# Patient Record
Sex: MALE | Race: WHITE | NOT HISPANIC OR LATINO | URBAN - METROPOLITAN AREA
[De-identification: names, ages, dates, MRNs, and addresses within clinical notes are randomized per-mention and may not be internally consistent; named-entity substitution may affect disease eponyms.]

---

## 2017-01-14 ASSESSMENT — MIFFLIN-ST. JEOR
SCORE: 1728
SCORE: 1780.66

## 2017-01-15 ENCOUNTER — SURGERY - HEALTHEAST (OUTPATIENT)
Dept: GASTROENTEROLOGY | Facility: HOSPITAL | Age: 52
End: 2017-01-15

## 2017-01-16 ASSESSMENT — MIFFLIN-ST. JEOR: SCORE: 1738

## 2017-01-17 ENCOUNTER — OFFICE VISIT - HEALTHEAST (OUTPATIENT)
Dept: BEHAVIORAL HEALTH | Facility: HOSPITAL | Age: 52
End: 2017-01-17

## 2017-01-17 DIAGNOSIS — F10.20 SEVERE ALCOHOL USE DISORDER (H): ICD-10-CM

## 2017-01-17 DIAGNOSIS — F43.12 CHRONIC POST-TRAUMATIC STRESS DISORDER (PTSD): ICD-10-CM

## 2017-01-17 DIAGNOSIS — F33.1 MAJOR DEPRESSIVE DISORDER, RECURRENT EPISODE, MODERATE (H): ICD-10-CM

## 2017-01-24 ENCOUNTER — OFFICE VISIT - HEALTHEAST (OUTPATIENT)
Dept: BEHAVIORAL HEALTH | Facility: HOSPITAL | Age: 52
End: 2017-01-24

## 2017-01-24 DIAGNOSIS — F10.20 SEVERE ALCOHOL USE DISORDER (H): ICD-10-CM

## 2017-01-24 DIAGNOSIS — F43.12 CHRONIC POST-TRAUMATIC STRESS DISORDER (PTSD): ICD-10-CM

## 2017-01-24 DIAGNOSIS — F33.1 MAJOR DEPRESSIVE DISORDER, RECURRENT EPISODE, MODERATE (H): ICD-10-CM

## 2017-01-31 ENCOUNTER — OFFICE VISIT - HEALTHEAST (OUTPATIENT)
Dept: BEHAVIORAL HEALTH | Facility: HOSPITAL | Age: 52
End: 2017-01-31

## 2017-01-31 ENCOUNTER — AMBULATORY - HEALTHEAST (OUTPATIENT)
Dept: BEHAVIORAL HEALTH | Facility: HOSPITAL | Age: 52
End: 2017-01-31

## 2017-01-31 DIAGNOSIS — F43.12 CHRONIC POST-TRAUMATIC STRESS DISORDER (PTSD): ICD-10-CM

## 2017-01-31 DIAGNOSIS — F33.1 MAJOR DEPRESSIVE DISORDER, RECURRENT EPISODE, MODERATE (H): ICD-10-CM

## 2017-01-31 DIAGNOSIS — F10.20 SEVERE ALCOHOL USE DISORDER (H): ICD-10-CM

## 2017-02-07 ENCOUNTER — OFFICE VISIT - HEALTHEAST (OUTPATIENT)
Dept: BEHAVIORAL HEALTH | Facility: HOSPITAL | Age: 52
End: 2017-02-07

## 2017-02-07 DIAGNOSIS — F33.1 MAJOR DEPRESSIVE DISORDER, RECURRENT EPISODE, MODERATE (H): ICD-10-CM

## 2017-02-07 DIAGNOSIS — F10.20 SEVERE ALCOHOL USE DISORDER (H): ICD-10-CM

## 2017-02-07 DIAGNOSIS — F43.12 CHRONIC POST-TRAUMATIC STRESS DISORDER (PTSD): ICD-10-CM

## 2017-02-14 ENCOUNTER — OFFICE VISIT - HEALTHEAST (OUTPATIENT)
Dept: BEHAVIORAL HEALTH | Facility: HOSPITAL | Age: 52
End: 2017-02-14

## 2017-02-14 DIAGNOSIS — F10.20 SEVERE ALCOHOL USE DISORDER (H): ICD-10-CM

## 2017-02-14 DIAGNOSIS — F33.1 MAJOR DEPRESSIVE DISORDER, RECURRENT EPISODE, MODERATE (H): ICD-10-CM

## 2017-02-14 DIAGNOSIS — F43.12 CHRONIC POST-TRAUMATIC STRESS DISORDER (PTSD): ICD-10-CM

## 2017-02-21 ENCOUNTER — OFFICE VISIT - HEALTHEAST (OUTPATIENT)
Dept: BEHAVIORAL HEALTH | Facility: HOSPITAL | Age: 52
End: 2017-02-21

## 2017-02-21 DIAGNOSIS — F10.20 SEVERE ALCOHOL USE DISORDER (H): ICD-10-CM

## 2017-02-21 DIAGNOSIS — F33.1 MAJOR DEPRESSIVE DISORDER, RECURRENT EPISODE, MODERATE (H): ICD-10-CM

## 2017-02-21 DIAGNOSIS — F43.12 CHRONIC POST-TRAUMATIC STRESS DISORDER (PTSD): ICD-10-CM

## 2017-02-28 ENCOUNTER — OFFICE VISIT - HEALTHEAST (OUTPATIENT)
Dept: BEHAVIORAL HEALTH | Facility: HOSPITAL | Age: 52
End: 2017-02-28

## 2017-02-28 DIAGNOSIS — F10.20 SEVERE ALCOHOL USE DISORDER (H): ICD-10-CM

## 2017-02-28 DIAGNOSIS — F33.1 MAJOR DEPRESSIVE DISORDER, RECURRENT EPISODE, MODERATE (H): ICD-10-CM

## 2017-02-28 DIAGNOSIS — F43.12 CHRONIC POST-TRAUMATIC STRESS DISORDER (PTSD): ICD-10-CM

## 2017-03-09 ENCOUNTER — OFFICE VISIT - HEALTHEAST (OUTPATIENT)
Dept: BEHAVIORAL HEALTH | Facility: HOSPITAL | Age: 52
End: 2017-03-09

## 2017-03-09 DIAGNOSIS — F10.20 SEVERE ALCOHOL USE DISORDER (H): ICD-10-CM

## 2017-03-09 DIAGNOSIS — F33.1 MAJOR DEPRESSIVE DISORDER, RECURRENT EPISODE, MODERATE (H): ICD-10-CM

## 2017-03-09 DIAGNOSIS — F43.12 CHRONIC POST-TRAUMATIC STRESS DISORDER (PTSD): ICD-10-CM

## 2017-03-16 ENCOUNTER — OFFICE VISIT - HEALTHEAST (OUTPATIENT)
Dept: BEHAVIORAL HEALTH | Facility: HOSPITAL | Age: 52
End: 2017-03-16

## 2017-03-16 DIAGNOSIS — F33.1 MAJOR DEPRESSIVE DISORDER, RECURRENT EPISODE, MODERATE (H): ICD-10-CM

## 2017-03-16 DIAGNOSIS — F10.20 SEVERE ALCOHOL USE DISORDER (H): ICD-10-CM

## 2017-03-16 DIAGNOSIS — F43.12 CHRONIC POST-TRAUMATIC STRESS DISORDER (PTSD): ICD-10-CM

## 2017-03-21 ENCOUNTER — OFFICE VISIT - HEALTHEAST (OUTPATIENT)
Dept: BEHAVIORAL HEALTH | Facility: HOSPITAL | Age: 52
End: 2017-03-21

## 2017-03-21 DIAGNOSIS — F43.12 CHRONIC POST-TRAUMATIC STRESS DISORDER (PTSD): ICD-10-CM

## 2017-03-21 DIAGNOSIS — F33.1 MAJOR DEPRESSIVE DISORDER, RECURRENT EPISODE, MODERATE (H): ICD-10-CM

## 2017-03-21 DIAGNOSIS — F10.20 SEVERE ALCOHOL USE DISORDER (H): ICD-10-CM

## 2017-03-27 ENCOUNTER — COMMUNICATION - HEALTHEAST (OUTPATIENT)
Dept: BEHAVIORAL HEALTH | Facility: HOSPITAL | Age: 52
End: 2017-03-27

## 2017-03-28 ENCOUNTER — OFFICE VISIT - HEALTHEAST (OUTPATIENT)
Dept: BEHAVIORAL HEALTH | Facility: HOSPITAL | Age: 52
End: 2017-03-28

## 2017-03-28 DIAGNOSIS — F43.12 CHRONIC POST-TRAUMATIC STRESS DISORDER (PTSD): ICD-10-CM

## 2017-03-28 DIAGNOSIS — F19.94 SUBSTANCE INDUCED MOOD DISORDER (H): ICD-10-CM

## 2017-03-28 DIAGNOSIS — F10.20 SEVERE ALCOHOL USE DISORDER (H): ICD-10-CM

## 2017-03-28 DIAGNOSIS — F43.10 PTSD (POST-TRAUMATIC STRESS DISORDER): ICD-10-CM

## 2017-03-28 DIAGNOSIS — F33.1 MAJOR DEPRESSIVE DISORDER, RECURRENT EPISODE, MODERATE (H): ICD-10-CM

## 2017-03-28 DIAGNOSIS — F41.9 ANXIETY DISORDER, UNSPECIFIED TYPE: ICD-10-CM

## 2017-04-06 ENCOUNTER — OFFICE VISIT - HEALTHEAST (OUTPATIENT)
Dept: BEHAVIORAL HEALTH | Facility: HOSPITAL | Age: 52
End: 2017-04-06

## 2017-04-06 DIAGNOSIS — F43.12 CHRONIC POST-TRAUMATIC STRESS DISORDER (PTSD): ICD-10-CM

## 2017-04-06 DIAGNOSIS — F10.20 SEVERE ALCOHOL USE DISORDER (H): ICD-10-CM

## 2017-04-06 DIAGNOSIS — F33.1 MAJOR DEPRESSIVE DISORDER, RECURRENT EPISODE, MODERATE (H): ICD-10-CM

## 2017-04-13 ENCOUNTER — OFFICE VISIT - HEALTHEAST (OUTPATIENT)
Dept: BEHAVIORAL HEALTH | Facility: HOSPITAL | Age: 52
End: 2017-04-13

## 2017-04-13 DIAGNOSIS — F10.20 SEVERE ALCOHOL USE DISORDER (H): ICD-10-CM

## 2017-04-13 DIAGNOSIS — F43.12 CHRONIC POST-TRAUMATIC STRESS DISORDER (PTSD): ICD-10-CM

## 2017-04-13 DIAGNOSIS — F33.1 MAJOR DEPRESSIVE DISORDER, RECURRENT EPISODE, MODERATE (H): ICD-10-CM

## 2017-04-20 ENCOUNTER — OFFICE VISIT - HEALTHEAST (OUTPATIENT)
Dept: BEHAVIORAL HEALTH | Facility: HOSPITAL | Age: 52
End: 2017-04-20

## 2017-04-20 DIAGNOSIS — F10.20 SEVERE ALCOHOL USE DISORDER (H): ICD-10-CM

## 2017-04-20 DIAGNOSIS — F33.1 MAJOR DEPRESSIVE DISORDER, RECURRENT EPISODE, MODERATE (H): ICD-10-CM

## 2017-04-20 DIAGNOSIS — F43.12 CHRONIC POST-TRAUMATIC STRESS DISORDER (PTSD): ICD-10-CM

## 2017-04-27 ENCOUNTER — OFFICE VISIT - HEALTHEAST (OUTPATIENT)
Dept: BEHAVIORAL HEALTH | Facility: HOSPITAL | Age: 52
End: 2017-04-27

## 2017-04-27 DIAGNOSIS — F43.10 POSTTRAUMATIC STRESS DISORDER: ICD-10-CM

## 2017-04-27 DIAGNOSIS — F43.12 POST-TRAUMATIC STRESS DISORDER, CHRONIC: ICD-10-CM

## 2017-04-27 DIAGNOSIS — F43.12 CHRONIC POST-TRAUMATIC STRESS DISORDER (PTSD): ICD-10-CM

## 2017-04-27 DIAGNOSIS — F10.20 SEVERE ALCOHOL USE DISORDER (H): ICD-10-CM

## 2017-04-27 DIAGNOSIS — F33.1 MAJOR DEPRESSIVE DISORDER, RECURRENT EPISODE, MODERATE (H): ICD-10-CM

## 2017-05-04 ENCOUNTER — OFFICE VISIT - HEALTHEAST (OUTPATIENT)
Dept: BEHAVIORAL HEALTH | Facility: HOSPITAL | Age: 52
End: 2017-05-04

## 2017-05-04 DIAGNOSIS — F33.1 MAJOR DEPRESSIVE DISORDER, RECURRENT EPISODE, MODERATE (H): ICD-10-CM

## 2017-05-04 DIAGNOSIS — F43.12 CHRONIC POST-TRAUMATIC STRESS DISORDER (PTSD): ICD-10-CM

## 2017-05-04 DIAGNOSIS — F10.20 SEVERE ALCOHOL USE DISORDER (H): ICD-10-CM

## 2017-05-11 ENCOUNTER — OFFICE VISIT - HEALTHEAST (OUTPATIENT)
Dept: BEHAVIORAL HEALTH | Facility: HOSPITAL | Age: 52
End: 2017-05-11

## 2017-05-11 DIAGNOSIS — F43.12 CHRONIC POST-TRAUMATIC STRESS DISORDER (PTSD): ICD-10-CM

## 2017-05-11 DIAGNOSIS — F33.1 MAJOR DEPRESSIVE DISORDER, RECURRENT EPISODE, MODERATE (H): ICD-10-CM

## 2017-05-11 DIAGNOSIS — F10.20 SEVERE ALCOHOL USE DISORDER (H): ICD-10-CM

## 2017-05-16 ENCOUNTER — OFFICE VISIT - HEALTHEAST (OUTPATIENT)
Dept: BEHAVIORAL HEALTH | Facility: HOSPITAL | Age: 52
End: 2017-05-16

## 2017-05-16 DIAGNOSIS — F10.20 SEVERE ALCOHOL USE DISORDER (H): ICD-10-CM

## 2017-05-16 DIAGNOSIS — F43.10 PTSD (POST-TRAUMATIC STRESS DISORDER): ICD-10-CM

## 2017-05-16 DIAGNOSIS — F43.12 CHRONIC POST-TRAUMATIC STRESS DISORDER (PTSD): ICD-10-CM

## 2017-05-16 DIAGNOSIS — F33.1 MAJOR DEPRESSIVE DISORDER, RECURRENT EPISODE, MODERATE (H): ICD-10-CM

## 2017-05-25 ENCOUNTER — COMMUNICATION - HEALTHEAST (OUTPATIENT)
Dept: BEHAVIORAL HEALTH | Facility: HOSPITAL | Age: 52
End: 2017-05-25

## 2017-06-01 ENCOUNTER — OFFICE VISIT - HEALTHEAST (OUTPATIENT)
Dept: BEHAVIORAL HEALTH | Facility: HOSPITAL | Age: 52
End: 2017-06-01

## 2017-06-01 DIAGNOSIS — F43.12 CHRONIC POST-TRAUMATIC STRESS DISORDER (PTSD): ICD-10-CM

## 2017-06-01 DIAGNOSIS — F33.1 MAJOR DEPRESSIVE DISORDER, RECURRENT EPISODE, MODERATE (H): ICD-10-CM

## 2017-06-01 DIAGNOSIS — F10.20 SEVERE ALCOHOL USE DISORDER (H): ICD-10-CM

## 2017-06-06 ENCOUNTER — COMMUNICATION - HEALTHEAST (OUTPATIENT)
Dept: BEHAVIORAL HEALTH | Facility: HOSPITAL | Age: 52
End: 2017-06-06

## 2017-06-06 DIAGNOSIS — F41.9 ANXIETY DISORDER, UNSPECIFIED TYPE: ICD-10-CM

## 2017-06-13 ENCOUNTER — OFFICE VISIT - HEALTHEAST (OUTPATIENT)
Dept: BEHAVIORAL HEALTH | Facility: HOSPITAL | Age: 52
End: 2017-06-13

## 2017-06-13 DIAGNOSIS — F33.1 MAJOR DEPRESSIVE DISORDER, RECURRENT EPISODE, MODERATE (H): ICD-10-CM

## 2017-06-13 DIAGNOSIS — F10.20 SEVERE ALCOHOL USE DISORDER (H): ICD-10-CM

## 2017-06-13 DIAGNOSIS — F43.12 CHRONIC POST-TRAUMATIC STRESS DISORDER (PTSD): ICD-10-CM

## 2017-06-23 ENCOUNTER — AMBULATORY - HEALTHEAST (OUTPATIENT)
Dept: BEHAVIORAL HEALTH | Facility: CLINIC | Age: 52
End: 2017-06-23

## 2017-06-27 ENCOUNTER — OFFICE VISIT - HEALTHEAST (OUTPATIENT)
Dept: BEHAVIORAL HEALTH | Facility: HOSPITAL | Age: 52
End: 2017-06-27

## 2017-06-27 DIAGNOSIS — F41.9 ANXIETY DISORDER, UNSPECIFIED TYPE: ICD-10-CM

## 2017-06-27 DIAGNOSIS — F43.10 POSTTRAUMATIC STRESS DISORDER: ICD-10-CM

## 2017-06-27 DIAGNOSIS — F10.20 SEVERE ALCOHOL USE DISORDER (H): ICD-10-CM

## 2017-06-27 ASSESSMENT — MIFFLIN-ST. JEOR: SCORE: 1738.93

## 2017-07-05 ENCOUNTER — RECORDS - HEALTHEAST (OUTPATIENT)
Dept: ADMINISTRATIVE | Facility: OTHER | Age: 52
End: 2017-07-05

## 2017-07-06 ENCOUNTER — OFFICE VISIT - HEALTHEAST (OUTPATIENT)
Dept: BEHAVIORAL HEALTH | Facility: HOSPITAL | Age: 52
End: 2017-07-06

## 2017-07-06 ENCOUNTER — AMBULATORY - HEALTHEAST (OUTPATIENT)
Dept: BEHAVIORAL HEALTH | Facility: HOSPITAL | Age: 52
End: 2017-07-06

## 2017-07-06 DIAGNOSIS — F10.20 SEVERE ALCOHOL USE DISORDER (H): ICD-10-CM

## 2017-07-06 DIAGNOSIS — F33.1 MAJOR DEPRESSIVE DISORDER, RECURRENT EPISODE, MODERATE (H): ICD-10-CM

## 2017-07-06 DIAGNOSIS — F43.10 PTSD (POST-TRAUMATIC STRESS DISORDER): ICD-10-CM

## 2017-07-10 ENCOUNTER — HOSPITAL ENCOUNTER (OUTPATIENT)
Dept: CT IMAGING | Facility: HOSPITAL | Age: 52
Discharge: HOME | End: 2017-07-10

## 2017-07-10 DIAGNOSIS — R51.9 HEADACHE, UNSPECIFIED HEADACHE TYPE: ICD-10-CM

## 2017-07-11 ENCOUNTER — COMMUNICATION - HEALTHEAST (OUTPATIENT)
Dept: BEHAVIORAL HEALTH | Facility: HOSPITAL | Age: 52
End: 2017-07-11

## 2017-07-11 ENCOUNTER — OFFICE VISIT - HEALTHEAST (OUTPATIENT)
Dept: BEHAVIORAL HEALTH | Facility: HOSPITAL | Age: 52
End: 2017-07-11

## 2017-07-11 DIAGNOSIS — F33.1 MAJOR DEPRESSIVE DISORDER, RECURRENT EPISODE, MODERATE (H): ICD-10-CM

## 2017-07-11 DIAGNOSIS — F10.20 SEVERE ALCOHOL USE DISORDER (H): ICD-10-CM

## 2017-07-11 DIAGNOSIS — F43.12 CHRONIC POST-TRAUMATIC STRESS DISORDER (PTSD): ICD-10-CM

## 2017-07-26 ENCOUNTER — COMMUNICATION - HEALTHEAST (OUTPATIENT)
Dept: BEHAVIORAL HEALTH | Facility: CLINIC | Age: 52
End: 2017-07-26

## 2017-08-03 ENCOUNTER — OFFICE VISIT - HEALTHEAST (OUTPATIENT)
Dept: BEHAVIORAL HEALTH | Facility: HOSPITAL | Age: 52
End: 2017-08-03

## 2017-08-03 DIAGNOSIS — F43.12 CHRONIC POST-TRAUMATIC STRESS DISORDER (PTSD): ICD-10-CM

## 2017-08-03 DIAGNOSIS — F33.1 MAJOR DEPRESSIVE DISORDER, RECURRENT EPISODE, MODERATE (H): ICD-10-CM

## 2017-08-03 DIAGNOSIS — F10.20 SEVERE ALCOHOL USE DISORDER (H): ICD-10-CM

## 2017-08-07 ENCOUNTER — COMMUNICATION - HEALTHEAST (OUTPATIENT)
Dept: BEHAVIORAL HEALTH | Facility: CLINIC | Age: 52
End: 2017-08-07

## 2017-08-08 ENCOUNTER — OFFICE VISIT - HEALTHEAST (OUTPATIENT)
Dept: BEHAVIORAL HEALTH | Facility: HOSPITAL | Age: 52
End: 2017-08-08

## 2017-08-08 ENCOUNTER — AMBULATORY - HEALTHEAST (OUTPATIENT)
Dept: BEHAVIORAL HEALTH | Facility: HOSPITAL | Age: 52
End: 2017-08-08

## 2017-08-08 DIAGNOSIS — F33.1 MAJOR DEPRESSIVE DISORDER, RECURRENT EPISODE, MODERATE (H): ICD-10-CM

## 2017-08-08 DIAGNOSIS — F10.20 SEVERE ALCOHOL USE DISORDER (H): ICD-10-CM

## 2017-08-08 DIAGNOSIS — F43.12 CHRONIC POST-TRAUMATIC STRESS DISORDER (PTSD): ICD-10-CM

## 2017-08-22 ENCOUNTER — OFFICE VISIT - HEALTHEAST (OUTPATIENT)
Dept: BEHAVIORAL HEALTH | Facility: HOSPITAL | Age: 52
End: 2017-08-22

## 2017-08-22 DIAGNOSIS — F41.9 ANXIETY DISORDER, UNSPECIFIED TYPE: ICD-10-CM

## 2017-08-22 DIAGNOSIS — F43.10 PTSD (POST-TRAUMATIC STRESS DISORDER): ICD-10-CM

## 2017-08-22 DIAGNOSIS — F10.20 SEVERE ALCOHOL USE DISORDER (H): ICD-10-CM

## 2017-08-22 ASSESSMENT — MIFFLIN-ST. JEOR: SCORE: 1796.99

## 2017-10-16 ENCOUNTER — AMBULATORY - HEALTHEAST (OUTPATIENT)
Dept: BEHAVIORAL HEALTH | Facility: HOSPITAL | Age: 52
End: 2017-10-16

## 2017-10-17 ENCOUNTER — COMMUNICATION - HEALTHEAST (OUTPATIENT)
Dept: BEHAVIORAL HEALTH | Facility: HOSPITAL | Age: 52
End: 2017-10-17

## 2017-10-24 ENCOUNTER — COMMUNICATION - HEALTHEAST (OUTPATIENT)
Dept: BEHAVIORAL HEALTH | Facility: CLINIC | Age: 52
End: 2017-10-24

## 2017-10-24 ENCOUNTER — OFFICE VISIT - HEALTHEAST (OUTPATIENT)
Dept: BEHAVIORAL HEALTH | Facility: HOSPITAL | Age: 52
End: 2017-10-24

## 2017-10-24 DIAGNOSIS — F43.10 PTSD (POST-TRAUMATIC STRESS DISORDER): ICD-10-CM

## 2017-10-24 DIAGNOSIS — F22 DELUSIONAL DISORDER (H): ICD-10-CM

## 2017-10-25 ENCOUNTER — COMMUNICATION - HEALTHEAST (OUTPATIENT)
Dept: BEHAVIORAL HEALTH | Facility: CLINIC | Age: 52
End: 2017-10-25

## 2017-11-03 ENCOUNTER — COMMUNICATION - HEALTHEAST (OUTPATIENT)
Dept: BEHAVIORAL HEALTH | Facility: HOSPITAL | Age: 52
End: 2017-11-03

## 2017-11-07 ENCOUNTER — OFFICE VISIT - HEALTHEAST (OUTPATIENT)
Dept: BEHAVIORAL HEALTH | Facility: HOSPITAL | Age: 52
End: 2017-11-07

## 2017-11-07 DIAGNOSIS — F33.1 MAJOR DEPRESSIVE DISORDER, RECURRENT EPISODE, MODERATE (H): ICD-10-CM

## 2017-11-07 DIAGNOSIS — F43.10 PTSD (POST-TRAUMATIC STRESS DISORDER): ICD-10-CM

## 2017-11-07 DIAGNOSIS — F20.9 SCHIZOPHRENIA, UNSPECIFIED TYPE (H): ICD-10-CM

## 2017-11-07 DIAGNOSIS — F10.20 SEVERE ALCOHOL USE DISORDER (H): ICD-10-CM

## 2017-11-07 DIAGNOSIS — F41.1 ANXIETY STATE: ICD-10-CM

## 2017-11-07 ASSESSMENT — MIFFLIN-ST. JEOR: SCORE: 1818.76

## 2017-11-14 ENCOUNTER — OFFICE VISIT - HEALTHEAST (OUTPATIENT)
Dept: BEHAVIORAL HEALTH | Facility: HOSPITAL | Age: 52
End: 2017-11-14

## 2017-11-14 DIAGNOSIS — F33.1 MAJOR DEPRESSIVE DISORDER, RECURRENT EPISODE, MODERATE (H): ICD-10-CM

## 2017-11-14 DIAGNOSIS — F43.10 PTSD (POST-TRAUMATIC STRESS DISORDER): ICD-10-CM

## 2017-11-14 DIAGNOSIS — F10.20 SEVERE ALCOHOL USE DISORDER (H): ICD-10-CM

## 2017-11-16 ENCOUNTER — AMBULATORY - HEALTHEAST (OUTPATIENT)
Dept: BEHAVIORAL HEALTH | Facility: CLINIC | Age: 52
End: 2017-11-16

## 2017-11-21 ENCOUNTER — OFFICE VISIT - HEALTHEAST (OUTPATIENT)
Dept: BEHAVIORAL HEALTH | Facility: HOSPITAL | Age: 52
End: 2017-11-21

## 2017-11-21 DIAGNOSIS — F43.10 PTSD (POST-TRAUMATIC STRESS DISORDER): ICD-10-CM

## 2017-11-21 DIAGNOSIS — F33.1 MAJOR DEPRESSIVE DISORDER, RECURRENT EPISODE, MODERATE (H): ICD-10-CM

## 2017-11-21 DIAGNOSIS — F10.20 SEVERE ALCOHOL USE DISORDER (H): ICD-10-CM

## 2017-11-28 ENCOUNTER — AMBULATORY - HEALTHEAST (OUTPATIENT)
Dept: BEHAVIORAL HEALTH | Facility: CLINIC | Age: 52
End: 2017-11-28

## 2017-11-28 ENCOUNTER — OFFICE VISIT - HEALTHEAST (OUTPATIENT)
Dept: BEHAVIORAL HEALTH | Facility: HOSPITAL | Age: 52
End: 2017-11-28

## 2017-11-28 DIAGNOSIS — F10.20 SEVERE ALCOHOL USE DISORDER (H): ICD-10-CM

## 2017-11-28 DIAGNOSIS — F43.10 PTSD (POST-TRAUMATIC STRESS DISORDER): ICD-10-CM

## 2017-11-28 DIAGNOSIS — F20.9 SCHIZOPHRENIA, UNSPECIFIED TYPE (H): ICD-10-CM

## 2017-11-28 ASSESSMENT — MIFFLIN-ST. JEOR: SCORE: 1798.8

## 2017-12-05 ENCOUNTER — OFFICE VISIT - HEALTHEAST (OUTPATIENT)
Dept: BEHAVIORAL HEALTH | Facility: HOSPITAL | Age: 52
End: 2017-12-05

## 2017-12-05 DIAGNOSIS — F43.10 PTSD (POST-TRAUMATIC STRESS DISORDER): ICD-10-CM

## 2017-12-05 DIAGNOSIS — F33.1 MAJOR DEPRESSIVE DISORDER, RECURRENT EPISODE, MODERATE (H): ICD-10-CM

## 2017-12-05 DIAGNOSIS — F10.20 SEVERE ALCOHOL USE DISORDER (H): ICD-10-CM

## 2017-12-12 ENCOUNTER — COMMUNICATION - HEALTHEAST (OUTPATIENT)
Dept: BEHAVIORAL HEALTH | Facility: HOSPITAL | Age: 52
End: 2017-12-12

## 2017-12-12 ENCOUNTER — OFFICE VISIT - HEALTHEAST (OUTPATIENT)
Dept: BEHAVIORAL HEALTH | Facility: HOSPITAL | Age: 52
End: 2017-12-12

## 2017-12-12 DIAGNOSIS — F20.0 PARANOID SCHIZOPHRENIA (H): ICD-10-CM

## 2017-12-12 ASSESSMENT — MIFFLIN-ST. JEOR: SCORE: 1755.26

## 2017-12-19 ENCOUNTER — OFFICE VISIT - HEALTHEAST (OUTPATIENT)
Dept: BEHAVIORAL HEALTH | Facility: HOSPITAL | Age: 52
End: 2017-12-19

## 2017-12-19 DIAGNOSIS — F20.0 PARANOID SCHIZOPHRENIA (H): ICD-10-CM

## 2017-12-19 DIAGNOSIS — F43.10 PTSD (POST-TRAUMATIC STRESS DISORDER): ICD-10-CM

## 2018-01-09 ENCOUNTER — OFFICE VISIT - HEALTHEAST (OUTPATIENT)
Dept: BEHAVIORAL HEALTH | Facility: HOSPITAL | Age: 53
End: 2018-01-09

## 2018-01-09 DIAGNOSIS — F10.20 SEVERE ALCOHOL USE DISORDER (H): ICD-10-CM

## 2018-01-09 DIAGNOSIS — F43.10 PTSD (POST-TRAUMATIC STRESS DISORDER): ICD-10-CM

## 2018-01-09 DIAGNOSIS — G47.00 INSOMNIA: ICD-10-CM

## 2018-01-09 DIAGNOSIS — F20.0 PARANOID SCHIZOPHRENIA (H): ICD-10-CM

## 2018-01-09 DIAGNOSIS — F41.1 ANXIETY STATE: ICD-10-CM

## 2018-01-09 DIAGNOSIS — F22 DELUSIONAL DISORDER (H): ICD-10-CM

## 2018-01-09 DIAGNOSIS — F33.1 MAJOR DEPRESSIVE DISORDER, RECURRENT EPISODE, MODERATE (H): ICD-10-CM

## 2018-01-09 ASSESSMENT — MIFFLIN-ST. JEOR: SCORE: 1807.87

## 2018-01-16 ENCOUNTER — OFFICE VISIT - HEALTHEAST (OUTPATIENT)
Dept: BEHAVIORAL HEALTH | Facility: HOSPITAL | Age: 53
End: 2018-01-16

## 2018-01-16 DIAGNOSIS — F10.20 SEVERE ALCOHOL USE DISORDER (H): ICD-10-CM

## 2018-01-16 DIAGNOSIS — F33.1 MAJOR DEPRESSIVE DISORDER, RECURRENT EPISODE, MODERATE (H): ICD-10-CM

## 2018-01-16 DIAGNOSIS — F43.10 PTSD (POST-TRAUMATIC STRESS DISORDER): ICD-10-CM

## 2018-01-17 ENCOUNTER — COMMUNICATION - HEALTHEAST (OUTPATIENT)
Dept: BEHAVIORAL HEALTH | Facility: CLINIC | Age: 53
End: 2018-01-17

## 2018-01-17 DIAGNOSIS — F43.10 PTSD (POST-TRAUMATIC STRESS DISORDER): ICD-10-CM

## 2018-01-23 ENCOUNTER — OFFICE VISIT - HEALTHEAST (OUTPATIENT)
Dept: BEHAVIORAL HEALTH | Facility: HOSPITAL | Age: 53
End: 2018-01-23

## 2018-01-23 DIAGNOSIS — F43.10 PTSD (POST-TRAUMATIC STRESS DISORDER): ICD-10-CM

## 2018-01-23 DIAGNOSIS — F10.20 SEVERE ALCOHOL USE DISORDER (H): ICD-10-CM

## 2018-01-23 DIAGNOSIS — F33.1 MAJOR DEPRESSIVE DISORDER, RECURRENT EPISODE, MODERATE (H): ICD-10-CM

## 2018-01-30 ENCOUNTER — OFFICE VISIT - HEALTHEAST (OUTPATIENT)
Dept: BEHAVIORAL HEALTH | Facility: HOSPITAL | Age: 53
End: 2018-01-30

## 2018-01-30 DIAGNOSIS — F10.20 SEVERE ALCOHOL USE DISORDER (H): ICD-10-CM

## 2018-01-30 DIAGNOSIS — F43.10 PTSD (POST-TRAUMATIC STRESS DISORDER): ICD-10-CM

## 2018-01-30 DIAGNOSIS — F33.1 MAJOR DEPRESSIVE DISORDER, RECURRENT EPISODE, MODERATE (H): ICD-10-CM

## 2018-02-06 ENCOUNTER — OFFICE VISIT - HEALTHEAST (OUTPATIENT)
Dept: BEHAVIORAL HEALTH | Facility: HOSPITAL | Age: 53
End: 2018-02-06

## 2018-02-06 DIAGNOSIS — F33.1 MAJOR DEPRESSIVE DISORDER, RECURRENT EPISODE, MODERATE (H): ICD-10-CM

## 2018-02-06 DIAGNOSIS — F10.20 SEVERE ALCOHOL USE DISORDER (H): ICD-10-CM

## 2018-02-06 DIAGNOSIS — F43.10 PTSD (POST-TRAUMATIC STRESS DISORDER): ICD-10-CM

## 2018-02-13 ENCOUNTER — OFFICE VISIT - HEALTHEAST (OUTPATIENT)
Dept: RHEUMATOLOGY | Facility: CLINIC | Age: 53
End: 2018-02-13

## 2018-02-13 DIAGNOSIS — R76.8 ANA POSITIVE: ICD-10-CM

## 2018-02-13 DIAGNOSIS — M25.50 POLYARTHRALGIA: ICD-10-CM

## 2018-02-13 DIAGNOSIS — M47.812 CERVICAL SPONDYLOSIS: ICD-10-CM

## 2018-02-13 LAB — CCP AB SER IA-ACNC: 0.9 U/ML

## 2018-02-13 ASSESSMENT — MIFFLIN-ST. JEOR: SCORE: 1807.87

## 2018-02-14 LAB — HCV AB SERPL QL IA: NEGATIVE

## 2018-02-21 ENCOUNTER — COMMUNICATION - HEALTHEAST (OUTPATIENT)
Dept: BEHAVIORAL HEALTH | Facility: CLINIC | Age: 53
End: 2018-02-21

## 2018-02-21 DIAGNOSIS — G47.00 INSOMNIA: ICD-10-CM

## 2018-02-22 ENCOUNTER — AMBULATORY - HEALTHEAST (OUTPATIENT)
Dept: BEHAVIORAL HEALTH | Facility: CLINIC | Age: 53
End: 2018-02-22

## 2018-02-22 ENCOUNTER — OFFICE VISIT - HEALTHEAST (OUTPATIENT)
Dept: RHEUMATOLOGY | Facility: CLINIC | Age: 53
End: 2018-02-22

## 2018-02-22 DIAGNOSIS — M19.071 ARTHRITIS OF RIGHT ANKLE: ICD-10-CM

## 2018-02-22 DIAGNOSIS — M10.9 GOUTY ARTHRITIS OF TOE OF LEFT FOOT: ICD-10-CM

## 2018-02-22 DIAGNOSIS — M19.90 INFLAMMATORY ARTHRITIS: ICD-10-CM

## 2018-02-22 ASSESSMENT — MIFFLIN-ST. JEOR: SCORE: 1807.87

## 2018-02-23 ENCOUNTER — COMMUNICATION - HEALTHEAST (OUTPATIENT)
Dept: LAB | Facility: CLINIC | Age: 53
End: 2018-02-23

## 2018-02-27 ENCOUNTER — OFFICE VISIT - HEALTHEAST (OUTPATIENT)
Dept: BEHAVIORAL HEALTH | Facility: HOSPITAL | Age: 53
End: 2018-02-27

## 2018-02-27 ENCOUNTER — AMBULATORY - HEALTHEAST (OUTPATIENT)
Dept: BEHAVIORAL HEALTH | Facility: CLINIC | Age: 53
End: 2018-02-27

## 2018-02-27 DIAGNOSIS — F10.20 SEVERE ALCOHOL USE DISORDER (H): ICD-10-CM

## 2018-02-27 DIAGNOSIS — F43.10 PTSD (POST-TRAUMATIC STRESS DISORDER): ICD-10-CM

## 2018-02-27 DIAGNOSIS — G47.09 OTHER INSOMNIA: ICD-10-CM

## 2018-02-27 DIAGNOSIS — F22 DELUSIONAL DISORDER (H): ICD-10-CM

## 2018-02-27 DIAGNOSIS — F33.1 MAJOR DEPRESSIVE DISORDER, RECURRENT EPISODE, MODERATE (H): ICD-10-CM

## 2018-02-28 ENCOUNTER — AMBULATORY - HEALTHEAST (OUTPATIENT)
Dept: LAB | Facility: CLINIC | Age: 53
End: 2018-02-28

## 2018-02-28 DIAGNOSIS — M10.9 GOUTY ARTHRITIS OF TOE OF LEFT FOOT: ICD-10-CM

## 2018-02-28 DIAGNOSIS — M19.90 INFLAMMATORY ARTHRITIS: ICD-10-CM

## 2018-02-28 LAB
ALT SERPL W P-5'-P-CCNC: 41 U/L (ref 0–45)
CREAT SERPL-MCNC: 0.72 MG/DL (ref 0.7–1.3)
ERYTHROCYTE [DISTWIDTH] IN BLOOD BY AUTOMATED COUNT: 17.5 % (ref 11–14.5)
GFR SERPL CREATININE-BSD FRML MDRD: >60 ML/MIN/1.73M2
HCT VFR BLD AUTO: 36.3 % (ref 40–54)
HGB BLD-MCNC: 11.7 G/DL (ref 14–18)
MCH RBC QN AUTO: 26.2 PG (ref 27–34)
MCHC RBC AUTO-ENTMCNC: 32.2 G/DL (ref 32–36)
MCV RBC AUTO: 81 FL (ref 80–100)
PLATELET # BLD AUTO: 346 THOU/UL (ref 140–440)
PMV BLD AUTO: 6.7 FL (ref 7–10)
RBC # BLD AUTO: 4.48 MILL/UL (ref 4.4–6.2)
URATE SERPL-MCNC: 4.8 MG/DL (ref 3–8)
WBC: 8.6 THOU/UL (ref 4–11)

## 2018-03-06 ENCOUNTER — OFFICE VISIT - HEALTHEAST (OUTPATIENT)
Dept: BEHAVIORAL HEALTH | Facility: HOSPITAL | Age: 53
End: 2018-03-06

## 2018-03-06 DIAGNOSIS — F33.1 MAJOR DEPRESSIVE DISORDER, RECURRENT EPISODE, MODERATE (H): ICD-10-CM

## 2018-03-06 DIAGNOSIS — F10.20 SEVERE ALCOHOL USE DISORDER (H): ICD-10-CM

## 2018-03-06 DIAGNOSIS — F43.10 PTSD (POST-TRAUMATIC STRESS DISORDER): ICD-10-CM

## 2018-03-13 ENCOUNTER — OFFICE VISIT - HEALTHEAST (OUTPATIENT)
Dept: BEHAVIORAL HEALTH | Facility: HOSPITAL | Age: 53
End: 2018-03-13

## 2018-03-13 ENCOUNTER — AMBULATORY - HEALTHEAST (OUTPATIENT)
Dept: BEHAVIORAL HEALTH | Facility: CLINIC | Age: 53
End: 2018-03-13

## 2018-03-13 DIAGNOSIS — F33.1 MAJOR DEPRESSIVE DISORDER, RECURRENT EPISODE, MODERATE (H): ICD-10-CM

## 2018-03-13 DIAGNOSIS — F10.20 SEVERE ALCOHOL USE DISORDER (H): ICD-10-CM

## 2018-03-13 DIAGNOSIS — F43.10 PTSD (POST-TRAUMATIC STRESS DISORDER): ICD-10-CM

## 2018-03-20 ENCOUNTER — AMBULATORY - HEALTHEAST (OUTPATIENT)
Dept: RHEUMATOLOGY | Facility: CLINIC | Age: 53
End: 2018-03-20

## 2018-03-20 DIAGNOSIS — M47.812 CERVICAL SPONDYLOSIS: ICD-10-CM

## 2018-03-20 DIAGNOSIS — M10.9 GOUTY ARTHRITIS OF TOE OF LEFT FOOT: ICD-10-CM

## 2018-03-20 DIAGNOSIS — M19.90 INFLAMMATORY ARTHRITIS: ICD-10-CM

## 2018-03-20 DIAGNOSIS — M19.071 ARTHRITIS OF RIGHT ANKLE: ICD-10-CM

## 2018-03-21 ENCOUNTER — COMMUNICATION - HEALTHEAST (OUTPATIENT)
Dept: BEHAVIORAL HEALTH | Facility: CLINIC | Age: 53
End: 2018-03-21

## 2018-03-27 ENCOUNTER — OFFICE VISIT - HEALTHEAST (OUTPATIENT)
Dept: BEHAVIORAL HEALTH | Facility: HOSPITAL | Age: 53
End: 2018-03-27

## 2018-03-27 DIAGNOSIS — F33.1 MAJOR DEPRESSIVE DISORDER, RECURRENT EPISODE, MODERATE (H): ICD-10-CM

## 2018-03-27 DIAGNOSIS — F10.20 SEVERE ALCOHOL USE DISORDER (H): ICD-10-CM

## 2018-03-27 DIAGNOSIS — F43.10 PTSD (POST-TRAUMATIC STRESS DISORDER): ICD-10-CM

## 2018-04-03 ENCOUNTER — OFFICE VISIT - HEALTHEAST (OUTPATIENT)
Dept: BEHAVIORAL HEALTH | Facility: HOSPITAL | Age: 53
End: 2018-04-03

## 2018-04-03 DIAGNOSIS — F43.10 PTSD (POST-TRAUMATIC STRESS DISORDER): ICD-10-CM

## 2018-04-03 DIAGNOSIS — F33.1 MAJOR DEPRESSIVE DISORDER, RECURRENT EPISODE, MODERATE (H): ICD-10-CM

## 2018-04-03 DIAGNOSIS — F10.20 SEVERE ALCOHOL USE DISORDER (H): ICD-10-CM

## 2018-04-10 ENCOUNTER — OFFICE VISIT - HEALTHEAST (OUTPATIENT)
Dept: BEHAVIORAL HEALTH | Facility: HOSPITAL | Age: 53
End: 2018-04-10

## 2018-04-10 DIAGNOSIS — F10.20 SEVERE ALCOHOL USE DISORDER (H): ICD-10-CM

## 2018-04-10 DIAGNOSIS — F43.10 PTSD (POST-TRAUMATIC STRESS DISORDER): ICD-10-CM

## 2018-04-10 DIAGNOSIS — F33.1 MAJOR DEPRESSIVE DISORDER, RECURRENT EPISODE, MODERATE (H): ICD-10-CM

## 2018-04-17 ENCOUNTER — OFFICE VISIT - HEALTHEAST (OUTPATIENT)
Dept: BEHAVIORAL HEALTH | Facility: HOSPITAL | Age: 53
End: 2018-04-17

## 2018-04-17 DIAGNOSIS — F43.10 PTSD (POST-TRAUMATIC STRESS DISORDER): ICD-10-CM

## 2018-04-17 DIAGNOSIS — F10.20 SEVERE ALCOHOL USE DISORDER (H): ICD-10-CM

## 2018-04-17 DIAGNOSIS — F33.1 MAJOR DEPRESSIVE DISORDER, RECURRENT EPISODE, MODERATE (H): ICD-10-CM

## 2018-04-24 ENCOUNTER — OFFICE VISIT - HEALTHEAST (OUTPATIENT)
Dept: BEHAVIORAL HEALTH | Facility: HOSPITAL | Age: 53
End: 2018-04-24

## 2018-04-24 ENCOUNTER — COMMUNICATION - HEALTHEAST (OUTPATIENT)
Dept: BEHAVIORAL HEALTH | Facility: CLINIC | Age: 53
End: 2018-04-24

## 2018-04-24 DIAGNOSIS — F10.20 SEVERE ALCOHOL USE DISORDER (H): ICD-10-CM

## 2018-04-24 DIAGNOSIS — F43.10 PTSD (POST-TRAUMATIC STRESS DISORDER): ICD-10-CM

## 2018-04-24 DIAGNOSIS — F33.1 MAJOR DEPRESSIVE DISORDER, RECURRENT EPISODE, MODERATE (H): ICD-10-CM

## 2018-04-27 ENCOUNTER — OFFICE VISIT - HEALTHEAST (OUTPATIENT)
Dept: BEHAVIORAL HEALTH | Facility: HOSPITAL | Age: 53
End: 2018-04-27

## 2018-04-27 DIAGNOSIS — F43.10 PTSD (POST-TRAUMATIC STRESS DISORDER): ICD-10-CM

## 2018-04-27 DIAGNOSIS — F22 DELUSIONAL DISORDER (H): ICD-10-CM

## 2018-04-27 DIAGNOSIS — G47.09 OTHER INSOMNIA: ICD-10-CM

## 2018-05-01 ENCOUNTER — AMBULATORY - HEALTHEAST (OUTPATIENT)
Dept: LAB | Facility: CLINIC | Age: 53
End: 2018-05-01

## 2018-05-01 ENCOUNTER — OFFICE VISIT - HEALTHEAST (OUTPATIENT)
Dept: BEHAVIORAL HEALTH | Facility: HOSPITAL | Age: 53
End: 2018-05-01

## 2018-05-01 DIAGNOSIS — F10.20 SEVERE ALCOHOL USE DISORDER (H): ICD-10-CM

## 2018-05-01 DIAGNOSIS — M19.071 ARTHRITIS OF RIGHT ANKLE: ICD-10-CM

## 2018-05-01 DIAGNOSIS — M10.9 GOUTY ARTHRITIS OF TOE OF LEFT FOOT: ICD-10-CM

## 2018-05-01 DIAGNOSIS — F43.10 PTSD (POST-TRAUMATIC STRESS DISORDER): ICD-10-CM

## 2018-05-01 DIAGNOSIS — M47.812 CERVICAL SPONDYLOSIS: ICD-10-CM

## 2018-05-01 DIAGNOSIS — F33.1 MAJOR DEPRESSIVE DISORDER, RECURRENT EPISODE, MODERATE (H): ICD-10-CM

## 2018-05-01 DIAGNOSIS — M19.90 INFLAMMATORY ARTHRITIS: ICD-10-CM

## 2018-05-01 LAB
ALBUMIN SERPL-MCNC: 3.6 G/DL (ref 3.5–5)
ALT SERPL W P-5'-P-CCNC: 18 U/L (ref 0–45)
CREAT SERPL-MCNC: 0.8 MG/DL (ref 0.7–1.3)
ERYTHROCYTE [DISTWIDTH] IN BLOOD BY AUTOMATED COUNT: 16.3 % (ref 11–14.5)
GFR SERPL CREATININE-BSD FRML MDRD: >60 ML/MIN/1.73M2
HCT VFR BLD AUTO: 39.7 % (ref 40–54)
HGB BLD-MCNC: 12.5 G/DL (ref 14–18)
MCH RBC QN AUTO: 23.2 PG (ref 27–34)
MCHC RBC AUTO-ENTMCNC: 31.5 G/DL (ref 32–36)
MCV RBC AUTO: 73 FL (ref 80–100)
PLATELET # BLD AUTO: 320 THOU/UL (ref 140–440)
PMV BLD AUTO: 7.2 FL (ref 7–10)
RBC # BLD AUTO: 5.4 MILL/UL (ref 4.4–6.2)
URATE SERPL-MCNC: 6.8 MG/DL (ref 3–8)
WBC: 6.4 THOU/UL (ref 4–11)

## 2018-05-03 ENCOUNTER — OFFICE VISIT - HEALTHEAST (OUTPATIENT)
Dept: RHEUMATOLOGY | Facility: CLINIC | Age: 53
End: 2018-05-03

## 2018-05-03 DIAGNOSIS — M10.9 GOUTY ARTHRITIS OF TOE OF LEFT FOOT: ICD-10-CM

## 2018-05-03 DIAGNOSIS — R76.8 ANA POSITIVE: ICD-10-CM

## 2018-05-03 DIAGNOSIS — M15.0 PRIMARY OSTEOARTHRITIS INVOLVING MULTIPLE JOINTS: ICD-10-CM

## 2018-05-08 ENCOUNTER — OFFICE VISIT - HEALTHEAST (OUTPATIENT)
Dept: BEHAVIORAL HEALTH | Facility: HOSPITAL | Age: 53
End: 2018-05-08

## 2018-05-08 DIAGNOSIS — F43.10 PTSD (POST-TRAUMATIC STRESS DISORDER): ICD-10-CM

## 2018-05-08 DIAGNOSIS — F33.1 MAJOR DEPRESSIVE DISORDER, RECURRENT EPISODE, MODERATE (H): ICD-10-CM

## 2018-05-08 DIAGNOSIS — F10.20 SEVERE ALCOHOL USE DISORDER (H): ICD-10-CM

## 2018-05-15 ENCOUNTER — OFFICE VISIT - HEALTHEAST (OUTPATIENT)
Dept: BEHAVIORAL HEALTH | Facility: HOSPITAL | Age: 53
End: 2018-05-15

## 2018-05-15 DIAGNOSIS — F43.10 PTSD (POST-TRAUMATIC STRESS DISORDER): ICD-10-CM

## 2018-05-15 DIAGNOSIS — F33.1 MAJOR DEPRESSIVE DISORDER, RECURRENT EPISODE, MODERATE (H): ICD-10-CM

## 2018-05-15 DIAGNOSIS — F10.20 SEVERE ALCOHOL USE DISORDER (H): ICD-10-CM

## 2018-05-22 ENCOUNTER — OFFICE VISIT - HEALTHEAST (OUTPATIENT)
Dept: BEHAVIORAL HEALTH | Facility: HOSPITAL | Age: 53
End: 2018-05-22

## 2018-05-22 DIAGNOSIS — F10.20 SEVERE ALCOHOL USE DISORDER (H): ICD-10-CM

## 2018-05-22 DIAGNOSIS — F33.1 MAJOR DEPRESSIVE DISORDER, RECURRENT EPISODE, MODERATE (H): ICD-10-CM

## 2018-05-22 DIAGNOSIS — F43.10 PTSD (POST-TRAUMATIC STRESS DISORDER): ICD-10-CM

## 2018-06-05 ENCOUNTER — OFFICE VISIT - HEALTHEAST (OUTPATIENT)
Dept: BEHAVIORAL HEALTH | Facility: HOSPITAL | Age: 53
End: 2018-06-05

## 2018-06-05 DIAGNOSIS — F43.10 PTSD (POST-TRAUMATIC STRESS DISORDER): ICD-10-CM

## 2018-06-05 DIAGNOSIS — F10.20 SEVERE ALCOHOL USE DISORDER (H): ICD-10-CM

## 2018-06-05 DIAGNOSIS — F33.1 MAJOR DEPRESSIVE DISORDER, RECURRENT EPISODE, MODERATE (H): ICD-10-CM

## 2018-06-12 ENCOUNTER — COMMUNICATION - HEALTHEAST (OUTPATIENT)
Dept: BEHAVIORAL HEALTH | Facility: CLINIC | Age: 53
End: 2018-06-12

## 2018-06-12 ENCOUNTER — OFFICE VISIT - HEALTHEAST (OUTPATIENT)
Dept: BEHAVIORAL HEALTH | Facility: HOSPITAL | Age: 53
End: 2018-06-12

## 2018-06-12 ENCOUNTER — AMBULATORY - HEALTHEAST (OUTPATIENT)
Dept: BEHAVIORAL HEALTH | Facility: CLINIC | Age: 53
End: 2018-06-12

## 2018-06-12 DIAGNOSIS — F10.20 SEVERE ALCOHOL USE DISORDER (H): ICD-10-CM

## 2018-06-12 DIAGNOSIS — G47.09 OTHER INSOMNIA: ICD-10-CM

## 2018-06-12 DIAGNOSIS — F33.1 MAJOR DEPRESSIVE DISORDER, RECURRENT EPISODE, MODERATE (H): ICD-10-CM

## 2018-06-12 DIAGNOSIS — F43.10 PTSD (POST-TRAUMATIC STRESS DISORDER): ICD-10-CM

## 2018-06-19 ENCOUNTER — OFFICE VISIT - HEALTHEAST (OUTPATIENT)
Dept: BEHAVIORAL HEALTH | Facility: HOSPITAL | Age: 53
End: 2018-06-19

## 2018-06-19 DIAGNOSIS — F33.1 MAJOR DEPRESSIVE DISORDER, RECURRENT EPISODE, MODERATE (H): ICD-10-CM

## 2018-06-19 DIAGNOSIS — F10.20 SEVERE ALCOHOL USE DISORDER (H): ICD-10-CM

## 2018-06-19 DIAGNOSIS — F43.10 PTSD (POST-TRAUMATIC STRESS DISORDER): ICD-10-CM

## 2018-06-22 ENCOUNTER — COMMUNICATION - HEALTHEAST (OUTPATIENT)
Dept: BEHAVIORAL HEALTH | Facility: CLINIC | Age: 53
End: 2018-06-22

## 2018-06-22 DIAGNOSIS — F22 DELUSIONAL DISORDER (H): ICD-10-CM

## 2018-06-26 ENCOUNTER — OFFICE VISIT - HEALTHEAST (OUTPATIENT)
Dept: BEHAVIORAL HEALTH | Facility: HOSPITAL | Age: 53
End: 2018-06-26

## 2018-06-26 DIAGNOSIS — F10.20 SEVERE ALCOHOL USE DISORDER (H): ICD-10-CM

## 2018-06-26 DIAGNOSIS — F33.1 MAJOR DEPRESSIVE DISORDER, RECURRENT EPISODE, MODERATE (H): ICD-10-CM

## 2018-06-26 DIAGNOSIS — F43.10 PTSD (POST-TRAUMATIC STRESS DISORDER): ICD-10-CM

## 2018-07-03 ENCOUNTER — COMMUNICATION - HEALTHEAST (OUTPATIENT)
Dept: BEHAVIORAL HEALTH | Facility: CLINIC | Age: 53
End: 2018-07-03

## 2018-07-03 DIAGNOSIS — F43.10 PTSD (POST-TRAUMATIC STRESS DISORDER): ICD-10-CM

## 2018-07-10 ENCOUNTER — OFFICE VISIT - HEALTHEAST (OUTPATIENT)
Dept: BEHAVIORAL HEALTH | Facility: HOSPITAL | Age: 53
End: 2018-07-10

## 2018-07-10 ENCOUNTER — COMMUNICATION - HEALTHEAST (OUTPATIENT)
Dept: BEHAVIORAL HEALTH | Facility: CLINIC | Age: 53
End: 2018-07-10

## 2018-07-10 DIAGNOSIS — F10.20 SEVERE ALCOHOL USE DISORDER (H): ICD-10-CM

## 2018-07-10 DIAGNOSIS — F43.10 PTSD (POST-TRAUMATIC STRESS DISORDER): ICD-10-CM

## 2018-07-10 DIAGNOSIS — G47.09 OTHER INSOMNIA: ICD-10-CM

## 2018-07-10 DIAGNOSIS — F33.1 MAJOR DEPRESSIVE DISORDER, RECURRENT EPISODE, MODERATE (H): ICD-10-CM

## 2018-07-13 ENCOUNTER — OFFICE VISIT - HEALTHEAST (OUTPATIENT)
Dept: BEHAVIORAL HEALTH | Facility: HOSPITAL | Age: 53
End: 2018-07-13

## 2018-07-13 DIAGNOSIS — F22 DELUSIONAL DISORDER (H): ICD-10-CM

## 2018-07-13 DIAGNOSIS — F43.10 PTSD (POST-TRAUMATIC STRESS DISORDER): ICD-10-CM

## 2018-07-13 DIAGNOSIS — G47.09 OTHER INSOMNIA: ICD-10-CM

## 2018-07-17 ENCOUNTER — OFFICE VISIT - HEALTHEAST (OUTPATIENT)
Dept: BEHAVIORAL HEALTH | Facility: HOSPITAL | Age: 53
End: 2018-07-17

## 2018-07-17 ENCOUNTER — COMMUNICATION - HEALTHEAST (OUTPATIENT)
Dept: BEHAVIORAL HEALTH | Facility: HOSPITAL | Age: 53
End: 2018-07-17

## 2018-07-17 DIAGNOSIS — F22 DELUSIONAL DISORDER (H): ICD-10-CM

## 2018-07-17 DIAGNOSIS — F43.10 PTSD (POST-TRAUMATIC STRESS DISORDER): ICD-10-CM

## 2018-07-17 DIAGNOSIS — F33.1 MAJOR DEPRESSIVE DISORDER, RECURRENT EPISODE, MODERATE (H): ICD-10-CM

## 2018-08-01 ENCOUNTER — RECORDS - HEALTHEAST (OUTPATIENT)
Dept: ADMINISTRATIVE | Facility: OTHER | Age: 53
End: 2018-08-01

## 2018-08-04 ENCOUNTER — HOSPITAL ENCOUNTER (OUTPATIENT)
Dept: CT IMAGING | Facility: HOSPITAL | Age: 53
Discharge: HOME | End: 2018-08-04

## 2018-08-04 DIAGNOSIS — I26.99 PULMONARY EMBOLUS (H): ICD-10-CM

## 2018-08-07 ENCOUNTER — OFFICE VISIT - HEALTHEAST (OUTPATIENT)
Dept: BEHAVIORAL HEALTH | Facility: HOSPITAL | Age: 53
End: 2018-08-07

## 2018-08-07 DIAGNOSIS — F33.1 MAJOR DEPRESSIVE DISORDER, RECURRENT EPISODE, MODERATE (H): ICD-10-CM

## 2018-08-07 DIAGNOSIS — F43.10 PTSD (POST-TRAUMATIC STRESS DISORDER): ICD-10-CM

## 2018-08-07 DIAGNOSIS — F10.20 SEVERE ALCOHOL USE DISORDER (H): ICD-10-CM

## 2018-08-14 ENCOUNTER — OFFICE VISIT - HEALTHEAST (OUTPATIENT)
Dept: BEHAVIORAL HEALTH | Facility: HOSPITAL | Age: 53
End: 2018-08-14

## 2018-08-14 DIAGNOSIS — F10.20 SEVERE ALCOHOL USE DISORDER (H): ICD-10-CM

## 2018-08-14 DIAGNOSIS — F33.1 MAJOR DEPRESSIVE DISORDER, RECURRENT EPISODE, MODERATE (H): ICD-10-CM

## 2018-08-14 DIAGNOSIS — F43.10 PTSD (POST-TRAUMATIC STRESS DISORDER): ICD-10-CM

## 2018-08-16 ENCOUNTER — COMMUNICATION - HEALTHEAST (OUTPATIENT)
Dept: BEHAVIORAL HEALTH | Facility: CLINIC | Age: 53
End: 2018-08-16

## 2018-08-16 DIAGNOSIS — F43.10 PTSD (POST-TRAUMATIC STRESS DISORDER): ICD-10-CM

## 2018-08-16 DIAGNOSIS — G47.09 OTHER INSOMNIA: ICD-10-CM

## 2018-08-28 ENCOUNTER — OFFICE VISIT - HEALTHEAST (OUTPATIENT)
Dept: BEHAVIORAL HEALTH | Facility: HOSPITAL | Age: 53
End: 2018-08-28

## 2018-08-28 DIAGNOSIS — F10.20 SEVERE ALCOHOL USE DISORDER (H): ICD-10-CM

## 2018-08-28 DIAGNOSIS — F33.1 MAJOR DEPRESSIVE DISORDER, RECURRENT EPISODE, MODERATE (H): ICD-10-CM

## 2018-08-28 DIAGNOSIS — F43.10 PTSD (POST-TRAUMATIC STRESS DISORDER): ICD-10-CM

## 2018-09-04 ENCOUNTER — OFFICE VISIT - HEALTHEAST (OUTPATIENT)
Dept: BEHAVIORAL HEALTH | Facility: HOSPITAL | Age: 53
End: 2018-09-04

## 2018-09-04 DIAGNOSIS — F10.20 SEVERE ALCOHOL USE DISORDER (H): ICD-10-CM

## 2018-09-04 DIAGNOSIS — F33.1 MAJOR DEPRESSIVE DISORDER, RECURRENT EPISODE, MODERATE (H): ICD-10-CM

## 2018-09-04 DIAGNOSIS — F43.10 PTSD (POST-TRAUMATIC STRESS DISORDER): ICD-10-CM

## 2018-09-11 ENCOUNTER — OFFICE VISIT - HEALTHEAST (OUTPATIENT)
Dept: BEHAVIORAL HEALTH | Facility: HOSPITAL | Age: 53
End: 2018-09-11

## 2018-09-11 DIAGNOSIS — F43.10 PTSD (POST-TRAUMATIC STRESS DISORDER): ICD-10-CM

## 2018-09-11 DIAGNOSIS — F10.20 SEVERE ALCOHOL USE DISORDER (H): ICD-10-CM

## 2018-09-11 DIAGNOSIS — F33.1 MAJOR DEPRESSIVE DISORDER, RECURRENT EPISODE, MODERATE (H): ICD-10-CM

## 2018-09-12 ENCOUNTER — AMBULATORY - HEALTHEAST (OUTPATIENT)
Dept: BEHAVIORAL HEALTH | Facility: CLINIC | Age: 53
End: 2018-09-12

## 2018-09-14 ENCOUNTER — OFFICE VISIT - HEALTHEAST (OUTPATIENT)
Dept: BEHAVIORAL HEALTH | Facility: HOSPITAL | Age: 53
End: 2018-09-14

## 2018-09-14 DIAGNOSIS — F43.10 PTSD (POST-TRAUMATIC STRESS DISORDER): ICD-10-CM

## 2018-09-14 DIAGNOSIS — F22 DELUSIONAL DISORDER (H): ICD-10-CM

## 2018-09-14 DIAGNOSIS — G47.09 OTHER INSOMNIA: ICD-10-CM

## 2018-09-18 ENCOUNTER — OFFICE VISIT - HEALTHEAST (OUTPATIENT)
Dept: BEHAVIORAL HEALTH | Facility: HOSPITAL | Age: 53
End: 2018-09-18

## 2018-09-18 DIAGNOSIS — F10.20 SEVERE ALCOHOL USE DISORDER (H): ICD-10-CM

## 2018-09-18 DIAGNOSIS — F43.10 PTSD (POST-TRAUMATIC STRESS DISORDER): ICD-10-CM

## 2018-09-18 DIAGNOSIS — F33.1 MAJOR DEPRESSIVE DISORDER, RECURRENT EPISODE, MODERATE (H): ICD-10-CM

## 2018-09-25 ENCOUNTER — OFFICE VISIT - HEALTHEAST (OUTPATIENT)
Dept: BEHAVIORAL HEALTH | Facility: HOSPITAL | Age: 53
End: 2018-09-25

## 2018-09-25 DIAGNOSIS — F10.20 SEVERE ALCOHOL USE DISORDER (H): ICD-10-CM

## 2018-09-25 DIAGNOSIS — F33.1 MAJOR DEPRESSIVE DISORDER, RECURRENT EPISODE, MODERATE (H): ICD-10-CM

## 2018-09-25 DIAGNOSIS — F43.10 PTSD (POST-TRAUMATIC STRESS DISORDER): ICD-10-CM

## 2018-10-02 ENCOUNTER — OFFICE VISIT - HEALTHEAST (OUTPATIENT)
Dept: BEHAVIORAL HEALTH | Facility: HOSPITAL | Age: 53
End: 2018-10-02

## 2018-10-02 DIAGNOSIS — F43.10 PTSD (POST-TRAUMATIC STRESS DISORDER): ICD-10-CM

## 2018-10-02 DIAGNOSIS — F33.1 MAJOR DEPRESSIVE DISORDER, RECURRENT EPISODE, MODERATE (H): ICD-10-CM

## 2018-10-02 DIAGNOSIS — F10.20 SEVERE ALCOHOL USE DISORDER (H): ICD-10-CM

## 2018-10-09 ENCOUNTER — AMBULATORY - HEALTHEAST (OUTPATIENT)
Dept: BEHAVIORAL HEALTH | Facility: CLINIC | Age: 53
End: 2018-10-09

## 2018-10-16 ENCOUNTER — OFFICE VISIT - HEALTHEAST (OUTPATIENT)
Dept: BEHAVIORAL HEALTH | Facility: HOSPITAL | Age: 53
End: 2018-10-16

## 2018-10-16 DIAGNOSIS — F10.20 SEVERE ALCOHOL USE DISORDER (H): ICD-10-CM

## 2018-10-16 DIAGNOSIS — F43.10 PTSD (POST-TRAUMATIC STRESS DISORDER): ICD-10-CM

## 2018-10-16 DIAGNOSIS — F22 DELUSIONAL DISORDER (H): ICD-10-CM

## 2018-10-23 ENCOUNTER — OFFICE VISIT - HEALTHEAST (OUTPATIENT)
Dept: BEHAVIORAL HEALTH | Facility: HOSPITAL | Age: 53
End: 2018-10-23

## 2018-10-23 DIAGNOSIS — F43.10 PTSD (POST-TRAUMATIC STRESS DISORDER): ICD-10-CM

## 2018-10-23 DIAGNOSIS — F10.20 SEVERE ALCOHOL USE DISORDER (H): ICD-10-CM

## 2018-10-23 DIAGNOSIS — F22 DELUSIONAL DISORDER (H): ICD-10-CM

## 2018-10-30 ENCOUNTER — OFFICE VISIT - HEALTHEAST (OUTPATIENT)
Dept: BEHAVIORAL HEALTH | Facility: HOSPITAL | Age: 53
End: 2018-10-30

## 2018-10-30 DIAGNOSIS — F10.20 SEVERE ALCOHOL USE DISORDER (H): ICD-10-CM

## 2018-10-30 DIAGNOSIS — F22 DELUSIONAL DISORDER (H): ICD-10-CM

## 2018-10-30 DIAGNOSIS — F43.10 PTSD (POST-TRAUMATIC STRESS DISORDER): ICD-10-CM

## 2018-11-06 ENCOUNTER — OFFICE VISIT - HEALTHEAST (OUTPATIENT)
Dept: BEHAVIORAL HEALTH | Facility: HOSPITAL | Age: 53
End: 2018-11-06

## 2018-11-06 DIAGNOSIS — F10.20 SEVERE ALCOHOL USE DISORDER (H): ICD-10-CM

## 2018-11-06 DIAGNOSIS — F43.10 PTSD (POST-TRAUMATIC STRESS DISORDER): ICD-10-CM

## 2018-11-06 DIAGNOSIS — F22 DELUSIONAL DISORDER (H): ICD-10-CM

## 2018-11-13 ENCOUNTER — OFFICE VISIT - HEALTHEAST (OUTPATIENT)
Dept: BEHAVIORAL HEALTH | Facility: HOSPITAL | Age: 53
End: 2018-11-13

## 2018-11-13 DIAGNOSIS — F43.10 PTSD (POST-TRAUMATIC STRESS DISORDER): ICD-10-CM

## 2018-11-13 DIAGNOSIS — F22 DELUSIONAL DISORDER (H): ICD-10-CM

## 2018-11-13 DIAGNOSIS — F10.20 SEVERE ALCOHOL USE DISORDER (H): ICD-10-CM

## 2018-11-15 ENCOUNTER — OFFICE VISIT - HEALTHEAST (OUTPATIENT)
Dept: BEHAVIORAL HEALTH | Facility: HOSPITAL | Age: 53
End: 2018-11-15

## 2018-11-15 DIAGNOSIS — G47.09 OTHER INSOMNIA: ICD-10-CM

## 2018-11-15 DIAGNOSIS — F43.10 PTSD (POST-TRAUMATIC STRESS DISORDER): ICD-10-CM

## 2018-11-15 DIAGNOSIS — F22 DELUSIONAL DISORDER (H): ICD-10-CM

## 2018-11-20 ENCOUNTER — OFFICE VISIT - HEALTHEAST (OUTPATIENT)
Dept: BEHAVIORAL HEALTH | Facility: HOSPITAL | Age: 53
End: 2018-11-20

## 2018-11-20 DIAGNOSIS — F10.20 SEVERE ALCOHOL USE DISORDER (H): ICD-10-CM

## 2018-11-20 DIAGNOSIS — F22 DELUSIONAL DISORDER (H): ICD-10-CM

## 2018-11-20 DIAGNOSIS — F43.10 PTSD (POST-TRAUMATIC STRESS DISORDER): ICD-10-CM

## 2018-12-03 ENCOUNTER — COMMUNICATION - HEALTHEAST (OUTPATIENT)
Dept: BEHAVIORAL HEALTH | Facility: CLINIC | Age: 53
End: 2018-12-03

## 2018-12-03 DIAGNOSIS — G47.09 OTHER INSOMNIA: ICD-10-CM

## 2018-12-04 ENCOUNTER — OFFICE VISIT - HEALTHEAST (OUTPATIENT)
Dept: BEHAVIORAL HEALTH | Facility: HOSPITAL | Age: 53
End: 2018-12-04

## 2018-12-04 DIAGNOSIS — F43.10 PTSD (POST-TRAUMATIC STRESS DISORDER): ICD-10-CM

## 2018-12-04 DIAGNOSIS — F10.20 SEVERE ALCOHOL USE DISORDER (H): ICD-10-CM

## 2018-12-04 DIAGNOSIS — F22 DELUSIONAL DISORDER (H): ICD-10-CM

## 2018-12-11 ENCOUNTER — OFFICE VISIT - HEALTHEAST (OUTPATIENT)
Dept: BEHAVIORAL HEALTH | Facility: HOSPITAL | Age: 53
End: 2018-12-11

## 2018-12-11 DIAGNOSIS — F10.20 SEVERE ALCOHOL USE DISORDER (H): ICD-10-CM

## 2018-12-11 DIAGNOSIS — F22 DELUSIONAL DISORDER (H): ICD-10-CM

## 2018-12-11 DIAGNOSIS — F43.10 PTSD (POST-TRAUMATIC STRESS DISORDER): ICD-10-CM

## 2018-12-13 ENCOUNTER — COMMUNICATION - HEALTHEAST (OUTPATIENT)
Dept: BEHAVIORAL HEALTH | Facility: CLINIC | Age: 53
End: 2018-12-13

## 2018-12-17 ENCOUNTER — COMMUNICATION - HEALTHEAST (OUTPATIENT)
Dept: BEHAVIORAL HEALTH | Facility: CLINIC | Age: 53
End: 2018-12-17

## 2019-01-08 ENCOUNTER — OFFICE VISIT - HEALTHEAST (OUTPATIENT)
Dept: BEHAVIORAL HEALTH | Facility: HOSPITAL | Age: 54
End: 2019-01-08

## 2019-01-08 ENCOUNTER — AMBULATORY - HEALTHEAST (OUTPATIENT)
Dept: BEHAVIORAL HEALTH | Facility: CLINIC | Age: 54
End: 2019-01-08

## 2019-01-08 DIAGNOSIS — F43.10 PTSD (POST-TRAUMATIC STRESS DISORDER): ICD-10-CM

## 2019-01-08 DIAGNOSIS — F10.20 SEVERE ALCOHOL USE DISORDER (H): ICD-10-CM

## 2019-01-08 DIAGNOSIS — F22 DELUSIONAL DISORDER (H): ICD-10-CM

## 2019-01-15 ENCOUNTER — OFFICE VISIT - HEALTHEAST (OUTPATIENT)
Dept: BEHAVIORAL HEALTH | Facility: HOSPITAL | Age: 54
End: 2019-01-15

## 2019-01-15 ENCOUNTER — AMBULATORY - HEALTHEAST (OUTPATIENT)
Dept: BEHAVIORAL HEALTH | Facility: CLINIC | Age: 54
End: 2019-01-15

## 2019-01-15 DIAGNOSIS — F43.10 PTSD (POST-TRAUMATIC STRESS DISORDER): ICD-10-CM

## 2019-01-15 DIAGNOSIS — F10.20 SEVERE ALCOHOL USE DISORDER (H): ICD-10-CM

## 2019-01-15 DIAGNOSIS — F22 DELUSIONAL DISORDER (H): ICD-10-CM

## 2019-01-22 ENCOUNTER — OFFICE VISIT - HEALTHEAST (OUTPATIENT)
Dept: BEHAVIORAL HEALTH | Facility: HOSPITAL | Age: 54
End: 2019-01-22

## 2019-01-22 DIAGNOSIS — F43.10 PTSD (POST-TRAUMATIC STRESS DISORDER): ICD-10-CM

## 2019-01-22 DIAGNOSIS — F10.20 SEVERE ALCOHOL USE DISORDER (H): ICD-10-CM

## 2019-01-22 DIAGNOSIS — F22 DELUSIONAL DISORDER (H): ICD-10-CM

## 2019-01-24 ENCOUNTER — COMMUNICATION - HEALTHEAST (OUTPATIENT)
Dept: BEHAVIORAL HEALTH | Facility: CLINIC | Age: 54
End: 2019-01-24

## 2019-01-29 ENCOUNTER — OFFICE VISIT - HEALTHEAST (OUTPATIENT)
Dept: BEHAVIORAL HEALTH | Facility: HOSPITAL | Age: 54
End: 2019-01-29

## 2019-01-29 DIAGNOSIS — F10.20 SEVERE ALCOHOL USE DISORDER (H): ICD-10-CM

## 2019-01-29 DIAGNOSIS — F22 DELUSIONAL DISORDER (H): ICD-10-CM

## 2019-01-29 DIAGNOSIS — F43.10 PTSD (POST-TRAUMATIC STRESS DISORDER): ICD-10-CM

## 2019-01-30 ENCOUNTER — COMMUNICATION - HEALTHEAST (OUTPATIENT)
Dept: BEHAVIORAL HEALTH | Facility: CLINIC | Age: 54
End: 2019-01-30

## 2019-02-05 ENCOUNTER — OFFICE VISIT - HEALTHEAST (OUTPATIENT)
Dept: BEHAVIORAL HEALTH | Facility: HOSPITAL | Age: 54
End: 2019-02-05

## 2019-02-05 DIAGNOSIS — F22 DELUSIONAL DISORDER (H): ICD-10-CM

## 2019-02-05 DIAGNOSIS — F43.10 PTSD (POST-TRAUMATIC STRESS DISORDER): ICD-10-CM

## 2019-02-05 DIAGNOSIS — F10.20 SEVERE ALCOHOL USE DISORDER (H): ICD-10-CM

## 2019-02-07 ENCOUNTER — OFFICE VISIT - HEALTHEAST (OUTPATIENT)
Dept: BEHAVIORAL HEALTH | Facility: HOSPITAL | Age: 54
End: 2019-02-07

## 2019-02-07 DIAGNOSIS — F22 DELUSIONAL DISORDER (H): ICD-10-CM

## 2019-02-07 DIAGNOSIS — G47.09 OTHER INSOMNIA: ICD-10-CM

## 2019-02-07 DIAGNOSIS — F43.10 PTSD (POST-TRAUMATIC STRESS DISORDER): ICD-10-CM

## 2019-02-12 ENCOUNTER — OFFICE VISIT - HEALTHEAST (OUTPATIENT)
Dept: BEHAVIORAL HEALTH | Facility: HOSPITAL | Age: 54
End: 2019-02-12

## 2019-02-12 DIAGNOSIS — F43.10 PTSD (POST-TRAUMATIC STRESS DISORDER): ICD-10-CM

## 2019-02-12 DIAGNOSIS — F22 DELUSIONAL DISORDER (H): ICD-10-CM

## 2019-02-12 DIAGNOSIS — F10.20 SEVERE ALCOHOL USE DISORDER (H): ICD-10-CM

## 2019-02-19 ENCOUNTER — OFFICE VISIT - HEALTHEAST (OUTPATIENT)
Dept: BEHAVIORAL HEALTH | Facility: HOSPITAL | Age: 54
End: 2019-02-19

## 2019-02-19 DIAGNOSIS — F22 DELUSIONAL DISORDER (H): ICD-10-CM

## 2019-02-19 DIAGNOSIS — F43.10 PTSD (POST-TRAUMATIC STRESS DISORDER): ICD-10-CM

## 2019-02-19 DIAGNOSIS — F10.20 SEVERE ALCOHOL USE DISORDER (H): ICD-10-CM

## 2019-03-05 ENCOUNTER — OFFICE VISIT - HEALTHEAST (OUTPATIENT)
Dept: BEHAVIORAL HEALTH | Facility: HOSPITAL | Age: 54
End: 2019-03-05

## 2019-03-05 DIAGNOSIS — F43.10 PTSD (POST-TRAUMATIC STRESS DISORDER): ICD-10-CM

## 2019-03-05 DIAGNOSIS — F10.20 SEVERE ALCOHOL USE DISORDER (H): ICD-10-CM

## 2019-03-05 DIAGNOSIS — F22 DELUSIONAL DISORDER (H): ICD-10-CM

## 2019-03-08 ENCOUNTER — OFFICE VISIT - HEALTHEAST (OUTPATIENT)
Dept: BEHAVIORAL HEALTH | Facility: HOSPITAL | Age: 54
End: 2019-03-08

## 2019-03-08 DIAGNOSIS — F22 DELUSIONAL DISORDER (H): ICD-10-CM

## 2019-03-08 DIAGNOSIS — F43.10 PTSD (POST-TRAUMATIC STRESS DISORDER): ICD-10-CM

## 2019-03-08 DIAGNOSIS — G47.09 OTHER INSOMNIA: ICD-10-CM

## 2019-03-12 ENCOUNTER — OFFICE VISIT - HEALTHEAST (OUTPATIENT)
Dept: BEHAVIORAL HEALTH | Facility: HOSPITAL | Age: 54
End: 2019-03-12

## 2019-03-12 DIAGNOSIS — F22 DELUSIONAL DISORDER (H): ICD-10-CM

## 2019-03-12 DIAGNOSIS — F10.20 SEVERE ALCOHOL USE DISORDER (H): ICD-10-CM

## 2019-03-12 DIAGNOSIS — F43.10 PTSD (POST-TRAUMATIC STRESS DISORDER): ICD-10-CM

## 2019-03-19 ENCOUNTER — OFFICE VISIT - HEALTHEAST (OUTPATIENT)
Dept: BEHAVIORAL HEALTH | Facility: HOSPITAL | Age: 54
End: 2019-03-19

## 2019-03-19 DIAGNOSIS — F10.20 SEVERE ALCOHOL USE DISORDER (H): ICD-10-CM

## 2019-03-19 DIAGNOSIS — F43.10 PTSD (POST-TRAUMATIC STRESS DISORDER): ICD-10-CM

## 2019-03-19 DIAGNOSIS — F22 DELUSIONAL DISORDER (H): ICD-10-CM

## 2019-03-26 ENCOUNTER — OFFICE VISIT - HEALTHEAST (OUTPATIENT)
Dept: BEHAVIORAL HEALTH | Facility: HOSPITAL | Age: 54
End: 2019-03-26

## 2019-03-26 DIAGNOSIS — F43.10 PTSD (POST-TRAUMATIC STRESS DISORDER): ICD-10-CM

## 2019-03-26 DIAGNOSIS — F22 DELUSIONAL DISORDER (H): ICD-10-CM

## 2019-03-26 DIAGNOSIS — F10.20 SEVERE ALCOHOL USE DISORDER (H): ICD-10-CM

## 2019-04-02 ENCOUNTER — OFFICE VISIT - HEALTHEAST (OUTPATIENT)
Dept: BEHAVIORAL HEALTH | Facility: HOSPITAL | Age: 54
End: 2019-04-02

## 2019-04-02 DIAGNOSIS — F10.20 SEVERE ALCOHOL USE DISORDER (H): ICD-10-CM

## 2019-04-02 DIAGNOSIS — F22 DELUSIONAL DISORDER (H): ICD-10-CM

## 2019-04-02 DIAGNOSIS — F43.10 PTSD (POST-TRAUMATIC STRESS DISORDER): ICD-10-CM

## 2019-04-05 ENCOUNTER — COMMUNICATION - HEALTHEAST (OUTPATIENT)
Dept: BEHAVIORAL HEALTH | Facility: HOSPITAL | Age: 54
End: 2019-04-05

## 2019-04-09 ENCOUNTER — OFFICE VISIT - HEALTHEAST (OUTPATIENT)
Dept: BEHAVIORAL HEALTH | Facility: HOSPITAL | Age: 54
End: 2019-04-09

## 2019-04-09 DIAGNOSIS — F10.20 SEVERE ALCOHOL USE DISORDER (H): ICD-10-CM

## 2019-04-09 DIAGNOSIS — F22 DELUSIONAL DISORDER (H): ICD-10-CM

## 2019-04-09 DIAGNOSIS — F43.10 PTSD (POST-TRAUMATIC STRESS DISORDER): ICD-10-CM

## 2019-04-10 ENCOUNTER — AMBULATORY - HEALTHEAST (OUTPATIENT)
Dept: BEHAVIORAL HEALTH | Facility: CLINIC | Age: 54
End: 2019-04-10

## 2019-04-12 ENCOUNTER — RECORDS - HEALTHEAST (OUTPATIENT)
Dept: ADMINISTRATIVE | Facility: OTHER | Age: 54
End: 2019-04-12

## 2019-04-15 ENCOUNTER — HOSPITAL ENCOUNTER (OUTPATIENT)
Dept: CT IMAGING | Facility: HOSPITAL | Age: 54
Discharge: HOME | End: 2019-04-15
Attending: INTERNAL MEDICINE

## 2019-04-15 DIAGNOSIS — R06.02 SOB (SHORTNESS OF BREATH): ICD-10-CM

## 2019-04-15 DIAGNOSIS — Z86.711 HISTORY OF PULMONARY EMBOLUS (PE): ICD-10-CM

## 2019-04-16 ENCOUNTER — OFFICE VISIT - HEALTHEAST (OUTPATIENT)
Dept: BEHAVIORAL HEALTH | Facility: HOSPITAL | Age: 54
End: 2019-04-16

## 2019-04-16 DIAGNOSIS — F10.20 SEVERE ALCOHOL USE DISORDER (H): ICD-10-CM

## 2019-04-16 DIAGNOSIS — F43.10 PTSD (POST-TRAUMATIC STRESS DISORDER): ICD-10-CM

## 2019-04-16 DIAGNOSIS — F22 DELUSIONAL DISORDER (H): ICD-10-CM

## 2019-04-17 ENCOUNTER — AMBULATORY - HEALTHEAST (OUTPATIENT)
Dept: BEHAVIORAL HEALTH | Facility: CLINIC | Age: 54
End: 2019-04-17

## 2019-04-23 ENCOUNTER — OFFICE VISIT - HEALTHEAST (OUTPATIENT)
Dept: BEHAVIORAL HEALTH | Facility: HOSPITAL | Age: 54
End: 2019-04-23

## 2019-04-23 DIAGNOSIS — F43.10 PTSD (POST-TRAUMATIC STRESS DISORDER): ICD-10-CM

## 2019-04-23 DIAGNOSIS — F22 DELUSIONAL DISORDER (H): ICD-10-CM

## 2019-04-23 DIAGNOSIS — F10.20 SEVERE ALCOHOL USE DISORDER (H): ICD-10-CM

## 2019-04-25 ENCOUNTER — OFFICE VISIT - HEALTHEAST (OUTPATIENT)
Dept: BEHAVIORAL HEALTH | Facility: HOSPITAL | Age: 54
End: 2019-04-25

## 2019-04-25 DIAGNOSIS — F22 DELUSIONAL DISORDER (H): ICD-10-CM

## 2019-04-30 ENCOUNTER — OFFICE VISIT - HEALTHEAST (OUTPATIENT)
Dept: BEHAVIORAL HEALTH | Facility: HOSPITAL | Age: 54
End: 2019-04-30

## 2019-04-30 DIAGNOSIS — F10.20 SEVERE ALCOHOL USE DISORDER (H): ICD-10-CM

## 2019-04-30 DIAGNOSIS — F43.10 PTSD (POST-TRAUMATIC STRESS DISORDER): ICD-10-CM

## 2019-04-30 DIAGNOSIS — F22 DELUSIONAL DISORDER (H): ICD-10-CM

## 2019-05-07 ENCOUNTER — OFFICE VISIT - HEALTHEAST (OUTPATIENT)
Dept: BEHAVIORAL HEALTH | Facility: HOSPITAL | Age: 54
End: 2019-05-07

## 2019-05-07 DIAGNOSIS — F22 DELUSIONAL DISORDER (H): ICD-10-CM

## 2019-05-07 DIAGNOSIS — F43.10 PTSD (POST-TRAUMATIC STRESS DISORDER): ICD-10-CM

## 2019-05-07 DIAGNOSIS — F10.20 SEVERE ALCOHOL USE DISORDER (H): ICD-10-CM

## 2019-05-08 ENCOUNTER — COMMUNICATION - HEALTHEAST (OUTPATIENT)
Dept: BEHAVIORAL HEALTH | Facility: CLINIC | Age: 54
End: 2019-05-08

## 2019-05-21 ENCOUNTER — OFFICE VISIT - HEALTHEAST (OUTPATIENT)
Dept: BEHAVIORAL HEALTH | Facility: HOSPITAL | Age: 54
End: 2019-05-21

## 2019-05-21 DIAGNOSIS — F10.20 SEVERE ALCOHOL USE DISORDER (H): ICD-10-CM

## 2019-05-21 DIAGNOSIS — F22 DELUSIONAL DISORDER (H): ICD-10-CM

## 2019-05-21 DIAGNOSIS — F43.10 PTSD (POST-TRAUMATIC STRESS DISORDER): ICD-10-CM

## 2019-05-23 ENCOUNTER — OFFICE VISIT - HEALTHEAST (OUTPATIENT)
Dept: BEHAVIORAL HEALTH | Facility: HOSPITAL | Age: 54
End: 2019-05-23

## 2019-05-23 DIAGNOSIS — F22 DELUSIONAL DISORDER (H): ICD-10-CM

## 2019-05-28 ENCOUNTER — OFFICE VISIT - HEALTHEAST (OUTPATIENT)
Dept: BEHAVIORAL HEALTH | Facility: HOSPITAL | Age: 54
End: 2019-05-28

## 2019-05-28 DIAGNOSIS — F43.10 PTSD (POST-TRAUMATIC STRESS DISORDER): ICD-10-CM

## 2019-05-28 DIAGNOSIS — F22 DELUSIONAL DISORDER (H): ICD-10-CM

## 2019-05-28 DIAGNOSIS — F10.20 SEVERE ALCOHOL USE DISORDER (H): ICD-10-CM

## 2019-06-04 ENCOUNTER — OFFICE VISIT - HEALTHEAST (OUTPATIENT)
Dept: BEHAVIORAL HEALTH | Facility: HOSPITAL | Age: 54
End: 2019-06-04

## 2019-06-04 DIAGNOSIS — F22 DELUSIONAL DISORDER (H): ICD-10-CM

## 2019-06-04 DIAGNOSIS — F43.10 PTSD (POST-TRAUMATIC STRESS DISORDER): ICD-10-CM

## 2019-06-04 DIAGNOSIS — F10.20 SEVERE ALCOHOL USE DISORDER (H): ICD-10-CM

## 2019-06-11 ENCOUNTER — OFFICE VISIT - HEALTHEAST (OUTPATIENT)
Dept: BEHAVIORAL HEALTH | Facility: HOSPITAL | Age: 54
End: 2019-06-11

## 2019-06-11 DIAGNOSIS — F43.10 PTSD (POST-TRAUMATIC STRESS DISORDER): ICD-10-CM

## 2019-06-11 DIAGNOSIS — F22 DELUSIONAL DISORDER (H): ICD-10-CM

## 2019-06-11 DIAGNOSIS — F10.20 SEVERE ALCOHOL USE DISORDER (H): ICD-10-CM

## 2019-06-18 ENCOUNTER — OFFICE VISIT - HEALTHEAST (OUTPATIENT)
Dept: BEHAVIORAL HEALTH | Facility: HOSPITAL | Age: 54
End: 2019-06-18

## 2019-06-18 DIAGNOSIS — F22 DELUSIONAL DISORDER (H): ICD-10-CM

## 2019-06-18 DIAGNOSIS — F43.10 PTSD (POST-TRAUMATIC STRESS DISORDER): ICD-10-CM

## 2019-06-18 DIAGNOSIS — F10.20 SEVERE ALCOHOL USE DISORDER (H): ICD-10-CM

## 2019-06-20 ENCOUNTER — OFFICE VISIT - HEALTHEAST (OUTPATIENT)
Dept: BEHAVIORAL HEALTH | Facility: HOSPITAL | Age: 54
End: 2019-06-20

## 2019-06-20 DIAGNOSIS — G47.09 OTHER INSOMNIA: ICD-10-CM

## 2019-06-20 DIAGNOSIS — F22 DELUSIONAL DISORDER (H): ICD-10-CM

## 2019-06-20 DIAGNOSIS — F43.10 PTSD (POST-TRAUMATIC STRESS DISORDER): ICD-10-CM

## 2019-06-25 ENCOUNTER — AMBULATORY - HEALTHEAST (OUTPATIENT)
Dept: BEHAVIORAL HEALTH | Facility: CLINIC | Age: 54
End: 2019-06-25

## 2019-06-25 ENCOUNTER — OFFICE VISIT - HEALTHEAST (OUTPATIENT)
Dept: BEHAVIORAL HEALTH | Facility: HOSPITAL | Age: 54
End: 2019-06-25

## 2019-06-25 DIAGNOSIS — F43.10 PTSD (POST-TRAUMATIC STRESS DISORDER): ICD-10-CM

## 2019-06-25 DIAGNOSIS — F22 DELUSIONAL DISORDER (H): ICD-10-CM

## 2019-06-25 DIAGNOSIS — F10.20 SEVERE ALCOHOL USE DISORDER (H): ICD-10-CM

## 2019-07-02 ENCOUNTER — OFFICE VISIT - HEALTHEAST (OUTPATIENT)
Dept: BEHAVIORAL HEALTH | Facility: HOSPITAL | Age: 54
End: 2019-07-02

## 2019-07-02 DIAGNOSIS — F43.10 PTSD (POST-TRAUMATIC STRESS DISORDER): ICD-10-CM

## 2019-07-02 DIAGNOSIS — F10.20 SEVERE ALCOHOL USE DISORDER (H): ICD-10-CM

## 2019-07-02 DIAGNOSIS — F22 DELUSIONAL DISORDER (H): ICD-10-CM

## 2019-07-09 ENCOUNTER — OFFICE VISIT - HEALTHEAST (OUTPATIENT)
Dept: BEHAVIORAL HEALTH | Facility: HOSPITAL | Age: 54
End: 2019-07-09

## 2019-07-09 ENCOUNTER — AMBULATORY - HEALTHEAST (OUTPATIENT)
Dept: BEHAVIORAL HEALTH | Facility: CLINIC | Age: 54
End: 2019-07-09

## 2019-07-09 DIAGNOSIS — F10.20 SEVERE ALCOHOL USE DISORDER (H): ICD-10-CM

## 2019-07-09 DIAGNOSIS — F43.10 PTSD (POST-TRAUMATIC STRESS DISORDER): ICD-10-CM

## 2019-07-09 DIAGNOSIS — F22 DELUSIONAL DISORDER (H): ICD-10-CM

## 2019-07-16 ENCOUNTER — OFFICE VISIT - HEALTHEAST (OUTPATIENT)
Dept: BEHAVIORAL HEALTH | Facility: HOSPITAL | Age: 54
End: 2019-07-16

## 2019-07-16 DIAGNOSIS — F10.20 SEVERE ALCOHOL USE DISORDER (H): ICD-10-CM

## 2019-07-16 DIAGNOSIS — F43.10 PTSD (POST-TRAUMATIC STRESS DISORDER): ICD-10-CM

## 2019-07-16 DIAGNOSIS — F22 DELUSIONAL DISORDER (H): ICD-10-CM

## 2019-07-18 ENCOUNTER — OFFICE VISIT - HEALTHEAST (OUTPATIENT)
Dept: BEHAVIORAL HEALTH | Facility: HOSPITAL | Age: 54
End: 2019-07-18

## 2019-07-18 DIAGNOSIS — F22 DELUSIONAL DISORDER (H): ICD-10-CM

## 2019-07-23 ENCOUNTER — OFFICE VISIT - HEALTHEAST (OUTPATIENT)
Dept: BEHAVIORAL HEALTH | Facility: HOSPITAL | Age: 54
End: 2019-07-23

## 2019-07-23 DIAGNOSIS — F22 DELUSIONAL DISORDER (H): ICD-10-CM

## 2019-07-23 DIAGNOSIS — F10.20 SEVERE ALCOHOL USE DISORDER (H): ICD-10-CM

## 2019-07-23 DIAGNOSIS — F43.10 PTSD (POST-TRAUMATIC STRESS DISORDER): ICD-10-CM

## 2019-08-06 ENCOUNTER — OFFICE VISIT - HEALTHEAST (OUTPATIENT)
Dept: BEHAVIORAL HEALTH | Facility: HOSPITAL | Age: 54
End: 2019-08-06

## 2019-08-06 DIAGNOSIS — F22 DELUSIONAL DISORDER (H): ICD-10-CM

## 2019-08-06 DIAGNOSIS — F10.20 SEVERE ALCOHOL USE DISORDER (H): ICD-10-CM

## 2019-08-06 DIAGNOSIS — F43.10 PTSD (POST-TRAUMATIC STRESS DISORDER): ICD-10-CM

## 2019-08-08 ENCOUNTER — COMMUNICATION - HEALTHEAST (OUTPATIENT)
Dept: BEHAVIORAL HEALTH | Facility: CLINIC | Age: 54
End: 2019-08-08

## 2019-08-15 ENCOUNTER — OFFICE VISIT - HEALTHEAST (OUTPATIENT)
Dept: BEHAVIORAL HEALTH | Facility: HOSPITAL | Age: 54
End: 2019-08-15

## 2019-08-15 DIAGNOSIS — F22 DELUSIONAL DISORDER (H): ICD-10-CM

## 2019-08-20 ENCOUNTER — OFFICE VISIT - HEALTHEAST (OUTPATIENT)
Dept: BEHAVIORAL HEALTH | Facility: HOSPITAL | Age: 54
End: 2019-08-20

## 2019-08-20 DIAGNOSIS — F22 DELUSIONAL DISORDER (H): ICD-10-CM

## 2019-08-20 DIAGNOSIS — F43.10 PTSD (POST-TRAUMATIC STRESS DISORDER): ICD-10-CM

## 2019-08-20 DIAGNOSIS — F10.20 SEVERE ALCOHOL USE DISORDER (H): ICD-10-CM

## 2019-08-26 ENCOUNTER — COMMUNICATION - HEALTHEAST (OUTPATIENT)
Dept: BEHAVIORAL HEALTH | Facility: CLINIC | Age: 54
End: 2019-08-26

## 2019-08-26 DIAGNOSIS — F43.10 PTSD (POST-TRAUMATIC STRESS DISORDER): ICD-10-CM

## 2019-08-26 DIAGNOSIS — G47.09 OTHER INSOMNIA: ICD-10-CM

## 2019-08-27 ENCOUNTER — OFFICE VISIT - HEALTHEAST (OUTPATIENT)
Dept: BEHAVIORAL HEALTH | Facility: HOSPITAL | Age: 54
End: 2019-08-27

## 2019-08-27 DIAGNOSIS — F43.10 PTSD (POST-TRAUMATIC STRESS DISORDER): ICD-10-CM

## 2019-08-27 DIAGNOSIS — F22 DELUSIONAL DISORDER (H): ICD-10-CM

## 2019-08-27 DIAGNOSIS — F10.20 SEVERE ALCOHOL USE DISORDER (H): ICD-10-CM

## 2019-08-30 ENCOUNTER — COMMUNICATION - HEALTHEAST (OUTPATIENT)
Dept: BEHAVIORAL HEALTH | Facility: CLINIC | Age: 54
End: 2019-08-30

## 2019-09-03 ENCOUNTER — OFFICE VISIT - HEALTHEAST (OUTPATIENT)
Dept: BEHAVIORAL HEALTH | Facility: HOSPITAL | Age: 54
End: 2019-09-03

## 2019-09-03 DIAGNOSIS — F43.10 PTSD (POST-TRAUMATIC STRESS DISORDER): ICD-10-CM

## 2019-09-03 DIAGNOSIS — F10.20 SEVERE ALCOHOL USE DISORDER (H): ICD-10-CM

## 2019-09-03 DIAGNOSIS — F22 DELUSIONAL DISORDER (H): ICD-10-CM

## 2019-09-10 ENCOUNTER — OFFICE VISIT - HEALTHEAST (OUTPATIENT)
Dept: BEHAVIORAL HEALTH | Facility: HOSPITAL | Age: 54
End: 2019-09-10

## 2019-09-10 DIAGNOSIS — F10.20 SEVERE ALCOHOL USE DISORDER (H): ICD-10-CM

## 2019-09-10 DIAGNOSIS — F43.10 PTSD (POST-TRAUMATIC STRESS DISORDER): ICD-10-CM

## 2019-09-10 DIAGNOSIS — F22 DELUSIONAL DISORDER (H): ICD-10-CM

## 2019-09-13 ENCOUNTER — OFFICE VISIT - HEALTHEAST (OUTPATIENT)
Dept: BEHAVIORAL HEALTH | Facility: HOSPITAL | Age: 54
End: 2019-09-13

## 2019-09-13 ENCOUNTER — COMMUNICATION - HEALTHEAST (OUTPATIENT)
Dept: BEHAVIORAL HEALTH | Facility: CLINIC | Age: 54
End: 2019-09-13

## 2019-09-13 DIAGNOSIS — F43.10 PTSD (POST-TRAUMATIC STRESS DISORDER): ICD-10-CM

## 2019-09-13 DIAGNOSIS — F22 DELUSIONAL DISORDER (H): ICD-10-CM

## 2019-09-17 ENCOUNTER — OFFICE VISIT - HEALTHEAST (OUTPATIENT)
Dept: BEHAVIORAL HEALTH | Facility: HOSPITAL | Age: 54
End: 2019-09-17

## 2019-09-17 DIAGNOSIS — F10.20 SEVERE ALCOHOL USE DISORDER (H): ICD-10-CM

## 2019-09-17 DIAGNOSIS — F22 DELUSIONAL DISORDER (H): ICD-10-CM

## 2019-09-17 DIAGNOSIS — F43.10 PTSD (POST-TRAUMATIC STRESS DISORDER): ICD-10-CM

## 2019-09-24 ENCOUNTER — OFFICE VISIT - HEALTHEAST (OUTPATIENT)
Dept: BEHAVIORAL HEALTH | Facility: HOSPITAL | Age: 54
End: 2019-09-24

## 2019-09-24 DIAGNOSIS — F43.10 PTSD (POST-TRAUMATIC STRESS DISORDER): ICD-10-CM

## 2019-09-24 DIAGNOSIS — F22 DELUSIONAL DISORDER (H): ICD-10-CM

## 2019-09-24 DIAGNOSIS — F10.20 SEVERE ALCOHOL USE DISORDER (H): ICD-10-CM

## 2019-10-01 ENCOUNTER — OFFICE VISIT - HEALTHEAST (OUTPATIENT)
Dept: BEHAVIORAL HEALTH | Facility: HOSPITAL | Age: 54
End: 2019-10-01

## 2019-10-01 DIAGNOSIS — F43.10 PTSD (POST-TRAUMATIC STRESS DISORDER): ICD-10-CM

## 2019-10-01 DIAGNOSIS — F22 DELUSIONAL DISORDER (H): ICD-10-CM

## 2019-10-01 DIAGNOSIS — F10.20 SEVERE ALCOHOL USE DISORDER (H): ICD-10-CM

## 2019-10-02 ENCOUNTER — COMMUNICATION - HEALTHEAST (OUTPATIENT)
Dept: BEHAVIORAL HEALTH | Facility: CLINIC | Age: 54
End: 2019-10-02

## 2019-10-02 DIAGNOSIS — F43.10 PTSD (POST-TRAUMATIC STRESS DISORDER): ICD-10-CM

## 2019-10-08 ENCOUNTER — OFFICE VISIT - HEALTHEAST (OUTPATIENT)
Dept: BEHAVIORAL HEALTH | Facility: HOSPITAL | Age: 54
End: 2019-10-08

## 2019-10-08 DIAGNOSIS — F10.20 SEVERE ALCOHOL USE DISORDER (H): ICD-10-CM

## 2019-10-08 DIAGNOSIS — F43.10 PTSD (POST-TRAUMATIC STRESS DISORDER): ICD-10-CM

## 2019-10-08 DIAGNOSIS — F22 DELUSIONAL DISORDER (H): ICD-10-CM

## 2019-10-10 ENCOUNTER — OFFICE VISIT - HEALTHEAST (OUTPATIENT)
Dept: BEHAVIORAL HEALTH | Facility: HOSPITAL | Age: 54
End: 2019-10-10

## 2019-10-10 DIAGNOSIS — F43.10 PTSD (POST-TRAUMATIC STRESS DISORDER): ICD-10-CM

## 2019-10-15 ENCOUNTER — OFFICE VISIT - HEALTHEAST (OUTPATIENT)
Dept: BEHAVIORAL HEALTH | Facility: HOSPITAL | Age: 54
End: 2019-10-15

## 2019-10-15 DIAGNOSIS — F22 DELUSIONAL DISORDER (H): ICD-10-CM

## 2019-10-15 DIAGNOSIS — F43.10 PTSD (POST-TRAUMATIC STRESS DISORDER): ICD-10-CM

## 2019-10-15 DIAGNOSIS — F10.20 SEVERE ALCOHOL USE DISORDER (H): ICD-10-CM

## 2019-10-22 ENCOUNTER — OFFICE VISIT - HEALTHEAST (OUTPATIENT)
Dept: BEHAVIORAL HEALTH | Facility: HOSPITAL | Age: 54
End: 2019-10-22

## 2019-10-22 DIAGNOSIS — F10.20 SEVERE ALCOHOL USE DISORDER (H): ICD-10-CM

## 2019-10-22 DIAGNOSIS — F22 DELUSIONAL DISORDER (H): ICD-10-CM

## 2019-10-22 DIAGNOSIS — F43.10 PTSD (POST-TRAUMATIC STRESS DISORDER): ICD-10-CM

## 2019-10-24 ENCOUNTER — OFFICE VISIT - HEALTHEAST (OUTPATIENT)
Dept: BEHAVIORAL HEALTH | Facility: HOSPITAL | Age: 54
End: 2019-10-24

## 2019-10-24 DIAGNOSIS — G47.09 OTHER INSOMNIA: ICD-10-CM

## 2019-10-24 DIAGNOSIS — F43.10 PTSD (POST-TRAUMATIC STRESS DISORDER): ICD-10-CM

## 2019-10-24 ASSESSMENT — MIFFLIN-ST. JEOR: SCORE: 1803.34

## 2019-10-29 ENCOUNTER — OFFICE VISIT - HEALTHEAST (OUTPATIENT)
Dept: BEHAVIORAL HEALTH | Facility: HOSPITAL | Age: 54
End: 2019-10-29

## 2019-10-29 DIAGNOSIS — F10.20 SEVERE ALCOHOL USE DISORDER (H): ICD-10-CM

## 2019-10-29 DIAGNOSIS — F22 DELUSIONAL DISORDER (H): ICD-10-CM

## 2019-10-29 DIAGNOSIS — F43.10 PTSD (POST-TRAUMATIC STRESS DISORDER): ICD-10-CM

## 2019-11-05 ENCOUNTER — COMMUNICATION - HEALTHEAST (OUTPATIENT)
Dept: BEHAVIORAL HEALTH | Facility: CLINIC | Age: 54
End: 2019-11-05

## 2019-11-05 ENCOUNTER — OFFICE VISIT - HEALTHEAST (OUTPATIENT)
Dept: BEHAVIORAL HEALTH | Facility: HOSPITAL | Age: 54
End: 2019-11-05

## 2019-11-05 DIAGNOSIS — F43.10 PTSD (POST-TRAUMATIC STRESS DISORDER): ICD-10-CM

## 2019-11-05 DIAGNOSIS — F22 DELUSIONAL DISORDER (H): ICD-10-CM

## 2019-11-05 DIAGNOSIS — F10.20 SEVERE ALCOHOL USE DISORDER (H): ICD-10-CM

## 2019-11-11 ENCOUNTER — COMMUNICATION - HEALTHEAST (OUTPATIENT)
Dept: BEHAVIORAL HEALTH | Facility: HOSPITAL | Age: 54
End: 2019-11-11

## 2019-11-11 DIAGNOSIS — R41.89 COGNITIVE CHANGE: ICD-10-CM

## 2019-11-12 ENCOUNTER — OFFICE VISIT - HEALTHEAST (OUTPATIENT)
Dept: BEHAVIORAL HEALTH | Facility: HOSPITAL | Age: 54
End: 2019-11-12

## 2019-11-12 ENCOUNTER — AMBULATORY - HEALTHEAST (OUTPATIENT)
Dept: BEHAVIORAL HEALTH | Facility: CLINIC | Age: 54
End: 2019-11-12

## 2019-11-12 DIAGNOSIS — F43.10 PTSD (POST-TRAUMATIC STRESS DISORDER): ICD-10-CM

## 2019-11-12 DIAGNOSIS — F10.20 SEVERE ALCOHOL USE DISORDER (H): ICD-10-CM

## 2019-11-12 DIAGNOSIS — F22 DELUSIONAL DISORDER (H): ICD-10-CM

## 2019-11-21 ENCOUNTER — OFFICE VISIT - HEALTHEAST (OUTPATIENT)
Dept: BEHAVIORAL HEALTH | Facility: HOSPITAL | Age: 54
End: 2019-11-21

## 2019-11-21 ENCOUNTER — COMMUNICATION - HEALTHEAST (OUTPATIENT)
Dept: BEHAVIORAL HEALTH | Facility: HOSPITAL | Age: 54
End: 2019-11-21

## 2019-11-21 DIAGNOSIS — F22 DELUSIONAL DISORDER (H): ICD-10-CM

## 2019-11-21 ASSESSMENT — MIFFLIN-ST. JEOR: SCORE: 1794.27

## 2019-12-19 ENCOUNTER — OFFICE VISIT - HEALTHEAST (OUTPATIENT)
Dept: BEHAVIORAL HEALTH | Facility: HOSPITAL | Age: 54
End: 2019-12-19

## 2019-12-19 DIAGNOSIS — F22 DELUSIONAL DISORDER (H): ICD-10-CM

## 2020-01-16 ENCOUNTER — COMMUNICATION - HEALTHEAST (OUTPATIENT)
Dept: BEHAVIORAL HEALTH | Facility: CLINIC | Age: 55
End: 2020-01-16

## 2020-01-16 ENCOUNTER — OFFICE VISIT - HEALTHEAST (OUTPATIENT)
Dept: BEHAVIORAL HEALTH | Facility: HOSPITAL | Age: 55
End: 2020-01-16

## 2020-01-16 DIAGNOSIS — R41.89 COGNITIVE CHANGE: ICD-10-CM

## 2020-01-16 DIAGNOSIS — F43.10 PTSD (POST-TRAUMATIC STRESS DISORDER): ICD-10-CM

## 2020-01-16 DIAGNOSIS — F22 DELUSIONAL DISORDER (H): ICD-10-CM

## 2020-01-16 DIAGNOSIS — G47.09 OTHER INSOMNIA: ICD-10-CM

## 2020-02-26 ENCOUNTER — COMMUNICATION - HEALTHEAST (OUTPATIENT)
Dept: PULMONOLOGY | Facility: OTHER | Age: 55
End: 2020-02-26

## 2020-02-26 ENCOUNTER — AMBULATORY - HEALTHEAST (OUTPATIENT)
Dept: PULMONOLOGY | Facility: OTHER | Age: 55
End: 2020-02-26

## 2020-02-26 DIAGNOSIS — R06.02 SOB (SHORTNESS OF BREATH): ICD-10-CM

## 2020-02-27 ENCOUNTER — OFFICE VISIT - HEALTHEAST (OUTPATIENT)
Dept: BEHAVIORAL HEALTH | Facility: HOSPITAL | Age: 55
End: 2020-02-27

## 2020-02-27 DIAGNOSIS — F43.10 PTSD (POST-TRAUMATIC STRESS DISORDER): ICD-10-CM

## 2020-02-27 DIAGNOSIS — F22 DELUSIONAL DISORDER (H): ICD-10-CM

## 2020-03-03 ENCOUNTER — OFFICE VISIT - HEALTHEAST (OUTPATIENT)
Dept: BEHAVIORAL HEALTH | Facility: CLINIC | Age: 55
End: 2020-03-03

## 2020-03-03 DIAGNOSIS — F10.20 SEVERE ALCOHOL USE DISORDER (H): ICD-10-CM

## 2020-03-03 DIAGNOSIS — F43.10 PTSD (POST-TRAUMATIC STRESS DISORDER): ICD-10-CM

## 2020-03-03 DIAGNOSIS — F22 DELUSIONAL DISORDER (H): ICD-10-CM

## 2020-03-12 ENCOUNTER — HOSPITAL ENCOUNTER (OUTPATIENT)
Dept: RESPIRATORY THERAPY | Facility: CLINIC | Age: 55
Discharge: HOME | End: 2020-03-12

## 2020-03-12 DIAGNOSIS — R06.02 SOB (SHORTNESS OF BREATH): ICD-10-CM

## 2020-03-13 ENCOUNTER — OFFICE VISIT - HEALTHEAST (OUTPATIENT)
Dept: PULMONOLOGY | Facility: OTHER | Age: 55
End: 2020-03-13

## 2020-03-13 DIAGNOSIS — R06.09 DOE (DYSPNEA ON EXERTION): ICD-10-CM

## 2020-03-13 DIAGNOSIS — I26.99 OTHER PULMONARY EMBOLISM WITHOUT ACUTE COR PULMONALE, UNSPECIFIED CHRONICITY (H): ICD-10-CM

## 2020-03-13 ASSESSMENT — MIFFLIN-ST. JEOR: SCORE: 1839.63

## 2020-03-16 ENCOUNTER — AMBULATORY - HEALTHEAST (OUTPATIENT)
Dept: BEHAVIORAL HEALTH | Facility: CLINIC | Age: 55
End: 2020-03-16

## 2020-03-17 ENCOUNTER — OFFICE VISIT - HEALTHEAST (OUTPATIENT)
Dept: BEHAVIORAL HEALTH | Facility: CLINIC | Age: 55
End: 2020-03-17

## 2020-03-17 DIAGNOSIS — F43.10 PTSD (POST-TRAUMATIC STRESS DISORDER): ICD-10-CM

## 2020-03-17 DIAGNOSIS — F22 DELUSIONAL DISORDER (H): ICD-10-CM

## 2020-03-17 DIAGNOSIS — F10.20 SEVERE ALCOHOL USE DISORDER (H): ICD-10-CM

## 2020-03-18 ENCOUNTER — HOSPITAL ENCOUNTER (OUTPATIENT)
Dept: CARDIOLOGY | Facility: HOSPITAL | Age: 55
Discharge: HOME | End: 2020-03-18
Attending: INTERNAL MEDICINE

## 2020-03-18 DIAGNOSIS — R06.09 DOE (DYSPNEA ON EXERTION): ICD-10-CM

## 2020-03-24 ENCOUNTER — AMBULATORY - HEALTHEAST (OUTPATIENT)
Dept: BEHAVIORAL HEALTH | Facility: CLINIC | Age: 55
End: 2020-03-24

## 2020-03-31 ENCOUNTER — OFFICE VISIT - HEALTHEAST (OUTPATIENT)
Dept: BEHAVIORAL HEALTH | Facility: CLINIC | Age: 55
End: 2020-03-31

## 2020-03-31 DIAGNOSIS — F10.20 SEVERE ALCOHOL USE DISORDER (H): ICD-10-CM

## 2020-03-31 DIAGNOSIS — F22 DELUSIONAL DISORDER (H): ICD-10-CM

## 2020-03-31 DIAGNOSIS — F43.10 PTSD (POST-TRAUMATIC STRESS DISORDER): ICD-10-CM

## 2020-04-02 ENCOUNTER — COMMUNICATION - HEALTHEAST (OUTPATIENT)
Dept: BEHAVIORAL HEALTH | Facility: CLINIC | Age: 55
End: 2020-04-02

## 2020-04-07 ENCOUNTER — OFFICE VISIT - HEALTHEAST (OUTPATIENT)
Dept: BEHAVIORAL HEALTH | Facility: CLINIC | Age: 55
End: 2020-04-07

## 2020-04-07 DIAGNOSIS — F10.20 SEVERE ALCOHOL USE DISORDER (H): ICD-10-CM

## 2020-04-07 DIAGNOSIS — F43.10 POSTTRAUMATIC STRESS DISORDER: ICD-10-CM

## 2020-04-07 DIAGNOSIS — F22 DELUSIONAL DISORDER (H): ICD-10-CM

## 2020-04-14 ENCOUNTER — OFFICE VISIT - HEALTHEAST (OUTPATIENT)
Dept: BEHAVIORAL HEALTH | Facility: CLINIC | Age: 55
End: 2020-04-14

## 2020-04-14 ENCOUNTER — COMMUNICATION - HEALTHEAST (OUTPATIENT)
Dept: BEHAVIORAL HEALTH | Facility: CLINIC | Age: 55
End: 2020-04-14

## 2020-04-14 DIAGNOSIS — F10.20 SEVERE ALCOHOL USE DISORDER (H): ICD-10-CM

## 2020-04-14 DIAGNOSIS — F22 DELUSIONAL DISORDER (H): ICD-10-CM

## 2020-04-14 DIAGNOSIS — F43.10 PTSD (POST-TRAUMATIC STRESS DISORDER): ICD-10-CM

## 2020-04-14 DIAGNOSIS — F43.10 POSTTRAUMATIC STRESS DISORDER: ICD-10-CM

## 2020-04-14 DIAGNOSIS — G47.09 OTHER INSOMNIA: ICD-10-CM

## 2020-04-15 ENCOUNTER — AMBULATORY - HEALTHEAST (OUTPATIENT)
Dept: BEHAVIORAL HEALTH | Facility: CLINIC | Age: 55
End: 2020-04-15

## 2020-04-21 ENCOUNTER — OFFICE VISIT - HEALTHEAST (OUTPATIENT)
Dept: BEHAVIORAL HEALTH | Facility: CLINIC | Age: 55
End: 2020-04-21

## 2020-04-21 DIAGNOSIS — F10.20 SEVERE ALCOHOL USE DISORDER (H): ICD-10-CM

## 2020-04-21 DIAGNOSIS — F43.10 PTSD (POST-TRAUMATIC STRESS DISORDER): ICD-10-CM

## 2020-04-21 DIAGNOSIS — F22 DELUSIONAL DISORDER (H): ICD-10-CM

## 2020-04-22 ENCOUNTER — OFFICE VISIT - HEALTHEAST (OUTPATIENT)
Dept: BEHAVIORAL HEALTH | Facility: CLINIC | Age: 55
End: 2020-04-22

## 2020-04-22 DIAGNOSIS — F22 DELUSIONAL DISORDER (H): ICD-10-CM

## 2020-04-22 DIAGNOSIS — F10.20 SEVERE ALCOHOL USE DISORDER (H): ICD-10-CM

## 2020-04-22 DIAGNOSIS — F43.10 PTSD (POST-TRAUMATIC STRESS DISORDER): ICD-10-CM

## 2020-04-28 ENCOUNTER — OFFICE VISIT - HEALTHEAST (OUTPATIENT)
Dept: BEHAVIORAL HEALTH | Facility: CLINIC | Age: 55
End: 2020-04-28

## 2020-04-28 DIAGNOSIS — F43.10 PTSD (POST-TRAUMATIC STRESS DISORDER): ICD-10-CM

## 2020-04-28 DIAGNOSIS — F10.20 SEVERE ALCOHOL USE DISORDER (H): ICD-10-CM

## 2020-04-28 DIAGNOSIS — F22 DELUSIONAL DISORDER (H): ICD-10-CM

## 2020-04-30 ENCOUNTER — OFFICE VISIT - HEALTHEAST (OUTPATIENT)
Dept: BEHAVIORAL HEALTH | Facility: HOSPITAL | Age: 55
End: 2020-04-30

## 2020-04-30 DIAGNOSIS — F43.10 POSTTRAUMATIC STRESS DISORDER: ICD-10-CM

## 2020-04-30 DIAGNOSIS — F28 OTHER PSYCHOTIC DISORDER NOT DUE TO SUBSTANCE OR KNOWN PHYSIOLOGICAL CONDITION (H): ICD-10-CM

## 2020-05-05 ENCOUNTER — OFFICE VISIT - HEALTHEAST (OUTPATIENT)
Dept: BEHAVIORAL HEALTH | Facility: CLINIC | Age: 55
End: 2020-05-05

## 2020-05-05 DIAGNOSIS — F10.20 SEVERE ALCOHOL USE DISORDER (H): ICD-10-CM

## 2020-05-05 DIAGNOSIS — F43.10 POSTTRAUMATIC STRESS DISORDER: ICD-10-CM

## 2020-05-05 DIAGNOSIS — F22 DELUSIONAL DISORDER (H): ICD-10-CM

## 2020-05-08 ENCOUNTER — OFFICE VISIT - HEALTHEAST (OUTPATIENT)
Dept: PULMONOLOGY | Facility: OTHER | Age: 55
End: 2020-05-08

## 2020-05-08 DIAGNOSIS — R06.01 ORTHOPNEA: ICD-10-CM

## 2020-05-08 ASSESSMENT — MIFFLIN-ST. JEOR: SCORE: 1839.63

## 2020-05-15 ENCOUNTER — AMBULATORY - HEALTHEAST (OUTPATIENT)
Dept: BEHAVIORAL HEALTH | Facility: CLINIC | Age: 55
End: 2020-05-15

## 2020-05-15 ENCOUNTER — COMMUNICATION - HEALTHEAST (OUTPATIENT)
Dept: BEHAVIORAL HEALTH | Facility: CLINIC | Age: 55
End: 2020-05-15

## 2020-05-19 ENCOUNTER — OFFICE VISIT - HEALTHEAST (OUTPATIENT)
Dept: BEHAVIORAL HEALTH | Facility: CLINIC | Age: 55
End: 2020-05-19

## 2020-05-19 DIAGNOSIS — F22 DELUSIONAL DISORDER (H): ICD-10-CM

## 2020-05-19 DIAGNOSIS — F10.20 SEVERE ALCOHOL USE DISORDER (H): ICD-10-CM

## 2020-05-19 DIAGNOSIS — F43.10 POSTTRAUMATIC STRESS DISORDER: ICD-10-CM

## 2020-05-26 ENCOUNTER — OFFICE VISIT - HEALTHEAST (OUTPATIENT)
Dept: BEHAVIORAL HEALTH | Facility: CLINIC | Age: 55
End: 2020-05-26

## 2020-05-26 DIAGNOSIS — F10.20 SEVERE ALCOHOL USE DISORDER (H): ICD-10-CM

## 2020-05-26 DIAGNOSIS — F22 DELUSIONAL DISORDER (H): ICD-10-CM

## 2020-05-26 DIAGNOSIS — F43.10 POSTTRAUMATIC STRESS DISORDER: ICD-10-CM

## 2020-05-29 ENCOUNTER — OFFICE VISIT - HEALTHEAST (OUTPATIENT)
Dept: BEHAVIORAL HEALTH | Facility: HOSPITAL | Age: 55
End: 2020-05-29

## 2020-05-29 DIAGNOSIS — F43.10 PTSD (POST-TRAUMATIC STRESS DISORDER): ICD-10-CM

## 2020-06-05 ENCOUNTER — OFFICE VISIT - HEALTHEAST (OUTPATIENT)
Dept: BEHAVIORAL HEALTH | Facility: CLINIC | Age: 55
End: 2020-06-05

## 2020-06-05 DIAGNOSIS — F43.10 PTSD (POST-TRAUMATIC STRESS DISORDER): ICD-10-CM

## 2020-06-05 DIAGNOSIS — F10.20 SEVERE ALCOHOL USE DISORDER (H): ICD-10-CM

## 2020-06-05 DIAGNOSIS — F22 DELUSIONAL DISORDER (H): ICD-10-CM

## 2020-06-09 ENCOUNTER — OFFICE VISIT - HEALTHEAST (OUTPATIENT)
Dept: BEHAVIORAL HEALTH | Facility: CLINIC | Age: 55
End: 2020-06-09

## 2020-06-09 DIAGNOSIS — F10.20 SEVERE ALCOHOL USE DISORDER (H): ICD-10-CM

## 2020-06-09 DIAGNOSIS — F43.10 PTSD (POST-TRAUMATIC STRESS DISORDER): ICD-10-CM

## 2020-06-09 DIAGNOSIS — F22 DELUSIONAL DISORDER (H): ICD-10-CM

## 2020-06-15 ENCOUNTER — COMMUNICATION - HEALTHEAST (OUTPATIENT)
Dept: BEHAVIORAL HEALTH | Facility: CLINIC | Age: 55
End: 2020-06-15

## 2020-06-16 ENCOUNTER — OFFICE VISIT - HEALTHEAST (OUTPATIENT)
Dept: BEHAVIORAL HEALTH | Facility: CLINIC | Age: 55
End: 2020-06-16

## 2020-06-16 DIAGNOSIS — F43.10 PTSD (POST-TRAUMATIC STRESS DISORDER): ICD-10-CM

## 2020-06-16 DIAGNOSIS — F10.20 SEVERE ALCOHOL USE DISORDER (H): ICD-10-CM

## 2020-06-16 DIAGNOSIS — F22 DELUSIONAL DISORDER (H): ICD-10-CM

## 2020-06-23 ENCOUNTER — OFFICE VISIT - HEALTHEAST (OUTPATIENT)
Dept: BEHAVIORAL HEALTH | Facility: CLINIC | Age: 55
End: 2020-06-23

## 2020-06-23 DIAGNOSIS — F22 DELUSIONAL DISORDER (H): ICD-10-CM

## 2020-06-23 DIAGNOSIS — F43.10 PTSD (POST-TRAUMATIC STRESS DISORDER): ICD-10-CM

## 2020-06-23 DIAGNOSIS — F10.20 SEVERE ALCOHOL USE DISORDER (H): ICD-10-CM

## 2020-06-30 ENCOUNTER — OFFICE VISIT - HEALTHEAST (OUTPATIENT)
Dept: BEHAVIORAL HEALTH | Facility: CLINIC | Age: 55
End: 2020-06-30

## 2020-06-30 DIAGNOSIS — F43.10 PTSD (POST-TRAUMATIC STRESS DISORDER): ICD-10-CM

## 2020-06-30 DIAGNOSIS — F22 DELUSIONAL DISORDER (H): ICD-10-CM

## 2020-06-30 DIAGNOSIS — F10.20 SEVERE ALCOHOL USE DISORDER (H): ICD-10-CM

## 2020-07-09 ENCOUNTER — COMMUNICATION - HEALTHEAST (OUTPATIENT)
Dept: BEHAVIORAL HEALTH | Facility: CLINIC | Age: 55
End: 2020-07-09

## 2020-07-10 ENCOUNTER — OFFICE VISIT - HEALTHEAST (OUTPATIENT)
Dept: BEHAVIORAL HEALTH | Facility: CLINIC | Age: 55
End: 2020-07-10

## 2020-07-10 DIAGNOSIS — F10.20 SEVERE ALCOHOL USE DISORDER (H): ICD-10-CM

## 2020-07-10 DIAGNOSIS — F22 DELUSIONAL DISORDER (H): ICD-10-CM

## 2020-07-10 DIAGNOSIS — F43.10 PTSD (POST-TRAUMATIC STRESS DISORDER): ICD-10-CM

## 2020-07-14 ENCOUNTER — OFFICE VISIT - HEALTHEAST (OUTPATIENT)
Dept: BEHAVIORAL HEALTH | Facility: CLINIC | Age: 55
End: 2020-07-14

## 2020-07-14 DIAGNOSIS — F43.10 PTSD (POST-TRAUMATIC STRESS DISORDER): ICD-10-CM

## 2020-07-14 DIAGNOSIS — F10.20 SEVERE ALCOHOL USE DISORDER (H): ICD-10-CM

## 2020-07-14 DIAGNOSIS — F22 DELUSIONAL DISORDER (H): ICD-10-CM

## 2020-07-21 ENCOUNTER — COMMUNICATION - HEALTHEAST (OUTPATIENT)
Dept: BEHAVIORAL HEALTH | Facility: CLINIC | Age: 55
End: 2020-07-21

## 2020-07-23 ENCOUNTER — OFFICE VISIT - HEALTHEAST (OUTPATIENT)
Dept: BEHAVIORAL HEALTH | Facility: CLINIC | Age: 55
End: 2020-07-23

## 2020-07-23 DIAGNOSIS — F10.20 SEVERE ALCOHOL USE DISORDER (H): ICD-10-CM

## 2020-07-23 DIAGNOSIS — F43.10 PTSD (POST-TRAUMATIC STRESS DISORDER): ICD-10-CM

## 2020-07-23 DIAGNOSIS — F22 DELUSIONAL DISORDER (H): ICD-10-CM

## 2020-08-04 ENCOUNTER — OFFICE VISIT - HEALTHEAST (OUTPATIENT)
Dept: BEHAVIORAL HEALTH | Facility: CLINIC | Age: 55
End: 2020-08-04

## 2020-08-04 DIAGNOSIS — F43.10 PTSD (POST-TRAUMATIC STRESS DISORDER): ICD-10-CM

## 2020-08-04 DIAGNOSIS — F10.20 SEVERE ALCOHOL USE DISORDER (H): ICD-10-CM

## 2020-08-04 DIAGNOSIS — F22 DELUSIONAL DISORDER (H): ICD-10-CM

## 2020-08-08 ENCOUNTER — COMMUNICATION - HEALTHEAST (OUTPATIENT)
Dept: BEHAVIORAL HEALTH | Facility: CLINIC | Age: 55
End: 2020-08-08

## 2020-08-08 DIAGNOSIS — F43.10 PTSD (POST-TRAUMATIC STRESS DISORDER): ICD-10-CM

## 2020-08-10 ENCOUNTER — COMMUNICATION - HEALTHEAST (OUTPATIENT)
Dept: BEHAVIORAL HEALTH | Facility: CLINIC | Age: 55
End: 2020-08-10

## 2020-08-11 ENCOUNTER — COMMUNICATION - HEALTHEAST (OUTPATIENT)
Dept: BEHAVIORAL HEALTH | Facility: CLINIC | Age: 55
End: 2020-08-11

## 2020-08-11 ENCOUNTER — OFFICE VISIT - HEALTHEAST (OUTPATIENT)
Dept: BEHAVIORAL HEALTH | Facility: CLINIC | Age: 55
End: 2020-08-11

## 2020-08-11 DIAGNOSIS — F43.10 PTSD (POST-TRAUMATIC STRESS DISORDER): ICD-10-CM

## 2020-08-11 DIAGNOSIS — F10.20 SEVERE ALCOHOL USE DISORDER (H): ICD-10-CM

## 2020-08-11 DIAGNOSIS — F22 DELUSIONAL DISORDER (H): ICD-10-CM

## 2020-08-12 ENCOUNTER — COMMUNICATION - HEALTHEAST (OUTPATIENT)
Dept: BEHAVIORAL HEALTH | Facility: CLINIC | Age: 55
End: 2020-08-12

## 2020-08-12 DIAGNOSIS — F43.10 PTSD (POST-TRAUMATIC STRESS DISORDER): ICD-10-CM

## 2020-08-13 ENCOUNTER — OFFICE VISIT - HEALTHEAST (OUTPATIENT)
Dept: BEHAVIORAL HEALTH | Facility: HOSPITAL | Age: 55
End: 2020-08-13

## 2020-08-13 DIAGNOSIS — G47.09 OTHER INSOMNIA: ICD-10-CM

## 2020-08-13 DIAGNOSIS — F22 DELUSIONAL DISORDER (H): ICD-10-CM

## 2020-08-17 ENCOUNTER — COMMUNICATION - HEALTHEAST (OUTPATIENT)
Dept: PULMONOLOGY | Facility: OTHER | Age: 55
End: 2020-08-17

## 2020-08-18 ENCOUNTER — OFFICE VISIT - HEALTHEAST (OUTPATIENT)
Dept: BEHAVIORAL HEALTH | Facility: CLINIC | Age: 55
End: 2020-08-18

## 2020-08-18 DIAGNOSIS — F43.10 PTSD (POST-TRAUMATIC STRESS DISORDER): ICD-10-CM

## 2020-08-18 DIAGNOSIS — F22 DELUSIONAL DISORDER (H): ICD-10-CM

## 2020-08-18 DIAGNOSIS — F43.10 POSTTRAUMATIC STRESS DISORDER: ICD-10-CM

## 2020-08-25 ENCOUNTER — OFFICE VISIT - HEALTHEAST (OUTPATIENT)
Dept: BEHAVIORAL HEALTH | Facility: CLINIC | Age: 55
End: 2020-08-25

## 2020-08-25 DIAGNOSIS — F43.10 PTSD (POST-TRAUMATIC STRESS DISORDER): ICD-10-CM

## 2020-08-25 DIAGNOSIS — F22 DELUSIONAL DISORDER (H): ICD-10-CM

## 2020-09-01 ENCOUNTER — OFFICE VISIT - HEALTHEAST (OUTPATIENT)
Dept: BEHAVIORAL HEALTH | Facility: CLINIC | Age: 55
End: 2020-09-01

## 2020-09-01 DIAGNOSIS — F43.10 PTSD (POST-TRAUMATIC STRESS DISORDER): ICD-10-CM

## 2020-09-01 DIAGNOSIS — F22 DELUSIONAL DISORDER (H): ICD-10-CM

## 2020-09-08 ENCOUNTER — OFFICE VISIT - HEALTHEAST (OUTPATIENT)
Dept: BEHAVIORAL HEALTH | Facility: CLINIC | Age: 55
End: 2020-09-08

## 2020-09-08 DIAGNOSIS — F22 DELUSIONAL DISORDER (H): ICD-10-CM

## 2020-09-08 DIAGNOSIS — F43.10 PTSD (POST-TRAUMATIC STRESS DISORDER): ICD-10-CM

## 2020-09-08 DIAGNOSIS — F10.20 SEVERE ALCOHOL USE DISORDER (H): ICD-10-CM

## 2020-09-10 ENCOUNTER — OFFICE VISIT - HEALTHEAST (OUTPATIENT)
Dept: BEHAVIORAL HEALTH | Facility: HOSPITAL | Age: 55
End: 2020-09-10

## 2020-09-10 DIAGNOSIS — F43.10 PTSD (POST-TRAUMATIC STRESS DISORDER): ICD-10-CM

## 2020-09-10 DIAGNOSIS — F22 DELUSIONAL DISORDER (H): ICD-10-CM

## 2020-09-15 ENCOUNTER — OFFICE VISIT - HEALTHEAST (OUTPATIENT)
Dept: BEHAVIORAL HEALTH | Facility: CLINIC | Age: 55
End: 2020-09-15

## 2020-09-15 DIAGNOSIS — F10.20 SEVERE ALCOHOL USE DISORDER (H): ICD-10-CM

## 2020-09-15 DIAGNOSIS — F22 DELUSIONAL DISORDER (H): ICD-10-CM

## 2020-09-15 DIAGNOSIS — F43.10 PTSD (POST-TRAUMATIC STRESS DISORDER): ICD-10-CM

## 2020-09-22 ENCOUNTER — OFFICE VISIT - HEALTHEAST (OUTPATIENT)
Dept: BEHAVIORAL HEALTH | Facility: CLINIC | Age: 55
End: 2020-09-22

## 2020-09-22 DIAGNOSIS — F10.20 SEVERE ALCOHOL USE DISORDER (H): ICD-10-CM

## 2020-09-22 DIAGNOSIS — F43.10 PTSD (POST-TRAUMATIC STRESS DISORDER): ICD-10-CM

## 2020-09-22 DIAGNOSIS — F22 DELUSIONAL DISORDER (H): ICD-10-CM

## 2020-09-29 ENCOUNTER — OFFICE VISIT - HEALTHEAST (OUTPATIENT)
Dept: BEHAVIORAL HEALTH | Facility: CLINIC | Age: 55
End: 2020-09-29

## 2020-09-29 DIAGNOSIS — F22 DELUSIONAL DISORDER (H): ICD-10-CM

## 2020-09-29 DIAGNOSIS — F43.10 PTSD (POST-TRAUMATIC STRESS DISORDER): ICD-10-CM

## 2020-09-29 DIAGNOSIS — F10.20 SEVERE ALCOHOL USE DISORDER (H): ICD-10-CM

## 2020-10-06 ENCOUNTER — OFFICE VISIT - HEALTHEAST (OUTPATIENT)
Dept: BEHAVIORAL HEALTH | Facility: CLINIC | Age: 55
End: 2020-10-06

## 2020-10-06 DIAGNOSIS — F10.20 SEVERE ALCOHOL USE DISORDER (H): ICD-10-CM

## 2020-10-06 DIAGNOSIS — F43.10 PTSD (POST-TRAUMATIC STRESS DISORDER): ICD-10-CM

## 2020-10-06 DIAGNOSIS — F22 DELUSIONAL DISORDER (H): ICD-10-CM

## 2020-10-08 ENCOUNTER — OFFICE VISIT - HEALTHEAST (OUTPATIENT)
Dept: BEHAVIORAL HEALTH | Facility: HOSPITAL | Age: 55
End: 2020-10-08

## 2020-10-08 DIAGNOSIS — F43.10 PTSD (POST-TRAUMATIC STRESS DISORDER): ICD-10-CM

## 2020-10-08 DIAGNOSIS — G47.09 OTHER INSOMNIA: ICD-10-CM

## 2020-10-08 DIAGNOSIS — F22 DELUSIONAL DISORDER (H): ICD-10-CM

## 2020-10-13 ENCOUNTER — AMBULATORY - HEALTHEAST (OUTPATIENT)
Dept: BEHAVIORAL HEALTH | Facility: CLINIC | Age: 55
End: 2020-10-13

## 2020-10-21 ENCOUNTER — COMMUNICATION - HEALTHEAST (OUTPATIENT)
Dept: BEHAVIORAL HEALTH | Facility: CLINIC | Age: 55
End: 2020-10-21

## 2020-10-27 ENCOUNTER — OFFICE VISIT - HEALTHEAST (OUTPATIENT)
Dept: BEHAVIORAL HEALTH | Facility: CLINIC | Age: 55
End: 2020-10-27

## 2020-10-27 DIAGNOSIS — F10.20 SEVERE ALCOHOL USE DISORDER (H): ICD-10-CM

## 2020-10-27 DIAGNOSIS — F43.10 PTSD (POST-TRAUMATIC STRESS DISORDER): ICD-10-CM

## 2020-10-27 DIAGNOSIS — F22 DELUSIONAL DISORDER (H): ICD-10-CM

## 2020-11-02 ENCOUNTER — OFFICE VISIT - HEALTHEAST (OUTPATIENT)
Dept: BEHAVIORAL HEALTH | Facility: CLINIC | Age: 55
End: 2020-11-02

## 2020-11-02 DIAGNOSIS — F22 DELUSIONAL DISORDER (H): ICD-10-CM

## 2020-11-02 DIAGNOSIS — F43.10 PTSD (POST-TRAUMATIC STRESS DISORDER): ICD-10-CM

## 2020-11-03 ENCOUNTER — AMBULATORY - HEALTHEAST (OUTPATIENT)
Dept: BEHAVIORAL HEALTH | Facility: CLINIC | Age: 55
End: 2020-11-03

## 2020-11-03 ENCOUNTER — OFFICE VISIT - HEALTHEAST (OUTPATIENT)
Dept: BEHAVIORAL HEALTH | Facility: CLINIC | Age: 55
End: 2020-11-03

## 2020-11-03 DIAGNOSIS — F43.10 PTSD (POST-TRAUMATIC STRESS DISORDER): ICD-10-CM

## 2020-11-03 DIAGNOSIS — F10.20 SEVERE ALCOHOL USE DISORDER (H): ICD-10-CM

## 2020-11-03 DIAGNOSIS — F22 DELUSIONAL DISORDER (H): ICD-10-CM

## 2020-11-10 ENCOUNTER — OFFICE VISIT - HEALTHEAST (OUTPATIENT)
Dept: BEHAVIORAL HEALTH | Facility: CLINIC | Age: 55
End: 2020-11-10

## 2020-11-10 DIAGNOSIS — F43.10 PTSD (POST-TRAUMATIC STRESS DISORDER): ICD-10-CM

## 2020-11-10 DIAGNOSIS — F10.20 SEVERE ALCOHOL USE DISORDER (H): ICD-10-CM

## 2020-11-10 DIAGNOSIS — F22 DELUSIONAL DISORDER (H): ICD-10-CM

## 2020-11-17 ENCOUNTER — OFFICE VISIT - HEALTHEAST (OUTPATIENT)
Dept: BEHAVIORAL HEALTH | Facility: CLINIC | Age: 55
End: 2020-11-17

## 2020-11-17 DIAGNOSIS — F10.20 SEVERE ALCOHOL USE DISORDER (H): ICD-10-CM

## 2020-11-17 DIAGNOSIS — F43.10 PTSD (POST-TRAUMATIC STRESS DISORDER): ICD-10-CM

## 2020-11-17 DIAGNOSIS — F22 DELUSIONAL DISORDER (H): ICD-10-CM

## 2020-11-24 ENCOUNTER — OFFICE VISIT - HEALTHEAST (OUTPATIENT)
Dept: BEHAVIORAL HEALTH | Facility: CLINIC | Age: 55
End: 2020-11-24

## 2020-11-24 ENCOUNTER — COMMUNICATION - HEALTHEAST (OUTPATIENT)
Dept: BEHAVIORAL HEALTH | Facility: CLINIC | Age: 55
End: 2020-11-24

## 2020-11-24 DIAGNOSIS — F43.10 PTSD (POST-TRAUMATIC STRESS DISORDER): ICD-10-CM

## 2020-11-24 DIAGNOSIS — F22 DELUSIONAL DISORDER (H): ICD-10-CM

## 2020-11-24 DIAGNOSIS — F10.20 SEVERE ALCOHOL USE DISORDER (H): ICD-10-CM

## 2020-12-08 ENCOUNTER — OFFICE VISIT - HEALTHEAST (OUTPATIENT)
Dept: BEHAVIORAL HEALTH | Facility: CLINIC | Age: 55
End: 2020-12-08

## 2020-12-08 ENCOUNTER — COMMUNICATION - HEALTHEAST (OUTPATIENT)
Dept: BEHAVIORAL HEALTH | Facility: CLINIC | Age: 55
End: 2020-12-08

## 2020-12-08 DIAGNOSIS — F10.20 SEVERE ALCOHOL USE DISORDER (H): ICD-10-CM

## 2020-12-08 DIAGNOSIS — F22 DELUSIONAL DISORDER (H): ICD-10-CM

## 2020-12-08 DIAGNOSIS — F43.10 PTSD (POST-TRAUMATIC STRESS DISORDER): ICD-10-CM

## 2020-12-14 ENCOUNTER — COMMUNICATION - HEALTHEAST (OUTPATIENT)
Dept: BEHAVIORAL HEALTH | Facility: CLINIC | Age: 55
End: 2020-12-14

## 2020-12-22 ENCOUNTER — OFFICE VISIT - HEALTHEAST (OUTPATIENT)
Dept: BEHAVIORAL HEALTH | Facility: CLINIC | Age: 55
End: 2020-12-22

## 2020-12-22 DIAGNOSIS — F10.20 SEVERE ALCOHOL USE DISORDER (H): ICD-10-CM

## 2020-12-22 DIAGNOSIS — F22 DELUSIONAL DISORDER (H): ICD-10-CM

## 2020-12-22 DIAGNOSIS — F43.10 PTSD (POST-TRAUMATIC STRESS DISORDER): ICD-10-CM

## 2020-12-29 ENCOUNTER — OFFICE VISIT - HEALTHEAST (OUTPATIENT)
Dept: BEHAVIORAL HEALTH | Facility: CLINIC | Age: 55
End: 2020-12-29

## 2020-12-29 DIAGNOSIS — F10.20 SEVERE ALCOHOL USE DISORDER (H): ICD-10-CM

## 2020-12-29 DIAGNOSIS — F22 DELUSIONAL DISORDER (H): ICD-10-CM

## 2020-12-29 DIAGNOSIS — F43.10 PTSD (POST-TRAUMATIC STRESS DISORDER): ICD-10-CM

## 2020-12-30 ENCOUNTER — COMMUNICATION - HEALTHEAST (OUTPATIENT)
Dept: BEHAVIORAL HEALTH | Facility: CLINIC | Age: 55
End: 2020-12-30

## 2021-01-11 ENCOUNTER — AMBULATORY - HEALTHEAST (OUTPATIENT)
Dept: BEHAVIORAL HEALTH | Facility: CLINIC | Age: 56
End: 2021-01-11

## 2021-01-12 ENCOUNTER — OFFICE VISIT - HEALTHEAST (OUTPATIENT)
Dept: BEHAVIORAL HEALTH | Facility: CLINIC | Age: 56
End: 2021-01-12

## 2021-01-12 ENCOUNTER — COMMUNICATION - HEALTHEAST (OUTPATIENT)
Dept: BEHAVIORAL HEALTH | Facility: CLINIC | Age: 56
End: 2021-01-12

## 2021-01-12 DIAGNOSIS — F43.10 PTSD (POST-TRAUMATIC STRESS DISORDER): ICD-10-CM

## 2021-01-12 DIAGNOSIS — F22 DELUSIONAL DISORDER (H): ICD-10-CM

## 2021-01-14 ENCOUNTER — COMMUNICATION - HEALTHEAST (OUTPATIENT)
Dept: BEHAVIORAL HEALTH | Facility: CLINIC | Age: 56
End: 2021-01-14

## 2021-01-14 DIAGNOSIS — F22 DELUSIONAL DISORDER (H): ICD-10-CM

## 2021-01-14 DIAGNOSIS — F43.10 PTSD (POST-TRAUMATIC STRESS DISORDER): ICD-10-CM

## 2021-01-19 ENCOUNTER — COMMUNICATION - HEALTHEAST (OUTPATIENT)
Dept: BEHAVIORAL HEALTH | Facility: CLINIC | Age: 56
End: 2021-01-19

## 2021-01-19 DIAGNOSIS — F43.10 PTSD (POST-TRAUMATIC STRESS DISORDER): ICD-10-CM

## 2021-01-19 DIAGNOSIS — F22 DELUSIONAL DISORDER (H): ICD-10-CM

## 2021-01-24 ENCOUNTER — COMMUNICATION - HEALTHEAST (OUTPATIENT)
Dept: SCHEDULING | Facility: CLINIC | Age: 56
End: 2021-01-24

## 2021-02-01 ENCOUNTER — COMMUNICATION - HEALTHEAST (OUTPATIENT)
Dept: BEHAVIORAL HEALTH | Facility: CLINIC | Age: 56
End: 2021-02-01

## 2021-02-01 DIAGNOSIS — F43.10 POSTTRAUMATIC STRESS DISORDER: ICD-10-CM

## 2021-02-01 RX ORDER — PRAZOSIN HYDROCHLORIDE 5 MG/1
5 CAPSULE ORAL AT BEDTIME
Qty: 90 CAPSULE | Refills: 0 | Status: SHIPPED | OUTPATIENT
Start: 2021-02-01

## 2021-02-02 ENCOUNTER — OFFICE VISIT - HEALTHEAST (OUTPATIENT)
Dept: BEHAVIORAL HEALTH | Facility: CLINIC | Age: 56
End: 2021-02-02

## 2021-02-02 DIAGNOSIS — F43.10 PTSD (POST-TRAUMATIC STRESS DISORDER): ICD-10-CM

## 2021-02-02 DIAGNOSIS — F22 DELUSIONAL DISORDER (H): ICD-10-CM

## 2021-02-02 DIAGNOSIS — F10.20 SEVERE ALCOHOL USE DISORDER (H): ICD-10-CM

## 2021-02-10 ENCOUNTER — COMMUNICATION - HEALTHEAST (OUTPATIENT)
Dept: BEHAVIORAL HEALTH | Facility: CLINIC | Age: 56
End: 2021-02-10

## 2021-02-10 ENCOUNTER — OFFICE VISIT - HEALTHEAST (OUTPATIENT)
Dept: BEHAVIORAL HEALTH | Facility: CLINIC | Age: 56
End: 2021-02-10

## 2021-02-10 DIAGNOSIS — F43.10 PTSD (POST-TRAUMATIC STRESS DISORDER): ICD-10-CM

## 2021-02-10 DIAGNOSIS — F22 DELUSIONAL DISORDER (H): ICD-10-CM

## 2021-02-10 DIAGNOSIS — F20.0 PARANOID SCHIZOPHRENIA, CHRONIC CONDITION (H): ICD-10-CM

## 2021-02-10 RX ORDER — QUETIAPINE FUMARATE 400 MG/1
400 TABLET, FILM COATED ORAL AT BEDTIME
Qty: 90 TABLET | Refills: 0 | Status: SHIPPED | OUTPATIENT
Start: 2021-02-10

## 2021-02-10 RX ORDER — QUETIAPINE FUMARATE 100 MG/1
100 TABLET, FILM COATED ORAL DAILY PRN
Qty: 90 TABLET | Refills: 0 | Status: SHIPPED | OUTPATIENT
Start: 2021-02-10

## 2021-02-15 ENCOUNTER — OFFICE VISIT - HEALTHEAST (OUTPATIENT)
Dept: BEHAVIORAL HEALTH | Facility: CLINIC | Age: 56
End: 2021-02-15

## 2021-02-15 DIAGNOSIS — F43.10 PTSD (POST-TRAUMATIC STRESS DISORDER): ICD-10-CM

## 2021-02-15 DIAGNOSIS — F10.20 SEVERE ALCOHOL USE DISORDER (H): ICD-10-CM

## 2021-02-15 DIAGNOSIS — F22 DELUSIONAL DISORDER (H): ICD-10-CM

## 2021-02-16 ENCOUNTER — AMBULATORY - HEALTHEAST (OUTPATIENT)
Dept: BEHAVIORAL HEALTH | Facility: CLINIC | Age: 56
End: 2021-02-16

## 2021-02-19 ENCOUNTER — COMMUNICATION - HEALTHEAST (OUTPATIENT)
Dept: SCHEDULING | Facility: CLINIC | Age: 56
End: 2021-02-19

## 2021-02-25 ENCOUNTER — OFFICE VISIT - HEALTHEAST (OUTPATIENT)
Dept: BEHAVIORAL HEALTH | Facility: CLINIC | Age: 56
End: 2021-02-25

## 2021-02-25 DIAGNOSIS — F20.0 PARANOID SCHIZOPHRENIA, CHRONIC CONDITION (H): ICD-10-CM

## 2021-02-25 DIAGNOSIS — F10.10 ALCOHOL ABUSE: ICD-10-CM

## 2021-02-27 ENCOUNTER — COMMUNICATION - HEALTHEAST (OUTPATIENT)
Dept: SCHEDULING | Facility: CLINIC | Age: 56
End: 2021-02-27

## 2021-03-02 ENCOUNTER — AMBULATORY - HEALTHEAST (OUTPATIENT)
Dept: BEHAVIORAL HEALTH | Facility: CLINIC | Age: 56
End: 2021-03-02

## 2021-03-02 ENCOUNTER — OFFICE VISIT - HEALTHEAST (OUTPATIENT)
Dept: BEHAVIORAL HEALTH | Facility: CLINIC | Age: 56
End: 2021-03-02

## 2021-03-02 ENCOUNTER — COMMUNICATION - HEALTHEAST (OUTPATIENT)
Dept: BEHAVIORAL HEALTH | Facility: CLINIC | Age: 56
End: 2021-03-02

## 2021-03-02 DIAGNOSIS — F22 DELUSIONAL DISORDER (H): ICD-10-CM

## 2021-03-02 DIAGNOSIS — F43.10 PTSD (POST-TRAUMATIC STRESS DISORDER): ICD-10-CM

## 2021-03-02 DIAGNOSIS — F10.20 SEVERE ALCOHOL USE DISORDER (H): ICD-10-CM

## 2021-03-03 ENCOUNTER — OFFICE VISIT - HEALTHEAST (OUTPATIENT)
Dept: BEHAVIORAL HEALTH | Facility: CLINIC | Age: 56
End: 2021-03-03

## 2021-03-03 DIAGNOSIS — G47.09 OTHER INSOMNIA: ICD-10-CM

## 2021-03-03 RX ORDER — TRAZODONE HYDROCHLORIDE 100 MG/1
TABLET ORAL
Qty: 90 TABLET | Refills: 0 | Status: SHIPPED | OUTPATIENT
Start: 2021-03-03

## 2021-03-09 ENCOUNTER — OFFICE VISIT - HEALTHEAST (OUTPATIENT)
Dept: BEHAVIORAL HEALTH | Facility: CLINIC | Age: 56
End: 2021-03-09

## 2021-03-09 DIAGNOSIS — F43.10 PTSD (POST-TRAUMATIC STRESS DISORDER): ICD-10-CM

## 2021-03-09 DIAGNOSIS — F22 DELUSIONAL DISORDER (H): ICD-10-CM

## 2021-03-09 DIAGNOSIS — F10.20 SEVERE ALCOHOL USE DISORDER (H): ICD-10-CM

## 2021-03-19 ENCOUNTER — COMMUNICATION - HEALTHEAST (OUTPATIENT)
Dept: BEHAVIORAL HEALTH | Facility: CLINIC | Age: 56
End: 2021-03-19

## 2021-03-22 ENCOUNTER — COMMUNICATION - HEALTHEAST (OUTPATIENT)
Dept: BEHAVIORAL HEALTH | Facility: CLINIC | Age: 56
End: 2021-03-22

## 2021-04-08 ENCOUNTER — COMMUNICATION - HEALTHEAST (OUTPATIENT)
Dept: BEHAVIORAL HEALTH | Facility: CLINIC | Age: 56
End: 2021-04-08

## 2021-05-27 NOTE — PROGRESS NOTES
Mental Health Visit Note    04/02/2019    Start time: 1159 Stop Time: 1237 Session # 12    Session Type: Patient is presenting for an Individual session.    Choco Claudio is a 53 y.o. male is being seen today for    Chief Complaint   Patient presents with      Follow Up     Confusion     New symptoms or complaints: None    Functional Impairment:   Personal: 4  Family: 3  Work: N/A  Social:4    Clinical assessment of mental status: Choco Claudio presented on time for scheduled session today.  He was struggling with memory, open and cooperative and appropriate for therapy. His hygiene and grooming was poor. His memory was poor for short and long term.  His speech and language was soft and concise throughout the session. Concentration and focus is brief. Psychosis is reported and noted. He reports his mood is confused.  Affect is congruent with speech.  Fund of knowledge is adequate. Insight is adequate for therapy. Changes made as needed.      Suicidal/Homicidal Ideation present: None Reported This Session    Patient's impression of their current status: Patient reported feeling confused. Patient indicated he believes this is due to lack of sleep. Patient reported last night getting on the bus to [place] and being unaware. Patient indicated he was able to get onto another bus to get to [facility]. Patient reported he has been spending most his time on the buses or trams. Patient indicated he has not gone back to his tent due to being too cold. Patient reported he also struggles with loneliness so that makes him not want to be in his tent as well.     Therapist impression of patients current state: Patient appears to have poor insight into his mental health. This therapist processed with patient the importance of trying to find somewhere he can get sleep at night. This therapist enacouarged patient to see his doctor for his confusion concerns. This therapist informed patient on different community  activities he could participate in to help with his loneliness.     Type of psychotherapeutic technique provided: Insight oriented, Client centered and CBT    Progress toward short term goals:Progress as expected with patient coming to scheduled session, not drinking for a week and taking medications.     Review of long term goals: Long term review on 01/08/2019    Diagnosis:   1. Delusional disorder (H)    2. PTSD (post-traumatic stress disorder)    3. Severe alcohol use disorder (H)      Plan and Follow up: Patient will return in one week for scheduled session.  Patient should take his medication daily as prescribed. Patient should work on continuing to stop drinking. Patient would benefit from continuing to identify situations where he feels he may be struggling with paranoia and what is leading to the paranoia thoughts. Patient should continue to look for housing. Patient should contact his doctor for any medication concerns.     Discharge Criteria/Planning: Client has chronic symptoms and ongoing therapy for maintenance stability recommended.    Marcella Nogueira

## 2021-05-27 NOTE — PROGRESS NOTES
Mental Health Visit Note    04/16/2019    Start time: 1201 Stop Time: 1246 Session # 14    Session Type: Patient is presenting for an Individual session.    Choco Claudio is a 53 y.o. male is being seen today for    Chief Complaint   Patient presents with      Follow Up     Sadness     New symptoms or complaints: None    Functional Impairment:   Personal: 4  Family: 3  Work: N/A  Social:4    Clinical assessment of mental status: Choco Claudio presented on time for scheduled session today.  He was struggling with memory, open and cooperative and appropriate for therapy. His hygiene and grooming was fair. His memory was poor for short and long term.  His speech and language was soft but rambling throughout the session. Concentration and focus is brief. Psychosis is reported and noted. He reports his mood is sad.  Affect is congruent with speech.  Fund of knowledge is adequate. Insight is adequate for therapy. Changes made as needed.     Suicidal/Homicidal Ideation present: None Reported This Session    Patient's impression of their current status: Patient reported feeling sad. Patient indicated he has bronchitis so he was staying at his parents house. Patient reported his mom has told him not to come back for a while due to his dad. Patient indicated it makes him sad how his dad treats him and his mom. Patient reported he knows his dad has health problems but that does not mean he needs to be mean. Patient indicated he should have a family that supports him and lets him stay when needed. Patient reported instead he is left to care for himself. Patient indicated he should hear by Friday if he gets the apartment.     Therapist impression of patients current state: Patient appears to have poor insight into his mental health. This therapist processed with patient his emotions related to his family. This therapist challenged patient on ways he continues to want a relationship with his dad but it does not  happen. This therapist processed with patient starting to identify the hurt he experiences from his family.     Type of psychotherapeutic technique provided: Insight oriented, Client centered and CBT    Progress toward short term goals:Progress as expected with patient coming to scheduled session, taking his medications and not drinking.     Review of long term goals: Long term review on 04/09/2019    Diagnosis:   1. Delusional disorder (H)    2. PTSD (post-traumatic stress disorder)    3. Severe alcohol use disorder (H)      Plan and Follow up: Patient will return in one week for scheduled session. Patient should work on continuing to stop drinking. Patient would benefit from continuing to identify situations where he feels he may be struggling with paranoia and what is leading to the paranoia thoughts. Patient should contact his doctor for any medication concerns. Patient would benefit from reaching out to family and friends to see if he can stay there for a few nights at a time. Patient should continue to work with his  on finding housing.     Discharge Criteria/Planning: Client has chronic symptoms and ongoing therapy for maintenance stability recommended.    Marcella Nogueira

## 2021-05-27 NOTE — PROGRESS NOTES
Mental Health Visit Note    3/26/2019    Start time: 1201 Stop Time: 1245 Session # 11    Session Type: Patient is presenting for an Individual session.    Choco Claudio is a 53 y.o. male is being seen today for    Chief Complaint   Patient presents with      Follow Up     Depression     New symptoms or complaints: None    Functional Impairment:   Personal: 4  Family: 3  Work: N/A  Social:4    Clinical assessment of mental status: Choco Claudio presented on time for scheduled session today.  He was oriented x3, open and cooperative and appropriate for therapy. His hygiene and grooming was poor. His memory was poor for short and long term.  His speech and language was soft and concise throughout the session. Concentration and focus is brief. Psychosis is reported and noted. He reports his mood is depressed.  Affect is congruent with speech.  Fund of knowledge is adequate. Insight is adequate for therapy. Mental status reviewed and changes made as needed.     Suicidal/Homicidal Ideation present: None Reported This Session    Patient's impression of their current status: Patient indicated feeling depressed. Patient reported life is not good right now. Patient indicated his mind is very foggy. Patient reported he is forgetting very simple things at this time. Patient indicated he did find a new skyway where he can sleep. Patient reported he does not feel it is warm enough to sleep in his tent. Patient indicated due to coming to his parents drunk he is no longer allowed to stay with them.     Therapist impression of patients current state: Patient appears to have poor insight into his mental health. This therapist processed with patient ways that lack of sleep and drinking can cause problems with his memory. This therapist processed with patient now that it is warmer out spending time during the day in the tent to prevent having to spend so much time on the train and feeling unsafe. This therapist processed  with patient ways that attending meeting could help him to meet people and work on stopping drinking.     Type of psychotherapeutic technique provided: Insight oriented, Client centered and CBT    Progress toward short term goals:Progress as expected with patient coming to scheduled session. Poor progress as evidenced by continuing to not taking medications and drinking.    Review of long term goals: Long term review on 01/08/2019    Diagnosis:   1. Delusional disorder (H)    2. PTSD (post-traumatic stress disorder)    3. Severe alcohol use disorder (H)      Plan and Follow up: Patient will return in one week for scheduled session.  Patient should take his medication daily as prescribed. Patient should work on stopping drinking. Patient would benefit from continuing to identify situations where he feels he may be struggling with paranoia and what is leading to the paranoia thoughts. Patient should continue to look for housing.     Discharge Criteria/Planning: Client has chronic symptoms and ongoing therapy for maintenance stability recommended.    Marcella Nogueira

## 2021-05-27 NOTE — PROGRESS NOTES
This therapist attempted to return patient' phone call and left a Vm saying he could call the clinic back.

## 2021-05-27 NOTE — PROGRESS NOTES
Outpatient Mental Health Treatment Plan    Name:  Choco Claudio  :  1965  MRN:  [id number]    Treatment Plan:  Updated Treatment Plan  Intake/initial treatment plan date:  2017  Benefit and risks and alternatives have been discussed: Yes  Is this treatment appropriate with minimal intrusion/restrictions: Yes  Estimated duration of treatment:  Ongoing psychotherapy at this time  Anticipated frequency of services:  Weekly  Necessity for frequency: This frequency is needed to establish therapeutic goals and for continuity of care in order to monitor progress.  Necessity for treatment: To address cognitive, behavioral, and/or emotional barriers in order to work toward goals and to improve quality of life.    Plan:       ?   ?  Posttraumatic Stress Disorder    Goal:  Reduced the signs and symptoms of PTSD and increase patient's ability to tolerate breakthrough stressors surrounding his various traumas.   Strategies: ? [x]Learn and practice relaxation techniques and other coping strategies (e.g., thought stopping, reframing, meditation)     ? [x] Increase involvement in meaningful activities     ? [x] Discuss sleep hygiene     ? [x] Explore thoughts and expectations about self and others     ? [x] Identify and monitor triggers for panic/anxiety symptoms     ? [x] Implement physical activity routine (with physician approval)     ? [x] Consider introduction of bibliotherapy and/or videos     ? [x] Continue compliance with medical treatment plan (or explore barriers)   ?Degree to which this is a problem: 4  Degree to which goal is met: 1    Date of next review: 2019    Substance use  Goal:  Increase patient awareness regarding substance use triggers.  Consider harm reduction and/or chemical dependency treatment.   Strategies: ? [x] Consider referral for chemical dependency evaluation     ? [x] Discuss barriers to participating in AA or other peer-facilitated groups         [x] Address environmental  factors which may interfere with sobriety     ? [x] Explore short-term versus long-term consequences of use    ?  Degree to which this is a problem: 4  Degree to which goal is met: 1    Date of next review: 07/09/2019    Depression  Goal:  Reduced the signs and symptoms of depression and increase patient's ability to tolerate breakthrough symptomology.   Strategies: ? [x]Learn and practice relaxation techniques and other coping strategies (e.g., thought stopping, reframing, meditation)     ? [x] Increase involvement in meaningful activities     ? [x] Discuss sleep hygiene     ? [x] Explore thoughts and expectations about self and others     ? [x] Identify and monitor triggers for panic/anxiety symptoms     ? [x] Implement physical activity routine (with physician approval)     ? [x] Consider introduction of bibliotherapy and/or videos     ? [x] Continue compliance with medical treatment plan (or explore barriers)   ?Degree to which this is a problem: 4  Degree to which goal is met: 1    Date of next review: 07/09/2019    Functional Impairment:  1=Not at all/Rarely  2=Some days  3=Most Days  4=Every Day    Personal : 4  Family : 4  Social : 4   Work/school : N/A at this time    Diagnosis:  Posttraumatic stress disorder; alcohol use disorder, severe; and major depressive disorder, recurrent, moderate.    Strengths:  supported by mother, one good friend, employed, and motivated for change.  Limitations:  isolation, ambivalence about discontinuing alcohol use, and difficulty connecting with others due to trauma symptoms  Cultural Considerations: Client identifies is half .    Persons responsible for this plan: Patient and Provider            Psychotherapist Signature           Patient Signature:                This note was created with help of Dragon dictation software.  Grammatical / typing errors are not intentional and inherent to the software.

## 2021-05-27 NOTE — PROGRESS NOTES
Mental Health Visit Note    04/09/2019    Start time: 1201 Stop Time: 1247 Session # 13    Session Type: Patient is presenting for an Individual session.    Choco Claudio is a 53 y.o. male is being seen today for    Chief Complaint   Patient presents with      Follow Up     Fatigue      New symptoms or complaints: None    Functional Impairment:   Personal: 4  Family: 3  Work: N/A  Social:4    Clinical assessment of mental status: Choco Claudio presented on time for scheduled session today.  He was struggling with memory, open and cooperative and appropriate for therapy. His hygiene and grooming was poor. His memory was poor for short and long term.  His speech and language was soft but rambling throughout the session. Concentration and focus is brief. Psychosis is reported and noted. He reports his mood is fatigued.  Affect is congruent with speech.  Fund of knowledge is adequate. Insight is adequate for therapy. Reviewed and changes made as needed.     Suicidal/Homicidal Ideation present: None Reported This Session    Patient's impression of their current status: Patient indicated feeling fatigued. Patient reported he is planning to go see his doctor after this appointment. Patient indicated he went to look at a place and believes he may be able to move in May 1st. Patient reported he is not getting his hopes up at this time. Patient indicated if it does work out it will make a huge difference for him. Patient reported his mom allowed him to sleep in the garage which was nice so he could get some sleep. Patient indicated other then that he has not had good sleep. Patient reported he has not been taking his medications due to them making him feel drowsy and he needs to be fully alert on the train.     Therapist impression of patients current state: Patient appears to have poor insight into his mental health. This therapist processed with patient the importance of him taking care of his needs including  seeing this doctor. This therapist encouraged patient to talk with his provider about his medication concerns. This therapist processed with patient talking with his mom about being able to continue to sleep at their house until he gets his own place.     Type of psychotherapeutic technique provided: Insight oriented, Client centered and CBT    Progress toward short term goals:Progress as expected with patient coming to scheduled session. Poor progress as evidenced by not taking medication.     Review of long term goals: Long term review on 04/09/2019    Diagnosis:   1. Delusional disorder (H)    2. PTSD (post-traumatic stress disorder)    3. Severe alcohol use disorder (H)      Plan and Follow up: Patient will return in one week for scheduled session. Patient should work on continuing to stop drinking. Patient would benefit from continuing to identify situations where he feels he may be struggling with paranoia and what is leading to the paranoia thoughts. Patient should contact his doctor for any medication concerns. Patient would benefit from reaching out to family and friends to see if he can stay there for a few nights at a time.     Discharge Criteria/Planning: Client has chronic symptoms and ongoing therapy for maintenance stability recommended.    Marcella Nogueira
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
## 2021-05-28 NOTE — PROGRESS NOTES
Mental Health Visit Note    05/07/2019    Start time: 1201 Stop Time: 1239 Session # 17    Session Type: Patient is presenting for an Individual session.    Choco Claudio is a 53 y.o. male is being seen today for    Chief Complaint   Patient presents with      Follow Up     Anger     New symptoms or complaints: None    Functional Impairment:   Personal: 4  Family: 3  Work: N/A  Social:4    Clinical assessment of mental status: Choco Claudio presented on time for scheduled session today.  He was struggling with memory, open and cooperative and appropriate for therapy. His hygiene and grooming was poor due to homelessness. His memory was poor for short and long term.  His speech and language was soft and concise throughout the session. Concentration and focus is brief. Psychosis is reported and noted. He reports his mood is angry.  Affect is congruent with speech.  Fund of knowledge is adequate. Insight is adequate for therapy. Reviewed and changes made as needed.     Suicidal/Homicidal Ideation present: None Reported This Session    Patient's impression of their current status: Patient reported feeling angry. Patient indicated his angry is related to not having housing. Patient reported he met with the flo yesterday and had him fill out more paperwork. Patient indicated being extremely angry that he had to fill out this paperwork and is not moving in. Patient reported he feels that his worker is not trying hard enough. Patient indicated he is ready to tell them he is done with them and move back to his tent. Patient reported being homeless is extremely hard for him and affecting his mood due to no sleep.     Therapist impression of patients current state: Patient appears to have poor insight into his mental health. This therapist challenged patient on what would happen next winter if he decided to stop the housing process now. This therapist processed with patient ways situations can be out of a  person's hand and needing to look at the fact he has been accepted for an apartment. This therapist informed patient that security indicated he can only arrive 15 to 20 minutes before his scheduled appointment.     Type of psychotherapeutic technique provided: Insight oriented, Client centered and CBT    Progress toward short term goals:Progress as expected with patient coming to scheduled session. Poor progress as evidenced by still drinking and not taking medications.     Review of long term goals: Long term review on 04/09/2019    Diagnosis:   1. Delusional disorder (H)    2. PTSD (post-traumatic stress disorder)    3. Severe alcohol use disorder (H)      Plan and Follow up: Patient will return in one week for scheduled session. Patient should work on continuing to stop drinking. Patient would benefit from continuing to identify situations where he feels he may be struggling with paranoia and what is leading to the paranoia thoughts. Patient should contact his doctor for any medication concerns. Patient needs to continue to work with his worker and housing and use skills to reduce anger.     Discharge Criteria/Planning: Client has chronic symptoms and ongoing therapy for maintenance stability recommended.    Marcella Nogueira

## 2021-05-28 NOTE — PROGRESS NOTES
Mental Health Visit Note    04/30/2019    Start time: 1201 Stop Time: 1231 Session # 16    Session Type: Patient is presenting for an Individual session.    Choco Claudio is a 53 y.o. male is being seen today for    Chief Complaint   Patient presents with      Follow Up     Homelessness     New symptoms or complaints: None    Functional Impairment:   Personal: 4  Family: 3  Work: N/A  Social:4    Clinical assessment of mental status: Choco Claudio presented on time for scheduled session today.  He was struggling with memory, open and cooperative and appropriate for therapy. His hygiene and grooming was poor due to homelessness. His memory was poor for short and long term.  His speech and language was soft and concise throughout the session. Concentration and focus is brief. Psychosis is reported and noted. He reports his mood is depressed.  Affect is congruent with speech.  Fund of knowledge is adequate. Insight is adequate for therapy. Changes made as needed.     Suicidal/Homicidal Ideation present: None Reported This Session    Patient's impression of their current status: Patient indicated feeling depressed. Patient reported he is struggling with being homeless. Patient indicated he did get approved for housing but unsure when he will move in. Patient reported he is not optimistic at this time about the housing and wants it to be now. Patient indicated he is meeting with emergency assistance who will help move the process along. Patient reported he has been drinking to help with the depression. Patient indicated knowing drinking makes the depression worse in the long run but feeling it helps at the moment. Patient reported he is not taking his medications due to not wanting to be tired.     Therapist impression of patients current state: Patient appears to have poor insight into his mental health. This therapist processed with patient his options of getting a rule 29 to get into treatment to help  him stop drinking which patient declined. This therapist informed patient if he was having any thoughts of self-harm he needs to call 911 and/or report to ER and patient agreed. This therapist processed with patient talking to his prescriber about his medications concerns. This therapist challenged patient on the good that is happening in his life and being able to recognize ways he is moving closer to having a home.     Type of psychotherapeutic technique provided: Insight oriented, Client centered and CBT    Progress toward short term goals:Progress as expected with patient coming to scheduled session. Poor progress as evidenced by still drinking and not taking medications.     Review of long term goals: Long term review on 04/09/2019    Diagnosis:   1. Delusional disorder (H)    2. PTSD (post-traumatic stress disorder)    3. Severe alcohol use disorder (H)      Plan and Follow up: Patient will return in one week for scheduled session. Patient should work on continuing to stop drinking. Patient would benefit from continuing to identify situations where he feels he may be struggling with paranoia and what is leading to the paranoia thoughts. Patient should contact his doctor for any medication concerns.    Discharge Criteria/Planning: Client has chronic symptoms and ongoing therapy for maintenance stability recommended.    Marcella Nogueira

## 2021-05-28 NOTE — PROGRESS NOTES
Psychiatric  Progress Note  Date of visit:4/25/2019           Discussion of Care and Treatment Recommendations:   This is a 53 y.o. male with significant history of Alcohol Use Disorder, Etoh induced gastritis , DENISE and PTSD who presents to the clinic today , noncompliance with treatment plan.  Patient is here today for  a follow up appointment          Last visit 3/8/19.  Recommendation at last visit .  1.-Continue with current medications Trazodone  100 mg at Bedtime, gabapentin 100  mg at bedtime, Start Prazosin 2 mg at bedtime -PTSD, Start Seroquel 25 mg  at bedtime - psychosis , delusional thoughts  2- Highly recommend Inpatient CD treatment Alcohol Use Disorder - Severe. Pt  opposed to CD treatment   3- Continue with  Psychotherapy:  4- 5-Make efforts to stay away from alcohol and drug  5- RTC-10  weeks call in between visit with any questions or concerns  Patient and I reviewed diagnosis and treatment plan and patient agrees with following recommendations:  Ongoing education given regarding diagnostic and treatment options with adequate verbalization of understanding.  Plan   1.-Continue with current medications Trazodone  100 mg at Bedtime, gabapentin 100  mg at bedtime,  Prazosin 2 mg at bedtime -PTSD, Seroquel 25 mg  at bedtime - psychosis , delusional thoughts  2- Highly recommend Inpatient CD treatment Alcohol Use Disorder - Severe. Pt  opposed to CD treatment   3- Continue with  Psychotherapy:  4- 5-Make efforts to stay away from alcohol and drug  5- RTC-4 weeks call in between visit with any questions or concerns         DIagnoses:        PTSD  Alcohol Use Disorder , Severe   Delusional disorder R/O Schizophrenia    Homelessness     Patient Active Problem List   Diagnosis     Chest pain     Alcohol withdrawal, with unspecified complication (H)     Sinus tachycardia     Lactic acidosis     Dehydration     Microcytosis     Non-traumatic rhabdomyolysis     Nausea     Alcoholic ketoacidosis     Epigastric  "pain     Alcoholism, chronic (H)     High anion gap metabolic acidosis     Hypomagnesemia     Alcohol intoxication, uncomplicated (H)     GI bleed     Anemia associated with acute blood loss     Disorder due to alcohol abuse (H)     Substance induced mood disorder (H)     Anxiety disorder     History of posttraumatic stress disorder (PTSD)     Erosive esophagitis     Polyarthralgia     Cervical spondylosis     VIKI positive     Inflammatory arthritis     Arthritis of right ankle     Gouty arthritis of toe of left foot     Pulmonary embolism (H)     Primary osteoarthritis involving multiple joints     Supratherapeutic INR     Hypokalemia     Epistaxis             Chief Complaint / Subjective:    Chief complaint: \" I don't know what I am going to do  \"     History of Present Illness:   Collateral info:   ED encounter 4/20/19: \" Patient brought in by paramedics after being found sleeping on a bench.  He is intoxicated\"   Patient was stabilized and once he became sober he was discharged with the same medications.    Per patient statement : Taking med's inconsistently . He has been riding the train all night and going to hang out at the [employer] during the day. He state that his  is still working on finding housing but there is no definite day on when this will happen. He continues to attend psychotherapy consistently.   Today he came in 3 hours early for this appointment  and slept at the [employer].  He was tired and sleep as he has not slept well over over 3 weeks. He states that he has not eaten well for over 3 weeks and is losing weight. He was 195 pounds on 3/8/19 and today he is 186 pounds.    He remains delusional and with with some psychosis. His illness get in the way of him finding and receiving appropriate help from community resources as he is extremely paranoid and struggles to trust may people.   He goes to his [facility] living apartment  once a week to eat a hot meal, he is not allowed to " stay as he is not on the lease.    Engage patient in detail discussion regarding importance of medication compliance.  Also trying to persuade him to accept community resources including shelter but he remains socially and reported quite paranoid about some of the resources and shelters of the in the community.  They are hopeful that his  who spoke with his therapist within the last week will eventually be able to secure him housing.  Meanwhile we will keep seeing patients mainly for safety checks and with the hope that we can convince him to consistently take his medications.  He is today denying suicidal homicidal ideations.  Denies delusions or psychosis in the present and quite notable.  Is quite paranoid today.  Endorses some anxiety and some depression related to his homelessness.  He will continue psychotherapy and return to the clinic in 4 weeks for follow-up appointment and encouraged him to call in between visits with any questions or concerns.  Mental Status Examination:   General: poor hygiene , cooperative  Speech:Elevated   Language: Intact  Thought process: Coherent, paranoid   Thought content:                    Auditory hallucinations-Present                      Visual Hallucinations - Denies                         Delusions : Present                          Loose Associations:  No                          Suicidal thoughts: Denies                          Homicidal thoughts:Denies                        Affect: Neutral  Mood: Neutral   Intellectual functioning: Fair with some limitations due to illness    Memory:Long term and short term memory - Poor   Fund of knowledge: : Fair with some limitations due to illness   Attention and concentration: Brief  Gait: Steady  Psychomotor activity: Calm, no agitation  Muscles: No atrophy, no abnormal movements  InSight and judgment: Poor most due to his illness       Drug/treatment history and current pattern of use:   Drug of choice: Alcohol    Severe Alcohol Use disorder- drinks almos daily when he can afford to buy alcohol      Medication changes: See Above   Medication adherence: compliant  Medication side effects: absent  Information about medications: Side effects, benefits and alternative treatments discussed and patient agrees a  Psychotherapy: Supportive therapy day-to-day living    Education: Diet, exercise, abstinence from drugs and alcohol, patient will not drive if sedated and medications or  under influence of any substance    Lab Results:  Personally reviewed and discussed with the patient    Lab Results   Component Value Date    WBC 7.8 07/23/2018    HGB 11.1 (L) 07/24/2018    HCT 38.3 (L) 07/23/2018     07/23/2018    ALT 15 07/23/2018    AST 19 07/23/2018     07/23/2018    K 3.4 (L) 07/24/2018     07/23/2018    CREATININE 1.00 07/23/2018    BUN 5 (L) 07/23/2018    CO2 27 07/23/2018    TSH 2.17 03/24/2018    INR 2.77 (H) 07/24/2018       Vital signs:  Vitals:    04/25/19 0807   BP: 109/72   Pulse: 91   Temp: 98.7  F (37.1  C)   TempSrc: Oral   Weight: 186 lb (84.4 kg)     Allergies: Other environmental allergy         Medications:       Current Outpatient Medications on File Prior to Visit   Medication Sig Dispense Refill     acetaminophen (TYLENOL) 500 MG tablet Take 1,000 mg by mouth every 6 (six) hours as needed for pain.       albuterol (PROAIR HFA;PROVENTIL HFA;VENTOLIN HFA) 90 mcg/actuation inhaler Inhale 2 puffs every 4 (four) hours as needed (cough). 1 Inhaler 0     cetirizine (ZYRTEC) 10 MG tablet Take 10 mg by mouth at bedtime.        omeprazole (PRILOSEC) 20 MG capsule Take 20 mg by mouth daily as needed.        prazosin (MINIPRESS) 2 MG capsule Take 1 capsule (2 mg total) by mouth at bedtime. 30 capsule 2     QUEtiapine (SEROQUEL) 25 MG tablet Take 1 tablet (25 mg total) by mouth at bedtime. 30 tablet 2     traZODone (DESYREL) 100 MG tablet TAKE 1 TABLET(100 MG) BY MOUTH AT BEDTIME 30 tablet 2     warfarin  (COUMADIN) 5 MG tablet Take 1 tablet (5 mg total) by mouth daily. Take 1 tablets (5 mg) by mouth daily. Adjust dose based on INR results as directed. 30 tablet 0     No current facility-administered medications on file prior to visit.                 Review of Systems:      ROS:       10 point ROS was negative except for the items listed in HPI.    Review of Systems - General ROS: negative for - chills, fatigue, night sweats, sleep disturbance or weight loss    Respiratory ROS: no cough, shortness of breath, or wheezing  negative for - cough or shortness of breath  Cardiovascular ROS: no chest pain or dyspnea on exertion  Gastrointestinal ROS: no abdominal pain, change in bowel habits, or black or bloody stools  Musculoskeletal ROS: negative  negative for - gait disturbance, joint pain, joint stiffness, muscle pain or muscular weakness  Neurological ROS: negative for - confusion, gait disturbance, headaches or seizures    Coordination of Care:   More than 25 minutes spent on this visit  with more than 50% of time spent on coordination of care including: Educating patient about diagnosis, prognosis, side effects and benefits of medications, diet, exercise.  Time also spent providing supportive therapy regarding above issues.    This note was created using a dictation system. All typing errors or contextual distortion is unintentional and software inherent.

## 2021-05-28 NOTE — PATIENT INSTRUCTIONS - HE
Continue medications as prescribed  Have your pharmacy contact us for a refill if you are running low on medications (We may ask you to come into clinic to get a refill from the nurse  No Alcohol or drug use  No driving if sedated  Call the clinic with any questions or concerns   Reach out for help if you feel like hurting yourself or others ([facility] [phone]: [address] [facility], [address] or [facility] Suicide Hotline [phone] , call 911 or go to nearest Emergency room    Follow up as directed, for your appointments, per your After Visit Summary Form.

## 2021-05-28 NOTE — PROGRESS NOTES
Mental Health Visit Note    04/23/2019    Start time: 1201 Stop Time: 1239 Session # 15    Session Type: Patient is presenting for an Individual session.    Choco Claudio is a 53 y.o. male is being seen today for    Chief Complaint   Patient presents with      Follow Up     New symptoms or complaints: None    Functional Impairment:   Personal: 4  Family: 3  Work: N/A  Social:4    Clinical assessment of mental status: Choco Claudio presented on time for scheduled session today.  He was struggling with memory, open and cooperative and appropriate for therapy. His hygiene and grooming was poor due to homelessness. His memory was poor for short and long term.  His speech and language was elevated throughout the session. Concentration and focus is brief. Psychosis is reported and noted. He reports his mood is stressed.  Affect is congruent with speech.  Fund of knowledge is adequate. Insight is adequate for therapy. Reviewed and changes made as needed.     Suicidal/Homicidal Ideation present: None Reported This Session    Patient's impression of their current status: Patient indicated feeling stressed. Patient reported he has not heard from the housing person which is stressful to him. Patient indicated he is assuming he did not get the apartment. Patient asked this therapist to assist him in calling his . Therapist and patient were able to connect with  who indicated they are still working on all the paperwork for the apartment and that he has not been turned down but not accept yet. Patient reported he cannot be homeless for another month. Patient indicated he was brought to the ER due to falling asleep on a bench. Patient reported he has not been doing good due to not taking his medications and not sleeping. Patient indicated while in the ER they had him do breathalyzer test and then sent him back outside. Patient reported he did get to spend a night at his parents after being in  the ER.     Therapist impression of patients current state: Patient appears to have poor insight into his mental health. This therapist processed with patient ways to look at the steps he is taking forward in getting housing. This therapist processed with patient the importance of talking with Debra about his medications. This therapist informed patient that a Vm was left after he called and attempted to talk to therapist and patient indicated he did not remember calling the therapist.     Type of psychotherapeutic technique provided: Insight oriented, Client centered and CBT    Progress toward short term goals:Progress as expected with patient coming to scheduled session. Poor progress as evidenced by still drinking and not taking medications.     Review of long term goals: Long term review on 04/09/2019    Diagnosis:   1. Delusional disorder (H)    2. PTSD (post-traumatic stress disorder)    3. Severe alcohol use disorder (H)      Plan and Follow up: Patient will return in one week for scheduled session. Patient should work on continuing to stop drinking. Patient would benefit from continuing to identify situations where he feels he may be struggling with paranoia and what is leading to the paranoia thoughts. Patient should contact his doctor for any medication concerns. Patient would benefit from reaching out to family and friends to see if he can stay there for a few nights at a time. Patient should continue to work with his  on finding housing. Patient would benefit from being able to look at the steps he has been able to take forward and the improvements he has made.     Discharge Criteria/Planning: Client has chronic symptoms and ongoing therapy for maintenance stability recommended.    Marcella Nogueira

## 2021-05-28 NOTE — PROGRESS NOTES
Patient is here for follow up appointment.  He states he hasn't been taking his medication on a regular basis.  He was encouraged to take his medications as prescribed and he agreed he would try.  His depression and anxiety are daily.  He denies that he would ever act on SI or HI.  Sleep has been poor as he is not allowed to sleep on the bus.  His  is looking into an apartment for him. 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  He is excited about that.      Correct pharmacy verified with patient and confirmed in snapshot? [x] yes []no    Charge captured ? [x] yes  [] no    Medications Phoned  to Pharmacy [] yes [x]no  Name of Pharmacist:  List Medications, including dose, quantity and instructions      Medication Prescriptions given to patient   [] yes  [x] no   List the name of the drug the prescription was written for.       Medications ordered this visit were e-scribed.  Verified by order class [] yes  [x] no  No medications ordered    Medication changes or discontinuations were communicated to patient's pharmacy: [] yes  [x] NA    UA collected [] yes  [x] no    [place] Prescription Monitoring Program Reviewed? [] yes  [x] no    Referrals were made to:  NA    Future appointment was made: [x] yes  [] no    Dictation completed at time of chart check: [x] yes  [] no    I have checked the documentation for today s encounters and the above information has been reviewed and completed.

## 2021-05-29 NOTE — PROGRESS NOTES
Psychiatric  Progress Note  Date of visit:6/20/2019           Discussion of Care and Treatment Recommendations:   This is a 53 y.o. male with a  significant history of Alcohol Use Disorder, Etoh induced gastritis , DENISE and PTSD who presents to the clinic today , noncompliance with treatment plan.  Patient is here today for  a follow up appointment       Last visit 05/23/19.  Recommendation at last visit .  1.-Continue with current medications Trazodone  100 mg at Bedtime, gabapentin 100  mg at bedtime,  Prazosin 2 mg at bedtime -PTSD, Seroquel 25 mg  at bedtime - psychosis , delusional thoughts  2- Highly recommend Inpatient CD treatment Alcohol Use Disorder - Severe. Pt  opposed to CD treatment   3- Continue with  Psychotherapy:  4- 5-Make efforts to stay away from alcohol and drug  5- RTC-4 weeks call in between visit with any questions or concerns  Patient and I reviewed diagnosis and treatment plan and patient agrees with following recommendations:  Ongoing education given regarding diagnostic and treatment options with adequate verbalization of understanding.  Plan   1.-Continue with current medications Trazodone  100 mg at Bedtime, gabapentin 100  mg at bedtime,  Prazosin 2 mg at bedtime -PTSD, Seroquel 25 mg  at bedtime - psychosis , delusional thoughts  2- Highly recommend Inpatient CD treatment Alcohol Use Disorder - Severe. Pt  opposed to CD treatment   3- Continue with  Psychotherapy:  4- 5-Make efforts to stay away from alcohol and drug  5- RTC-4 weeks call in between visit with any questions or concerns         Diagnoses:     PTSD  Alcohol Use Disorder , Severe   Delusional disorder R/O Schizophrenia    Homelessness     Patient Active Problem List   Diagnosis     Chest pain     Alcohol withdrawal, with unspecified complication (H)     Sinus tachycardia     Lactic acidosis     Dehydration     Microcytosis     Non-traumatic rhabdomyolysis     Nausea     Alcoholic ketoacidosis     Epigastric pain      "Alcoholism, chronic (H)     High anion gap metabolic acidosis     Hypomagnesemia     Alcohol intoxication, uncomplicated (H)     GI bleed     Anemia associated with acute blood loss     Disorder due to alcohol abuse (H)     Substance induced mood disorder (H)     Anxiety disorder     History of posttraumatic stress disorder (PTSD)     Erosive esophagitis     Polyarthralgia     Cervical spondylosis     VIKI positive     Inflammatory arthritis     Arthritis of right ankle     Gouty arthritis of toe of left foot     Pulmonary embolism (H)     Primary osteoarthritis involving multiple joints     Supratherapeutic INR     Hypokalemia     Epistaxis             Chief Complaint / Subjective:    Chief complaint: \" I am doing well\"     History of Present Illness:   Per patient statement-he is doing well.  He has been taking his medications some days and others a forgets.  His mother continue to set up his medications for him.  He reports that he has cut down on his drinking and is only drinking 3 cans of beer per day.  He buys his alcohol with his cautious assistance money and when he runs out but he is not able to buy any alcohol.  He also has food stamps and has been preparing some of his food at home.  He continues to follow-up with his therapist and finds it beneficial.  He also has a  that is working very closely with that community.  He is riding his bike twice a day.  Some days he sleeps well but other than stays his paranoid due to the fact that he was homeless for a long time and he startles sometimes and wakes up in the middle of the night but is able to go right back to bed.    Today he denies delusions.  Endorses some paranoia.  Denies hallucinations although sometimes he does talk in his breath us if responding to internal stimuli.  This is not new for him.  He denies suicidal homicidal ideations.  States he feels safe and verbally contracts for safety.  I try to clarify what he had reported to RN staff " that occasionally he does get suicidal ideations with no plan or intent to act but he adamantly denied.  I will have a continue the same medications.  I reminded him of the importance of taking his medications.  Overall he is looking so much better he is better groomed is dressed appropriately for the weather.    We will continue to see him every 4 weeks mainly for harm reduction and have them call the clinic in between visits with any questions or concerns.  Mental Status Examination:   General: Adequate hygiene, dressed appropriately for the weather ,  cooperative  Speech: Normal in rate and tone  Language: Intact  Thought process: Coherent  Thought content:                     Auditory hallucinations-Denies                     Visual Hallucinations - Denies                      Delusions Absent                        Loose Associations:  No                        Suicidal thoughts: Denies                        Homicidal thoughts:Denies                        Affect: Neutral  Mood: Neutral   Intellectual functioning: Within normal limits  Memory: Within normal limits  Fund of knowledge: Average  Attention and concentration: Within normal limits  Gait: Steady  Psychomotor activity: Calm, no agitation  Muscles: No atrophy, no abnormal movements  InSight and judgment: Fair      Drug/treatment history and current pattern of use:   Drug of choice: Alcohol   Hx alcohol Use disorder       Medication changes: See Above   Medication adherence: compliant  Medication side effects: absent  Information about medications: Side effects, benefits and alternative treatments discussed and patient agrees .    Psychotherapy: Supportive therapy day-to-day living    Education: Diet, exercise, abstinence from drugs and alcohol, patient will not drive if sedated and medications or  under influence of any substance    Lab Results:   Personally reviewed and discussed with the patient    Lab Results   Component Value Date    WBC 7.8  07/23/2018    HGB 11.1 (L) 07/24/2018    HCT 38.3 (L) 07/23/2018     07/23/2018    ALT 19 05/08/2019    AST 26 05/08/2019     05/08/2019    K 3.7 05/08/2019     (H) 05/08/2019    CREATININE 0.72 05/08/2019    BUN 5 (L) 05/08/2019    CO2 23 05/08/2019    TSH 2.17 03/24/2018    INR 2.77 (H) 07/24/2018       Vital signs:    Vitals:    06/20/19 0852   BP: 115/65   Pulse: 93   Temp: 98.3  F (36.8  C)   TempSrc: Oral   Weight: 192 lb (87.1 kg)     Allergies: Other environmental allergy         Medications:     Current Outpatient Medications on File Prior to Visit   Medication Sig Dispense Refill     acetaminophen (TYLENOL) 500 MG tablet Take 1,000 mg by mouth every 6 (six) hours as needed for pain.       albuterol (PROAIR HFA;PROVENTIL HFA;VENTOLIN HFA) 90 mcg/actuation inhaler Inhale 2 puffs every 4 (four) hours as needed (cough). 1 Inhaler 0     cetirizine (ZYRTEC) 10 MG tablet Take 10 mg by mouth at bedtime.        omeprazole (PRILOSEC) 20 MG capsule Take 20 mg by mouth daily as needed.        prazosin (MINIPRESS) 2 MG capsule Take 1 capsule (2 mg total) by mouth at bedtime. 30 capsule 2     QUEtiapine (SEROQUEL) 25 MG tablet Take 1 tablet (25 mg total) by mouth at bedtime. 30 tablet 2     ranitidine (ZANTAC) 300 MG tablet Take 300 mg by mouth daily.  1     traZODone (DESYREL) 100 MG tablet TAKE 1 TABLET(100 MG) BY MOUTH AT BEDTIME 30 tablet 2     No current facility-administered medications on file prior to visit.                   Review of Systems:      ROS:       10 point ROS was negative except for the items listed in HPI.    Review of Systems - General ROS: negative for - chills, fatigue, night sweats, sleep disturbance or weight loss    Respiratory ROS: no cough, shortness of breath, or wheezing  negative for - cough or shortness of breath  Cardiovascular ROS: no chest pain or dyspnea on exertion  Gastrointestinal ROS: no abdominal pain, change in bowel habits, or black or bloody  stools  Musculoskeletal ROS: negative  negative for - gait disturbance, joint pain, joint stiffness, muscle pain or muscular weakness  Neurological ROS: negative for - confusion, gait disturbance, headaches or seizures    Coordination of Care:   More than 25 minutes spent on this visit  with more than 50% of time spent on coordination of care including: Educating patient about diagnosis, prognosis, side effects and benefits of medications, diet, exercise.  Time also spent providing supportive therapy regarding above issues.    This note was created using a dictation system. All typing errors or contextual distortion is unintentional and software inherent.

## 2021-05-29 NOTE — PROGRESS NOTES
Mental Health Visit Note    05/21/2019    Start time: 1203 Stop Time: 1256 Session # 18    Session Type: Patient is presenting for an Individual session.    Choco Claudio is a 53 y.o. male is being seen today for    Chief Complaint   Patient presents with      Follow Up     Stress     New symptoms or complaints: None    Functional Impairment:   Personal: 4  Family: 3  Work: N/A  Social:4    Clinical assessment of mental status: Choco Claudio presented on time for scheduled session today.  He was struggling with memory, open and cooperative and appropriate for therapy. His hygiene and grooming was poor due to homelessness. His memory was poor for short and long term.  His speech and language was soft and concise throughout the session. Concentration and focus is brief. Psychosis is reported and noted. He reports his mood is stressed.  Affect is congruent with speech.  Fund of knowledge is adequate. Insight is adequate for therapy. Reviewed and changes made as needed.     Suicidal/Homicidal Ideation present: None Reported This Session    Patient's impression of their current status: Patient indicated feeling stressed. Patient reported he still does not have his bus card. Patient indicated he did get an apartment which is a huge relief. Patient reported he still has some symptoms of fear when people are walking pass his apartment. Patient indicated he is starting to see the same people which helps to relax his symptoms. Patient reported he is better at taking his medications. Patient indicated he is still struggling a lot with sleep. Patient reported he believes this is due to the habits he created while homeless.     Therapist impression of patients current state: Patient appears to have poor insight into his mental health. This therapist processed with patient ways to help feel safe in his apartment. This therapist assisted patient in calling to try and get a bus card. This therapist processed with  patient the importance of a routine and getting out of the apartment.     Type of psychotherapeutic technique provided: Insight oriented, Client centered and CBT    Progress toward short term goals:Progress as expected with patient coming to scheduled session. Poor progress as evidenced by still drinking.    Review of long term goals: Long term review on 04/09/2019    Diagnosis:   1. Delusional disorder (H)    2. PTSD (post-traumatic stress disorder)    3. Severe alcohol use disorder (H)      Plan and Follow up: Patient will return in one week for scheduled session. Patient should work on continuing to stop drinking. Patient would benefit from continuing to identify situations where he feels he may be struggling with paranoia and what is leading to the paranoia thoughts. Patient should contact his doctor for any medication concerns.     Discharge Criteria/Planning: Client has chronic symptoms and ongoing therapy for maintenance stability recommended.    Marcella Nogueira

## 2021-05-29 NOTE — PROGRESS NOTES
Mental Health Visit Note    06/04/2019    Start time: 1201 Stop Time: 1239 Session # 20    Session Type: Patient is presenting for an Individual session.    Choco Claudio is a 53 y.o. male is being seen today for    Chief Complaint   Patient presents with      Follow Up     New symptoms or complaints: None    Functional Impairment:   Personal: 4  Family: 3  Work: N/A  Social:4    Clinical assessment of mental status: Choco Claudio presented on time for scheduled session today.  He was struggling with memory, open and cooperative and appropriate for therapy. His hygiene and grooming was fair. His memory was poor for short and long term.  His speech and language was soft and concise throughout the session. Concentration and focus is brief. Psychosis is reported and noted. He reports his mood is content.  Affect is congruent with speech.  Fund of knowledge is adequate. Insight is adequate for therapy. Reviewed and changes made as needed.     Suicidal/Homicidal Ideation present: None Reported This Session    Patient's impression of their current status: Patient indicated feeling content. Patient reported he is starting to feel better about his apartment. Patient indicated he was at the pool and started to talk to people which is a huge step forward for him. Patient reported he is struggling now with loneliness. Patient indicated he does not trust people to make friends but wants to be around people. Patient reported he knows trust is something that is hard for him to do due to being hurt in the past.     Therapist impression of patients current state: Patient appears to have poor insight into his mental health. This therapist processed with patient ways to work on starting to meet people and slowly starting to trust them. This therapist processed with patient ways to trust people in certain ways while still always himself to be guarded in other ways.    Type of psychotherapeutic technique provided: Insight  oriented, Client centered and CBT    Progress toward short term goals:Progress as expected with patient coming to scheduled session, noticing mental health symptoms, getting housing and taking to people.     Review of long term goals: Long term review on 04/09/2019    Diagnosis:   1. Delusional disorder (H)    2. PTSD (post-traumatic stress disorder)    3. Severe alcohol use disorder (H)      Plan and Follow up: Patient will return in one week for scheduled session.  Patient would benefit from continuing to identify situations where he feels he may be struggling with paranoia and what is leading to the paranoia thoughts. Patient should continue to work on identifying ways to feel safe in his apartment. Patient would benefit from continuing to not drink. Patient should work on continuing to talk to people and learning to trust a little at a time with people.     Discharge Criteria/Planning: Client has chronic symptoms and ongoing therapy for maintenance stability recommended.    Marcella Nogueira

## 2021-05-29 NOTE — PATIENT INSTRUCTIONS - HE
Continue medications as prescribed  Have your pharmacy contact us for a refill if you are running low on medications (We may ask you to come into clinic to get a refill from the nurse  No Alcohol or drug use  No driving if sedated  Call the clinic with any questions or concerns   Reach out for help if you feel like hurting yourself or others ([facility] [phone]: [address] [place], [address] or [facility] Suicide Hotline [phone] , call 911 or go to nearest Emergency room    Follow up as directed, for your appointments, per your After Visit Summary Form.

## 2021-05-29 NOTE — PROGRESS NOTES
Mental Health Visit Note    05/28/2019    Start time: 1202 Stop Time: 147 Session # 19    Session Type: Patient is presenting for an Individual session.    Choco Claudio is a 53 y.o. male is being seen today for    Chief Complaint   Patient presents with      Follow Up     Worry     New symptoms or complaints: None    Functional Impairment:   Personal: 4  Family: 3  Work: N/A  Social:4    Clinical assessment of mental status: Choco Claudio presented on time for scheduled session today.  He was struggling with memory, open and cooperative and appropriate for therapy. His hygiene and grooming was fair. His memory was poor for short and long term.  His speech and language was soft and concise throughout the session. Concentration and focus is brief. Psychosis is reported and noted. He reports his mood is worried.  Affect is congruent with speech.  Fund of knowledge is adequate. Insight is adequate for therapy. Changes made as needed.     Suicidal/Homicidal Ideation present: None Reported This Session    Patient's impression of their current status: Patient reported feeling worried. Patient indicated the worry is a lot less then in the past. Patient reported he still worries about people going up and down the stairs in his apartment. Patient indicated he is able to relax now in his apartment. Patient reported he takes deep breaths and feels comfortable. Patient indicated he is unable to recall the last time he has felt this way. Patient reported his guard is still up but has came down a lot in only a few weeks. Patient indicated he was able to get a bike and cell phone that helped to improve his mood.     Therapist impression of patients current state: Patient appears to have poor insight into his mental health. This therapist challenged patient on what about his apartment feels safe to him. This therapist processed with patient ways to continue working on making his apartment feel safe. This therapist  processed with patient the importance of still getting out to see friends and not isolating.     Type of psychotherapeutic technique provided: Insight oriented, Client centered and CBT    Progress toward short term goals:Progress as expected with patient coming to scheduled session, noticing mental health symptoms, getting housing and not drinking    Review of long term goals: Long term review on 04/09/2019    Diagnosis:   1. Delusional disorder (H)    2. PTSD (post-traumatic stress disorder)    3. Severe alcohol use disorder (H)      Plan and Follow up: Patient will return in one week for scheduled session.  Patient would benefit from continuing to identify situations where he feels he may be struggling with paranoia and what is leading to the paranoia thoughts. Patient should continue to work on identifying ways to feel safe in his apartment. Patient would benefit from continuing to not drink.     Discharge Criteria/Planning: Client has chronic symptoms and ongoing therapy for maintenance stability recommended.    Marcella Nogueira

## 2021-05-29 NOTE — PROGRESS NOTES
Psychiatric  Progress Note  Date of visit:         Discussion of Care and Treatment Recommendations:   This is a 53 y.o. male with significant history of Alcohol Use Disorder, Etoh induced gastritis , DENISE and PTSD who presents to the clinic today , noncompliance with treatment plan.  Patient is here today for  a follow up appointment          Last visit  4/25/19  Recommendation at last visit .  1.-Continue with current medications Trazodone  100 mg at Bedtime, gabapentin 100  mg at bedtime,  Prazosin 2 mg at bedtime -PTSD, Seroquel 25 mg  at bedtime - psychosis , delusional thoughts  2- Highly recommend Inpatient CD treatment Alcohol Use Disorder - Severe. Pt  opposed to CD treatment   3- Continue with  Psychotherapy:  4- 5-Make efforts to stay away from alcohol and drug  5- RTC-4 weeks call in between visit with any questions or concerns  Patient and I reviewed diagnosis and treatment plan and patient agrees with following recommendations:  Ongoing education given regarding diagnostic and treatment options with adequate verbalization of understanding.  Plan   1.-Continue with current medications Trazodone  100 mg at Bedtime, gabapentin 100  mg at bedtime,  Prazosin 2 mg at bedtime -PTSD, Seroquel 25 mg  at bedtime - psychosis , delusional thoughts  2- Highly recommend Inpatient CD treatment Alcohol Use Disorder - Severe. Pt  opposed to CD treatment   3- Continue with  Psychotherapy:  4- 5-Make efforts to stay away from alcohol and drug  5- RTC-4 weeks call in between visit with any questions or concerns         DIagnoses:     PTSD  Alcohol Use Disorder , Severe   Delusional disorder R/O Schizophrenia    Homelessness     Patient Active Problem List   Diagnosis     Chest pain     Alcohol withdrawal, with unspecified complication (H)     Sinus tachycardia     Lactic acidosis     Dehydration     Microcytosis     Non-traumatic rhabdomyolysis     Nausea     Alcoholic ketoacidosis     Epigastric pain     Alcoholism,  "chronic (H)     High anion gap metabolic acidosis     Hypomagnesemia     Alcohol intoxication, uncomplicated (H)     GI bleed     Anemia associated with acute blood loss     Disorder due to alcohol abuse (H)     Substance induced mood disorder (H)     Anxiety disorder     History of posttraumatic stress disorder (PTSD)     Erosive esophagitis     Polyarthralgia     Cervical spondylosis     VIKI positive     Inflammatory arthritis     Arthritis of right ankle     Gouty arthritis of toe of left foot     Pulmonary embolism (H)     Primary osteoarthritis involving multiple joints     Supratherapeutic INR     Hypokalemia     Epistaxis             Chief Complaint / Subjective:    Chief complaint: \" I am doing better\"     History of Present Illness:   He finally has housing now through  [facility] . He has a one bedroom apartment and is no longer homeless .  He has jovan taking is medication when he remembers to. His mum sets up his pill box . His is not interested in nursing home health services due to his paranoia and lack of trust and will not take the medication if another person then branch his mum sets up his medication.  Today he had  good hygiene, he had shaved, had clean clothes and he looked rested. He states that he has been sleeping so much better.    continues to display some paranoia.  He thinks police from [place] of tracking him trying to find where his stent is because he no longer has a tent outside now that he is an apartment.  He is to do that he was in a hurry to leave [place] and get back to his apartment and [place].  He denies hallucinations at x1200 he is previously responding to internal stimuli.  He continues to follow-up with his therapist.  He did present to the ER with complaints of anxiety was seen in discharge same day with no medication changes.  He reports that he just got paranoid thinking people are following him.  I continue to encourage him to take his medications daily.  " Hopefully now that we have solve the problem of homelessness he may be able to convince him to get a home health nurse but currently he is still very paranoid and it takes a while before he can trust anybody so sending home health nurse they would only fade into his paranoia and he may not take the medications at all.  For now per his preference mom will continue setting up his meds.  His  is helping him with getting disability paperwork filed.  Currently he is receiving government assistance and food stamps and cautious assistance.  He notably looks better than I have ever seen him in the previous past but again he has been homeless since I started seeing him so this is a huge and positive change for him.     Noted today patient's blood pressure is a low.  He refused to have a retake.  He refused to go to urgent care here to get seen BP.  Primary care clinic will be to primary recommendations.  He denies any dizziness.  He states is been hydrating well.  I did encourage him to drink fluids and should he have any headaches dizziness or fatigue that he should.  Go to the ER.  He endorsed understanding   Patient will return to the clinic in 4 weeks for follow-up appointment and encouraged him to call in between visits with any questions or concerns          Mental Status Examination:   General: Adequate hygiene, cooperative  Speech: Normal in rate and tone  Language: Intact  Thought process: Coherent  Thought content:       Auditory hallucinations:Present.  noted            Visual Hallucinations - Denies                Delusions : Present , most paranoid delusions                   Loose Associations:  No                    Suicidal thoughts: Denies                     Homicidal thoughts:Denies                        Affect: Congruent   Mood: Neutral   Intellectual functioning: Within normal limits  Memory: Poor long therm and short tem   Fund of knowledge: Fair  Concentration: Brief   Gait:  Steady  Psychomotor activity: Calm, no agitation  Muscles: No atrophy, no abnormal movements  InSight and judgment: Poor- fair      Drug/treatment history and current pattern of use:   Drug of choice: Alcohol   Severe alcohol Use Disorder  Drinks whenever he has moner to buy alcohol      Medication changes: See Above   Medication adherence: compliant  Medication side effects: absent  Information about medications: Side effects, benefits and alternative treatments discussed and patient agrees .    Psychotherapy: Supportive therapy day-to-day living    Education: Diet, exercise, abstinence from drugs and alcohol, patient will not drive if sedated and medications or  under influence of any substance    Lab Results:   Personally reviewed and discussed with the patient    Lab Results   Component Value Date    WBC 7.8 07/23/2018    HGB 11.1 (L) 07/24/2018    HCT 38.3 (L) 07/23/2018     07/23/2018    ALT 19 05/08/2019    AST 26 05/08/2019     05/08/2019    K 3.7 05/08/2019     (H) 05/08/2019    CREATININE 0.72 05/08/2019    BUN 5 (L) 05/08/2019    CO2 23 05/08/2019    TSH 2.17 03/24/2018    INR 2.77 (H) 07/24/2018       Vital signs:    Vitals:    05/23/19 0840   BP: (!) 86/63   Pulse: 87   Weight: 187 lb (84.8 kg)     Allergies: Other environmental allergy         Medications:       Current Outpatient Medications on File Prior to Visit   Medication Sig Dispense Refill     acetaminophen (TYLENOL) 500 MG tablet Take 1,000 mg by mouth every 6 (six) hours as needed for pain.       albuterol (PROAIR HFA;PROVENTIL HFA;VENTOLIN HFA) 90 mcg/actuation inhaler Inhale 2 puffs every 4 (four) hours as needed (cough). 1 Inhaler 0     cetirizine (ZYRTEC) 10 MG tablet Take 10 mg by mouth at bedtime.        omeprazole (PRILOSEC) 20 MG capsule Take 20 mg by mouth daily as needed.        prazosin (MINIPRESS) 2 MG capsule Take 1 capsule (2 mg total) by mouth at bedtime. 30 capsule 2     QUEtiapine (SEROQUEL) 25 MG tablet  Take 1 tablet (25 mg total) by mouth at bedtime. 30 tablet 2     ranitidine (ZANTAC) 300 MG tablet Take 300 mg by mouth daily.  1     traZODone (DESYREL) 100 MG tablet TAKE 1 TABLET(100 MG) BY MOUTH AT BEDTIME 30 tablet 2     No current facility-administered medications on file prior to visit.                 Review of Systems:      ROS:       10 point ROS was negative except for the items listed in HPI.    Review of Systems - General ROS: negative for - chills, fatigue, night sweats, sleep disturbance or weight loss    Respiratory ROS: no cough, shortness of breath, or wheezing  negative for - cough or shortness of breath  Cardiovascular ROS: no chest pain or dyspnea on exertion  Gastrointestinal ROS: no abdominal pain, change in bowel habits, or black or bloody stools  Musculoskeletal ROS: negative  negative for - gait disturbance, joint pain, joint stiffness, muscle pain or muscular weakness  Neurological ROS: negative for - confusion, gait disturbance, headaches or seizures    Coordination of Care:   More than 25 minutes spent on this visit  with more than 50% of time spent on coordination of care including: Educating patient about diagnosis, prognosis, side effects and benefits of medications, diet, exercise.  Time also spent providing supportive therapy regarding above issues.    This note was created using a dictation system. All typing errors or contextual distortion is unintentional and software inherent.

## 2021-05-29 NOTE — PROGRESS NOTES
Mental Health Visit Note    06/11/2019    Start time: 1202 Stop Time: 1240 Session # 21    Session Type: Patient is presenting for an Individual session.    Choco Claudio is a 53 y.o. male is being seen today for    Chief Complaint   Patient presents with      Follow Up     Lonliness     New symptoms or complaints: None    Functional Impairment:   Personal: 4  Family: 3  Work: N/A  Social:4    Clinical assessment of mental status: Choco Claudio presented on time for scheduled session today.  He was struggling with memory, open and cooperative and appropriate for therapy. His hygiene and grooming was fair. His memory was poor for short and long term.  His speech and language was soft and concise throughout the session. Concentration and focus is brief. Psychosis is reported and noted. He reports his mood is lonely.  Affect is congruent with speech.  Fund of knowledge is adequate. Insight is adequate for therapy. Changes made as needed.     Suicidal/Homicidal Ideation present: None Reported This Session    Patient's impression of their current status: Patient reported feeling lonely. Patient indicated it tends to happen mostly at night. Patient reported he has started to go ride bike around when he is feeling the loneliness. Patient indicated he thought when he got his own place he would not have this struggle but realizing he needs people in his life. Patient reported he went to the free meal at the [employer] and found it hard to leave. Patient indicated when it is nice he does talk a little with people by the pool until there is too many people and then he leaves.     Therapist impression of patients current state: Patient appears to have poor insight into his mental health. This therapist processed with patient going to other free events including free meals, activities in his building and events around town to help get out of his apartment.     Type of psychotherapeutic technique provided: Insight  oriented, Client centered and CBT    Progress toward short term goals:Progress as expected with patient coming to scheduled session, noticing mental health symptoms, getting housing and taking to people.     Review of long term goals: Long term review on 04/09/2019    Diagnosis:   1. Delusional disorder (H)    2. PTSD (post-traumatic stress disorder)    3. Severe alcohol use disorder (H)      Plan and Follow up: Patient will return in one week for scheduled session.  Patient would benefit from continuing to identify situations where he feels he may be struggling with paranoia and what is leading to the paranoia thoughts. Patient should continue to work on identifying ways to feel safe in his apartment. Patient would benefit from continuing to not drink. Patient should work on continuing to talk to people and learning to trust a little at a time with people.     Discharge Criteria/Planning: Client has chronic symptoms and ongoing therapy for maintenance stability recommended.    Marcella Nogueira

## 2021-05-29 NOTE — PROGRESS NOTES
Mental Health Visit Note    06/18/2019    Start time: 1201 Stop Time: 1239 Session # 22    Session Type: Patient is presenting for an Individual session.    Choco Claudio is a 53 y.o. male is being seen today for    Chief Complaint   Patient presents with      Follow Up     Depression     New symptoms or complaints: None    Functional Impairment:   Personal: 4  Family: 3  Work: N/A  Social:4    Clinical assessment of mental status: Choco Claudio presented on time for scheduled session today.  He was struggling with memory, open and cooperative and appropriate for therapy. His hygiene and grooming was fair. His memory was poor for short and long term.  His speech and language was soft and concise throughout the session. Concentration and focus is brief. Psychosis is reported and noted. He reports his mood is depressed.  Affect is congruent with speech.  Fund of knowledge is adequate. Insight is adequate for therapy. Reviewed and changes made as needed.     Suicidal/Homicidal Ideation present: None Reported This Session    Patient's impression of their current status: Patient indicated feeling depressed. Patient reported he feels he is not living a good life. Patient indicated he looks at other people and feel they have a good life. Patient reported he wants to be able to eat out more often. Patient indicated he wants to have more money to do more stuff. Patient reported sometimes he feels he would be happier if he kept living in the tent.      Therapist impression of patients current state: Patient appears to have poor insight into his mental health. This therapist challenged patient on what to him would look like a good life. This therapist processed with patient 2 months ago him explaining having a place to be would be a good life. This therapist processed with patient ways he is struggling to see good and find happiness in the life that he lives.     Type of psychotherapeutic technique provided:  Insight oriented, Client centered and CBT    Progress toward short term goals:Progress as expected with patient coming to scheduled session, noticing mental health symptoms, getting housing and taking to people.     Review of long term goals: Long term review on 04/09/2019    Diagnosis:   1. Delusional disorder (H)    2. PTSD (post-traumatic stress disorder)    3. Severe alcohol use disorder (H)      Plan and Follow up: Patient will return in one week for scheduled session.  Patient would benefit from continuing to identify situations where he feels he may be struggling with paranoia and what is leading to the paranoia thoughts. Patient should continue to work on identifying ways to feel safe in his apartment. Patient would benefit from continuing to not drink. Patient should work on continuing to talk to people and learning to trust a little at a time with people.     Discharge Criteria/Planning: Client has chronic symptoms and ongoing therapy for maintenance stability recommended.    Marcella Nogueira

## 2021-05-29 NOTE — PROGRESS NOTES
Patient is here for follow up appointment.  He states he likes his apartment and is taking his medications when he can remember.  His mother sets them up for her.  No new issues or concerns.  He sees Marcella for therapy. Not able to get a temperature as machine malfunctioning.       Correct pharmacy verified with patient and confirmed in snapshot? [x] yes []no    Charge captured ? [x] yes  [] no    Medications Phoned  to Pharmacy [] yes [x]no  Name of Pharmacist:  List Medications, including dose, quantity and instructions      Medication Prescriptions given to patient   [] yes  [x] no   List the name of the drug the prescription was written for.       Medications ordered this visit were e-scribed.  Verified by order class [] yes  [x] no  No medications ordered at this visit    Medication changes or discontinuations were communicated to patient's pharmacy: [] yes  [x] no    UA collected [] yes  [x] no    [place] Prescription Monitoring Program Reviewed? [] yes  [x] no    Referrals were made to:  NA    Future appointment was made: [x] yes  [] no    Dictation completed at time of chart check: [x] yes  [] no    I have checked the documentation for today s encounters and the above information has been reviewed and completed.

## 2021-05-30 VITALS — WEIGHT: 189.6 LBS | HEIGHT: 72 IN | BODY MASS INDEX: 25.68 KG/M2

## 2021-05-30 VITALS — BODY MASS INDEX: 25.71 KG/M2 | HEIGHT: 72 IN

## 2021-05-30 NOTE — PROGRESS NOTES
Patient is here for follow up appointment.  He states he is depressed daily, but not having SI.  He has daily anxiety without panic attacks.  Sleep varies.  He is still in his apartment.  patent states he is having paranoia and suspicious of people.  He sees Josette for therapy weekly.     Correct pharmacy verified with patient and confirmed in snapshot? [x] yes []no    Charge captured ? [x] yes  [] no    Medications Phoned  to Pharmacy [] yes [x]no  Name of Pharmacist:  List Medications, including dose, quantity and instructions      Medication Prescriptions given to patient   [] yes  [x] no   List the name of the drug the prescription was written for.       Medications ordered this visit were e-scribed.  Verified by order class [x] yes  [] no  Seroquel increased    Medication changes or discontinuations were communicated to patient's pharmacy: [x] yes  [] no    UA collected [] yes  [x] no    [place] Prescription Monitoring Program Reviewed? [] yes  [x] no    Referrals were made to:  NA    Future appointment was made: [x] yes  [] no    Dictation completed at time of chart check: [x] yes  [] no    I have checked the documentation for today s encounters and the above information has been reviewed and completed.

## 2021-05-30 NOTE — PROGRESS NOTES
Mental Health Visit Note    07/23/2019    Start time: 1202 Stop Time: 1242 Session # 27    Session Type: Patient is presenting for an Individual session.    Choco Claudio is a 53 y.o. male is being seen today for    Chief Complaint   Patient presents with      Follow Up     Social Stress     New symptoms or complaints: None    Present For Session: Patient and therapist    Functional Impairment:   Personal: 4  Family: 3  Work: N/A  Social:4    Clinical assessment of mental status: Choco Claudio presented on time for scheduled session today.  He was struggling with memory, open and cooperative and appropriate for therapy. His hygiene and grooming was fair. His memory was poor for short and long term.  His speech and language was soft and concise throughout the session. Concentration and focus is brief. Psychosis is reported and noted. He reports his mood is anxious.  Affect is congruent with speech.  Fund of knowledge is adequate. Insight is adequate for therapy. Changes made as needed for this session.     Suicidal/Homicidal Ideation present: None reported this session    Patient's impression of their current status: Patient reported feeling anxious. Patient indicated he went to a beach that had a social event going on. Patient reported he was able to stay for 3 hours before his anxiety became too high. Patient indicated knowing 2 people there which helped to reduce his anxiety. Patient reported he did have a moment where he wanted to hit someone but was able to stop himself. Patient indicated when people get too close to him that is is automatic thought. Patient reported he has a hard time seeing any positive changes in himself. Patient indicated he wants to normal but unsure what that would look like for him.     Therapist impression of patients current state: Patient appears to have poor insight into his mental health. This therapist challenged patient on the progress he is making by being able to be  around people for 3 hours. This therapist processed with patient ways to work on slowly trusting people. This therapist processed with patient the importance of continuing to get out of his apartment daily.     Type of psychotherapeutic technique provided: Insight oriented, Client centered and CBT    Progress toward short term goals:Progress as expected with patient coming to scheduled session, going to social event, handling anger and hanging with friend.     Review of long term goals: Long term review on 07/09/2019    Diagnosis:   1. Delusional disorder (H)    2. PTSD (post-traumatic stress disorder)    3. Severe alcohol use disorder (H)      Plan and Follow up: Patient will return in one week for scheduled session.  Patient should continue to work on decreasing his anger. Patient would benefit from being able to look at ways he can trust people a little and not all or none thinking. Patient should look into volunteering as a way to help his loneliness and finding purpose at this time. Patient would benefit from continuing to get out and meet new people. Patient needs to work on daily hygiene.     Discharge Criteria/Planning: Client has chronic symptoms and ongoing therapy for maintenance stability recommended.    Marcella Nogueira

## 2021-05-30 NOTE — PROGRESS NOTES
Mental Health Visit Note    07/09/2019    Start time: 1201 Stop Time: 1217Session # 25    Session Type: Patient is presenting for an Individual session.    Choco Claudio is a 53 y.o. male is being seen today for    Chief Complaint   Patient presents with      Follow Up     Down     New symptoms or complaints: None    Present For Session: Patient and therapist    Functional Impairment:   Personal: 4  Family: 3  Work: N/A  Social:4    Clinical assessment of mental status: Choco Claudio presented on time for scheduled session today.  He was struggling with memory, open and cooperative and appropriate for therapy. His hygiene and grooming was fair. His memory was poor for short and long term.  His speech and language was soft and concise throughout the session. Concentration and focus is brief. Psychosis is reported and noted. He reports his mood is down.  Affect is congruent with speech.  Fund of knowledge is adequate. Insight is adequate for therapy. Mental status reviewed and changes made as needed.     Suicidal/Homicidal Ideation present: None reported this session    Patient's impression of their current status: Patient reported feeling down. Patient indicated the last few days he has not been feeling well. Patient reported he believes it is heat exhaustion. Patient indicated he spent time outside this weekend with his friend. Patient reported he continues to struggle with the loneliness. Patient indicated since feeling sick he has been even more isolated. Patient reported when he feels better he plans to spend time with his friend.     Therapist impression of patients current state: Patient appears to have poor insight into his mental health. This therapist processed with patient the importance of talking with his doctor about his physical concerns. This therapist processed with patient ways to continue to work on being active.    Type of psychotherapeutic technique provided: Insight oriented, Client  centered and CBT    Progress toward short term goals:Progress as expected with patient coming to scheduled session and hanging with friend.     Review of long term goals: Long term review on 07/09/2019    Diagnosis:   1. PTSD (post-traumatic stress disorder)    2. Delusional disorder (H)    3. Severe alcohol use disorder (H)      Plan and Follow up: Patient will return in one week for scheduled session.  Patient should continue to work on decreasing his anger. Patient would benefit from being able to look at ways he can trust people a little and not all or none thinking. Patient should look into volunteering as a way to help his loneliness and finding purpose at this time.     Discharge Criteria/Planning: Client has chronic symptoms and ongoing therapy for maintenance stability recommended.    Marcella Nogueira

## 2021-05-30 NOTE — PROGRESS NOTES
Mental Health Visit Note    07/02/2019    Start time: 1202 Stop Time: 1247 Session # 24    Session Type: Patient is presenting for an Individual session.    hCoco Claudio is a 53 y.o. male is being seen today for    Chief Complaint   Patient presents with      Follow Up     Anger     New symptoms or complaints: None    Present For Session: Patient and therapist    Functional Impairment:   Personal: 4  Family: 3  Work: N/A  Social:4    Clinical assessment of mental status: Choco Claudio presented on time for scheduled session today.  He was struggling with memory, open and cooperative and appropriate for therapy. His hygiene and grooming was fair. His memory was poor for short and long term.  His speech and language was soft and concise throughout the session. Concentration and focus is brief. Psychosis is reported and noted. He reports his mood is angry.  Affect is congruent with speech.  Fund of knowledge is adequate. Insight is adequate for therapy. Reviewed and changes made as needed.     Suicidal/Homicidal Ideation present: None reported this session    Patient's impression of their current status: Patient indicated feeling angry. Patient reported he almost got hit by a car and wanting to go bang on the person's window. Patient indicated then sitting in the waiting area and seeing the person again and wanting to yell at them. Patient reported he was able to control his anger. Patient indicated he continues to struggle with not trusting people. Patient reported he is at the point where he checks the hallways before leaving his apartment. Patient indicated he tried to talk to someone at the beach but it did not go well. Patient indicated last night he spent half the night thinking he was in his tent.     Therapist impression of patients current state: Patient appears to have poor insight into his mental health. This therapist processed with patient skills he used to be able to not allow his anger to  become physical. This therapist challenged patient on what makes him feel people in his apartment complex are not safe. This therapist processed with patient ways he could start feeling involved by volunteering at his [employer].     Type of psychotherapeutic technique provided: Insight oriented, Client centered and CBT    Progress toward short term goals:Progress as expected with patient coming to scheduled session and controlling his anger.     Review of long term goals: Long term review on 04/09/2019    Diagnosis:   1. PTSD (post-traumatic stress disorder)    2. Delusional disorder (H)    3. Severe alcohol use disorder (H)      Plan and Follow up: Patient will return in one week for scheduled session.  Patient should continue to work on decreasing his anger. Patient would benefit from being able to look at ways he can trust people a little and not all or none thinking. Patient should look into volunteering as a way to help his loneliness and finding purpose at this time.     Discharge Criteria/Planning: Client has chronic symptoms and ongoing therapy for maintenance stability recommended.    Marcella Nogueira

## 2021-05-30 NOTE — PROGRESS NOTES
[facility]  SUICIDE SAFETY PREVENTION  PLAN  Patient's Name: Choco Claudio  MRN:[id number]    Date of Service: 6/25/2019  Provider: NETTA De La Paz         Step 1: Warning signs:  1. Isolation  2. Crying  3. Feelings of lonliness     Step 2: Internal coping strategies -   Things I can do to take my mind off my problems without contacting another person:  1.  Go to the beach  2. Go for a bike ride  3. Watch TV  4. Go fishing    Step 3: People and social settings that provide distraction:    1. Name: Hipolito (Friend)                                    2. Name: Mom                                                      3. Place: Hipolito's                                    4. Place: Mom's house    Step 4: People whom I can ask for help:   1. Name: Hipolito                                                               2. Name: Mom                                                              Step 5:Professionals or agencies I can contact during a crisis:     1.          Emergency Contact #: 911    2. [facility]:    Urgent Care Services  Address: [address] [place] [address]. [place] [address]   Urgent Care Services  Phone:  [phone]      3. Suicide Prevention Crisis Connection: 1-826.247.8464    Step 6: Making the environment safe:  1.  Not drinking    Safety Plan Treatment Manual to Reduce Suicide Risk:  ([employer] & Patricio, 2008).

## 2021-05-30 NOTE — PROGRESS NOTES
Mental Health Visit Note    06/25/2019    Start time: 1145 Stop Time: 1231 Session # 23    Session Type: Patient is presenting for an Individual session.    Choco Claudio is a 53 y.o. male is being seen today for    Chief Complaint   Patient presents with      Follow Up     Depression     New symptoms or complaints: None    Present For Session: Patient and therapist    Functional Impairment:   Personal: 4  Family: 3  Work: N/A  Social:4    Clinical assessment of mental status: Choco Claudio presented on time for scheduled session today.  He was struggling with memory, open and cooperative and appropriate for therapy. His hygiene and grooming was fair. His memory was poor for short and long term.  His speech and language was soft and concise throughout the session. Concentration and focus is brief. Psychosis is reported and noted. He reports his mood is depressed.  Affect is congruent with speech.  Fund of knowledge is adequate. Insight is adequate for therapy. No changes for this session.     Suicidal/Homicidal Ideation present: Patient reported having suicidal thoughts but denied any plans and/or means. Patient indicated he could never kill himself due to the  teaching him to never give up. Patient was provided with crises numbers to call if suicidal thoughts continue or he starts to think of a plan. Patient agreed to call crises numbers, call 911 and/or report to ER. Patient agreed to follow suicide crises plan.    Patient's impression of their current status: Patient reported feeling depressed. Patient indicated he is struggling with feeling lonely. Patient reported it has caused him to have suicidal thoughts. Patient indicated they are fleeting thoughts. Patient reported he would never hurt himself due to not being a person who gives up. Patient indicated he knows it is all related to feeling lonely. Patient reported he had a great day with his friend then went back to his apartment and the  depression started. Patient indicated the same thing happened after being at the [employer] for dinner and then leaving.       Therapist impression of patients current state: Patient appears to have poor insight into his mental health. This therapist went over the crises numbers provided, to call 911 and/or report to ER if suicidal plans were to occur. This therapist processed with patient ways he can work on getting out to meet new people. This therapist processed with patient ways to start small with trusting people and have them build up his trust.     Type of psychotherapeutic technique provided: Insight oriented, Client centered and CBT    Progress toward short term goals:Progress as expected with patient coming to scheduled session, noticing mental health symptoms, getting housing and taking to people.     Review of long term goals: Long term review on 04/09/2019    Diagnosis:   1. PTSD (post-traumatic stress disorder)    2. Delusional disorder (H)    3. Severe alcohol use disorder (H)      Plan and Follow up: Patient will return in one week for scheduled session.  Patient would benefit from continuing to identify situations where he feels he may be struggling with paranoia and what is leading to the paranoia thoughts. Patient should continue to work on identifying ways to feel safe in his apartment. Patient would benefit from continuing to not drink. Patient should work on continuing to talk to people and learning to trust a little at a time with people. Patient will call 911 and/or report to ER if suicidal ideations were to occur.     Discharge Criteria/Planning: Client has chronic symptoms and ongoing therapy for maintenance stability recommended.    Marcella Nogueira

## 2021-05-30 NOTE — PROGRESS NOTES
Outpatient Mental Health Treatment Plan    Name:  Choco Claudio  :  1965  MRN:  [id number]    Treatment Plan:  Updated Treatment Plan  Intake/initial treatment plan date:  2017  Benefit and risks and alternatives have been discussed: Yes  Is this treatment appropriate with minimal intrusion/restrictions: Yes  Estimated duration of treatment:  Ongoing psychotherapy at this time  Anticipated frequency of services:  Weekly  Necessity for frequency: This frequency is needed to establish therapeutic goals and for continuity of care in order to monitor progress.  Necessity for treatment: To address cognitive, behavioral, and/or emotional barriers in order to work toward goals and to improve quality of life.    Plan:       ?   ?  Posttraumatic Stress Disorder    Goal:  Reduced the signs and symptoms of PTSD and increase patient's ability to tolerate breakthrough stressors surrounding his various traumas.   Strategies: ? [x]Learn and practice relaxation techniques and other coping strategies (e.g., thought stopping, reframing, meditation)     ? [x] Increase involvement in meaningful activities     ? [x] Discuss sleep hygiene     ? [x] Explore thoughts and expectations about self and others     ? [x] Identify and monitor triggers for panic/anxiety symptoms     ? [x] Implement physical activity routine (with physician approval)     ? [x] Consider introduction of bibliotherapy and/or videos     ? [x] Continue compliance with medical treatment plan (or explore barriers)   ?Degree to which this is a problem: 4  Degree to which goal is met: 1    Date of next review: 10/09/2019    Substance use  Goal:  Increase patient awareness regarding substance use triggers.  Consider harm reduction and/or chemical dependency treatment.   Strategies: ? [x] Consider referral for chemical dependency evaluation     ? [x] Discuss barriers to participating in AA or other peer-facilitated groups         [x] Address environmental  factors which may interfere with sobriety     ? [x] Explore short-term versus long-term consequences of use    ?  Degree to which this is a problem: 4  Degree to which goal is met: 1    Date of next review: 10/09/2019    Depression  Goal:  Reduced the signs and symptoms of depression and increase patient's ability to tolerate breakthrough symptomology.   Strategies: ? [x]Learn and practice relaxation techniques and other coping strategies (e.g., thought stopping, reframing, meditation)     ? [x] Increase involvement in meaningful activities     ? [x] Discuss sleep hygiene     ? [x] Explore thoughts and expectations about self and others     ? [x] Identify and monitor triggers for panic/anxiety symptoms     ? [x] Implement physical activity routine (with physician approval)     ? [x] Consider introduction of bibliotherapy and/or videos     ? [x] Continue compliance with medical treatment plan (or explore barriers)   ?Degree to which this is a problem: 4  Degree to which goal is met: 1    Date of next review: 09/2019/    Functional Impairment:  1=Not at all/Rarely  2=Some days  3=Most Days  4=Every Day    Personal : 4  Family : 4  Social : 4   Work/school : N/A at this time    Diagnosis:  Posttraumatic stress disorder; alcohol use disorder, severe; and major depressive disorder, recurrent, moderate.    Strengths:  supported by mother, one good friend, employed, and motivated for change.  Limitations:  isolation, ambivalence about discontinuing alcohol use, and difficulty connecting with others due to trauma symptoms  Cultural Considerations: Client identifies is half .    Persons responsible for this plan: Patient and Provider            Psychotherapist Signature           Patient Signature:                This note was created with help of Dragon dictation software.  Grammatical / typing errors are not intentional and inherent to the software.

## 2021-05-30 NOTE — PROGRESS NOTES
Psychiatric  Progress Note  Date of visit:7/18/2019         Discussion of Care and Treatment Recommendations:   This is a 53 y.o. male with   significant history of Alcohol Use Disorder, Etoh induced gastritis , DENISE and PTSD who presents to the clinic today , noncompliance with treatment plan.  Patient is here today for  a follow up appointment          Last visit  6/20/19.  Recommendation at last visit   1.-Continue with current medications Trazodone  100 mg at Bedtime, ,  Prazosin 2 mg at bedtime -PTSD, Seroquel 25 mg  at bedtime - psychosis , delusional thoughts  2- Highly recommend Inpatient CD treatment Alcohol Use Disorder - Severe. Pt  opposed to CD treatment   3- Continue with  Psychotherapy:  4- 5-Make efforts to stay away from alcohol and drug  5- RTC-4 weeks call in between visit with any questions or concerns    Patient and I reviewed diagnosis and treatment plan and patient agrees with following recommendations:  Ongoing education given regarding diagnostic and treatment options with adequate verbalization of understanding.  Plan   1.-Continue with current medications Trazodone  100 mg at Bedtime, g  Prazosin 2 mg at bedtime -PTSD, Increase Seroquel to 50 mg  at bedtime - psychosis , delusional thoughts  2- Highly recommend Inpatient CD treatment Alcohol Use Disorder - Severe. Pt  opposed to CD treatment   3- Continue with  Psychotherapy:  4- 5-Make efforts to stay away from alcohol and drug  5- RTC-4 weeks call in between visit with any questions or concerns         Diagnoses:     PTSD  Alcohol Use Disorder , Severe   Delusional disorder R/O Schizophrenia    Homelessness     Patient Active Problem List   Diagnosis     Chest pain     Alcohol withdrawal, with unspecified complication (H)     Sinus tachycardia     Lactic acidosis     Dehydration     Microcytosis     Non-traumatic rhabdomyolysis     Nausea     Alcoholic ketoacidosis     Epigastric pain     Alcoholism, chronic (H)     High anion gap  "metabolic acidosis     Hypomagnesemia     Alcohol intoxication, uncomplicated (H)     GI bleed     Anemia associated with acute blood loss     Disorder due to alcohol abuse (H)     Substance induced mood disorder (H)     Anxiety disorder     History of posttraumatic stress disorder (PTSD)     Erosive esophagitis     Polyarthralgia     Cervical spondylosis     VIKI positive     Inflammatory arthritis     Arthritis of right ankle     Gouty arthritis of toe of left foot     Pulmonary embolism (H)     Primary osteoarthritis involving multiple joints     Supratherapeutic INR     Hypokalemia     Epistaxis             Chief Complaint / Subjective:    Chief complaint: \" I do not trust the people that live in my apartment\"     History of Present Illness:   Daily depression , denies SI, daily anxiety, denies pain attacks. Increased paranoia, suspicious of people denies homicidal ideations..  He also appears us if he is responding to internal stimuli.  He is complaining of police following him.  He does not want me to make any medication changes but long discussion he is agreeable that as long as I increase the Seroquel and not had any other med he will be okay.  We will therefore increase Seroquel to 50 mg.  Leave the rest of the medications as they are.  He is attending psychotherapy as scheduled and finds it beneficial.  He remained cooperative but brief during our appointment.  I will make no further changes to his medication and will have him return in 4 weeks for follow-up appointment  Mental Status Examination:   General: Adequate hygiene, cooperative, appropriately dressed for the weather  Speech: Normal in rate and tone  Language: Intact  Thought process: Coherent  Thought content:    Auditory hallucinations-Denies but noted to be present -pt is talking under his breathe as if responding to internal stimuli  Visual Hallucinations - Denies   Delusions : denies but is noted to be preset   Loose Associations:  No ne   " Suicidal thoughts: Denies   Homicidal thoughts:Denies                    Affect: Neutral   Mood: Neutral   Intellectual functioning: Within normal limits  Memory: Fair  Fund of knowledge: Fair   Attention and concentration: Brief  Gait: Steady  Psychomotor activity: Calm, no agitation  Muscles: No atrophy, no abnormal movements  InSight and judgment: Fair      Drug/treatment history and current pattern of use:   Drug of choice: Alcohol  History of alcohol      Medication changes: See Above   Medication adherence: compliant  Medication side effects: absent  Information about medications: Side effects, benefits and alternative treatments discussed and patient agrees .    Psychotherapy: Supportive therapy day-to-day living    Education: Diet, exercise, abstinence from drugs and alcohol, patient will not drive if sedated and medications or  under influence of any substance    Lab Results:   Personally reviewed and discussed with the patient    Lab Results   Component Value Date    WBC 7.8 07/23/2018    HGB 11.1 (L) 07/24/2018    HCT 38.3 (L) 07/23/2018     07/23/2018    ALT 19 05/08/2019    AST 26 05/08/2019     05/08/2019    K 3.7 05/08/2019     (H) 05/08/2019    CREATININE 0.72 05/08/2019    BUN 5 (L) 05/08/2019    CO2 23 05/08/2019    TSH 2.17 03/24/2018    INR 2.77 (H) 07/24/2018       Vital signs:    Vitals:    07/18/19 0943   BP: 115/68   Pulse: 81   Temp: 98.6  F (37  C)   TempSrc: Oral   Weight: 190 lb (86.2 kg)     Allergies: Other environmental allergy         Medications:       Current Outpatient Medications on File Prior to Visit   Medication Sig Dispense Refill     acetaminophen (TYLENOL) 500 MG tablet Take 1,000 mg by mouth every 6 (six) hours as needed for pain.       albuterol (PROAIR HFA;PROVENTIL HFA;VENTOLIN HFA) 90 mcg/actuation inhaler Inhale 2 puffs every 4 (four) hours as needed (cough). 1 Inhaler 0     cetirizine (ZYRTEC) 10 MG tablet Take 10 mg by mouth at bedtime.         omeprazole (PRILOSEC) 20 MG capsule Take 20 mg by mouth daily as needed.        prazosin (MINIPRESS) 2 MG capsule Take 1 capsule (2 mg total) by mouth at bedtime. 30 capsule 2     QUEtiapine (SEROQUEL) 25 MG tablet Take 1 tablet (25 mg total) by mouth at bedtime. 30 tablet 2     ranitidine (ZANTAC) 300 MG tablet Take 300 mg by mouth daily.  1     traZODone (DESYREL) 100 MG tablet TAKE 1 TABLET(100 MG) BY MOUTH AT BEDTIME 30 tablet 2     No current facility-administered medications on file prior to visit.                 Review of Systems:      ROS:       10 point ROS was negative except for the items listed in HPI.    Review of Systems - General ROS: negative for - chills, fatigue, night sweats, sleep disturbance or weight loss    Respiratory ROS: no cough, shortness of breath, or wheezing  negative for - cough or shortness of breath  Cardiovascular ROS: no chest pain or dyspnea on exertion  Gastrointestinal ROS: no abdominal pain, change in bowel habits, or black or bloody stools  Musculoskeletal ROS: negative  negative for - gait disturbance, joint pain, joint stiffness, muscle pain or muscular weakness  Neurological ROS: negative for - confusion, gait disturbance, headaches or seizures    Coordination of Care:   More than 25 minutes spent on this visit  with more than 50% of time spent on coordination of care including: Educating patient about diagnosis, prognosis, side effects and benefits of medications, diet, exercise.  Time also spent providing supportive therapy regarding above issues.    This note was created using a dictation system. All typing errors or contextual distortion is unintentional and software inherent.

## 2021-05-30 NOTE — PATIENT INSTRUCTIONS - HE
Continue medications as prescribed  Have your pharmacy contact us for a refill if you are running low on medications (We may ask you to come into clinic to get a refill from the nurse  No Alcohol or drug use  No driving if sedated  Call the clinic with any questions or concerns   Reach out for help if you feel like hurting yourself or others ([facility] [phone]: [address] [facility], [address] or [facility] Suicide Hotline [phone] , call 911 or go to nearest Emergency room    Follow up as directed, for your appointments, per your After Visit Summary Form.

## 2021-05-30 NOTE — PROGRESS NOTES
Mental Health Visit Note    07/16/2019    Start time: 1201 Stop Time: 1240 Session # 26    Session Type: Patient is presenting for an Individual session.    Choco Claudio is a 53 y.o. male is being seen today for    Chief Complaint   Patient presents with      Follow Up     Depression     New symptoms or complaints: None    Present For Session: Patient and therapist    Functional Impairment:   Personal: 4  Family: 3  Work: N/A  Social:4    Clinical assessment of mental status: Choco Claudio presented on time for scheduled session today.  He was struggling with memory, open and cooperative and appropriate for therapy. His hygiene and grooming was fair. His memory was poor for short and long term.  His speech and language was soft and concise throughout the session. Concentration and focus is brief. Psychosis is reported and noted. He reports his mood is depressed.  Affect is congruent with speech.  Fund of knowledge is adequate. Insight is adequate for therapy. Reviewed and changes made as needed.     Suicidal/Homicidal Ideation present: None reported this session    Patient's impression of their current status: Patient indicated feeling depressed. Patient reported it has to do with feeling lonely. Patient indicated he has been getting out of the apartment daily which helps his mood. Patient reported he enjoys being around people and has started to talk to more people. Patient indicated he does not trust them but will have a conversation with them. Patient reported it is when he heads home that he feels depressed. Patient indicated nights are the hardest. Patient reported this being new to him because he use to be someone who always wanted to be alone. Patient indicated even though he does not trust people he still finds that he wants them to be part of his life/     Therapist impression of patients current state: Patient appears to have poor insight into his mental health. This therapist processed with  patient continuing to work on getting out and talking to people. This therapist challenged patient on ways he could work to be more social by attending groups. This therapist discussed with patient hygiene and the importance of showering on a regular basis.     Type of psychotherapeutic technique provided: Insight oriented, Client centered and CBT    Progress toward short term goals:Progress as expected with patient coming to scheduled session and hanging with friend.     Review of long term goals: Long term review on 07/09/2019    Diagnosis:   1. PTSD (post-traumatic stress disorder)    2. Delusional disorder (H)    3. Severe alcohol use disorder (H)      Plan and Follow up: Patient will return in one week for scheduled session.  Patient should continue to work on decreasing his anger. Patient would benefit from being able to look at ways he can trust people a little and not all or none thinking. Patient should look into volunteering as a way to help his loneliness and finding purpose at this time. Patient would benefit from continuing to get out and meet new people. Patient needs to work on daily hygiene.     Discharge Criteria/Planning: Client has chronic symptoms and ongoing therapy for maintenance stability recommended.    Marcella Nogueira

## 2021-05-31 ENCOUNTER — RECORDS - HEALTHEAST (OUTPATIENT)
Dept: ADMINISTRATIVE | Facility: CLINIC | Age: 56
End: 2021-05-31

## 2021-05-31 VITALS — BODY MASS INDEX: 26.19 KG/M2 | HEIGHT: 72 IN | WEIGHT: 193.4 LBS

## 2021-05-31 VITALS — WEIGHT: 205 LBS | BODY MASS INDEX: 27.77 KG/M2 | HEIGHT: 72 IN

## 2021-05-31 VITALS — HEIGHT: 72 IN | BODY MASS INDEX: 28.09 KG/M2 | WEIGHT: 207.4 LBS

## 2021-05-31 VITALS — HEIGHT: 72 IN | WEIGHT: 203 LBS | BODY MASS INDEX: 27.5 KG/M2

## 2021-05-31 VITALS — WEIGHT: 189.8 LBS | BODY MASS INDEX: 25.71 KG/M2 | HEIGHT: 72 IN

## 2021-05-31 VITALS — WEIGHT: 202.6 LBS | BODY MASS INDEX: 27.44 KG/M2 | HEIGHT: 72 IN

## 2021-05-31 VITALS — HEIGHT: 72 IN | BODY MASS INDEX: 25.71 KG/M2

## 2021-05-31 NOTE — PROGRESS NOTES
Mental Health Visit Note    9/3/2019    Start time: 1202 Stop Time: 1240 Session # 31    Session Type: Patient is presenting for an Individual session.    Choco Claudio is a 54 y.o. male is being seen today for    Chief Complaint   Patient presents with      Follow Up     Anger     New symptoms or complaints: None    Present For Session: Patient and therapist    Functional Impairment:   Personal: 4  Family: 3  Work: N/A  Social:4    Clinical assessment of mental status: Choco Claudio presented on time for scheduled session today.  He was struggling with memory, open and cooperative and appropriate for therapy. His hygiene and grooming was fair. His memory was poor for short and long term.  His speech and language was soft and concise throughout the session. Concentration and focus is brief. Psychosis is reported and noted. He reports his mood is angry.  Affect is congruent with speech.  Fund of knowledge is adequate. Insight is adequate for therapy. Changes made as needed.      Suicidal/Homicidal Ideation present: None reported this session    Patient's impression of their current status: Patient reported feeling angry. 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 Patient reported this caused him his marriage and a lot of other negative things. Patient indicated being around people right now is challenging due to his anger. Patient reported knowing he cannot get physical. Patient indicated he is struggling to understand his anger.     Therapist impression of patients current state: Patient appears to have poor insight into his mental health. This therapist challenged patient on the anger he is feeling and how it relates to not feeling appreciated. This therapist processed with patient coping skills to help reduce his anger and the importance of reaching out to his support network.    Type of  psychotherapeutic technique provided: Insight oriented, Client centered and CBT    Progress toward short term goals:Progress as expected with patient coming to scheduled session and going out with his friend.     Review of long term goals: Long term review on 07/09/2019    Diagnosis:   1. Delusional disorder (H)    2. PTSD (post-traumatic stress disorder)    3. Severe alcohol use disorder (H)      Plan and Follow up: Patient will return in one week for scheduled session. Patient would benefit from being able to look at ways he can trust people a little and not all or none thinking. Patient need to work on daily care including eating, sleeping, hygiene and taking his medications. Patient would benefit from starting to try activities that are talked about in group.     Discharge Criteria/Planning: Client has chronic symptoms and ongoing therapy for maintenance stability recommended.    Marcella Nogueira

## 2021-05-31 NOTE — PROGRESS NOTES
Mental Health Visit Note    08/06/2019    Start time: 1201 Stop Time: 1240 Session # 28    Session Type: Patient is presenting for an Individual session.    Choco Claudio is a 53 y.o. male is being seen today for    Chief Complaint   Patient presents with      Follow Up     Obsessive Thoughts     New symptoms or complaints: None    Present For Session: Patient and therapist    Functional Impairment:   Personal: 4  Family: 3  Work: N/A  Social:4    Clinical assessment of mental status: Choco Claudio presented on time for scheduled session today.  He was struggling with memory, open and cooperative and appropriate for therapy. His hygiene and grooming was fair. His memory was poor for short and long term.  His speech and language was soft and concise throughout the session. Concentration and focus is brief. Psychosis is reported and noted. He reports his mood is anxious.  Affect is congruent with speech.  Fund of knowledge is adequate. Insight is adequate for therapy. Reviewed but no changes for this session.     Suicidal/Homicidal Ideation present: None reported this session    Patient's impression of their current status: Patient indicated feeling anxious. Patient reported all he can think about is explosives. Patient indicated it started when he talked to the . Patient reported since then all he can think about is explosives. Patient indicated he has not slept or ate for 3 days. Patient reported he has not taken his medication for 4 days and has not drank for 2 days. Patient indicated he has not been leaving his apartment. Patient reported reported he feels safe but just cannot stop thinking about explosives.     Therapist impression of patients current state: Patient appears to have poor insight into his mental health. This therapist processed with patient the importance of his self-care. This therapist processed with patient ways not eating, sleeping and taking medications can affect his  thinking pattern. This therapist processed with patient the importance of getting out of his apartment and being around people he trusts.     Type of psychotherapeutic technique provided: Insight oriented, Client centered and CBT    Progress toward short term goals:Progress as expected with patient coming to scheduled session    Review of long term goals: Long term review on 07/09/2019    Diagnosis:   1. Delusional disorder (H)    2. PTSD (post-traumatic stress disorder)    3. Severe alcohol use disorder (H)      Plan and Follow up: Patient will return in one week for scheduled session. Patient would benefit from being able to look at ways he can trust people a little and not all or none thinking. Patient need to work on daily care including eating, sleeping, hygiene and taking his medications.     Discharge Criteria/Planning: Client has chronic symptoms and ongoing therapy for maintenance stability recommended.    Marcella Nogueira

## 2021-05-31 NOTE — PATIENT INSTRUCTIONS - HE
Continue medications as prescribed  Have your pharmacy contact us for a refill if you are running low on medications (We may ask you to come into clinic to get a refill from the nurse  No Alcohol or drug use  No driving if sedated  Call the clinic with any questions or concerns   Reach out for help if you feel like hurting yourself or others ([facility] [phone]: [address] [address] [facility], [address] or [facility] Suicide Hotline [phone] , call 911 or go to nearest Emergency room    Follow up as directed, for your appointments, per your After Visit Summary Form.

## 2021-05-31 NOTE — PROGRESS NOTES
Psychiatric  Progress Note  Date of visit:8/15/2019           Discussion of Care and Treatment Recommendations:   This is a 53 y.o. male with significant history of Alcohol Use Disorder, Etoh induced gastritis , DENISE and PTSD who presents to the clinic today , noncompliance with treatment plan.  Patient is here today for  a follow up appointment       Last visit  7/18/19.  Recommendation at last visit .  1.-Continue with current medications Trazodone  100 mg at Bedtime, g  Prazosin 2 mg at bedtime -PTSD, Increase Seroquel to 50 mg  at bedtime - psychosis , delusional thoughts  2- Highly recommend Inpatient CD treatment Alcohol Use Disorder - Severe. Pt  opposed to CD treatment   3- Continue with  Psychotherapy:  4- 5-Make efforts to stay away from alcohol and drug  5- RTC-4 weeks call in between visit with any questions or concerns  Patient and I reviewed diagnosis and treatment plan and patient agrees with following recommendations:  Ongoing education given regarding diagnostic and treatment options with adequate verbalization of understanding.  Plan   1.-Continue with current medications Trazodone  100 mg at Bedtime, g  Prazosin 2 mg at bedtime -PTSD, Increase  Seroquel to 100 mg  at bedtime - psychosis , delusional thoughts  2- Highly recommend Inpatient CD treatment Alcohol Use Disorder - Severe. Pt  opposed to CD treatment   3- Continue with  Psychotherapy:  4- 5-Make efforts to stay away from alcohol and drug  5- RTC-4 weeks call in between visit with any questions or concerns         Diagnoses:     PTSD  Alcohol Use Disorder , Severe   Delusional disorder R/O Schizophrenia          Patient Active Problem List   Diagnosis     Chest pain     Alcohol withdrawal, with unspecified complication (H)     Sinus tachycardia     Lactic acidosis     Dehydration     Microcytosis     Non-traumatic rhabdomyolysis     Nausea     Alcoholic ketoacidosis     Epigastric pain     Alcoholism, chronic (H)     High anion gap  "metabolic acidosis     Hypomagnesemia     Alcohol intoxication, uncomplicated (H)     GI bleed     Anemia associated with acute blood loss     Disorder due to alcohol abuse (H)     Substance induced mood disorder (H)     Anxiety disorder     History of posttraumatic stress disorder (PTSD)     Erosive esophagitis     Polyarthralgia     Cervical spondylosis     VIKI positive     Inflammatory arthritis     Arthritis of right ankle     Gouty arthritis of toe of left foot     Pulmonary embolism (H)     Primary osteoarthritis involving multiple joints     Supratherapeutic INR     Hypokalemia     Epistaxis             Chief Complaint / Subjective:    Chief complaint: \" My mind has been racing have not been sleeping well\"     History of Present Illness:   Per patient statement-he has not been sleeping well.  His mind is constantly racing.  He is experiencing hallucinations.  He had been out of medications for about 5 days but is now back on medications.  We had increased his Seroquel to 50 mg but states that even after taking the 50 mg of Seroquel his mind is constantly hallucinating.  He is open to increasing the dose of Seroquel but is totally opposed to starting a different medications.  Given that he has been noncompliant with medications for long time and now is more willing to cooperate and will keep him on Seroquel but increase the dose to 100 mg at bedtime.  His mother does medication set up so a phone call was made to his mother to inform him of any changes so she could adjust the medication set up to reflect the new changes.  Patient denies suicidal homicidal ideations.  He states he feels safe and verbally contracts for safety.  He is still residing in his apartment.  This has been a huge improvement in terms of his hygiene.  He has good hygiene, appropriately dressed for the weather shaved and looking all clean.  I applauded him for this.  He continues to attend psychotherapy and finds it beneficial.  He will " return in a month for follow-up appointment and encouraged him to call in between visits any questions or concerns    Mental Status Examination:   General: Adequate hygiene, cooperative, appropriately dressed for the weather  Speech: Normal in rate and tone  Language: Intact  Thought process: Coherent  Thought content:    Auditory hallucinations-endorses occasional auditory hallucination.      Visual Hallucinations - Denies   Delusions : denies but is noted to be preset   Loose Associations:  No ne   Suicidal thoughts: Denies   Homicidal thoughts:Denies                    Affect: Neutral   Mood: Neutral   Intellectual functioning: Within normal limits  Memory: Fair  Fund of knowledge: Fair   Attention and concentration: Brief  Gait: Steady  Psychomotor activity: Calm, no agitation  Muscles: No atrophy, no abnormal movements  InSight and judgment: Fair           Drug/treatment history and current pattern of use:   Drug of choice: Alcohol   Alcohol abuse ongoing      Medication changes: See Above   Medication adherence: compliant  Medication side effects: absent  Information about medications: Side effects, benefits and alternative treatments discussed and patient agrees .    Psychotherapy: Supportive therapy day-to-day living    Education: Diet, exercise, abstinence from drugs and alcohol, patient will not drive if sedated and medications or  under influence of any substance    Lab Results:   Personally reviewed and discussed with the patient    Lab Results   Component Value Date    WBC 7.8 07/23/2018    HGB 11.1 (L) 07/24/2018    HCT 38.3 (L) 07/23/2018     07/23/2018    ALT 19 05/08/2019    AST 26 05/08/2019     05/08/2019    K 3.7 05/08/2019     (H) 05/08/2019    CREATININE 0.72 05/08/2019    BUN 5 (L) 05/08/2019    CO2 23 05/08/2019    TSH 2.17 03/24/2018    INR 2.77 (H) 07/24/2018       Vital signs:    Vitals:    08/15/19 0912   BP: 109/76   Pulse: 91   Temp: 98.3  F (36.8  C)   TempSrc: Oral    Weight: 189 lb (85.7 kg)     Allergies: Other environmental allergy         Medications:     Current Outpatient Medications on File Prior to Visit   Medication Sig Dispense Refill     acetaminophen (TYLENOL) 500 MG tablet Take 1,000 mg by mouth every 6 (six) hours as needed for pain.       albuterol (PROAIR HFA;PROVENTIL HFA;VENTOLIN HFA) 90 mcg/actuation inhaler Inhale 2 puffs every 4 (four) hours as needed (cough). 1 Inhaler 0     cetirizine (ZYRTEC) 10 MG tablet Take 10 mg by mouth at bedtime.        omeprazole (PRILOSEC) 20 MG capsule Take 20 mg by mouth daily as needed.        prazosin (MINIPRESS) 2 MG capsule Take 1 capsule (2 mg total) by mouth at bedtime. 30 capsule 2     QUEtiapine (SEROQUEL) 50 MG tablet Take 1 tablet (50 mg total) by mouth at bedtime. 30 tablet 1     ranitidine (ZANTAC) 300 MG tablet Take 300 mg by mouth daily.  1     traZODone (DESYREL) 100 MG tablet TAKE 1 TABLET(100 MG) BY MOUTH AT BEDTIME 30 tablet 2     No current facility-administered medications on file prior to visit.                   Review of Systems:      ROS:       10 point ROS was negative except for the items listed in HPI.    Review of Systems - General ROS: negative for - chills, fatigue, night sweats, sleep disturbance or weight loss    Respiratory ROS: no cough, shortness of breath, or wheezing  negative for - cough or shortness of breath  Cardiovascular ROS: no chest pain or dyspnea on exertion  Gastrointestinal ROS: no abdominal pain, change in bowel habits, or black or bloody stools  Musculoskeletal ROS: negative  negative for - gait disturbance, joint pain, joint stiffness, muscle pain or muscular weakness  Neurological ROS: negative for - confusion, gait disturbance, headaches or seizures    Coordination of Care:   More than 25 minutes spent on this visit  with more than 50% of time spent on coordination of care including: Educating patient about diagnosis, prognosis, side effects and benefits of medications,  diet, exercise.  Time also spent providing supportive therapy regarding above issues.    This note was created using a dictation system. All typing errors or contextual distortion is unintentional and software inherent.

## 2021-05-31 NOTE — TELEPHONE ENCOUNTER
"Pt called in asking to speak with his therapist, Cristina. Pt was transferred to nurse triage. He states he's \"not well right now\". and that he has been off of his medications for at least seven days now. He's sitting in a park waiting for birds to fly over so he can \"hear them\". He denies feeling suicidal and denies urges to harm others. He feels his PTSD is really \"coming out\" right now. He said that he feels \"like bawling my eyes out.\" Pt expressed that he only wants to talk to Marcella. Offered support and strongly encouraged pt to come to the ED. Pt denies the need and states \"I promise I will if it gets to that point\". Offered to send police to him for help which he also denied. Informed pt that I would send the message off to his provider and therapist, but that he should think about coming to the ED to be evaluated. Pt understood. Pt is scheduled to see Marcella again on 8/13/19 and Debra Lane on 8/15/19.   "

## 2021-05-31 NOTE — TELEPHONE ENCOUNTER
Date of Last Office Visit: 8/15/19  Date of Next Office Visit: 9/13/19  No shows since last visit: none  Cancellations since last visit: none  ED visits since last visit:  none  Medication prazosin 2mg date last ordered: 6/20/19  Qty: 30  Refills: 2  Medication trazodone 100mg date last ordered: 6/20/19  Qty: 30  Refills: 2    Lapse in therapy greater than 7 days: no  Medication refill request verified as identical to current order: yes  Result of Last DAM, VPA, Li+ Level, CBC, or Carbamazepine Level (at or since last visit): N/A     [x] Medication refilled per [facility] M-1.   [] Medication unable to be refilled by RN due to criteria not met as indicated below:     []Eligibility - not seen in last year    []Supervision - no future appointment    []Compliance     []Verification - order discrepancy    []Controlled Medication    []Medication not included in RN Protocol    []90 - day supply request    []Other   Current Medication list:    acetaminophen (TYLENOL) 500 MG tablet Take 1,000 mg by mouth every 6 (six) hours as needed for pain.   albuterol (PROAIR HFA;PROVENTIL HFA;VENTOLIN HFA) 90 mcg/actuation inhaler Inhale 2 puffs every 4 (four) hours as needed (cough).   cetirizine (ZYRTEC) 10 MG tablet Take 10 mg by mouth at bedtime.    omeprazole (PRILOSEC) 20 MG capsule Take 20 mg by mouth daily as needed.    prazosin (MINIPRESS) 2 MG capsule Take 1 capsule (2 mg total) by mouth at bedtime.   QUEtiapine (SEROQUEL) 100 MG tablet Take 1 tablet (100 mg total) by mouth at bedtime.   ranitidine (ZANTAC) 300 MG tablet Take 300 mg by mouth daily.   traZODone (DESYREL) 100 MG tablet TAKE 1 TABLET(100 MG) BY MOUTH AT BEDTIME          Medication Plan of Care at last office visit with MD/CNP:    Plan   1.-Continue with current medications Trazodone  100 mg at Bedtime, g  Prazosin 2 mg at bedtime -PTSD, Increase  Seroquel to 100 mg  at bedtime - psychosis , delusional thoughts  2- Highly recommend Inpatient CD treatment Alcohol Use  Disorder - Severe. Pt  opposed to CD treatment   3- Continue with  Psychotherapy:  4- 5-Make efforts to stay away from alcohol and drug  5- RTC-4 weeks call in between visit with any questions or concerns

## 2021-05-31 NOTE — PROGRESS NOTES
Mental Health Visit Note    08/27/2019    Start time: 1201 Stop Time: 1225 Session # 30    Session Type: Patient is presenting for an Individual session.    Choco Claudio is a 54 y.o. male is being seen today for    Chief Complaint   Patient presents with      Follow Up     Sadness     New symptoms or complaints: None    Present For Session: Patient and therapist    Functional Impairment:   Personal: 4  Family: 3  Work: N/A  Social:4    Clinical assessment of mental status: Choco Claudio presented on time for scheduled session today.  He was struggling with memory, open and cooperative and appropriate for therapy. His hygiene and grooming was fair. His memory was poor for short and long term.  His speech and language was soft and concise throughout the session. Concentration and focus is brief. Psychosis is reported and noted. He reports his mood is sad.  Affect is congruent with speech.  Fund of knowledge is adequate. Insight is adequate for therapy. Reviewed and changes made as needed.     Suicidal/Homicidal Ideation present: None reported this session    Patient's impression of their current status: Patient indicated feeling sad. Patient reported he feels this way after not seeing anyone for a few days. Patient indicated then he will see his friend on the weekend and feel better. Patient reported knowing that the weather will change soon and he will not be able to go to the beach. Patient indicated he is worried what he will do with his time. Patient reported he is hoping to go back to work very part-time but knowing this will improve his mood.     Therapist impression of patients current state: Patient appears to have poor insight into his mental health. This therapist processed with patient different activities he could do to help stay busy during the week. This therapist processed with patient different groups that he could attend that would be beneficial to help with his sadness.      Type of  psychotherapeutic technique provided: Insight oriented, Client centered and CBT    Progress toward short term goals:Progress as expected with patient coming to scheduled session and going out with his friend.     Review of long term goals: Long term review on 07/09/2019    Diagnosis:   1. Delusional disorder (H)    2. PTSD (post-traumatic stress disorder)    3. Severe alcohol use disorder (H)      Plan and Follow up: Patient will return in one week for scheduled session. Patient would benefit from being able to look at ways he can trust people a little and not all or none thinking. Patient need to work on daily care including eating, sleeping, hygiene and taking his medications. Patient would benefit from starting to try activities that are talked about in group.     Discharge Criteria/Planning: Client has chronic symptoms and ongoing therapy for maintenance stability recommended.    Marcella Nogueira

## 2021-05-31 NOTE — TELEPHONE ENCOUNTER
"Incoming call received from patient asking about medical opinion form he attempted to drop off for Debra at [facility]. He was told he needed to see his pcp for this and he is confused about why Debra couldn't look at the paperwork. Writer asked patient if this was related to mental health issues or a physical condition to which he responded \" I don't know I can't read that well\"  . Patient was hoping that Debra could collaborate care with his PCP if necessary  [phone]  "

## 2021-05-31 NOTE — TELEPHONE ENCOUNTER
Called and spoke to patient. He states he got his form signed by his doctor and doesn't think Debra needs to do anything with it. Informed patient to bring any concerns forward at next office visit with Debra on 9/13/19, or call us back if something is needed and we will see if we can help.

## 2021-05-31 NOTE — PROGRESS NOTES
Mental Health Visit Note    08/20/2019    Start time: 1202 Stop Time: 1241 Session # 29    Session Type: Patient is presenting for an Individual session.    Choco Claudio is a 53 y.o. male is being seen today for    Chief Complaint   Patient presents with     MH Follow Up     Anxiety     New symptoms or complaints: None    Present For Session: Patient and therapist    Functional Impairment:   Personal: 4  Family: 3  Work: N/A  Social:4    Clinical assessment of mental status: Choco Claudio presented on time for scheduled session today.  He was struggling with memory, open and cooperative and appropriate for therapy. His hygiene and grooming was fair. His memory was poor for short and long term.  His speech and language was soft and concise throughout the session. Concentration and focus is brief. Psychosis is reported and noted. He reports his mood is anxious.  Affect is congruent with speech.  Fund of knowledge is adequate. Insight is adequate for therapy. No changes for this session.    Suicidal/Homicidal Ideation present: None reported this session    Patient's impression of their current status: Patient reported feeling anxious. Patient indicated he continues to not want to be around people but feels isolated in his apartment. Patient reported he did get out this weekend with his friend which helped to improve his mood. Patient indicated someone tried to talk to him at the [employer] yesterday and he found this to be uncomfortable. Patient reported not wanting anyone new in his life but knowing he will then be lonely. Patient indicated he did talk with his prescriber about his sleep and they are trying new medications.     Therapist impression of patients current state: Patient appears to have poor insight into his mental health. This therapist challenged patient on ways he can start to trust people a little. This therapist processed with patient ways he feels better when he is around people. This  therapist processed with patient the importance of working on getting out of his apartment daily.     Type of psychotherapeutic technique provided: Insight oriented, Client centered and CBT    Progress toward short term goals:Progress as expected with patient coming to scheduled session and going out with his friend.     Review of long term goals: Long term review on 07/09/2019    Diagnosis:   1. Delusional disorder (H)    2. PTSD (post-traumatic stress disorder)    3. Severe alcohol use disorder (H)      Plan and Follow up: Patient will return in one week for scheduled session. Patient would benefit from being able to look at ways he can trust people a little and not all or none thinking. Patient need to work on daily care including eating, sleeping, hygiene and taking his medications.     Discharge Criteria/Planning: Client has chronic symptoms and ongoing therapy for maintenance stability recommended.    Marcella Nogueira

## 2021-05-31 NOTE — TELEPHONE ENCOUNTER
Spoke with patient to inform him of Debra's directive. Pt states he is physically and emotionally exhausted, but reports that he is stable. He denies the need to go to the ED at this time and plans to be at his office visit on 8/15/19 with Debra.

## 2021-05-31 NOTE — PROGRESS NOTES
Patient is here for follow up appointment.  He states he is taking his medications and his mother sets them up for her. His depression and anxiety are still there.  He denies being paranoid.  Sleep is poor due to his mind is racing.  He said he is irritable due to the lack of sleep.  He sees Marcella for therapy.  Called patients mother to inform her of the increased dose of Seroquel.     Correct pharmacy verified with patient and confirmed in snapshot? [x] yes []no    Charge captured ? [x] yes  [] no    Medications Phoned  to Pharmacy [] yes [x]no  Name of Pharmacist:  List Medications, including dose, quantity and instructions      Medication Prescriptions given to patient   [] yes  [x] no   List the name of the drug the prescription was written for.       Medications ordered this visit were e-scribed.  Verified by order class [x] yes  [] no    Medication changes or discontinuations were communicated to patient's pharmacy: [x] yes  [] no    UA collected [] yes  [x] no    [place] Prescription Monitoring Program Reviewed? [] yes  [x] no    Referrals were made to:  NA    Future appointment was made: [x] yes  [] no    Dictation completed at time of chart check: [x] yes  [] no    I have checked the documentation for today s encounters and the above information has been reviewed and completed.

## 2021-06-01 VITALS — HEIGHT: 72 IN | BODY MASS INDEX: 27.77 KG/M2 | WEIGHT: 205 LBS

## 2021-06-01 VITALS — WEIGHT: 220 LBS | BODY MASS INDEX: 29.84 KG/M2

## 2021-06-01 VITALS — WEIGHT: 205 LBS | BODY MASS INDEX: 27.77 KG/M2 | HEIGHT: 72 IN

## 2021-06-01 VITALS — BODY MASS INDEX: 29.29 KG/M2 | WEIGHT: 216 LBS

## 2021-06-01 VITALS — WEIGHT: 215 LBS | BODY MASS INDEX: 29.16 KG/M2

## 2021-06-01 NOTE — PROGRESS NOTES
Patient is here for follow up appointment.  He states his depression, anxiety and sleep varies.  His mother sets up his medications and he states he is taking them as prescribed.  He went to [employer] shopping with her yesterday and had a hard time being out socially.  He still lives in his apartment and calls it his safe haven.  He denies hallucinations or paranoia.  He said he is heading to his moms after this appointment.    Correct pharmacy verified with patient and confirmed in snapshot? [x] yes []no    Charge captured ? [x] yes  [] no    Medications Phoned  to Pharmacy [] yes [x]no  Name of Pharmacist:  List Medications, including dose, quantity and instructions      Medication Prescriptions given to patient   [] yes  [x] no   List the name of the drug the prescription was written for.       Medications ordered this visit were e-scribed.  Verified by order class [] yes  [x] no  No medications ordered    Medication changes or discontinuations were communicated to patient's pharmacy: [] yes  [x] NA    UA collected [] yes  [x] no    [place] Prescription Monitoring Program Reviewed? [] yes  [x] no    Referrals were made to:  NA    Future appointment was made: [x] yes  [] no    Dictation completed at time of chart check: [x] yes  [] no    I have checked the documentation for today s encounters and the above information has been reviewed and completed.

## 2021-06-01 NOTE — PROGRESS NOTES
Mental Health Visit Note    9/17/2019    Start time: 1201 Stop Time: 1245 Session # 33    Session Type: Patient is presenting for an Individual session.    Choco Claudio is a 54 y.o. male is being seen today for    Chief Complaint   Patient presents with      Follow Up     Anger     New symptoms or complaints: None    Present For Session: Patient and therapist    Functional Impairment:   Personal: 4  Family: 3  Work: N/A  Social:4    Clinical assessment of mental status: Choco Claudio presented on time for scheduled session today.  He was struggling with memory, open and cooperative and appropriate for therapy. His hygiene and grooming was fair. His memory was poor for short and long term.  His speech and language was soft and concise throughout the session. Concentration and focus is brief. Psychosis is reported and noted. He reports his mood is angry.  Affect is congruent with speech.  Fund of knowledge is adequate. Insight is adequate for therapy. No changes for this session.   Suicidal/Homicidal Ideation present: None reported this session    Patient's impression of their current status: Patient reported feeling angry. Patient indicated he does not want to be around people. Patient reported he is finding himself wanting to punch somebody. Patient indicated knowing this will result in snf time and does not allow himself to actually hurt someone. Patient reported he notices when he is alone his thoughts continue to [place] Patient indicated he does not understand why this controls so much of his life.     Therapist impression of patients current state: Patient appears to have poor insight into his mental health. This therapist challenged patient on ways isolating is making his depression and negative thoughts worse. This therapist processed with patient the importance of getting out of his apartment and working to slowly trust people. This therapist processed with patient  starting anger management with this therapist.     Type of psychotherapeutic technique provided: Insight oriented, Client centered and CBT    Progress toward short term goals:Progress as expected with patient coming to scheduled session and going out with his friend.     Review of long term goals: Long term review on 07/09/2019    Diagnosis:   1. Delusional disorder (H)    2. PTSD (post-traumatic stress disorder)    3. Severe alcohol use disorder (H)      Plan and Follow up: Patient will return in one week for scheduled session. Patient would benefit from being able to look at ways he can trust people a little and not all or none thinking. Patient need to work on daily care including eating, sleeping, hygiene and taking his medications. Patient would benefit from starting to try activities that are talked about in group.     Discharge Criteria/Planning: Client has chronic symptoms and ongoing therapy for maintenance stability recommended.    Marcella Nogueira

## 2021-06-01 NOTE — PROGRESS NOTES
Psychiatric  Progress Note  Date of visit:9/13/2019         Discussion of Care and Treatment Recommendations:   This is a 54 y.o. male with significant history of Alcohol Use Disorder, Etoh induced gastritis , DENISE and PTSD who presents to the clinic today , noncompliance with treatment plan.  Patient is here today for  a follow up appointment       Last visit  08/15/19  Recommendation at last visit .  1.-Continue with current medications Trazodone  100 mg at Bedtime, g  Prazosin 2 mg at bedtime -PTSD, Increase  Seroquel to 100 mg  at bedtime - psychosis , delusional thoughts  2- Highly recommend Inpatient CD treatment Alcohol Use Disorder - Severe. Pt  opposed to CD treatment   3- Continue with  Psychotherapy:  4- 5-Make efforts to stay away from alcohol and drug  5- RTC-4 weeks call in between visit with any questions or concerns  Patient and I reviewed diagnosis and treatment plan and patient agrees with following recommendations:  Ongoing education given regarding diagnostic and treatment options with adequate verbalization of understanding.  Plan   1.-Continue with current medications Trazodone  100 mg at Bedtime, g  Prazosin 2 mg at bedtime -PTSD, Increase  Seroquel to 100 mg  at bedtime - psychosis , delusional thoughts  2- Highly recommend Inpatient CD treatment Alcohol Use Disorder - Severe. Pt  opposed to CD treatment   3- Continue with  Psychotherapy:  4- 5-Make efforts to stay away from alcohol and drug  5- RTC-4 weeks call in between visit with any questions or concerns         Diagnoses:     PTSD  Alcohol Use Disorder , Severe   Delusional disorder R/O Schizophrenia     Patient Active Problem List   Diagnosis     Chest pain     Alcohol withdrawal, with unspecified complication (H)     Sinus tachycardia     Lactic acidosis     Dehydration     Microcytosis     Non-traumatic rhabdomyolysis     Nausea     Alcoholic ketoacidosis     Epigastric pain     Alcoholism, chronic (H)     High anion gap metabolic  "acidosis     Hypomagnesemia     Alcohol intoxication, uncomplicated (H)     GI bleed     Anemia associated with acute blood loss     Disorder due to alcohol abuse (H)     Substance induced mood disorder (H)     Anxiety disorder     History of posttraumatic stress disorder (PTSD)     Erosive esophagitis     Polyarthralgia     Cervical spondylosis     VIKI positive     Inflammatory arthritis     Arthritis of right ankle     Gouty arthritis of toe of left foot     Pulmonary embolism (H)     Primary osteoarthritis involving multiple joints     Supratherapeutic INR     Hypokalemia     Epistaxis             Chief Complaint / Subjective:    Chief complaint: \" I just want to get back home\"     History of Present Illness:   Per patient statement-he is reporting compliance with current medications denies side effects.  Reports that he went grocery shopping to [employer] with his mother and he was so paranoid because he does not like crowds and just wanted to go back home.  He has felt more paranoid since that visit and states that he likes being in his apartment as he feels safe there.  He is in a hurry to get back to his apartment today.  He reports that he will go to his pharmacy before he goes to his apartment and collect his medications.  He states that an increase in Seroquel has helpful as he is able to rest better and sleep better at night and not him any voices in his head.  He wants to continue the same medications.  He does not want to increase or change his current medications.  He says his therapist every week and reports that it is helpful.  He says he has not been drinking any alcohol lately as he has no burning to buy it.  His mother continues to set up his medications.  He is limping and states that he has a stiff muscle.  I did recommend that he get seen by his primary or at the urgent care.  He is not interested.  He says if he does not improve then he will go to the urgent care or emergency room.  For now he " states that he just wants to go home.  I will have him return to the clinic in 4 weeks for follow-up appointment and call in between visits with any questions or concerns.  He will continue the same medications and continue psychotherapy.     Mental Status Examination:   General: Disheveled with fair hygiene, cooperative, appropriately dressed for the weather  Speech: Normal in rate and tone  Language: Intact  Thought process: Coherent  Thought content:    Auditory hallucinations-endorses occasional auditory hallucination.      Visual Hallucinations - Denies   Delusions : denies but is noted to be preset   Loose Associations:  No ne   Suicidal thoughts: Denies   Homicidal thoughts:Denies                    Affect: Neutral   Mood: Neutral   Intellectual functioning: Within normal limits  Memory: Fair  Fund of knowledge: Fair   Attention and concentration: Brief  Gait: Steady  Psychomotor activity: Calm, no agitation  Muscles: No atrophy, no abnormal movements  InSight and judgment: Fair         Drug/treatment history and current pattern of use:   Hx of severe alcohol Use disorder     Medication changes: See Above   Medication adherence: compliant  Medication side effects: absent  Information about medications: Side effects, benefits and alternative treatments discussed and patient agrees .    Psychotherapy: Supportive therapy day-to-day living    Education: Diet, exercise, abstinence from drugs and alcohol, patient will not drive if sedated and medications or  under influence of any substance    Lab Results:   Personally reviewed and discussed with the patient    Lab Results   Component Value Date    WBC 7.8 07/23/2018    HGB 11.1 (L) 07/24/2018    HCT 38.3 (L) 07/23/2018     07/23/2018    ALT 19 05/08/2019    AST 26 05/08/2019     05/08/2019    K 3.7 05/08/2019     (H) 05/08/2019    CREATININE 0.72 05/08/2019    BUN 5 (L) 05/08/2019    CO2 23 05/08/2019    TSH 2.17 03/24/2018    INR 2.77 (H)  07/24/2018       Vital signs:    Vitals:    09/13/19 0946   BP: 149/82   Pulse: (!) 109   Temp: 98.2  F (36.8  C)   TempSrc: Oral   Weight: 195 lb (88.5 kg)     Allergies: Other environmental allergy         Medications:       Current Outpatient Medications on File Prior to Visit   Medication Sig Dispense Refill     acetaminophen (TYLENOL) 500 MG tablet Take 1,000 mg by mouth every 6 (six) hours as needed for pain.       albuterol (PROAIR HFA;PROVENTIL HFA;VENTOLIN HFA) 90 mcg/actuation inhaler Inhale 2 puffs every 4 (four) hours as needed (cough). 1 Inhaler 0     cetirizine (ZYRTEC) 10 MG tablet Take 10 mg by mouth at bedtime.        omeprazole (PRILOSEC) 20 MG capsule Take 20 mg by mouth daily as needed.        prazosin (MINIPRESS) 2 MG capsule Take 1 capsule (2 mg total) by mouth at bedtime. 30 capsule 0     QUEtiapine (SEROQUEL) 100 MG tablet Take 1 tablet (100 mg total) by mouth at bedtime. 90 tablet 0     ranitidine (ZANTAC) 300 MG tablet Take 300 mg by mouth daily.  1     traZODone (DESYREL) 100 MG tablet TAKE 1 TABLET(100 MG) BY MOUTH AT BEDTIME 30 tablet 0     No current facility-administered medications on file prior to visit.               Review of Systems:      ROS:       10 point ROS was negative except for the items listed in HPI.    Review of Systems - General ROS: negative for - chills, fatigue, night sweats, sleep disturbance or weight loss    Respiratory ROS: no cough, shortness of breath, or wheezing  negative for - cough or shortness of breath  Cardiovascular ROS: no chest pain or dyspnea on exertion  Gastrointestinal ROS: no abdominal pain, change in bowel habits, or black or bloody stools  Musculoskeletal ROS: negative  negative for - gait disturbance, joint pain, joint stiffness, muscle pain or muscular weakness  Neurological ROS: negative for - confusion, gait disturbance, headaches or seizures    Coordination of Care:   More than 25 minutes spent on this visit  with more than 50% of time  spent on coordination of care including: Educating patient about diagnosis, prognosis, side effects and benefits of medications, diet, exercise.  Time also spent providing supportive therapy regarding above issues.    This note was created using a dictation system. All typing errors or contextual distortion is unintentional and software inherent.

## 2021-06-01 NOTE — PROGRESS NOTES
Mental Health Visit Note    9/10/2019    Start time: 1201 Stop Time: 1240 Session # 32    Session Type: Patient is presenting for an Individual session.    Choco Claudio is a 54 y.o. male is being seen today for    Chief Complaint   Patient presents with      Follow Up     Anger     New symptoms or complaints: None    Present For Session: Patient and therapist    Functional Impairment:   Personal: 4  Family: 3  Work: N/A  Social:4    Clinical assessment of mental status: Choco Claudio presented on time for scheduled session today.  He was struggling with memory, open and cooperative and appropriate for therapy. His hygiene and grooming was fair. His memory was poor for short and long term.  His speech and language was soft and concise throughout the session. Concentration and focus is brief. Psychosis is reported and noted. He reports his mood is angry.  Affect is congruent with speech.  Fund of knowledge is adequate. Insight is adequate for therapy. Reviewed but no changes.     Suicidal/Homicidal Ideation present: None reported this session    Patient's impression of their current status: Patient indicated feeling angry. Patient reported he is triggered every time he see's a commercial for 9/11. Patient indicated he is angry by how 9/11 changed his life. Patient reported he is also angry that people from 9/11 continued to get recognized but he does not get recognized. Patient indicated wanting to be recognized by his boss for the risk he took at his job. Patient reported the risk resulted in anger which then affected his marriage. Patient indicated not feeling that anyone has thanked him for what he did to help protect people.     Therapist impression of patients current state: Patient appears to have poor insight into his mental health. This therapist processed with patient his emotions related to not feeling thanked for the work he has done. This therapist challenged patient on who he would want to  be thanked by and how this would help his anger.     Type of psychotherapeutic technique provided: Insight oriented, Client centered and CBT    Progress toward short term goals:Progress as expected with patient coming to scheduled session and going out with his friend.     Review of long term goals: Long term review on 07/09/2019    Diagnosis:   1. PTSD (post-traumatic stress disorder)    2. Delusional disorder (H)    3. Severe alcohol use disorder (H)      Plan and Follow up: Patient will return in one week for scheduled session. Patient would benefit from being able to look at ways he can trust people a little and not all or none thinking. Patient need to work on daily care including eating, sleeping, hygiene and taking his medications. Patient would benefit from starting to try activities that are talked about in group.     Discharge Criteria/Planning: Client has chronic symptoms and ongoing therapy for maintenance stability recommended.    Marcella Nogueira

## 2021-06-01 NOTE — PROGRESS NOTES
Mental Health Visit Note    10/01/2019    Start time: 1204 Stop Time: 1245 Session # 35    Session Type: Patient is presenting for an Individual session.    Choco Claudio is a 54 y.o. male is being seen today for    Chief Complaint   Patient presents with      Follow Up     Social Anxiety     New symptoms or complaints: None    Present For Session: Patient and therapist    Functional Impairment:   Personal: 4  Family: 3  Work: N/A  Social:4    Clinical assessment of mental status: Choco Claudio presented on time for scheduled session today.  He was struggling with memory, open and cooperative and appropriate for therapy. His hygiene and grooming was fair. His memory was poor for short and long term.  His speech and language was soft and concise throughout the session. Concentration and focus is brief. Psychosis is reported and noted. He reports his mood is anxious.  Affect is congruent with speech.  Fund of knowledge is adequate. Insight is adequate for therapy. Changes made as needed.     Suicidal/Homicidal Ideation present: None reported this session    Patient's impression of their current status: Patient reported feeling anxious. Patient indicated going to his sister's birthday party of over the weekend which caused him a lot of social anxiety. Patient reported he had no interest in talking to people. Patient indicated he drank beer as a way to cope. Patient reported he is not close with his sister but went because his parents asked him to. Patient indicated he continues to not trust people. Patient reported at [place] [employer] many people trying to talk to him but he does not trust any of them or want to trust them.    Therapist impression of patients current state: Patient appears to have poor insight into his mental health. This therapist challenged patient on ways he can work on talking to people about safe topics. This therapist processed with patient ways he talks about being lonely and this being  a way to meet people.    Type of psychotherapeutic technique provided: Insight oriented, Client centered and CBT    Progress toward short term goals:Progress as expected with patient coming to scheduled session and going out with his friend.     Review of long term goals: Long term review on 07/09/2019    Diagnosis:   1. Delusional disorder (H)    2. PTSD (post-traumatic stress disorder)    3. Severe alcohol use disorder (H)      Plan and Follow up: Patient will return in one week for scheduled session. Patient would benefit from being able to look at ways he can trust people a little and not all or none thinking. Patient need to work on daily care including eating, sleeping, hygiene and taking his medications. Patient would benefit from starting to try activities that are talked about in group.     Discharge Criteria/Planning: Client has chronic symptoms and ongoing therapy for maintenance stability recommended.    Marcella Nogueira

## 2021-06-01 NOTE — TELEPHONE ENCOUNTER
Seen today:    Plan   1.-Continue with current medications Trazodone  100 mg at Bedtime, g  Prazosin 2 mg at bedtime -PTSD, Increase  Seroquel to 100 mg  at bedtime - psychosis , delusional thoughts  2- Highly recommend Inpatient CD treatment Alcohol Use Disorder - Severe. Pt  opposed to CD treatment   3- Continue with  Psychotherapy:  4- 5-Make efforts to stay away from alcohol and drug  5- RTC-4 weeks call in between visit with any questions or concerns    Last Rx:    QUEtiapine (SEROQUEL) 100 MG tablet 90 tablet 0 8/15/2019  No   Sig - Route: Take 1 tablet (100 mg total) by mouth at bedtime. - Oral

## 2021-06-01 NOTE — PROGRESS NOTES
Mental Health Visit Note    9/24/2019    Start time: 1202 Stop Time: 1241 Session # 34    Session Type: Patient is presenting for an Individual session.    Choco Claudio is a 54 y.o. male is being seen today for    Chief Complaint   Patient presents with      Follow Up     Sadness     New symptoms or complaints: None    Present For Session: Patient and therapist    Functional Impairment:   Personal: 4  Family: 3  Work: N/A  Social:4    Clinical assessment of mental status: Choco Claudio presented on time for scheduled session today.  He was struggling with memory, open and cooperative and appropriate for therapy. His hygiene and grooming was fair. His memory was poor for short and long term.  His speech and language was soft and concise throughout the session. Concentration and focus is brief. Psychosis is reported and noted. He reports his mood is sad.  Affect is congruent with speech.  Fund of knowledge is adequate. Insight is adequate for therapy. Reviewed and changes made as needed.     Suicidal/Homicidal Ideation present: None reported this session    Patient's impression of their current status: Patient indicated feeling sadness. Patient reported if he did not take the [employer] job feeling that his life would be different. Patient indicated due to that job he does not want to meet new people. Patient reported he use to be a very social person. Patient indicated someone tried to talk to him yesterday and patient found himself wanting to hit him. Patient reported feeling he needs his space. Patient indicated this being hard cause feeling lonely at the same time.     Therapist impression of patients current state: Patient appears to have poor insight into his mental health. This therapist processed with patient ways he can continue to make his life better. This therapist challenged patient on ways he can work to confront some of his avoidance. This therapist processed with patient the importance of  working on meeting new people.     Type of psychotherapeutic technique provided: Insight oriented, Client centered and CBT    Progress toward short term goals:Progress as expected with patient coming to scheduled session and going out with his friend.     Review of long term goals: Long term review on 07/09/2019    Diagnosis:   1. Delusional disorder (H)    2. PTSD (post-traumatic stress disorder)    3. Severe alcohol use disorder (H)      Plan and Follow up: Patient will return in one week for scheduled session. Patient would benefit from being able to look at ways he can trust people a little and not all or none thinking. Patient need to work on daily care including eating, sleeping, hygiene and taking his medications. Patient would benefit from starting to try activities that are talked about in group.     Discharge Criteria/Planning: Client has chronic symptoms and ongoing therapy for maintenance stability recommended.    Marcella Nogueira

## 2021-06-02 VITALS — WEIGHT: 195 LBS | BODY MASS INDEX: 26.45 KG/M2

## 2021-06-02 VITALS — BODY MASS INDEX: 26.85 KG/M2 | WEIGHT: 198 LBS

## 2021-06-02 VITALS — BODY MASS INDEX: 27.12 KG/M2 | WEIGHT: 200 LBS

## 2021-06-02 VITALS — WEIGHT: 205 LBS | BODY MASS INDEX: 27.8 KG/M2

## 2021-06-02 NOTE — PROGRESS NOTES
Mental Health Visit Note    10/29/2019    Start time: 1202 Stop Time: 1230 Session # 39    Session Type: Patient is presenting for an Individual session.    Choco Caludio is a 54 y.o. male is being seen today for    Chief Complaint   Patient presents with      Follow Up     Depression     New symptoms or complaints: None    Present For Session: Patient and therapist    Functional Impairment:   Personal: 4  Family: 3  Work: N/A  Social:4    Clinical assessment of mental status: Choco Claudio presented on time for scheduled session today.  He was struggling with memory, open and cooperative and appropriate for therapy. His hygiene and grooming was fair. His memory was poor for short and long term.  His speech and language was soft and concise throughout the session. Concentration and focus is brief. Psychosis is reported and noted. He reports his mood is depressed.  Affect is congruent with speech.  Fund of knowledge is adequate. Insight is adequate for therapy. Reviewed and changes made as needed.     Suicidal/Homicidal Ideation present: None reported this session    Patient's impression of their current status: Patient reported feeling depressed. Patient indicated he did not go to the [employer] last night. Patient reported the again did not feel like being around people. Patient indicated at the same time feeling lonely. Patient reported the Buddhism has had people coming dunk which makes him feel uneasy. Patient indicated he enjoys his time with his friend the most but that is only once a week. Patient reported he has been better about taking his medications and he does feel now that they are helping.     Therapist impression of patients current state: Patient appears to have poor insight into his mental health. This therapist processed with patient different locations he can go to to try and meet new people. This therapist processed with patient the steps he can take to help make him feel comfortable at  these different locations. This therapist challenged patient on ways the isolation continue to make his depression worse.     Type of psychotherapeutic technique provided: Insight oriented, Client centered and CBT    Progress toward short term goals:Progress as expected with patient coming to scheduled session and medication management appointment.      Review of long term goals: Long term review on 10/08/2019    Diagnosis:   1. PTSD (post-traumatic stress disorder)    2. Delusional disorder (H)    3. Severe alcohol use disorder (H)      Plan and Follow up: Patient will return in one week for scheduled session. Patient would benefit from being able to look at ways he can trust people a little and not all or none thinking. Patient need to work on daily care including eating, sleeping, hygiene and taking his medications. Patient would benefit from starting to try activities that are talked about in session. Patient should work on getting out to the places recommended to help meet new people.     Discharge Criteria/Planning: Client has chronic symptoms and ongoing therapy for maintenance stability recommended.    Marcella Nogueira

## 2021-06-02 NOTE — PATIENT INSTRUCTIONS - HE
Continue medications as prescribed  Have your pharmacy contact us for a refill if you are running low on medications (We may ask you to come into clinic to get a refill from the nurse  No Alcohol or drug use  No driving if sedated  Call the clinic with any questions or concerns   Reach out for help if you feel like hurting yourself or others ([facility] [phone]: [address] [facility], [address] or [facility] Suicide Hotline [phone] , call 911 or go to nearest Emergency room    Follow up as directed, for your appointments, per your After Visit Summary Form.

## 2021-06-02 NOTE — PROGRESS NOTES
Patient refuses all vital signs.  He states he is very angry about his PTSD.  He said he has been drinking and last drink last night.  He also said he has not been taking his medications.  He asked that I come into his visit with ALCON Lane with him which I did.     Correct pharmacy verified with patient and confirmed in snapshot? [x] yes []no    Charge captured ? [x] yes  [] no    Medications Phoned  to Pharmacy [] yes [x]no  Name of Pharmacist:  List Medications, including dose, quantity and instructions      Medication Prescriptions given to patient   [] yes  [x] no   List the name of the drug the prescription was written for.       Medications ordered this visit were e-scribed.  Verified by order class [] yes  [x] no  No medications ordered    Medication changes or discontinuations were communicated to patient's pharmacy: [] yes  [x] no    UA collected [] yes  [x] no    [place] Prescription Monitoring Program Reviewed? [] yes  [x] no    Referrals were made to:  NA    Future appointment was made: [x] yes  [] no    Dictation completed at time of chart check: [x] yes  [] no    I have checked the documentation for today s encounters and the above information has been reviewed and completed.

## 2021-06-02 NOTE — PATIENT INSTRUCTIONS - HE
Continue medications as prescribed  Have your pharmacy contact us for a refill if you are running low on medications (We may ask you to come into clinic to get a refill from the nurse  No Alcohol or drug use  No driving if sedated  Call the clinic with any questions or concerns   Reach out for help if you feel like hurting yourself or others ([facility] [phone]: [address] [facility], [address] or [facility] Suicide Hotline [phone] , call 911 or go to nearest Emergency room    Follow up as directed, for your appointments, per your After Visit Summary Form.

## 2021-06-02 NOTE — PROGRESS NOTES
Mental Health Visit Note    10/15/2019    Start time: 1202 Stop Time: 1230 Session # 37    Session Type: Patient is presenting for an Individual session.    Choco Claudio is a 54 y.o. male is being seen today for    Chief Complaint   Patient presents with      Follow Up     Distrust     New symptoms or complaints: None    Present For Session: Patient and therapist    Functional Impairment:   Personal: 4  Family: 3  Work: N/A  Social:4    Clinical assessment of mental status: Choco Claudio presented on time for scheduled session today.  He was struggling with memory, open and cooperative and appropriate for therapy. His hygiene and grooming was fair. His memory was poor for short and long term.  His speech and language was soft and concise throughout the session. Concentration and focus is brief. Psychosis is reported and noted. He reports his mood is anxious.  Affect is congruent with speech.  Fund of knowledge is adequate. Insight is adequate for therapy. Changes made as needed.     Suicidal/Homicidal Ideation present: None reported this session    Patient's impression of their current status: Patient reported feeling anxious. Patient indicated he does not trust people. Patient reported he is struggling right now to even want to be around people. Patient indicated he did not go to the [employer] last night. Patient reported he was unable to spend time with his friend this weekend so he was never able to relax. Patient indicated he is taking his medication but is not taking anything for anxiety. Patient reported he has not ate in 3 days due to not having the motivation.      Therapist impression of patients current state: Patient appears to have poor insight into his mental health. This therapist processed with patient ways to work on trusting people in small amounts. This therapist informed patient to talk with Debra Lane NP about medication. This therapist processed with patient the importance of  our basic needs and needing to eat.     Type of psychotherapeutic technique provided: Insight oriented, Client centered and CBT    Progress toward short term goals:Progress as expected with patient coming to scheduled session and medication management appointment.  Poor progress as evidenced by patient not doing self-care.     Review of long term goals: Long term review on 10/08/2019    Diagnosis:   1. PTSD (post-traumatic stress disorder)    2. Delusional disorder (H)    3. Severe alcohol use disorder (H)      Plan and Follow up: Patient will return in one week for scheduled session. Patient would benefit from being able to look at ways he can trust people a little and not all or none thinking. Patient need to work on daily care including eating, sleeping, hygiene and taking his medications. Patient would benefit from starting to try activities that are talked about in session.     Discharge Criteria/Planning: Client has chronic symptoms and ongoing therapy for maintenance stability recommended.    Marcella Nogueira

## 2021-06-02 NOTE — PROGRESS NOTES
Mental Health Visit Note    10/08/2019    Start time: 1157 Stop Time: 1220 Session # 36    Session Type: Patient is presenting for an Individual session.    Choco Claudio is a 54 y.o. male is being seen today for    Chief Complaint   Patient presents with      Follow Up     Sleep Problems     New symptoms or complaints: None    Present For Session: Patient and therapist    Functional Impairment:   Personal: 4  Family: 3  Work: N/A  Social:4    Clinical assessment of mental status: Choco Claudio presented on time for scheduled session today.  He was struggling with memory, open and cooperative and appropriate for therapy. His hygiene and grooming was fair. His memory was poor for short and long term.  His speech and language was soft and concise throughout the session. Concentration and focus is brief. Psychosis is reported and noted. He reports his mood is depressed.  Affect is congruent with speech.  Fund of knowledge is adequate. Insight is adequate for therapy. Reviewed and changes made as needed.     Suicidal/Homicidal Ideation present: None reported this session    Patient's impression of their current status: Patient indicated feeling depressed. Patient reported he is struggling with week. Patient indicated his friend and him tried to go to a booya but there was a lot of people and he felt uncomfortable. Patient reported they ended up leaving right away. Patient indicated he is physically not feeling good. Patient reported he has not been eating for the last few days. Patient indicated he also has not slept for 48 hours. Patient reported he lays down but he cannot fall asleep. Patient indicated he thinks he took his medications the last 2 nights but he is unsure.     Therapist impression of patients current state: Patient appears to have poor insight into his mental health. This therapist processed with patient his copings skills he can use in group setting to help reduce his anxiety. This  therapist processed with patient the importance of self-care including sleep hygiene, showering, eating and taking medications.     Type of psychotherapeutic technique provided: Insight oriented, Client centered and CBT    Progress toward short term goals:Progress as expected with patient coming to scheduled session and going out with his friend. Poor progress as evidenced by patient not doing self-care.     Review of long term goals: Long term review on 10/08/2019    Diagnosis:   1. Delusional disorder (H)    2. PTSD (post-traumatic stress disorder)    3. Severe alcohol use disorder (H)      Plan and Follow up: Patient will return in one week for scheduled session. Patient would benefit from being able to look at ways he can trust people a little and not all or none thinking. Patient need to work on daily care including eating, sleeping, hygiene and taking his medications. Patient would benefit from starting to try activities that are talked about in session.     Discharge Criteria/Planning: Client has chronic symptoms and ongoing therapy for maintenance stability recommended.    Marcella Nogueira

## 2021-06-02 NOTE — PROGRESS NOTES
Psychiatric  Progress Note  Date of visit:         Discussion of Care and Treatment Recommendations:   This is a 54 y.o. male with  significant history of Alcohol Use Disorder, Etoh induced gastritis , DENISE and PTSD who presents to the clinic today , noncompliance with treatment plan.  Patient is here today for  a follow up appointment          Last visit  9/13/19 Recommendation at last visit .  1.-Continue with current medications Trazodone  100 mg at Bedtime, g  Prazosin 2 mg at bedtime -PTSD, Increase  Seroquel to 100 mg  at bedtime - psychosis , delusional thoughts  2- Highly recommend Inpatient CD treatment Alcohol Use Disorder - Severe. Pt  opposed to CD treatment   3- Continue with  Psychotherapy:  4- 5-Make efforts to stay away from alcohol and drug  5- RTC-4 weeks call in between visit with any questions or concerns  Patient and I reviewed diagnosis and treatment plan and patient agrees with following recommendations:  Ongoing education given regarding diagnostic and treatment options with adequate verbalization of understanding.  Plan   1.-Continue with current medications Trazodone  100 mg at Bedtime, g  Prazosin 2 mg at bedtime -PTSD, continue Seroquel to 100 mg  at bedtime - psychosis , delusional thoughts  2- Highly recommend Inpatient CD treatment Alcohol Use Disorder - Severe. Pt  opposed to CD treatment   3- Continue with  Psychotherapy:  4- 5-Make efforts to stay away from alcohol and drug  5- RTC-2 weeks call in between visit with any questions or concerns         DIagnoses:     PTSD  Alcohol Use Disorder , Severe   Delusional disorder R/O Schizophrenia     Patient Active Problem List   Diagnosis     Chest pain     Alcohol withdrawal, with unspecified complication (H)     Sinus tachycardia     Lactic acidosis     Dehydration     Microcytosis     Non-traumatic rhabdomyolysis     Nausea     Alcoholic ketoacidosis     Epigastric pain     Alcoholism, chronic (H)     High anion gap metabolic acidosis  "    Hypomagnesemia     Alcohol intoxication, uncomplicated (H)     GI bleed     Anemia associated with acute blood loss     Disorder due to alcohol abuse (H)     Substance induced mood disorder (H)     Anxiety disorder     History of posttraumatic stress disorder (PTSD)     Erosive esophagitis     Polyarthralgia     Cervical spondylosis     VIKI positive     Inflammatory arthritis     Arthritis of right ankle     Gouty arthritis of toe of left foot     Pulmonary embolism (H)     Primary osteoarthritis involving multiple joints     Supratherapeutic INR     Hypokalemia     Epistaxis             Chief Complaint / Subjective:    Chief complaint: \" I do not like this PTSD\"     History of Present Illness:   Patient presented to the very paranoid.  He did not want to stay in our clinic for a long time.  He stated he wanted to go home and because too many people were retrieved for his paranoia.  He reported that he had stopped taking his medications for while.  He had just come from seeing his therapist.  He reported that when he goes back home he will take his medications and try and sleep because he has not slept for a few days.  We offered him going to the hospital for stabilization and he adamantly declined.  Patient has a history of paranoia and trust only very few people.  We do see him at the clinic monthly and also is seen by his therapist quite frequently.  He does not miss his follow-up appointments with myself and with his therapist.  Today he wanted to be very brief therefore we did not spend a lot time per his request.  He has she wonders that he feels safe and he would feel better they demonstrate home in his apartment and laid down.  His request will appointment.  We did give him tokens progress via and reschedule his appointment in 2 weeks.  Crisis numbers were provided for him.  He also reported that he feels overwhelmed he will call the clinic or go to the ED.  He denies suicidal homicidal ideations.  He " said he feels safe and he verbally contracted for safety.    Mental Status Examination:   General: Adequate hygiene, cooperative  Speech: Normal in rate and tone  Language: Intact  Thought process: Coherent  Thought content:                           Auditory hallucinations-endorses occasional auditory hallucination                          Visual Hallucinations - Denies                         Delusions Absent                           Loose Associations:  No                          Suicidal thoughts: Denies                          Homicidal thoughts:Denies                        Affect: Neutral  Mood: Neutral   Intellectual functioning: Within normal limits  Memory: Within normal limits  Fund of knowledge: Average  Attention and concentration: Within normal limits  Gait: Steady  Psychomotor activity: Calm, no agitation  Muscles: No atrophy, no abnormal movements  InSight and judgment: Fair for baseline       Drug/treatment history and current pattern of use:   Hx Severe alcohol use disorder      Medication changes: See Above   Medication adherence: compliant  Medication side effects: absent  Information about medications: Side effects, benefits and alternative treatments discussed and patient agrees .    Psychotherapy: Supportive therapy day-to-day living    Education: Diet, exercise, abstinence from drugs and alcohol, patient will not drive if sedated and medications or  under influence of any substance    Lab Results:   Personally reviewed and discussed with the patient    Lab Results   Component Value Date    WBC 7.8 07/23/2018    HGB 11.1 (L) 07/24/2018    HCT 38.3 (L) 07/23/2018     07/23/2018    ALT 19 05/08/2019    AST 26 05/08/2019     05/08/2019    K 3.7 05/08/2019     (H) 05/08/2019    CREATININE 0.72 05/08/2019    BUN 5 (L) 05/08/2019    CO2 23 05/08/2019    TSH 2.17 03/24/2018    INR 2.77 (H) 07/24/2018       Vital signs:  He refused vital signs   Allergies:   Allergies   Allergen  Reactions     Other Environmental Allergy Other (See Comments)     Seasonal allergies  Nasal congestion          Medications:       Current Outpatient Medications on File Prior to Visit   Medication Sig Dispense Refill     acetaminophen (TYLENOL) 500 MG tablet Take 1,000 mg by mouth every 6 (six) hours as needed for pain.       albuterol (PROAIR HFA;PROVENTIL HFA;VENTOLIN HFA) 90 mcg/actuation inhaler Inhale 2 puffs every 4 (four) hours as needed (cough). 1 Inhaler 0     cetirizine (ZYRTEC) 10 MG tablet Take 10 mg by mouth at bedtime.        omeprazole (PRILOSEC) 20 MG capsule Take 20 mg by mouth daily as needed.        prazosin (MINIPRESS) 2 MG capsule TAKE 1 CAPSULE BY MOUTH AT BEDTIME 30 capsule 0     QUEtiapine (SEROQUEL) 100 MG tablet Take 1 tablet (100 mg total) by mouth at bedtime. 90 tablet 0     ranitidine (ZANTAC) 300 MG tablet Take 300 mg by mouth daily.  1     traZODone (DESYREL) 100 MG tablet TAKE 1 TABLET(100 MG) BY MOUTH AT BEDTIME 30 tablet 0     No current facility-administered medications on file prior to visit.                 Review of Systems:      ROS:       10 point ROS was negative except for the items listed in HPI.    Review of Systems - General ROS: negative for - chills, fatigue, night sweats, sleep disturbance or weight loss    Respiratory ROS: no cough, shortness of breath, or wheezing  negative for - cough or shortness of breath  Cardiovascular ROS: no chest pain or dyspnea on exertion  Gastrointestinal ROS: no abdominal pain, change in bowel habits, or black or bloody stools  Musculoskeletal ROS: negative  negative for - gait disturbance, joint pain, joint stiffness, muscle pain or muscular weakness  Neurological ROS: negative for - confusion, gait disturbance, headaches or seizures    Coordination of Care:   More than 25 minutes spent on this visit  with more than 50% of time spent on coordination of care including: Educating patient about diagnosis, prognosis, side effects and  benefits of medications, diet, exercise.  Time also spent providing supportive therapy regarding above issues.    This note was created using a dictation system. All typing errors or contextual distortion is unintentional and software inherent.

## 2021-06-02 NOTE — PROGRESS NOTES
Mental Health Visit Note    10/22/2019    Start time: 1201 Stop Time: 1240 Session # 38    Session Type: Patient is presenting for an Individual session.    Choco Claudio is a 54 y.o. male is being seen today for    Chief Complaint   Patient presents with      Follow Up     Isolation     New symptoms or complaints: None    Present For Session: Patient and therapist    Functional Impairment:   Personal: 4  Family: 3  Work: N/A  Social:4    Clinical assessment of mental status: Choco Claudio presented on time for scheduled session today.  He was struggling with memory, open and cooperative and appropriate for therapy. His hygiene and grooming was fair. His memory was poor for short and long term.  His speech and language was soft and concise throughout the session. Concentration and focus is brief. Psychosis is reported and noted. He reports his mood is anxious.  Affect is congruent with speech.  Fund of knowledge is adequate. Insight is adequate for therapy. No changes for this session.     Suicidal/Homicidal Ideation present: None reported this session    Patient's impression of their current status: Patient indicated feeling anxious. Patient reported he [facility] not go to [facility] [employer] last [facility]. Patient indicated he did not feel like being around people especially the one's at the [employer]. Patient reported feeling there are a lot of negative people who use that go to the [employer]. Patient indicated he does have his group of friends that are healthy there. Patient reported he has been doing good with getting sleep and taking his medications. Patient indicated he does not sleep all night but on and off. Patient reported he was able to see his friend this weekend and that has helped his mood a lot as well.     Therapist impression of patients current state: Patient appears to have poor insight into his mental health. This therapist challenged patient on ways isolation leads to depression and negative thoughts for  him. This therapist processed with patient the benefits of going to the [employer] and spending time with his friends at the [employer]. This therapist processed with patient ways that he is feeling better now and the importance of self-care.     Type of psychotherapeutic technique provided: Insight oriented, Client centered and CBT    Progress toward short term goals:Progress as expected with patient coming to scheduled session and medication management appointment.  Poor progress as evidenced by patient not doing self-care.     Review of long term goals: Long term review on 10/08/2019    Diagnosis:   1. PTSD (post-traumatic stress disorder)    2. Delusional disorder (H)    3. Severe alcohol use disorder (H)      Plan and Follow up: Patient will return in one week for scheduled session. Patient would benefit from being able to look at ways he can trust people a little and not all or none thinking. Patient need to work on daily care including eating, sleeping, hygiene and taking his medications. Patient would benefit from starting to try activities that are talked about in session.     Discharge Criteria/Planning: Client has chronic symptoms and ongoing therapy for maintenance stability recommended.    Marcella Nogueira

## 2021-06-02 NOTE — PROGRESS NOTES
Pt is here for psychiatric med management follow up. He reports being compliant with psych meds, said he takes all 5 and they help him to slow down his mind and thoughts but not always put him  asleep. Ry told that he still struggles with severe PTSD and has chronic thoughts hurting other people, has been looking for explosives but said he can control those thoughts with drinking beer (when he has money) or eating chips and candies and he is not planning to act on them. Pt has a best friend Hipolito he can talk to. C/o of muscle aches from sleeping on the floor - refuses sleeping in the bed after being homeless. Not comfortable around other people and gets intermittent tremors when anxious. He is hoping to get back to work. He sees Marcella and has appt with her on 10/29/19.    Correct pharmacy verified with patient and confirmed in snapshot? [x] yes []no    Charge captured ? [x] yes  [] no    Medications Phoned  to Pharmacy [] yes [x]no  Name of Pharmacist:  List Medications, including dose, quantity and instructions      Medication Prescriptions given to patient   [] yes  [x] no   List the name of the drug the prescription was written for.       Medications ordered this visit were e-scribed.  Verified by order class [x] yes  [] no  Minipress and Trazodone  Medication changes or discontinuations were communicated to patient's pharmacy: [] yes  [x] no    UA collected [] yes  [x] no    [place] Prescription Monitoring Program Reviewed? [] yes  [x] no    Referrals were made to:  none  Future appointment was made: [x] yes  [] no  11/21/19  Dictation completed at time of chart check: [x] yes  [] no    I have checked the documentation for today s encounters and the above information has been reviewed and completed.

## 2021-06-02 NOTE — PROGRESS NOTES
Psychiatric  Progress Note  Date of visit:10/24/2019         Discussion of Care and Treatment Recommendations:   This is a 54 y.o. male with significant history of Alcohol Use Disorder, Etoh induced gastritis , DENISE and PTSD who presents to the clinic today , noncompliance with treatment plan.  Patient is here today for  a follow up appointment       Last visit  10/10/19 Recommendation at last visit .  1.-Continue with current medications Trazodone  100 mg at Bedtime, g  Prazosin 2 mg at bedtime -PTSD, continue Seroquel to 100 mg  at bedtime - psychosis , delusional thoughts  2- Highly recommend Inpatient CD treatment Alcohol Use Disorder - Severe. Pt  opposed to CD treatment   3- Continue with  Psychotherapy:  4- 5-Make efforts to stay away from alcohol and drug  5- RTC-2 weeks call in between visit with any questions or concerns  Patient and I reviewed diagnosis and treatment plan and patient agrees with following recommendations:  Ongoing education given regarding diagnostic and treatment options with adequate verbalization of understanding.  Plan   1.-Continue with current medications Trazodone  100 mg at Bedtime, g  Prazosin 2 mg at bedtime -PTSD, continue Seroquel to 100 mg  at bedtime - psychosis , delusional thoughts  2- Highly recommend outpatient CD treatment Alcohol Use Disorder - Severe. Pt  opposed to CD treatment .  Continues to use alcohol when he can afford it.  3- Continue with  Psychotherapy:  4- 5-Make efforts to stay away from alcohol and drug  5- RTC-2 weeks call in between visit with any questions or concerns         Diagnoses:     PTSD  Alcohol Use Disorder , Severe   Delusional disorder R/O Schizophrenia        Patient Active Problem List   Diagnosis     Chest pain     Alcohol withdrawal, with unspecified complication (H)     Sinus tachycardia     Lactic acidosis     Dehydration     Microcytosis     Non-traumatic rhabdomyolysis     Nausea     Alcoholic ketoacidosis     Epigastric pain      "Alcoholism, chronic (H)     High anion gap metabolic acidosis     Hypomagnesemia     Alcohol intoxication, uncomplicated (H)     GI bleed     Anemia associated with acute blood loss     Disorder due to alcohol abuse (H)     Substance induced mood disorder (H)     Anxiety disorder     History of posttraumatic stress disorder (PTSD)     Erosive esophagitis     Polyarthralgia     Cervical spondylosis     VIKI positive     Inflammatory arthritis     Arthritis of right ankle     Gouty arthritis of toe of left foot     Pulmonary embolism (H)     Primary osteoarthritis involving multiple joints     Supratherapeutic INR     Hypokalemia     Epistaxis             Chief Complaint / Subjective:    Chief complaint: \" I am trying to be cautious \"     History of Present Illness:  Per patient statement-he reports feeling paranoid.  He says is around and is more cautious than paranoid to make sure he feels safe in his surroundings.  He denies having any homicidal ideations.  States he has good self-control and will always keep away from people and goes through to his apartment when he feels this way.  He reports taking his psychiatric medications every night denies side effects.DISCUS score today = 1 for slight tongue tremor unsure whether it is from the medication but will monitor again during his next appointment.  He denies dry mouth or any other symptoms conclusive for tachycardia dyskinesia.  States depression is well managed.  He has been attending psychotherapy on a weekly basis and finds it beneficial.  Reports that he has been watching lots of TV and recently was watching some shows that brought flashback memories of explosive flare.  He more paranoid.  We spent a lot of time in counseling and I did highly recommend that he should avoid watching such shows he endorsed understanding.  He states he is currently feels safe denies suicidal ideations.  Reports that he is feeling better than he did last week and the week after.  I " had propose increasing his Seroquel to 200 but he does not want to do that.  He will therefore continue the same medications and return to the clinic in 4 weeks and call in between visits with any questions or concerns.    He will continue with psychotherapy.  Mental Status Examination:   General: Disheveled with fair hygiene, cooperative, appropriately dressed for the weather  Speech: Normal in rate and tone  Language: Intact  Thought process: Coherent  Thought content:    Auditory hallucinations-endorses occasional auditory hallucination.      Visual Hallucinations - Denies   Delusions : denies but is noted to be preset   Loose Associations:  No ne   Suicidal thoughts: Denies   Homicidal thoughts:Denies                    Affect: Neutral   Mood: Neutral   Intellectual functioning: Within normal limits  Memory: Fair  Fund of knowledge: Fair   Attention and concentration: Brief  Gait: Steady  Psychomotor activity: Calm, no agitation  Muscles: No atrophy, no abnormal movements  InSight and judgment: Fair       Drug/treatment history and current pattern of use:   Hx of severe alcohol Use disorder     Medication changes: See Above   Medication adherence: compliant  Medication side effects: absent  Information about medications: Side effects, benefits and alternative treatments discussed and patient agrees .    Psychotherapy: Supportive therapy day-to-day living    Education: Diet, exercise, abstinence from drugs and alcohol, patient will not drive if sedated and medications or  under influence of any substance    Lab Results:   Personally reviewed and discussed with the patient    Lab Results   Component Value Date    WBC 7.8 07/23/2018    HGB 11.1 (L) 07/24/2018    HCT 38.3 (L) 07/23/2018     07/23/2018    ALT 19 05/08/2019    AST 26 05/08/2019     05/08/2019    K 3.7 05/08/2019     (H) 05/08/2019    CREATININE 0.72 05/08/2019    BUN 5 (L) 05/08/2019    CO2 23 05/08/2019    TSH 2.17 03/24/2018     INR 2.77 (H) 07/24/2018       Vital signs:    Vitals:    10/24/19 1029   BP: 96/70   Patient Site: Left Arm   Patient Position: Sitting   Cuff Size: Adult Regular   Pulse: 98   Temp: 97.5  F (36.4  C)   TempSrc: Oral   Weight: 204 lb (92.5 kg)   Height: 6' (1.829 m)     Allergies: Other environmental allergy         Medications:     Current Outpatient Medications on File Prior to Visit   Medication Sig Dispense Refill     acetaminophen (TYLENOL) 500 MG tablet Take 1,000 mg by mouth every 6 (six) hours as needed for pain.       prazosin (MINIPRESS) 2 MG capsule TAKE 1 CAPSULE BY MOUTH AT BEDTIME 30 capsule 0     QUEtiapine (SEROQUEL) 100 MG tablet Take 1 tablet (100 mg total) by mouth at bedtime. 90 tablet 0     ranitidine (ZANTAC) 300 MG tablet Take 300 mg by mouth daily.  1     traZODone (DESYREL) 100 MG tablet TAKE 1 TABLET(100 MG) BY MOUTH AT BEDTIME 30 tablet 0     albuterol (PROAIR HFA;PROVENTIL HFA;VENTOLIN HFA) 90 mcg/actuation inhaler Inhale 2 puffs every 4 (four) hours as needed (cough). 1 Inhaler 0     cetirizine (ZYRTEC) 10 MG tablet Take 10 mg by mouth at bedtime.        omeprazole (PRILOSEC) 20 MG capsule Take 20 mg by mouth daily as needed.        No current facility-administered medications on file prior to visit.               Review of Systems:      ROS:       10 point ROS was negative except for the items listed in HPI.    Review of Systems - General ROS: negative for - chills, fatigue, night sweats, sleep disturbance or weight loss    Respiratory ROS: no cough, shortness of breath, or wheezing  negative for - cough or shortness of breath  Cardiovascular ROS: no chest pain or dyspnea on exertion  Gastrointestinal ROS: no abdominal pain, change in bowel habits, or black or bloody stools  Musculoskeletal ROS: negative  negative for - gait disturbance, joint pain, joint stiffness, muscle pain or muscular weakness  Neurological ROS: negative for - confusion, gait disturbance, headaches or  seizures    Coordination of Care:   More than 25 minutes spent on this visit  with more than 50% of time spent on coordination of care including: Educating patient about diagnosis, prognosis, side effects and benefits of medications, diet, exercise.  Time also spent providing supportive therapy regarding above issues.    This note was created using a dictation system. All typing errors or contextual distortion is unintentional and software inherent.

## 2021-06-02 NOTE — TELEPHONE ENCOUNTER
Date of Last Office Visit: 9/13/19  Date of Next Office Visit: 10/10/19  No shows since last visit: none  Cancellations since last visit: none  ED visits since last visit:  none  Medication prazosin 2mg date last ordered: 8/26/19  Qty: 30  Refills: 0  Lapse in therapy greater than 7 days: yes  Medication refill request verified as identical to current order: yes  Result of Last DAM, VPA, Li+ Level, CBC, or Carbamazepine Level (at or since last visit): N/A     [] Medication refilled per [facility] M-1.   [x] Medication unable to be refilled by RN due to criteria not met as indicated below:     []Eligibility - not seen in last year    []Supervision - no future appointment    [x]Compliance     []Verification - order discrepancy    []Controlled Medication    []Medication not included in RN Protocol    []90 - day supply request    []Other   Current Medication list:    acetaminophen (TYLENOL) 500 MG tablet Take 1,000 mg by mouth every 6 (six) hours as needed for pain.   albuterol (PROAIR HFA;PROVENTIL HFA;VENTOLIN HFA) 90 mcg/actuation inhaler Inhale 2 puffs every 4 (four) hours as needed (cough).   cetirizine (ZYRTEC) 10 MG tablet Take 10 mg by mouth at bedtime.    omeprazole (PRILOSEC) 20 MG capsule Take 20 mg by mouth daily as needed.    prazosin (MINIPRESS) 2 MG capsule Take 1 capsule (2 mg total) by mouth at bedtime.   QUEtiapine (SEROQUEL) 100 MG tablet Take 1 tablet (100 mg total) by mouth at bedtime.   ranitidine (ZANTAC) 300 MG tablet Take 300 mg by mouth daily.   traZODone (DESYREL) 100 MG tablet TAKE 1 TABLET(100 MG) BY MOUTH AT BEDTIME       Medication Plan of Care at last office visit with MD/CNP:    Plan   1.-Continue with current medications Trazodone  100 mg at Bedtime, g  Prazosin 2 mg at bedtime -PTSD, Increase  Seroquel to 100 mg  at bedtime - psychosis , delusional thoughts  2- Highly recommend Inpatient CD treatment Alcohol Use Disorder - Severe. Pt  opposed to CD treatment   3- Continue with   Psychotherapy:  4- 5-Make efforts to stay away from alcohol and drug  5- RTC-4 weeks call in between visit with any questions or concerns

## 2021-06-03 VITALS
BODY MASS INDEX: 26.45 KG/M2 | HEART RATE: 109 BPM | WEIGHT: 195 LBS | DIASTOLIC BLOOD PRESSURE: 82 MMHG | SYSTOLIC BLOOD PRESSURE: 149 MMHG | TEMPERATURE: 98.2 F

## 2021-06-03 VITALS — BODY MASS INDEX: 25.77 KG/M2 | WEIGHT: 190 LBS

## 2021-06-03 VITALS — WEIGHT: 186 LBS | BODY MASS INDEX: 25.23 KG/M2

## 2021-06-03 VITALS — BODY MASS INDEX: 25.36 KG/M2 | WEIGHT: 187 LBS

## 2021-06-03 VITALS
WEIGHT: 204 LBS | DIASTOLIC BLOOD PRESSURE: 70 MMHG | BODY MASS INDEX: 27.63 KG/M2 | SYSTOLIC BLOOD PRESSURE: 96 MMHG | TEMPERATURE: 97.5 F | HEIGHT: 72 IN | HEART RATE: 98 BPM

## 2021-06-03 VITALS — WEIGHT: 189 LBS | BODY MASS INDEX: 25.63 KG/M2

## 2021-06-03 VITALS — WEIGHT: 192 LBS | BODY MASS INDEX: 26.04 KG/M2

## 2021-06-03 NOTE — PROGRESS NOTES
Mental Health Visit Note    11/12/2019    Start time: 1202 Stop Time: 1240 Session # 41    Session Type: Patient is presenting for an Individual session.    Choco Claudio is a 54 y.o. male is being seen today for    Chief Complaint   Patient presents with      Follow Up     Depression     New symptoms or complaints: None    Present For Session: Patient and therapist    Functional Impairment:   Personal: 4  Family: 3  Work: N/A  Social:4    Clinical assessment of mental status: Choco Claudio presented on time for scheduled session today.  He was struggling with memory, open and cooperative and appropriate for therapy. His hygiene and grooming was fair. His memory was poor for short and long term.  His speech and language was soft and concise throughout the session. Concentration and focus is brief. Psychosis is reported and noted. He reports his mood is depressed.  Affect is congruent with speech.  Fund of knowledge is adequate. Insight is adequate for therapy. Changes made as needed.     Suicidal/Homicidal Ideation present: None reported this session    Patient's impression of their current status: Patient reported feeling depressed. Patient indicated he has been spending most of his time isolated except when he see's his friend once a week. Patient reported the isolation then causes depression. Patient indicated he misses his friends but does not trust people. Patient reported he has not gone out to the activities discussed in therapy to try and meet people. Patient indicated sleep continues to be an ongoing problem but again not trying the skills taught in session.      Therapist impression of patients current state: Patient appears to have poor insight into his mental health. This therapist challenged patient on ways he can work on trusting people a little at a time. This therapist processed with patient not being able to meet people when he does not leave the house. This therapist discussed with  patient again the plan for him to see another therapist while this therapist is on leave. Patient was hesitate indicating he is no longer sure he wants to see someone else while this therapist is on leave. This therapist processed with patient the benefits of seeing another therapist, having some to help with his mental health symptoms and then return to this therapist.     Type of psychotherapeutic technique provided: Insight oriented, Client centered and CBT    Progress toward short term goals:Progress as expected with patient coming to scheduled session and taking medications.     Review of long term goals: Long term review on 10/08/2019    Diagnosis:   1. PTSD (post-traumatic stress disorder)    2. Delusional disorder (H)    3. Severe alcohol use disorder (H)      Plan and Follow up: Patient will return in one week for scheduled session. Patient would benefit from being able to look at ways he can trust people a little and not all or none thinking. Patient need to work on daily care including eating, sleeping, hygiene and taking his medications. Patient would benefit from starting to try activities that are talked about in session. Patient should work on getting out to the places recommended to help meet new people. Patient would benefit from working on sleep hygiene that has been taught in session.     Discharge Criteria/Planning: Client has chronic symptoms and ongoing therapy for maintenance stability recommended.    Marcella Nogueira

## 2021-06-03 NOTE — PROGRESS NOTES
Patient will be meeting with LAWANDA Velasquez  weekly starting in December until this therapist returns from leave.

## 2021-06-03 NOTE — TELEPHONE ENCOUNTER
Patient is a referral from Debra Lane, records brought to appt today with her were faxed to your office for you to review

## 2021-06-03 NOTE — TELEPHONE ENCOUNTER
Dr Medina did review records and still determined he will not take this case as there is no information that was gathered that shows patient needs a neuropsychological evaluation. He cannot see med-legal cases and bill to insurance, patient will need an RENA.. He's asked that this referral to him be canceled. He can be reached at his desk [phone] for questions and patient's  can call him with any further questions at that number, thanks!

## 2021-06-03 NOTE — PROGRESS NOTES
Psychiatric  Progress Note  Date of visit:11/21/2019           Discussion of Care and Treatment Recommendations:   This is a 54 y.o. male with a significant history of Alcohol Use Disorder, Etoh induced gastritis , DENISE and PTSD who presents to the clinic today , noncompliance with treatment plan.  Patient is here today for  a follow up appointment       Last visit  10/24/19 Recommendation at last visit .  1.-Continue with current medications Trazodone  100 mg at Bedtime, g  Prazosin 2 mg at bedtime -PTSD, continue Seroquel to 100 mg  at bedtime - psychosis , delusional thoughts  2- Highly recommend outpatient CD treatment Alcohol Use Disorder - Severe. Pt  opposed to CD treatment .  Continues to use alcohol when he can afford it.  3- Continue with  Psychotherapy:  4- 5-Make efforts to stay away from alcohol and drug  5- RTC-2 weeks call in between visit with any questions or concerns  Patient and I reviewed diagnosis and treatment plan and patient agrees with following recommendations:  Ongoing education given regarding diagnostic and treatment options with adequate verbalization of understanding.  Plan   1.-Continue with current medications Trazodone  100 mg at Bedtime, g  Prazosin 2 mg at bedtime -PTSD, continue Seroquel to 100 mg  at bedtime - psychosis , delusional thoughts  2- Highly recommend outpatient CD treatment Alcohol Use Disorder - Severe. Pt  opposed to CD treatment .  Continues to use alcohol when he can afford it.  3- Continue with  Psychotherapy:  4- 5-Make efforts to stay away from alcohol and drug  5- RTC- 4  weeks call in between visit with any questions or concerns         DIagnoses:   PTSD  Alcohol Use Disorder , Severe   Delusional disorder R/O Schizophrenia       Patient Active Problem List   Diagnosis     Chest pain     Alcohol withdrawal, with unspecified complication (H)     Sinus tachycardia     Lactic acidosis     Dehydration     Microcytosis     Non-traumatic rhabdomyolysis     Nausea  "    Alcoholic ketoacidosis     Epigastric pain     Alcoholism, chronic (H)     High anion gap metabolic acidosis     Hypomagnesemia     Alcohol intoxication, uncomplicated (H)     GI bleed     Anemia associated with acute blood loss     Disorder due to alcohol abuse (H)     Substance induced mood disorder (H)     Anxiety disorder     History of posttraumatic stress disorder (PTSD)     Erosive esophagitis     Polyarthralgia     Cervical spondylosis     VIKI positive     Inflammatory arthritis     Arthritis of right ankle     Gouty arthritis of toe of left foot     Pulmonary embolism (H)     Primary osteoarthritis involving multiple joints     Supratherapeutic INR     Hypokalemia     Epistaxis             Chief Complaint / Subjective:    Chief complaint: \" I am paranoid around people     History of Present Illness:  Per patient statement-he has been feeling more paranoid around people and wants to sustain his apartment.  He has been doing less biking as he has to be a lot when he is biking due to the paranoia.  Reports that [facility] has not dispensed his medication as he would like to speak to him personally.  Unsure which medication [facility] refused to dispense I am suspecting most likely Seroquel.  We will be calling [facility] and also calling patient's mother who picked the medications for him to find out.  Once we establish whether he has been taking his Seroquel on multiple decide whether to adjust his medications or to have him stay on the same dosage.  He denies suicidal homicidal ideations.  He says he feels safe and verbally contracts for safety.  He endorses paranoia, endorses auditory hallucinations that are noncommanding in nature, some delusional thoughts noted.  He reports drinking alcohol once a week about 3 beers.  Denies any use of other mood altering substances.  I applauded him for improvement in reduction of frequency and amount of alcohol.  I highly recommend complete abstinence .    Mental " Status Examination:   General: Adequate hygiene, cooperative  Speech: Normal in rate and tone  Language: Intact  Thought process: Coherent  Thought content:                           Auditory hallucinations-present                          Visual Hallucinations - Denies                         Delusions present                          Loose Associations:  No                          Suicidal thoughts: Denies                          Homicidal thoughts:Denies                        Affect: Neutral  Mood: Neutral   Intellectual functioning: Within normal limits  Memory: Within normal limits  Fund of knowledge: Average  Attention and concentration: Within normal limits  Gait: Steady  Psychomotor activity: Calm, no agitation  Muscles: No atrophy, no abnormal movements  InSight and judgment: Fair      Drug/treatment history and current pattern of use:   Alcohol Use disorder     Medication changes: See Above   Medication adherence: compliant  Medication side effects: absent  Information about medications: Side effects, benefits and alternative treatments discussed and patient agrees .    Psychotherapy: Supportive therapy day-to-day living    Education: Diet, exercise, abstinence from drugs and alcohol, patient will not drive if sedated and medications or  under influence of any substance    Lab Results:  Personally reviewed and discussed with the patient    Lab Results   Component Value Date    WBC 7.8 07/23/2018    HGB 11.1 (L) 07/24/2018    HCT 38.3 (L) 07/23/2018     07/23/2018    ALT 19 05/08/2019    AST 26 05/08/2019     05/08/2019    K 3.7 05/08/2019     (H) 05/08/2019    CREATININE 0.72 05/08/2019    BUN 5 (L) 05/08/2019    CO2 23 05/08/2019    TSH 2.17 03/24/2018    INR 2.77 (H) 07/24/2018       Vital signs:    Vitals:    11/21/19 1106   BP: 152/81   Patient Site: Left Arm   Patient Position: Sitting   Cuff Size: Adult Regular   Pulse: 83   Temp: 98.4  F (36.9  C)   TempSrc: Oral   Weight: 202  lb (91.6 kg)   Height: 6' (1.829 m)     Allergies: Other environmental allergy         Medications:     Current Outpatient Medications on File Prior to Visit   Medication Sig Dispense Refill     acetaminophen (TYLENOL) 500 MG tablet Take 1,000 mg by mouth every 6 (six) hours as needed for pain.       albuterol (PROAIR HFA;PROVENTIL HFA;VENTOLIN HFA) 90 mcg/actuation inhaler Inhale 2 puffs every 4 (four) hours as needed (cough). 1 Inhaler 0     prazosin (MINIPRESS) 2 MG capsule Take 1 capsule (2 mg total) by mouth at bedtime. 90 capsule 0     cetirizine (ZYRTEC) 10 MG tablet Take 10 mg by mouth at bedtime.        omeprazole (PRILOSEC) 20 MG capsule Take 20 mg by mouth daily as needed.        QUEtiapine (SEROQUEL) 100 MG tablet Take 1 tablet (100 mg total) by mouth at bedtime. 90 tablet 0     ranitidine (ZANTAC) 300 MG tablet Take 300 mg by mouth daily.  1     traZODone (DESYREL) 100 MG tablet TAKE 1 TABLET(100 MG) BY MOUTH AT BEDTIME 90 tablet 0     No current facility-administered medications on file prior to visit.                 Review of Systems:      ROS:       10 point ROS was negative except for the items listed in HPI.    Review of Systems - General ROS: negative for - chills, fatigue, night sweats, sleep disturbance or weight loss    Respiratory ROS: no cough, shortness of breath, or wheezing  negative for - cough or shortness of breath  Cardiovascular ROS: no chest pain or dyspnea on exertion  Gastrointestinal ROS: no abdominal pain, change in bowel habits, or black or bloody stools  Musculoskeletal ROS: negative  negative for - gait disturbance, joint pain, joint stiffness, muscle pain or muscular weakness  Neurological ROS: negative for - confusion, gait disturbance, headaches or seizures    Coordination of Care:   More than 25 minutes spent on this visit  with more than 50% of time spent on coordination of care including: Educating patient about diagnosis, prognosis, side effects and benefits of  medications, diet, exercise.  Time also spent providing supportive therapy regarding above issues.    This note was created using a dictation system. All typing errors or contextual distortion is unintentional and software inherent.

## 2021-06-03 NOTE — TELEPHONE ENCOUNTER
Mental health  charity called regarding getting Debra to enter a referral for a neuro psych eval. Charity said that she's had better luck getting them authorized when a pt's MH provider. Please contact Charity and discuss.

## 2021-06-03 NOTE — PROGRESS NOTES
Pt is here for psychiatric med management follow up and accompanied by his CM. Pt reports being sober for 4 days, says he gets easily irritated, still struggles with PTSD, told he takes 5 meds every day and they work. Still gets some SI, mostly at night, denies having HI.    Called [facility], last psych med :   Seroquel : October 23 rd 2019 Qty: 30 -  the Rx for Seroquel we e-scribed on 9/13/19 for 90 days was not filled at all and still on file at the pharmacy , they will get it ready for the pt's mother to .   Trazodone: November 7, 2019 Qty: 30  Prazosin: November 7, 2019 Qty : 30    Spoke to Claudia, mother thought that he was out of the Seroquel, she contacted [facility] and was told that he is out of refills and they are sending request to [facility]. Later she found 5 more tabs of the Seroquel at home.    Correct pharmacy verified with patient and confirmed in snapshot? [x] yes []no    Charge captured ? [x] yes  [] no    Medications Phoned  to Pharmacy [] yes [x]no  Name of Pharmacist:  List Medications, including dose, quantity and instructions      Medication Prescriptions given to patient   [] yes  [x] no   List the name of the drug the prescription was written for.       Medications ordered this visit were e-scribed.  Verified by order class [] yes  [x] no    Medication changes or discontinuations were communicated to patient's pharmacy: [] yes  [x] no    UA collected [] yes  [x] no    [place] Prescription Monitoring Program Reviewed? [] yes  [x] no    Referrals were made to:  Made a copy of documentation for Dr. Medina to review.     Future appointment was made: [x] yes  [] no  12/19/2019  Dictation completed at time of chart check: [x] yes  [] no    I have checked the documentation for today s encounters and the above information has been reviewed and completed.

## 2021-06-03 NOTE — PROGRESS NOTES
Mental Health Visit Note    11/5/2019    Start time: 1150 Stop Time: 1230 Session # 40    Session Type: Patient is presenting for an Individual session.    Choco Claudio is a 54 y.o. male is being seen today for    Chief Complaint   Patient presents with      Follow Up     Isolation     New symptoms or complaints: None    Present For Session: Patient and therapist    Functional Impairment:   Personal: 4  Family: 3  Work: N/A  Social:4    Clinical assessment of mental status: Choco Cluadio presented on time for scheduled session today.  He was struggling with memory, open and cooperative and appropriate for therapy. His hygiene and grooming was fair. His memory was poor for short and long term.  His speech and language was soft and concise throughout the session. Concentration and focus is brief. Psychosis is reported and noted. He reports his mood is anxious.  Affect is congruent with speech.  Fund of knowledge is adequate. Insight is adequate for therapy. Changes made as needed.     Suicidal/Homicidal Ideation present: None reported this session    Patient's impression of their current status: Patient indicated feeling anxious. Patient reported he has not slept for 2 nights. Patient indicated believing it was triggered by a show he watched last week. Patient reported he wants to continue to stay isolated even though he knows if is affecting his mental health. Patient indicated when he goes places he does not trust anyone. Patient reported sitting in the waiting area was challenging for him today. Patient indicated he continues to not trust people. Patient reported his prescriber wanted to up his medications but he is concerned that he will become addicted.     Therapist impression of patients current state: Patient appears to have poor insight into his mental health. This therapist processed with patient sleep hygiene and using these skills to help with sleep. This therapist processed with patient the  ways that isolation leads to it being hard to leave his apartment. This therapist encouraged patient to talk with his prescribes related to medication concerns.     Type of psychotherapeutic technique provided: Insight oriented, Client centered and CBT    Progress toward short term goals:Progress as expected with patient coming to scheduled session and taking medications.     Review of long term goals: Long term review on 10/08/2019    Diagnosis:   1. PTSD (post-traumatic stress disorder)    2. Delusional disorder (H)    3. Severe alcohol use disorder (H)      Plan and Follow up: Patient will return in one week for scheduled session. Patient would benefit from being able to look at ways he can trust people a little and not all or none thinking. Patient need to work on daily care including eating, sleeping, hygiene and taking his medications. Patient would benefit from starting to try activities that are talked about in session. Patient should work on getting out to the places recommended to help meet new people. Patient would benefit from working on sleep hygiene that has been taught in session.     Discharge Criteria/Planning: Client has chronic symptoms and ongoing therapy for maintenance stability recommended.    Marcella Nogueira

## 2021-06-03 NOTE — TELEPHONE ENCOUNTER
Refill request received for Prazosin.  In talking to [facility], they said it was sent in error, as they have a script from 10/24/2019, so their request was refused.

## 2021-06-03 NOTE — PATIENT INSTRUCTIONS - HE
Continue medications as prescribed  Have your pharmacy contact us for a refill if you are running low on medications (We may ask you to come into clinic to get a refill from the nurse  No Alcohol or drug use  No driving if sedated  Call the clinic with any questions or concerns   Reach out for help if you feel like hurting yourself or others ([facility] [phone]: [address], [address] or [facility] Suicide Hotline [phone] , call 911 or go to nearest Emergency room    Follow up as directed, for your appointments, per your After Visit Summary Form.

## 2021-06-03 NOTE — TELEPHONE ENCOUNTER
Deborah, patient's , asked that Debra refer patient for neuropsych testing. Patient is trying to get his social security benefits (he has intellectual disability) and was told by a social  that they would prefer if the neuropsych testing referral cones from the mental health provider. Is awaiting a directive from Debra.

## 2021-06-04 VITALS
HEIGHT: 72 IN | BODY MASS INDEX: 27.36 KG/M2 | TEMPERATURE: 98.4 F | WEIGHT: 202 LBS | DIASTOLIC BLOOD PRESSURE: 81 MMHG | HEART RATE: 83 BPM | SYSTOLIC BLOOD PRESSURE: 152 MMHG

## 2021-06-04 VITALS
DIASTOLIC BLOOD PRESSURE: 82 MMHG | SYSTOLIC BLOOD PRESSURE: 145 MMHG | TEMPERATURE: 98.2 F | BODY MASS INDEX: 28.89 KG/M2 | HEART RATE: 88 BPM | WEIGHT: 213 LBS

## 2021-06-04 VITALS
RESPIRATION RATE: 20 BRPM | HEART RATE: 87 BPM | BODY MASS INDEX: 28.71 KG/M2 | TEMPERATURE: 98.6 F | HEIGHT: 72 IN | OXYGEN SATURATION: 94 % | DIASTOLIC BLOOD PRESSURE: 60 MMHG | WEIGHT: 212 LBS | SYSTOLIC BLOOD PRESSURE: 110 MMHG

## 2021-06-04 VITALS
WEIGHT: 215 LBS | BODY MASS INDEX: 29.16 KG/M2 | SYSTOLIC BLOOD PRESSURE: 157 MMHG | DIASTOLIC BLOOD PRESSURE: 79 MMHG | TEMPERATURE: 98.7 F | HEART RATE: 81 BPM

## 2021-06-04 VITALS — WEIGHT: 212 LBS | BODY MASS INDEX: 28.71 KG/M2 | HEIGHT: 72 IN

## 2021-06-04 VITALS
BODY MASS INDEX: 29.43 KG/M2 | TEMPERATURE: 97.8 F | DIASTOLIC BLOOD PRESSURE: 91 MMHG | SYSTOLIC BLOOD PRESSURE: 161 MMHG | WEIGHT: 217 LBS | HEART RATE: 92 BPM

## 2021-06-04 NOTE — PROGRESS NOTES
Patient is here for follow up appointment.  He states he is taking his medications as prescribed.  He denies voices.  No new issues or concerns.  He said he will see Marcella when she gets back from Maternity leave.    Correct pharmacy verified with patient and confirmed in snapshot? [x] yes []no    Charge captured ? [x] yes  [] no    Medications Phoned  to Pharmacy [] yes [x]no  Name of Pharmacist:  List Medications, including dose, quantity and instructions      Medication Prescriptions given to patient   [] yes  [x] no   List the name of the drug the prescription was written for.       Medications ordered this visit were e-scribed.  Verified by order class [] yes  [x] no  No medications ordered    Medication changes or discontinuations were communicated to patient's pharmacy: [] yes  [x] NA    UA collected [] yes  [x] no    [place] Prescription Monitoring Program Reviewed? [] yes  [x] no    Referrals were made to:  [facility]    Future appointment was made: [x] yes  [] no    Dictation completed at time of chart check: [x] yes  [] no    I have checked the documentation for today s encounters and the above information has been reviewed and completed.

## 2021-06-04 NOTE — PROGRESS NOTES
Psychiatric  Progress Note  Date of visit: 12/19/2019           Discussion of Care and Treatment Recommendations:   This is a 54 y.o. male with a significant history of Alcohol Use Disorder, Etoh induced gastritis , DENISE and PTSD who presents to the clinic today , noncompliance with treatment plan.  Patient is here today for  a follow up appointment       Last visit  11/29/19 .  Recommendation at last visit .  1.-Continue with current medications Trazodone  100 mg at Bedtime, g  Prazosin 2 mg at bedtime -PTSD, continue Seroquel to 100 mg  at bedtime - psychosis , delusional thoughts  2- Highly recommend outpatient CD treatment Alcohol Use Disorder - Severe. Pt  opposed to CD treatment .  Continues to use alcohol when he can afford it.  3- Continue with  Psychotherapy:  4- 5-Make efforts to stay away from alcohol and drug  5- RTC- 4  weeks call in between visit with any questions or concerns  Patient and I reviewed diagnosis and treatment plan and patient agrees with following recommendations:  Ongoing education given regarding diagnostic and treatment options with adequate verbalization of understanding.  Plan   1.-Continue with current medications Trazodone  100 mg at Bedtime, g  Prazosin 2 mg at bedtime -PTSD, continue Seroquel to 100 mg  at bedtime - psychosis , delusional thoughts  2- Highly recommend outpatient CD treatment Alcohol Use Disorder - Severe. Pt  opposed to CD treatment .  Continues to use alcohol when he can afford it.  3- Continue with  Psychotherapy:  4- 5-Make efforts to stay away from alcohol and drug  5- RTC- 4  weeks call in between visit with any questions or concerns         Diagnoses:   PTSD  Alcohol Use Disorder , Severe   Delusional disorder R/O Schizophrenia       Patient Active Problem List   Diagnosis     Chest pain     Alcohol withdrawal, with unspecified complication (H)     Sinus tachycardia     Lactic acidosis     Dehydration     Microcytosis     Non-traumatic rhabdomyolysis      "Nausea     Alcoholic ketoacidosis     Epigastric pain     Alcoholism, chronic (H)     High anion gap metabolic acidosis     Hypomagnesemia     Alcohol intoxication, uncomplicated (H)     GI bleed     Anemia associated with acute blood loss     Disorder due to alcohol abuse (H)     Substance induced mood disorder (H)     Anxiety disorder     History of posttraumatic stress disorder (PTSD)     Erosive esophagitis     Polyarthralgia     Cervical spondylosis     VIKI positive     Inflammatory arthritis     Arthritis of right ankle     Gouty arthritis of toe of left foot     Pulmonary embolism (H)     Primary osteoarthritis involving multiple joints     Supratherapeutic INR     Hypokalemia     Epistaxis             Chief Complaint / Subjective:    Chief complaint: \" I am doing  well \"     History of Present Illness:   Per patient statement : He reports compliance with current medication denies side effects.  His  was present for the visit today.   has been trying to get support services for patients including RN medication set up but patient is hesitant as he is paranoid and states he does not want to many people involved with his care.  His mother sets up his medications every week and also pick them up for him from pharmacy.  Patient also states he has not left his house because the only time he leaves houses to come for his appointments with [facility]ra therapist but his therapist is currently on maternity leave.  I did offer and recommend a day treatment programming for patient but again he is very paranoid and is not interested as he does not want to be around people.  We spent a lot of time in counseling and discussion and medication management.  I did recommend increasing his Seroquel or adding another medication that would help decrease his paranoia but he is not interested at this time but he did promise that we could discuss again next visit after he thinks more about it.  Overall I think " patient is doing so much better than he has in the past several months.  His hygiene was really good, he was well groomed and dressed appropriately for the weather.  His affect was bright and he is not as paranoid as he used to be a year ago.  We will therefore continue with current  medications at this time and have patient return in about 4 to 6 weeks for follow-up management and call in between visits with any questions or concerns    Mental Status Examination:   General: Adequate hygiene, cooperative  Speech: Normal in rate and tone  Language: Intact  Thought process: Coherent  Thought content:                           Auditory hallucinations-Denies                          Visual Hallucinations - Denies                         Delusions Absent                           Loose Associations:  No                          Suicidal thoughts: Denies                          Homicidal thoughts:Denies                        Affect: Neutral  Mood: Neutral   Intellectual functioning: Within normal limits  Memory: Within normal limits  Fund of knowledge: Average  Attention and concentration: Within normal limits  Gait: Steady  Psychomotor activity: Calm, no agitation  Muscles: No atrophy, no abnormal movements  InSight and judgment: Fair      Drug/treatment history and current pattern of use:   History alcohol use disorder    Medication changes: See Above   Medication adherence: compliant  Medication side effects: absent  Information about medications: Side effects, benefits and alternative treatments discussed and patient agrees .    Psychotherapy: Supportive therapy day-to-day living    Education: Diet, exercise, abstinence from drugs and alcohol, patient will not drive if sedated and medications or  under influence of any substance    Lab Results:   Personally reviewed and discussed with the patient    Lab Results   Component Value Date    WBC 7.8 07/23/2018    HGB 11.1 (L) 07/24/2018    HCT 38.3 (L) 07/23/2018    PLT  226 07/23/2018    ALT 19 05/08/2019    AST 26 05/08/2019     05/08/2019    K 3.7 05/08/2019     (H) 05/08/2019    CREATININE 0.72 05/08/2019    BUN 5 (L) 05/08/2019    CO2 23 05/08/2019    TSH 2.17 03/24/2018    INR 2.77 (H) 07/24/2018       Vital signs:    Vitals:    12/19/19 1229   BP: 145/82   Pulse: 88   Temp: 98.2  F (36.8  C)   TempSrc: Oral   Weight: 213 lb (96.6 kg)     Allergies: Other environmental allergy         Medications:         Current Outpatient Medications on File Prior to Visit   Medication Sig Dispense Refill     acetaminophen (TYLENOL) 500 MG tablet Take 1,000 mg by mouth every 6 (six) hours as needed for pain.       albuterol (PROAIR HFA;PROVENTIL HFA;VENTOLIN HFA) 90 mcg/actuation inhaler Inhale 2 puffs every 4 (four) hours as needed (cough). 1 Inhaler 0     cetirizine (ZYRTEC) 10 MG tablet Take 10 mg by mouth at bedtime.        omeprazole (PRILOSEC) 20 MG capsule Take 20 mg by mouth daily as needed.        prazosin (MINIPRESS) 2 MG capsule Take 1 capsule (2 mg total) by mouth at bedtime. 90 capsule 0     QUEtiapine (SEROQUEL) 100 MG tablet Take 1 tablet (100 mg total) by mouth at bedtime. 90 tablet 0     ranitidine (ZANTAC) 300 MG tablet Take 300 mg by mouth daily.  1     traZODone (DESYREL) 100 MG tablet TAKE 1 TABLET(100 MG) BY MOUTH AT BEDTIME 90 tablet 0     No current facility-administered medications on file prior to visit.               Review of Systems:      ROS:       10 point ROS was negative except for the items listed in HPI.    Review of Systems - General ROS: negative for - chills, fatigue, night sweats, sleep disturbance or weight loss    Respiratory ROS: no cough, shortness of breath, or wheezing  negative for - cough or shortness of breath  Cardiovascular ROS: no chest pain or dyspnea on exertion  Gastrointestinal ROS: no abdominal pain, change in bowel habits, or black or bloody stools  Musculoskeletal ROS: negative  negative for - gait disturbance, joint pain,  joint stiffness, muscle pain or muscular weakness  Neurological ROS: negative for - confusion, gait disturbance, headaches or seizures    Coordination of Care:   More than 25 minutes spent on this visit  with more than 50% of time spent on coordination of care including: Educating patient about diagnosis, prognosis, side effects and benefits of medications, diet, exercise.  Time also spent providing supportive therapy regarding above issues.    This note was created using a dictation system. All typing errors or contextual distortion is unintentional and software inherent.

## 2021-06-04 NOTE — PATIENT INSTRUCTIONS - HE
Continue medications as prescribed  Have your pharmacy contact us for a refill if you are running low on medications (We may ask you to come into clinic to get a refill from the nurse  No Alcohol or drug use  No driving if sedated  Call the clinic with any questions or concerns   Reach out for help if you feel like hurting yourself or others ([facility] [phone]: [address] [facility], [address] or [facility] Suicide Hotline [phone] , call 911 or go to nearest Emergency room    Follow up as directed, for your appointments, per your After Visit Summary Form.

## 2021-06-05 NOTE — PROGRESS NOTES
Psychiatric  Progress Note  Date of visit:1/16/2020           Discussion of Care and Treatment Recommendations:   This is a 54 y.o. male with a significant history of Alcohol Use Disorder, Etoh induced gastritis , DENISE and PTSD who presents to the clinic today , noncompliance with treatment plan.  Patient is here today for  a follow up appointment       Last visit  12/19/19  Recommendation at last visit *  1.-Continue with current medications Trazodone  100 mg at Bedtime, g  Prazosin 2 mg at bedtime -PTSD, continue Seroquel to 100 mg  at bedtime - psychosis , delusional thoughts  2- Highly recommend outpatient CD treatment Alcohol Use Disorder - Severe. Pt  opposed to CD treatment .  Continues to use alcohol when he can afford it.  3- Continue with  Psychotherapy:  4- 5-Make efforts to stay away from alcohol and drug  5- RTC- 4  weeks call in between visit with any questions or concerns  Patient and I reviewed diagnosis and treatment plan and patient agrees with following recommendations:  Ongoing education given regarding diagnostic and treatment options with adequate verbalization of understanding.  Plan   1.-Continue with current medications Trazodone  100 mg at Bedtime, g  Prazosin 2 mg at bedtime -PTSD, Increase Seroquel to 200 mg  at bedtime - psychosis , delusional thoughts  2- Highly recommend outpatient CD treatment Alcohol Use Disorder - Severe. Pt  opposed to CD treatment .  Continues to use alcohol when he can afford it.  3- Continue with  Psychotherapy:  4- 5-Make efforts to stay away from alcohol and drug  5- RTC- 2  weeks call in between visit with any questions or concerns         Diagnoses:   Alcohol Use Disorder , Severe   Delusional disorder R/O Schizophrenia       Patient Active Problem List   Diagnosis     Chest pain     Alcohol withdrawal, with unspecified complication (H)     Sinus tachycardia     Lactic acidosis     Dehydration     Microcytosis     Non-traumatic rhabdomyolysis     Nausea      "Alcoholic ketoacidosis     Epigastric pain     Alcoholism, chronic (H)     High anion gap metabolic acidosis     Hypomagnesemia     Alcohol intoxication, uncomplicated (H)     GI bleed     Anemia associated with acute blood loss     Disorder due to alcohol abuse (H)     Substance induced mood disorder (H)     Anxiety disorder     History of posttraumatic stress disorder (PTSD)     Erosive esophagitis     Polyarthralgia     Cervical spondylosis     VIKI positive     Inflammatory arthritis     Arthritis of right ankle     Gouty arthritis of toe of left foot     Pulmonary embolism (H)     Primary osteoarthritis involving multiple joints     Supratherapeutic INR     Hypokalemia     Epistaxis             Chief Complaint / Subjective:    Chief complaint: \" I have jovan so paranoid\"      History of Present Illness:   Patient seen together with his .  Per patient statement : Has been experiencing shortness of breath - has an appointment with PCP.   reports that he will make sure that patient did make it to his appointment.  Patient reports increasing paranoia.  Does not leave his apartment unless is coming for an appointment.  His therapist has been on maternity leave and patient states that he feels more paranoid now that he is not receiving therapy services.  He is not willing to see another therapist while his therapist is on maternity leave.  We had discussed during her last visit increasing his Seroquel to 200 mg to address psychosis and increased paranoia.  After much discussion today he did agree therefore Seroquel has been increased to 200 mg.  Patient denies suicidal homicidal ideations.  Denies psychiatric symptoms.  Offers no other concerns.  I will have him return in 2 weeks for follow-up appointment and call in between visits any questions or concerns.  Mental Status Examination:   General: Adequate hygiene, cooperative  Speech: Normal in rate and tone  Language: Intact  Thought process: " Coherent  Thought content:                           Auditory hallucinations-Denies                          Visual Hallucinations - Denies                         Delusions Absent                           Loose Associations:  No                          Suicidal thoughts: Denies                          Homicidal thoughts:Denies                        Affect: Neutral  Mood: Neutral   Intellectual functioning: Within normal limits  Memory: Within normal limits  Fund of knowledge: Average  Attention and concentration: Within normal limits  Gait: Steady  Psychomotor activity: Calm, no agitation  Muscles: No atrophy, no abnormal movements  InSight and judgment: Fair      Drug/treatment history and current pattern of use:   History of alcohol use disorder      Medication changes: See Above   Medication adherence: compliant  Medication side effects: absent  Information about medications: Side effects, benefits and alternative treatments discussed and patient agrees .    Psychotherapy: Supportive therapy day-to-day living    Education: Diet, exercise, abstinence from drugs and alcohol, patient will not drive if sedated and medications or  under influence of any substance    Lab Results:   Personally reviewed and discussed with the patient    Lab Results   Component Value Date    WBC 7.8 07/23/2018    HGB 11.1 (L) 07/24/2018    HCT 38.3 (L) 07/23/2018     07/23/2018    ALT 19 05/08/2019    AST 26 05/08/2019     05/08/2019    K 3.7 05/08/2019     (H) 05/08/2019    CREATININE 0.72 05/08/2019    BUN 5 (L) 05/08/2019    CO2 23 05/08/2019    TSH 2.17 03/24/2018    INR 2.77 (H) 07/24/2018       Vital signs:    Vitals:    01/16/20 1120   BP: 157/79   Pulse: 81   Temp: 98.7  F (37.1  C)   TempSrc: Oral   Weight: 215 lb (97.5 kg)     Allergies: Other environmental allergy         Medications:     Current Outpatient Medications on File Prior to Visit   Medication Sig Dispense Refill     acetaminophen (TYLENOL)  500 MG tablet Take 1,000 mg by mouth every 6 (six) hours as needed for pain.       albuterol (PROAIR HFA;PROVENTIL HFA;VENTOLIN HFA) 90 mcg/actuation inhaler Inhale 2 puffs every 4 (four) hours as needed (cough). 1 Inhaler 0     cetirizine (ZYRTEC) 10 MG tablet Take 10 mg by mouth at bedtime.        omeprazole (PRILOSEC) 20 MG capsule Take 20 mg by mouth daily as needed.        prazosin (MINIPRESS) 2 MG capsule Take 1 capsule (2 mg total) by mouth at bedtime. 90 capsule 0     QUEtiapine (SEROQUEL) 100 MG tablet Take 1 tablet (100 mg total) by mouth at bedtime. 90 tablet 0     ranitidine (ZANTAC) 300 MG tablet Take 300 mg by mouth daily.  1     traZODone (DESYREL) 100 MG tablet TAKE 1 TABLET(100 MG) BY MOUTH AT BEDTIME 90 tablet 0     No current facility-administered medications on file prior to visit.                   Review of Systems:      ROS:       10 point ROS was negative except for the items listed in HPI.    Review of Systems - General ROS: negative for - chills, fatigue, night sweats, sleep disturbance or weight loss    Respiratory ROS: no cough, shortness of breath, or wheezing  negative for - cough or shortness of breath  Cardiovascular ROS: no chest pain or dyspnea on exertion  Gastrointestinal ROS: no abdominal pain, change in bowel habits, or black or bloody stools  Musculoskeletal ROS: negative  negative for - gait disturbance, joint pain, joint stiffness, muscle pain or muscular weakness  Neurological ROS: negative for - confusion, gait disturbance, headaches or seizures    Coordination of Care:   More than 25 minutes spent on this visit  with more than 50% of time spent on coordination of care including: Educating patient about diagnosis, prognosis, side effects and benefits of medications, diet, exercise.  Time also spent providing supportive therapy regarding above issues.    This note was created using a dictation system. All typing errors or contextual distortion is unintentional and software  inherent.

## 2021-06-05 NOTE — PROGRESS NOTES
Patient is here for follow up appointment with Deborah his . He is going to see his PMD for shortness of breath over the last couple of days.  He states he is medication compliant and his mother still sets up his medications.  He denies any paranoia or hallucinations.  Called patients mother to let her know Seroquel was increased to 200 mg at HS.  She said she just got the 100 mg filled, but will give him 2 of those until it runs out.  Then she will  the 200 mg seroquel giving him only one at HS.      Correct pharmacy verified with patient and confirmed in snapshot? [x] yes []no    Charge captured ? [x] yes  [] no    Medications Phoned  to Pharmacy [] yes [x]no  Name of Pharmacist:  List Medications, including dose, quantity and instructions      Medication Prescriptions given to patient   [] yes  [x] no   List the name of the drug the prescription was written for.       Medications ordered this visit were e-scribed.  Verified by order class [x] yes  [] no    Medication changes or discontinuations were communicated to patient's pharmacy: [x] yes  [] no    UA collected [] yes  [x] no    [place] Prescription Monitoring Program Reviewed? [] yes  [x] no    Referrals were made to:  NA    Future appointment was made: [x] yes  [] no    Dictation completed at time of chart check: [x] yes  [] no    I have checked the documentation for today s encounters and the above information has been reviewed and completed.

## 2021-06-05 NOTE — PATIENT INSTRUCTIONS - HE
Continue medications as prescribed  Have your pharmacy contact us for a refill if you are running low on medications (We may ask you to come into clinic to get a refill from the nurse  No Alcohol or drug use  No driving if sedated  Call the clinic with any questions or concerns   Reach out for help if you feel like hurting yourself or others ([facility] [phone]: [address] [facility], [address] or [facility] Suicide Hotline [phone] , call 911 or go to nearest Emergency room    Follow up as directed, for your appointments, per your After Visit Summary Form.

## 2021-06-05 NOTE — TELEPHONE ENCOUNTER
Please connect with the patient when available regarding his appt today 1.16.2020. His  is bringing him and they are running late; the pt is concerned about his appt.

## 2021-06-06 NOTE — PATIENT INSTRUCTIONS - HE
Continue medications as prescribed  Have your pharmacy contact us for a refill if you are running low on medications (We may ask you to come into clinic to get a refill from the nurse  No Alcohol or drug use  No driving if sedated  Call the clinic with any questions or concerns   Reach out for help if you feel like hurting yourself or others ([facility] [phone]: [address] [facility], [address] or [facility] Suicide Hotline [phone] , call 911 or go to nearest Emergency room    Follow up as directed, for your appointments, per your After Visit Summary Form.

## 2021-06-06 NOTE — PROGRESS NOTES
Psychiatric  Progress Note  Date of visit:2/27/2020         Discussion of Care and Treatment Recommendations:   This is a 54 y.o. male with a significant history of Alcohol Use Disorder, Etoh induced gastritis , DENISE and PTSD, Delusional Disorder who presents to the clinic today , noncompliance with treatment plan.  Patient is here today for  a follow up appointment          Last visit 01/16/2020.  Recommendation at last visit .  .-Continue with current medications Trazodone  100 mg at Bedtime, g  Prazosin 2 mg at bedtime -PTSD, Increase Seroquel to 200 mg  at bedtime - psychosis , delusional thoughts  2- Highly recommend outpatient CD treatment Alcohol Use Disorder - Severe. Pt  opposed to CD treatment .  Continues to use alcohol when he can afford it.  3- Continue with  Psychotherapy:  4- 5-Make efforts to stay away from alcohol and drug  5- RTC- 2  weeks call in between visit with any questions or concerns  Patient and I reviewed diagnosis and treatment plan and patient agrees with following recommendations:  Ongoing education given regarding diagnostic and treatment options with adequate verbalization of understanding.    Plan   .-Continue with current medications Trazodone  100 mg at Bedtime, g  Prazosin 2 mg at bedtime -PTSD, Continue Seroquel  200 mg  at bedtime - psychosis , delusional thoughts  2- Highly recommend outpatient CD treatment Alcohol Use Disorder - Severe. Pt  opposed to CD treatment .  Continues to use alcohol when he can afford it.  3- Continue with  Psychotherapy:  4- 5-Make efforts to stay away from alcohol and drug  5- RTC- 2  weeks call in between visit with any questions or concerns         Diagnoses:   Alcohol Use Disorder , Severe   Delusional disorder R/O Schizophrenia   PTSD    Patient Active Problem List   Diagnosis     Chest pain     Alcohol withdrawal, with unspecified complication (H)     Sinus tachycardia     Lactic acidosis     Dehydration     Microcytosis     Non-traumatic  "rhabdomyolysis     Nausea     Alcoholic ketoacidosis     Epigastric pain     Alcoholism, chronic (H)     High anion gap metabolic acidosis     Hypomagnesemia     Alcohol intoxication, uncomplicated (H)     GI bleed     Anemia associated with acute blood loss     Disorder due to alcohol abuse (H)     Substance induced mood disorder (H)     Anxiety disorder     History of posttraumatic stress disorder (PTSD)     Erosive esophagitis     Polyarthralgia     Cervical spondylosis     VIKI positive     Inflammatory arthritis     Arthritis of right ankle     Gouty arthritis of toe of left foot     Pulmonary embolism (H)     Primary osteoarthritis involving multiple joints     Supratherapeutic INR     Hypokalemia     Epistaxis             Chief Complaint / Subjective:    Chief complaint: \" I am doing  ok \"     History of Present Illness:   Per patient statement-he presented to the ED twice since our last visit for complaints of shortness of breath.  He has an upcoming appointment with pulmonology.  Continues to report some paranoia but reports an increase in Seroquel to 200 mg has been very helpful and he is less paranoid.  He mentioned that triggers to his paranoia include crowded spaces but he feels safe in his apartment.  Today we spent some time discussing avoiding crowded spaces and being away of his surrounding but also reminding himself that the people around him are just minding the own business does like he is.  He is excited that his therapist is back from maternity leave and has an upcoming appointment soon.  He denies all other psychiatric issues and offers no other concerns.  We also discussed adding an additional 25 mg of Seroquel as needed for daytime paranoia but he was hesitant and would like to first think about it.  We will therefore continue the same medications and have patient return in 4 weeks for follow-up appointment encouraged him to call in between visits any questions or concerns.    Mental Status " Examination:   General: Adequate hygiene, cooperative  Speech: Normal in rate and tone  Language: Intact  Thought process: Coherent  Thought content:                           Auditory hallucinations-Denies                          Visual Hallucinations - Denies                         Delusions : Present, chronic                           Loose Associations:  No                          Suicidal thoughts: Denies                          Homicidal thoughts:Denies                        Affect: Neutral  Mood: Neutral   Intellectual functioning: Within normal limits  Memory: Within normal limits  Fund of knowledge: Average  Attention and concentration: Within normal limits  Gait: Steady  Psychomotor activity: Calm, no agitation  Muscles: No atrophy, no abnormal movements  InSight and judgment: Fair      Drug/treatment history and current pattern of use:     Alcohol Use Disorder , Severe  Medication changes: See Above   Medication adherence: compliant  Medication side effects: absent  Information about medications: Side effects, benefits and alternative treatments discussed and patient agrees .    Psychotherapy: Supportive therapy day-to-day living    Education: Diet, exercise, abstinence from drugs and alcohol, patient will not drive if sedated and medications or  under influence of any substance    Lab Results:  Personally reviewed and discussed with the patient    Lab Results   Component Value Date    WBC 7.8 02/24/2020    HGB 15.2 02/24/2020    HCT 48.0 02/24/2020     02/24/2020    ALT 19 05/08/2019    AST 26 05/08/2019     02/24/2020    K 3.8 02/24/2020     02/24/2020    CREATININE 0.88 02/24/2020    BUN 7 (L) 02/24/2020    CO2 24 02/24/2020    TSH 2.17 03/24/2018    INR 1.05 01/28/2020       Vital signs:    Vitals:    02/27/20 1151   Temp: 97.8  F (36.6  C)   TempSrc: Oral   Weight: 217 lb (98.4 kg)       Allergies: Other environmental allergy         Medications:     Current Outpatient  Medications on File Prior to Visit   Medication Sig Dispense Refill     acetaminophen (TYLENOL) 500 MG tablet Take 1,000 mg by mouth every 6 (six) hours as needed for pain.       albuterol (PROAIR HFA;PROVENTIL HFA;VENTOLIN HFA) 90 mcg/actuation inhaler Inhale 2 puffs every 4 (four) hours as needed (cough). 1 Inhaler 0     cetirizine (ZYRTEC) 10 MG tablet Take 10 mg by mouth at bedtime.        omeprazole (PRILOSEC) 20 MG capsule Take 20 mg by mouth daily as needed.        prazosin (MINIPRESS) 2 MG capsule Take 1 capsule (2 mg total) by mouth at bedtime. 90 capsule 0     QUEtiapine (SEROQUEL) 200 MG tablet Take 1 tablet (200 mg total) by mouth at bedtime. 90 tablet 0     ranitidine (ZANTAC) 300 MG tablet Take 300 mg by mouth daily.  1     traZODone (DESYREL) 100 MG tablet TAKE 1 TABLET(100 MG) BY MOUTH AT BEDTIME 90 tablet 0     No current facility-administered medications on file prior to visit.                   Review of Systems:      ROS:       10 point ROS was negative except for the items listed in HPI.    Review of Systems - General ROS: negative for - chills, fatigue, night sweats, sleep disturbance or weight loss    Respiratory ROS: no cough, shortness of breath, or wheezing  negative for - cough or shortness of breath  Cardiovascular ROS: no chest pain or dyspnea on exertion  Gastrointestinal ROS: no abdominal pain, change in bowel habits, or black or bloody stools  Musculoskeletal ROS: negative  negative for - gait disturbance, joint pain, joint stiffness, muscle pain or muscular weakness  Neurological ROS: negative for - confusion, gait disturbance, headaches or seizures    Coordination of Care:   More than 25 minutes spent on this visit  with more than 50% of time spent on coordination of care including: Educating patient about diagnosis, prognosis, side effects and benefits of medications, diet, exercise.  Time also spent providing supportive therapy regarding above issues.    This note was created  using a dictation system. All typing errors or contextual distortion is unintentional and software inherent.

## 2021-06-06 NOTE — PROGRESS NOTES
"Pulmonary Clinic Outpatient Consultation    Assessment and Plan:   54 year old man with history of alcoholism, GERD, tremor, PTSD, small PE diagnosed in 2018 no longer on anticoagulation, presents for orthopnea and dyspnea on exertion. Exam is unremarkable today. PFTs showed marked reductions in FEV1 and FVC which may have been effort related; the degree of reduction seems out of proportion to the other PFT parameters. There was significant bronchodilator response. There was no restriction but significant air trapping and possibly hyperinflation on body pleth. DLco was moderately reduced. Differential for his symptoms and PFT abnormalities include reactive airway disease vs. Asthma, recurrent PE, and CHF (less likely with normal BNP). It was difficult to get a consistent history from him today.    Recommendations:  - VQ scan to look for PE since he is unable to tolerate CT scan  - trial of albuterol 2 puffs before exertion to see if this helps with his breathing, demonstrated technique in clinic today  - echo to evaluate for heart disease given his orthopnea  - he says he is UTD with flu vaccine    Follow up in 4-6 weeks for reassessment.    Juan Manuel (Ailin Gilliam MD  [facility]monary [facility]  Pager [phone]  Clinic [phone]      CCx: dyspnea    HPI: 54 year old man with history of alcoholism, GERD, tremor, PTSD, small PE diagnosed in 2018 no longer on anticoagulation, presents for orthopnea and dyspnea on exertion. He's had several ER visits over the last month for these issues without a diagnosis. He notes that shortness of breath occurred suddenly 2 months ago while he was walking to the gas station from his house. He has to stop to catch his breath while climbing stairs or hills.  He has a \"nervous\" cough but it's not productive.   He also notes orthopnea, can't lay flat for more than a few seconds (he demonstrated this for me in the exam room today, but it was unclear if " he had shortness of breath or anxiety while laying flat).   Denies chest pain, peripheral edema, palpitations.  He appears disheveled and malodorous today.  He's accompanied by his mother Claudia who does a lot of talking on his behalf.  He says he was fine prior to 2 months ago when these attacks of dyspnea on exertion began.     ROS:  A 12-system review was obtained and was negative with the exception of the symptoms endorsed in the history of present illness.    PMH:  Past Medical History:   Diagnosis Date     Acid reflux      Alcohol intoxication, uncomplicated (H)      Alcohol withdrawal seizure (H)      Alcoholic ketoacidosis 12/4/2016     Alcoholism (H)      Anxiety disorder      Arthritis      Back pain      Essential tremor      GI bleed 1/14/2017     Gout      Learning disability      Paranoid schizophrenia (H)      PTSD (post-traumatic stress disorder)      Pulmonary embolism (H)        PSH:  Past Surgical History:   Procedure Laterality Date     ESOPHAGOGASTRODUODENOSCOPY N/A 1/15/2017    Procedure: ESOPHAGOGASTRODUODENOSCOPY (EGD);  Surgeon: Hans Gleason MD;  Location: [facility];  Service:      HAND SURGERY Right      HERNIA REPAIR      Age 7       Allergies:  No Known Allergies    Family HX:  Family History   Problem Relation Age of Onset     Arthritis Mother      Tremor Mother      Diabetes Mother      Tremor Brother      Thyroid disease Brother        Social Hx:  Social History     Socioeconomic History     Marital status:      Spouse name: Not on file     Number of children: Not on file     Years of education: Not on file     Highest education level: Not on file   Occupational History     Not on file   Social Needs     Financial resource strain: Not on file     Food insecurity     Worry: Not on file     Inability: Not on file     Transportation needs     Medical: Not on file     Non-medical: Not on file   Tobacco Use     Smoking status: Current Some Day Smoker     Types: Pipe      Smokeless tobacco: Never Used     Tobacco comment: just when at pow wows and use of pipe   Substance and Sexual Activity     Alcohol use: Yes     Alcohol/week: 8.0 standard drinks     Types: 8 Cans of beer per week     Comment: when has money     Drug use: No     Comment: remote history of marijuana use     Sexual activity: Not Currently   Lifestyle     Physical activity     Days per week: Not on file     Minutes per session: Not on file     Stress: Not on file   Relationships     Social connections     Talks on phone: Not on file     Gets together: Not on file     Attends Anabaptism service: Not on file     Active member of club or organization: Not on file     Attends meetings of clubs or organizations: Not on file     Relationship status: Not on file     Intimate partner violence     Fear of current or ex partner: Not on file     Emotionally abused: Not on file     Physically abused: Not on file     Forced sexual activity: Not on file   Other Topics Concern     Not on file   Social History Narrative    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 He has a history of homelessness but currently lives with his mother.       Current Meds:  Current Outpatient Medications   Medication Sig Dispense Refill     cetirizine (ZYRTEC) 10 MG tablet Take 10 mg by mouth at bedtime.        omeprazole (PRILOSEC) 20 MG capsule Take 20 mg by mouth daily as needed.        prazosin (MINIPRESS) 2 MG capsule Take 1 capsule (2 mg total) by mouth at bedtime. 90 capsule 0     QUEtiapine (SEROQUEL) 200 MG tablet Take 1 tablet (200 mg total) by mouth at bedtime. 90 tablet 0     ranitidine (ZANTAC) 300 MG tablet Take 300 mg by mouth daily.  1     traZODone (DESYREL) 100 MG tablet TAKE 1 TABLET(100 MG) BY MOUTH AT BEDTIME 90 tablet 0     acetaminophen (TYLENOL) 500 MG tablet Take 1,000 mg by mouth every 6 (six) hours as needed for pain.       albuterol (PROAIR HFA;PROVENTIL HFA;VENTOLIN HFA) 90 mcg/actuation inhaler Inhale 2 puffs  every 4 (four) hours as needed (cough). 1 Inhaler 0     No current facility-administered medications for this visit.        Physical Exam:  /60   Pulse 87   Temp 98.6  F (37  C)   Resp 20   Ht 6' (1.829 m)   Wt 212 lb (96.2 kg)   SpO2 94%   BMI 28.75 kg/m    Gen: awake, alert, oriented, no distress  HEENT: nasal turbinates are unremarkable, no oropharyngeal lesions, no cervical or supraclavicular lymphadenopathy  CV: RRR, no M/G/R  Resp: diminished air entry bilaterally. Appears effort related. 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 No wheezing or rhonchi.   Abd: soft, nontender, no palpable organomegaly  Skin: no apparent rashes  Ext: no cyanosis, clubbing or edema  Neuro: alert, nonfocal    Labs:  Reviewed  Chem panel wnl  BNP normal  Troponin negative  hgb 15.2    Imaging studies:  EXAM: CTA CHEST PE RUN  LOCATION: [facility]  DATE/TIME: 4/15/2019 3:49 PM     INDICATION: Shortness of breath  COMPARISON: 08/04/2018  TECHNIQUE: Helical acquisition through the chest was performed during the arterial phase of contrast enhancement using IV contrast. 2D and 3D reconstructions were performed by the CT technologist. Dose reduction techniques were used.   IV CONTRAST: Iohexol (Omni) 100 mL     FINDINGS:  ANGIOGRAM CHEST: Pulmonary arteries are normal caliber and negative for pulmonary emboli. Normal caliber thoracic aorta with no dissection or aneurysm.     LUNGS AND PLEURA: The left hemidiaphragm is elevated with adjacent atelectasis. Lungs are otherwise clear. No pleural effusion.     MEDIASTINUM: No lymphadenopathy. No coronary artery calcification.     LIMITED UPPER ABDOMEN: Negative.     MUSCULOSKELETAL: Negative.     IMPRESSION:   CONCLUSION:  1.  No evidence of pulmonary embolus. No change from previous.    EXAM: XR CHEST 2 VIEWS  LOCATION: [facility]  DATE/TIME: 2/24/2020 4:29 PM     INDICATION: shortness of breath  COMPARISON: None.     IMPRESSION:   Mild bibasilar atelectasis  similar to previous. Lungs otherwise clear. Normal heart size and pulmonary vascularity. Chronic fracture deformity of the mid right clavicle.    Echo March 2018    Left Ventricle: Normal left ventricular size and systolic function.The calculated left ventricular ejection fraction is 60%. This represents a normal ejection fraction. The left ventricular wall thickness is normal. E/e' < 8, suggesting normal LV filling pressure.    The left ventricular wall motion is normal.    Right Ventricle: Right ventricle not well visualized. Right ventricle not well visualized but suspect normal right ventricular systolic function. Normal TAPSE also suggests that right ventricular systolic function is within normal limits. TAPSE is normal, which is consistent with normal right ventricular systolic function.    PFT's   3/12/2020  FEV1/FVC is 75% and is normal.  FEV1 is 0.88 L or 21% predicted and is reduced.  FVC is 1.18 L or 22% predicted and is reduced.  There was improvement in spirometry after a single inhaled dose of bronchodilator.  TLC is 9.50 L or 126% predicted and is increased.  RV is 8.34 L or 357% predicted and is increased.  DLCO is 20.98 mL/min/mmHg or 68% predicted and is reduced when it   is corrected for hemoglobin.  The flow volume loop is normal No.     Impression:  Full Pulmonary Function Test is abnormal.   Spirometry is consistent with very severe obstructive ventilatory defect.  Spirometry is consistent with reversibility.  There is hyperinflation.  There is air-trapping.  Diffusion capacity when corrected for hemoglobin is mildly reduced.     Flow volume loops are quite erratic and appear to be non-forced maneuvers.  There is comment that the test meets ATS standards but based on flow-volume loops these results are questionable and should be interpreted with caution.  Clinical correlation is required.

## 2021-06-06 NOTE — PROGRESS NOTES
RESPIRATORY CARE NOTE     Patient Name: Choco Claudio  Today's Date: 3/12/2020     Problem List  Patient Active Problem List   Diagnosis     Chest pain     Alcohol withdrawal, with unspecified complication (H)     Sinus tachycardia     Lactic acidosis     Dehydration     Microcytosis     Non-traumatic rhabdomyolysis     Nausea     Alcoholic ketoacidosis     Epigastric pain     Alcoholism, chronic (H)     High anion gap metabolic acidosis     Hypomagnesemia     Alcohol intoxication, uncomplicated (H)     GI bleed     Anemia associated with acute blood loss     Disorder due to alcohol abuse (H)     Substance induced mood disorder (H)     Anxiety disorder     History of posttraumatic stress disorder (PTSD)     Erosive esophagitis     Polyarthralgia     Cervical spondylosis     VIKI positive     Inflammatory arthritis     Arthritis of right ankle     Gouty arthritis of toe of left foot     Pulmonary embolism (H)     Primary osteoarthritis involving multiple joints     Supratherapeutic INR     Hypokalemia     Epistaxis             PFT NOTE    Pt gave good effort & was cooperative, was able to meet ATS standards for acceptability and repeatability. albuterol was given for the bronchodilator. Patient left in no distress.    Shavon Mejia, LRT                  Shavon Mejia

## 2021-06-06 NOTE — PROGRESS NOTES
"Choco Claudio is a 54 y.o. male who is being evaluated via a billable telephone visit.      The patient has been notified of following:     \"This telephone visit will be conducted via a call between you and your physician/provider. We have found that certain health care needs can be provided without the need for a physical exam.  This service lets us provide the care you need with a short phone conversation.  If a prescription is necessary we can send it directly to your pharmacy.  If lab work is needed we can place an order for that and you can then stop by our lab to have the test done at a later time.    If during the course of the call the physician/provider feels a telephone visit is not appropriate, you will not be charged for this service.\"     Choco Claudio complains of    Chief Complaint   Patient presents with      Follow Up     PTSD Symptoms       I have reviewed and updated the patient's Past Medical History, Social History, Family History and Medication List.    ALLERGIES  Patient has no known allergies.    Additional provider notes: Patient reported struggling with his PTSD symptoms. Patient indicated the symptoms started to get worse around 3 days ago. Patient reported he does not want to be around people and is just isolating. Patient indicated he has an important doctor appointment tomorrow that he needs to attend and is having his mom go with him. Patient reported hoping that having her with will help with his symptoms.     This therapist processed with patient ways to work on reducing his symptoms. This therapist challenged patient on skills he has been taught in the past that will help him to be able to handle the symptoms. This therapist processed with patient the importance of staying connected with people via the phone.     Assessment/Plan:    1. PTSD (post-traumatic stress disorder)     2. Delusional disorder (H)     3. Severe alcohol use disorder (H)        This therapist will " contact patient through teletherapy at his next scheduled session. Patient should use skills taught to him to work on reducing his PTSD symptoms. Patient would benefit from talking with his mom over his concerns to help address ways to reduce his symptoms for his appointment tomorrow.     Phone call contact time    Call Started at: 1030  Call Ended at: 1100      Signature:  Marcella Nogueira [facility]

## 2021-06-06 NOTE — PROGRESS NOTES
This therapist left a message indicated telemedicine will be used tomorrow so therapist will call patient at scheduled time.

## 2021-06-06 NOTE — PROGRESS NOTES
Patient is here for follow up appointment.  He states he is taking his medication as prescribed and the Seroquel is really helping.  He said it keeps him calm. He said he has appointment with pulmonologist soon for his shortness of breath.  He sees Marcella for therapy.  He states he still has daily depression and anxiety.  Denies SI.  Sleep is pretty good, but he said he has to sleep in a chair right now because of his breathing.     Correct pharmacy verified with patient and confirmed in snapshot? [x] yes []no    Charge captured ? [x] yes  [] no    Medications Phoned  to Pharmacy [] yes [x]no  Name of Pharmacist:  List Medications, including dose, quantity and instructions      Medication Prescriptions given to patient   [] yes  [x] no   List the name of the drug the prescription was written for.       Medications ordered this visit were e-scribed.  Verified by order class [] yes  [x] no  No medications ordered    Medication changes or discontinuations were communicated to patient's pharmacy: [] yes  [x] NA    UA collected [] yes  [x] no    [place] Prescription Monitoring Program Reviewed? [] yes  [x] no    Referrals were made to:  [facility]    Future appointment was made: [x] yes  [] no    Dictation completed at time of chart check: [x] yes  [] no    I have checked the documentation for today s encounters and the above information has been reviewed and completed.

## 2021-06-07 NOTE — PROGRESS NOTES
This therapist attempted to contact patient at 10:02 and 10:25 for therapy session but patient did not answer.

## 2021-06-07 NOTE — PROGRESS NOTES
"Mental Health Visit Note    Patient: Choco Claudio    : 1965 MRN: [id number]    Date: 2020   Start time: 230  Stop Time: 300   Session # 7    This therapist talked to patient twice this week due to patient contacting the clinic indicating he is having a hard time coping and needs to get in contact with this therapist.     The patient has been notified of the following:   \"We have found that certain health care needs can be provided without the need for a face to face visit.  This service lets us provide the care you need with a phone conversation.  I will have full access to your [place] medical record during this entire phone call.   I will be taking notes for your medical record. Since this is like an office visit, we will bill your insurance company for this service.  There are potential benefits and risks of telephone visits (e.g. limits to patient confidentiality) that differ from in-person visits.?  Confidentiality still applies for telephone services, and nobody will record the visit.  It is important to be in a quiet, private space that is free of distractions (including cell phone or other devices) during the visit.?? If during the course of the call I believe a telephone visit is not appropriate, you will not be charged for this service\"  Consent has been obtained for this service by care team member: Yes, per verbal agreement     All of patient's visits will be via telephone during the pandemic due to patient's lack of ability to run phone and hypervigilance of people hearing or seeing him.     Session Type: Patient is participating in a telephone visit    Chief Complaint   Patient presents with      Follow Up     Telephone Visit Mental Health       New symptoms or complaints: Patient reported feeling extremely upset.     Functional Impairment:   Personal: 4  Family: 2  Work: 2  Social:4          ASSESSMENT: Current Emotional / Mental Status (status of significant symptoms):           "    Patient denies personal safety concerns.               Patient denies current or recent suicidal ideation or behaviors.              Patient denies current or recent homicidal ideation or behaviors.              Patient denies current or recent self injurious behavior or ideation.              Patient denies other safety concerns.              Patient denies changes in protective factors              Recommended that patient call 911 or go to the local ED should there be a change in any of these risk factors.                Attitude:                                   Cooperative               Orientation:                             Person, place, time, situation              Speech                          Rate / Production:       Normal                           Volume:                       Normal               Mood:                                      Upset              Thought Content:                    Clear               Thought Form:                        Coherent                Insight:                                     Fair      Patient's impression of their current status: Patient indicated feeling upset and having a hard time coping. Patient reported he has no thoughts of hurting himself or hurting someone else. Patient indicated just feeling his anxiety is unmanageable  Patient reported finding out that his son is moving and feeling abandoned. Patient indicated he still has not heard from his best friend and feels abandoned by him as well.     Therapist impression of patients current state: Patient appears to have poor insight into his mental health. This therapist walked patient through grounding and then deep breathing on the phone. This therapist was able to process with patient ways he can work on meeting new people. This therapist went over again deep breathing, grounding and mindfulness as skills he can use if he feels the anxiety rise again.     Type of psychotherapeutic technique  provided: Client centered and CBT    Progress toward short term goals: Progress as expected with patient continuing to struggle with his mental health.     Review of long term goals: Treatment Plan Updated on 4/14/2020 verbally due to season on the phone due to pandemic.        Diagnosis:  1. PTSD (post-traumatic stress disorder)    2. Delusional disorder (H)    3. Severe alcohol use disorder (H)        Plan and Follow up: Patient will continue with weekly ongoing therapy. Patient should use skills used during today's session to help with reducing anxiety and panic.     Discharge Criteria/Planning: Client has chronic symptoms and ongoing therapy for maintenance stability recommended.    I have reviewed the note as documented above.  This accurately captures the substance of my conversation with the patient.  Marcella Nogueira [facility]

## 2021-06-07 NOTE — PROGRESS NOTES
"Mental Health Visit Note    Patient: Choco Claudio    : 1965 MRN: [id number]    Date: 2020   Start time: 1004  Stop Time: 1044   Session # 5    The patient has been notified of the following:   \"We have found that certain health care needs can be provided without the need for a face to face visit.  This service lets us provide the care you need with a phone conversation.  I will have full access to your [place] medical record during this entire phone call.   I will be taking notes for your medical record. Since this is like an office visit, we will bill your insurance company for this service.  There are potential benefits and risks of telephone visits (e.g. limits to patient confidentiality) that differ from in-person visits.?  Confidentiality still applies for telephone services, and nobody will record the visit.  It is important to be in a quiet, private space that is free of distractions (including cell phone or other devices) during the visit.?? If during the course of the call I believe a telephone visit is not appropriate, you will not be charged for this service\"  Consent has been obtained for this service by care team member: Yes, per verbal agreement     All of patient's visits will be via telephone during the pandemic due to patient's lack of ability to run phone and hypervigilance of people hearing or seeing him.     Session Type: Patient is participating in a telephone visit    Chief Complaint   Patient presents with      Follow Up     Telephone Visit Mental Health       New symptoms or complaints: None    Functional Impairment:   Personal: 4  Family: 2  Work: 2  Social:4          ASSESSMENT: Current Emotional / Mental Status (status of significant symptoms):              Patient denies personal safety concerns.               Patient denies current or recent suicidal ideation or behaviors.              Patient denies current or recent homicidal ideation or behaviors.              " Patient denies current or recent self injurious behavior or ideation.              Patient denies other safety concerns.              Patient denies changes in protective factors              Recommended that patient call 911 or go to the local ED should there be a change in any of these risk factors.                Attitude:                                   Cooperative               Orientation:                             Person, place, time, situation              Speech                          Rate / Production:       Normal                           Volume:                       Normal               Mood:                                      Anxious              Thought Content:                    Clear               Thought Form:                        Coherent                Insight:                                     Fair      Patient's impression of their current status: Patient indicated feeling anxious. Patient reported waking up believing that he was in his tent again. Patient indicated his first thought was to start fighting but then realized he was safe in his apartment. Patient reported this causing an increase in his PTSD symptoms and making the decision to stay at home. Patient indicated he does need to go to the grocery store with his mom but going with his mom feels safe.     Therapist impression of patients current state: Patient appears to have fair insight into his mental health. This therapist challenged patient on ways he is safe in his apartment and using grounding techniques to help be in the moment. This therapist processed with patient ways to work on reducing his symptoms while out of the apartment.     Type of psychotherapeutic technique provided: Client centered and CBT    Progress toward short term goals:Progress as expected with patient continuing therapy and noticing triggers to his mental health    Review of long term goals: Treatment Plan Updated on 4/14/2020 verbally due to  season on the phone due to pandemic.        Diagnosis:  1. Posttraumatic stress disorder    2. Delusional disorder (H)    3. Severe alcohol use disorder (H)        Plan and Follow up: Patient will continue with weekly ongoing therapy. Patient would benefit from reaching out to mom due to feeling sick. Patient should continue to work on reaching out to his friends. Patient would benefit from continuing to work on identifying ways he feels safe in his apartment.      Discharge Criteria/Planning: Client has chronic symptoms and ongoing therapy for maintenance stability recommended.    I have reviewed the note as documented above.  This accurately captures the substance of my conversation with the patient.  Marcella Nogueira [facility]

## 2021-06-07 NOTE — PROGRESS NOTES
During televisit rooming: Pt stated he had a fever and woke up feeling this way.  Writer was just about to finish rooming but Pt got physically ill and wished to stop visit.    Pt denied travel and any known exposure to Covid 19.    Writer told Pt to call and reschedule when he felt better.     Provider was updated and writer will update this appointment notes.

## 2021-06-07 NOTE — PROGRESS NOTES
Medications Phoned  to Pharmacy [] yes [x]no  Name of Pharmacist:  List Medications, including dose, quantity and instructions    Medications ordered this visit were e-scribed.  Verified by order class [x] yes  [] no   Prazosin 5 mg (dose increase); Seroquel 200 mg; trazodone 100 mg   Medication changes or discontinuations were communicated to patient's pharmacy: [x] yes  [] no  Prazosin 2 mg     Dictation completed at time of chart check: [x] yes  [] no    I have checked the documentation for today s encounters and the above information has been reviewed and completed.

## 2021-06-07 NOTE — PATIENT INSTRUCTIONS - HE
Continue medications as prescribed  Have your pharmacy contact us for a refill if you are running low on medications (We may ask you to come into clinic to get a refill from the nurse  No Alcohol or drug use  No driving if sedated  Call the clinic with any questions or concerns   Reach out for help if you feel like hurting yourself or others ([facility] [phone]: [address], [address] or [facility] Suicide Hotline [phone] , call 911 or go to nearest Emergency room    Follow up as directed, for your appointments, per your After Visit Summary Form.

## 2021-06-07 NOTE — PROGRESS NOTES
Outpatient Mental Health Treatment Plan    Name:  Choco Claudio  :  1965  MRN:  [id number]    Treatment Plan:  Updated Treatment Plan  Intake/initial treatment plan date:  2017  Benefit and risks and alternatives have been discussed: Yes  Is this treatment appropriate with minimal intrusion/restrictions: Yes  Estimated duration of treatment:  Ongoing psychotherapy at this time  Anticipated frequency of services:  Weekly  Necessity for frequency: This frequency is needed to establish therapeutic goals and for continuity of care in order to monitor progress.  Necessity for treatment: To address cognitive, behavioral, and/or emotional barriers in order to work toward goals and to improve quality of life.    Plan:       ?   ?  Posttraumatic Stress Disorder    Goal:  Reduced the signs and symptoms of PTSD and increase patient's ability to tolerate breakthrough stressors surrounding his various traumas.   Strategies: ? [x]Learn and practice relaxation techniques and other coping strategies (e.g., thought stopping, reframing, meditation)     ? [x] Increase involvement in meaningful activities     ? [x] Discuss sleep hygiene     ? [x] Explore thoughts and expectations about self and others     ? [x] Identify and monitor triggers for panic/anxiety symptoms     ? [x] Implement physical activity routine (with physician approval)     ? [x] Consider introduction of bibliotherapy and/or videos     ? [x] Continue compliance with medical treatment plan (or explore barriers)   ?Degree to which this is a problem: 4  Degree to which goal is met: 1    Date of next review: 2020    Substance use  Goal:  Increase patient awareness regarding substance use triggers.  Consider harm reduction and/or chemical dependency treatment.   Strategies: ? [x] Consider referral for chemical dependency evaluation     ? [x] Discuss barriers to participating in AA or other peer-facilitated groups         [x] Address environmental  factors which may interfere with sobriety     ? [x] Explore short-term versus long-term consequences of use    ?  Degree to which this is a problem: 4  Degree to which goal is met: 1    Date of next review: 7/14/2020    Depression  Goal:  Reduced the signs and symptoms of depression and increase patient's ability to tolerate breakthrough symptomology.   Strategies: ? [x]Learn and practice relaxation techniques and other coping strategies (e.g., thought stopping, reframing, meditation)     ? [x] Increase involvement in meaningful activities     ? [x] Discuss sleep hygiene     ? [x] Explore thoughts and expectations about self and others     ? [x] Identify and monitor triggers for panic/anxiety symptoms     ? [x] Implement physical activity routine (with physician approval)     ? [x] Consider introduction of bibliotherapy and/or videos     ? [x] Continue compliance with medical treatment plan (or explore barriers)   ?Degree to which this is a problem: 4  Degree to which goal is met: 2    Date of next review: 07/14/2020    Functional Impairment:  1=Not at all/Rarely  2=Some days  3=Most Days  4=Every Day    Personal : 4  Family : 4  Social : 4   Work/school : N/A at this time    Diagnosis:  Posttraumatic stress disorder; alcohol use disorder, severe; and major depressive disorder, recurrent, moderate.    Strengths:  supported by mother, one good friend, employed, and motivated for change.  Limitations:  isolation, ambivalence about discontinuing alcohol use, and difficulty connecting with others due to trauma symptoms  Cultural Considerations: Client identifies is half .    Persons responsible for this plan: Patient and Provider            Psychotherapist Signature           Patient Signature:

## 2021-06-07 NOTE — TELEPHONE ENCOUNTER
[facility] tried calling a couple of times with no response.     Left number for Pt to call and schedule again.      Clinic number was also given

## 2021-06-07 NOTE — PROGRESS NOTES
"Mental Health Visit Note    Patient: Choco Claudio    : 1965 MRN: [id number]    Date: 2020   Start time:   Stop Time: 1040   Session # 6    The patient has been notified of the following:   \"We have found that certain health care needs can be provided without the need for a face to face visit.  This service lets us provide the care you need with a phone conversation.  I will have full access to your [place] medical record during this entire phone call.   I will be taking notes for your medical record. Since this is like an office visit, we will bill your insurance company for this service.  There are potential benefits and risks of telephone visits (e.g. limits to patient confidentiality) that differ from in-person visits.?  Confidentiality still applies for telephone services, and nobody will record the visit.  It is important to be in a quiet, private space that is free of distractions (including cell phone or other devices) during the visit.?? If during the course of the call I believe a telephone visit is not appropriate, you will not be charged for this service\"  Consent has been obtained for this service by care team member: Yes, per verbal agreement     All of patient's visits will be via telephone during the pandemic due to patient's lack of ability to run phone and hypervigilance of people hearing or seeing him.     Session Type: Patient is participating in a telephone visit    Chief Complaint   Patient presents with      Follow Up     Telephone Visit Mental Health       New symptoms or complaints: None    Functional Impairment:   Personal: 4  Family: 2  Work: 2  Social:4          ASSESSMENT: Current Emotional / Mental Status (status of significant symptoms):              Patient denies personal safety concerns.               Patient denies current or recent suicidal ideation or behaviors.              Patient denies current or recent homicidal ideation or behaviors.              " Patient denies current or recent self injurious behavior or ideation.              Patient denies other safety concerns.              Patient denies changes in protective factors              Recommended that patient call 911 or go to the local ED should there be a change in any of these risk factors.                Attitude:                                   Cooperative               Orientation:                             Person, place, time, situation              Speech                          Rate / Production:       Normal                           Volume:                       Normal               Mood:                                      Sad              Thought Content:                    Clear               Thought Form:                        Coherent                Insight:                                     Fair      Patient's impression of their current status: Patient reported feeling sad. Patient indicated he has continued to not hear from his friend. Patient reported he has called and left messages but has not heard back. Patient indicated his friend has done this before when he is busy. Patient reported it being hard to go from hanging out every weekend to not talking at all. Patient indicated he is starting to struggle with spending so much time in his apartment. Patient reported yesterday going on the buses and today planning to go for a walk. Patient indicated knowing fresh air is important for him.     Therapist impression of patients current state: Patient appears to have fair insight into his mental health. This therapist processed with patient ways that walk and riding his bike can be beneficial for his mental health. This therapist encouraged patient to work on identifying what he wants in a friendship so when the pandemic is over he can start to attend groups where he may be able to make friends.     Type of psychotherapeutic technique provided: Client centered and CBT    Progress  toward short term goals:Progress as expected with patient continuing therapy and noticing triggers to his mental health    Review of long term goals: Treatment Plan Updated on 4/14/2020 verbally due to season on the phone due to pandemic.        Diagnosis:  1. PTSD (post-traumatic stress disorder)    2. Delusional disorder (H)    3. Severe alcohol use disorder (H)        Plan and Follow up: Patient will continue with weekly ongoing therapy. Patient should work on getting out of the apartment daily by going for a walk or riding his bike. Patient would benefit from identifying what he wants in a friendship and his fears of trusting someone.     Discharge Criteria/Planning: Client has chronic symptoms and ongoing therapy for maintenance stability recommended.    I have reviewed the note as documented above.  This accurately captures the substance of my conversation with the patient.  Marcella Nogueira [facility]

## 2021-06-07 NOTE — PROGRESS NOTES
"Choco Claudio is a 54 y.o. male who is being evaluated via a billable telephone visit.      The patient has been notified of following:     \"This telephone visit will be conducted via a call between you and your physician/provider. We have found that certain health care needs can be provided without the need for a physical exam.  This service lets us provide the care you need with a short phone conversation.  If a prescription is necessary we can send it directly to your pharmacy.  If lab work is needed we can place an order for that and you can then stop by our lab to have the test done at a later time.    Telephone visits are billed at different rates depending on your insurance coverage. During this emergency period, for some insurers they may be billed the same as an in-person visit.  Please reach out to your insurance provider with any questions.    If during the course of the call the physician/provider feels a telephone visit is not appropriate, you will not be charged for this service.\"    Patient has given verbal consent to a Telephone visit? Yes    Patient would like to receive their AVS by AVS Preference: Mail a copy.           Discussion of Care and Treatment Recommendations:   This is a 54 y.o. male with  significant history of Alcohol Use Disorder, Etoh induced gastritis , DENISE and PTSD, Delusional Disorder      Last visit 04/14/2020   Recommendation at last visit .  .-Continue with current medications Trazodone  100 mg at Bedtime,  Increase Prazosin to  5 mg at bedtime -PTSD, Continue Seroquel  200 mg  at bedtime - psychosis , delusional thoughts  2- Highly recommend outpatient CD treatment Alcohol Use Disorder - Severe. Pt  opposed to CD treatment .  Continues to use alcohol when he can afford it.  3- Continue with  Psychotherapy:  4- 5-Make efforts to stay away from alcohol and drug  5- RTC- 2  weeks call in between visit with any questions or concerns  Patient and I reviewed diagnosis and " "treatment plan and patient agrees with following recommendations:  Ongoing education given regarding diagnostic and treatment options with adequate verbalization of understanding.  Plan   .-Continue with current medications Trazodone  100 mg at Bedtime, continue  Prazosin to  5 mg at bedtime -PTSD, Continue Seroquel  200 mg  at bedtime - psychosis , delusional thoughts  2- Highly recommend outpatient CD treatment Alcohol Use Disorder - Severe. Pt  opposed to CD treatment .  Continues to use alcohol when he can afford it.  3- Continue with  Psychotherapy:  4- 5-Make efforts to stay away from alcohol and drug  5- RTC- 2  weeks call in between visit with any questions or concerns         DIagnoses:      Delusional disorder R/O Schizophrenia   PTSD  Alcohol Use Disorder , Severe    Patient Active Problem List   Diagnosis     Chest pain     Alcohol withdrawal, with unspecified complication (H)     Sinus tachycardia     Lactic acidosis     Dehydration     Microcytosis     Non-traumatic rhabdomyolysis     Nausea     Alcoholic ketoacidosis     Epigastric pain     Alcoholism, chronic (H)     High anion gap metabolic acidosis     Hypomagnesemia     Alcohol intoxication, uncomplicated (H)     GI bleed     Anemia associated with acute blood loss     Disorder due to alcohol abuse (H)     Substance induced mood disorder (H)     Anxiety disorder     History of posttraumatic stress disorder (PTSD)     Erosive esophagitis     Polyarthralgia     Cervical spondylosis     VIKI positive     Inflammatory arthritis     Arthritis of right ankle     Gouty arthritis of toe of left foot     Pulmonary embolism (H)     Primary osteoarthritis involving multiple joints     Supratherapeutic INR     Hypokalemia     Epistaxis             Chief Complaint / Subjective:    Chief complaint: \" I am feeling a  Lot better today compared to the last time we spoke \"     History of Present Illness:   Per patient's statement : Patient reports noting some " movement after we increased his proceed.  He is having less PTSD moments and he is able to go biking without being so paranoid.  He also noticed after 2 to 3 days of starting the higher dose that he was getting dizzy when he is stood up and has been doing that carefully and that has resolved.  Denies psychosis, denies delusions although notably during our conversation.  He overall states he is doing well he feels happy.  His parents bought him a new TV which is on the way and is excited and cannot wait to get it.  He is looking forward to going biking later today.  States he has everything he needs at home.  He still meets with his therapist and finds it very beneficial.  He is happy with current medications at now reports compliance denies other side effects.  Mental Status Examination:   Mental Status Examination:   General: Unable to assess telephone  Speech: Normal in rate and tone  Language: Intact  Thought process: Coherent  Thought content:                           Auditory hallucinations : Denies today                           Visual Hallucinations - Denies                         Delusions: Denies but noted during our conversation  you okay                          Loose Associations:  No                          Suicidal thoughts: Denies                          Homicidal thoughts:Denies                        Affect: Neutral  Mood: Neutral   Intellectual functioning: Within normal limits  Memory: Within normal limits  Fund of knowledge: Average  Attention and concentration: Within normal limits  Gait: Steady  Psychomotor activity: Unable to assess telephone visit  Muscles: Unable to assess telephone visit  InSight and judgment: Fair for his baseline - no new changes          Drug/treatment history and current pattern of use:   History of severe alcohol use disorder    Medication changes: See Above   Medication adherence: compliant  Medication side effects: absent  Information about medications: Side  effects, benefits and alternative treatments discussed and patient agrees .    Psychotherapy: Supportive therapy day-to-day living    Education: Diet, exercise, abstinence from drugs and alcohol, patient will not drive if sedated and medications or  under influence of any substance    Lab Results:   Personally reviewed and discussed with the patient    Lab Results   Component Value Date    WBC 7.8 02/24/2020    HGB 15.2 02/24/2020    HCT 48.0 02/24/2020     02/24/2020    ALT 19 05/08/2019    AST 26 05/08/2019     02/24/2020    K 3.8 02/24/2020     02/24/2020    CREATININE 0.88 02/24/2020    BUN 7 (L) 02/24/2020    CO2 24 02/24/2020    TSH 2.17 03/24/2018    INR 1.05 01/28/2020       Vital signs:  There were no vitals taken for this visit.  Unable to assess telephone visit  Allergies: Patient has no known allergies.         Medications:       Current Outpatient Medications on File Prior to Visit   Medication Sig Dispense Refill     acetaminophen (TYLENOL) 500 MG tablet Take 1,000 mg by mouth every 6 (six) hours as needed for pain.       albuterol (PROAIR HFA;PROVENTIL HFA;VENTOLIN HFA) 90 mcg/actuation inhaler Inhale 2 puffs every 4 (four) hours as needed (cough). 1 Inhaler 0     cetirizine (ZYRTEC) 10 MG tablet Take 10 mg by mouth at bedtime.        omeprazole (PRILOSEC) 20 MG capsule Take 20 mg by mouth daily as needed.        prazosin (MINIPRESS) 2 MG capsule Take 2 capsules (4 mg total) by mouth at bedtime. 60 capsule 0     QUEtiapine (SEROQUEL) 200 MG tablet Take 1 tablet (200 mg total) by mouth at bedtime. 90 tablet 0     ranitidine (ZANTAC) 300 MG tablet Take 300 mg by mouth daily.  1     traZODone (DESYREL) 100 MG tablet TAKE 1 TABLET(100 MG) BY MOUTH AT BEDTIME 90 tablet 0     No current facility-administered medications on file prior to visit.              Coordination of Care:   More than 25 minutes spent on this visit  with more than 50% of time spent on coordination of care including:  Educating patient about diagnosis, prognosis, side effects and benefits of medications, diet, exercise.  Time also spent providing supportive therapy regarding above issues.    This note was created using a dictation system. All typing errors or contextual distortion is unintentional and software inherent.  Phone call duration: 30  minutes    Debra Lane NP

## 2021-06-07 NOTE — PROGRESS NOTES
This video/telephone visit will be conducted via a call between you and your physician/provider. We have found that certain health care needs can be provided without the need for an in-person physical exam. This service lets us provide the care you need with a video /telephone conversation. If a prescription is necessary we can send it directly to your pharmacy. If lab work is needed we can place an order for that and you can then stop by our lab to have the test done at a later time.    Just as we bill insurance for in-person visits, we also bill insurance for video/telephone visits. If you have questions about your insurance coverage, we recommend that you speak with your insurance company.    Patient has given verbal consent for video/Telephone visit? Yes   Patient would like the video visit invitation sent by: Text to cell phone: [phone]   Mariangel Mcgarry CMA  Prefers AVS: to be mailed out   Patient verified allergies, medications and pharmacy via phone. Patient states he is ready for visit.    [place] :  No Activity Found

## 2021-06-07 NOTE — PROGRESS NOTES
"Mental Health Visit Note    Patient: Choco Claudio    : 1965 MRN: [id number]    Date: 2020   Start time: 1201   Stop Time: 1217   Session # 4    The patient has been notified of the following:   \"We have found that certain health care needs can be provided without the need for a face to face visit.  This service lets us provide the care you need with a phone conversation.  I will have full access to your [place] medical record during this entire phone call.   I will be taking notes for your medical record. Since this is like an office visit, we will bill your insurance company for this service.  There are potential benefits and risks of telephone visits (e.g. limits to patient confidentiality) that differ from in-person visits.?  Confidentiality still applies for telephone services, and nobody will record the visit.  It is important to be in a quiet, private space that is free of distractions (including cell phone or other devices) during the visit.?? If during the course of the call I believe a telephone visit is not appropriate, you will not be charged for this service\"  Consent has been obtained for this service by care team member: Yes, per verbal agreement     Session Type: Patient is participating in a telephone visit    Chief Complaint   Patient presents with     MH Follow Up     Telephone Visit Mental Health       New symptoms or complaints: None    Functional Impairment:   Personal: 4  Family: 2  Work: 2  Social:4          ASSESSMENT: Current Emotional / Mental Status (status of significant symptoms):              Patient denies personal safety concerns.               Patient denies current or recent suicidal ideation or behaviors.              Patient denies current or recent homicidal ideation or behaviors.              Patient denies current or recent self injurious behavior or ideation.              Patient denies other safety concerns.              Patient denies changes in protective " factors              Recommended that patient call 911 or go to the local ED should there be a change in any of these risk factors.                Attitude:                                   Cooperative               Orientation:                             Person, place, time, situation              Speech                          Rate / Production:       Normal                           Volume:                       Normal               Mood:                                      Down/anxious              Thought Content:                    Clear               Thought Form:                        Coherent                Insight:                                     Fair      Patient's impression of their current status: Patient reported feeling down and anxious. Patient indicated he woke up with a fever and not feeling well. Patient reported this has taken over his thoughts. Patient indicated he is worried about being sick. Patient reported before getting sick continuing to struggle with isolation. Patient indicated knowing it is safest for him to stay home. Patient reported he does want to go out and see his one friend but his friend has been busy. Patient indicated he continues to see people walking by his apartment and this makes him very uncomfortable.     Therapist impression of patients current state: Patient appears to have fair insight into his mental health. This therapist encouraged patient to reach out to his mom to let her know he is not feeling well. This therapist processed with patient ways to work on connecting with his friend via phone. This therapist processed with patient what makes him feel safe in his apartment.     Type of psychotherapeutic technique provided: Client centered and CBT    Progress toward short term goals:Progress as expected with patient continuing therapy and noticing triggers to his mental health    Review of long term goals: Not done at today's visit do to patient not feeling  well and needing to end therapy early.        Diagnosis:  1. Delusional disorder (H)    2. Severe alcohol use disorder (H)        Plan and Follow up: Patient will continue with weekly ongoing therapy. Patient would benefit from reaching out to mom due to feeling sick. Patient should continue to work on reaching out to his friends. Patient would benefit from continuing to work on identifying ways he feels safe in his apartment.      Discharge Criteria/Planning: Client has chronic symptoms and ongoing therapy for maintenance stability recommended.    I have reviewed the note as documented above.  This accurately captures the substance of my conversation with the patient.  Marcella Nogueira [facility]

## 2021-06-07 NOTE — TELEPHONE ENCOUNTER
Per Debra: Please call Pt's Mom and inform her that Pt's Prazosin dose was increase from 2 mg tab to 5 mg tab at HS.  Claudia (Mom) manages medication set-up for Pt.    Per Claudia, she had she picked up his last 30 day supply of Prazosin 2 mg.   She wants to know if Pt can take 2 tabs of 2 mg at bedtime and  new script when insurance will fill?   Claudia would like a return call.  # [phone]    Please advise.      Send to  [facility].

## 2021-06-07 NOTE — PROGRESS NOTES
"Mental Health Visit Note    Patient: Choco Claudio    : 1965 MRN: [id number]    Date: 3/31/2020   Start time: 1005   Stop Time: 1035   Session # 3    The patient has been notified of the following:   \"We have found that certain health care needs can be provided without the need for a face to face visit.  This service lets us provide the care you need with a phone conversation.  I will have full access to your [place] medical record during this entire phone call.   I will be taking notes for your medical record. Since this is like an office visit, we will bill your insurance company for this service.  There are potential benefits and risks of telephone visits (e.g. limits to patient confidentiality) that differ from in-person visits.?  Confidentiality still applies for telephone services, and nobody will record the visit.  It is important to be in a quiet, private space that is free of distractions (including cell phone or other devices) during the visit.?? If during the course of the call I believe a telephone visit is not appropriate, you will not be charged for this service\"  Consent has been obtained for this service by care team member: Yes, per verbal agreement     Session Type: Patient is participating in a telephone visit    Chief Complaint   Patient presents with     MH Follow Up     Telephone Visit Mental Health       New symptoms or complaints: None    Functional Impairment:   Personal: 4  Family: 2  Work: 2  Social:4          ASSESSMENT: Current Emotional / Mental Status (status of significant symptoms):              Patient denies personal safety concerns.               Patient denies current or recent suicidal ideation or behaviors.              Patient denies current or recent homicidal ideation or behaviors.              Patient denies current or recent self injurious behavior or ideation.              Patient denies other safety concerns.              Patient denies changes in " protective factors              Recommended that patient call 911 or go to the local ED should there be a change in any of these risk factors.                Attitude:                                   Cooperative               Orientation:                             Person, place, time, situation              Speech                          Rate / Production:       Normal                           Volume:                       Normal               Mood:                                      Anxious              Thought Content:                    Clear               Thought Form:                        Coherent                Insight:                                     Fair      Patient's impression of their current status: Patient indicated feeling anxious. Patient reported he is very concerned about his housing situation. Patient indicated he is unsure if the help he was getting is only for a year or longer. Patient reported he will have been at this apartment in May. Patient talked about the many benefits of having a stable place to live. Patient indicated he is isolating now due to the pandemic occurring. Patient reported his mom is driving him to get food, cash his check and appointments. Patient indicated he has been going for walks but is continuing to look over his shoulder.     Therapist impression of patients current state: Patient appears to have fair insight into his mental health. This therapist encouraged patient to reach out to his worker who helped him to get housing. This therapist processed with patient staying in the moment and not jumping to conclusions. This therapist processed with patient PTSD skills.     Type of psychotherapeutic technique provided: Client centered and CBT    Progress toward short term goals:Progress as expected with patient continuing therapy and noticing triggers to his mental health    Review of long term goals: Not done at today's visit       Diagnosis:  1. PTSD  (post-traumatic stress disorder)    2. Delusional disorder (H)    3. Severe alcohol use disorder (H)        Plan and Follow up: Patient will continue with weekly ongoing therapy. Patient will benefit from continuing to work on staying in the moment. Patient should talk with his worker over his housing concerns. Patient would benefit from continuing to go for walks and working on using skills taught in session.     Discharge Criteria/Planning: Client has chronic symptoms and ongoing therapy for maintenance stability recommended.    I have reviewed the note as documented above.  This accurately captures the substance of my conversation with the patient.  Marcella Nogueira [facility]

## 2021-06-07 NOTE — PROGRESS NOTES
"Mental Health Visit Note    Patient: Choco Claudio    : 1965 MRN: [id number]    Date: 2020   Start time: 805  Stop Time: 850   Session # 9    This therapist talked to patient twice this week due to patient contacting the clinic indicating he is having a hard time coping and needs to get in contact with this therapist.     The patient has been notified of the following:   \"We have found that certain health care needs can be provided without the need for a face to face visit.  This service lets us provide the care you need with a phone conversation.  I will have full access to your [place] medical record during this entire phone call.   I will be taking notes for your medical record. Since this is like an office visit, we will bill your insurance company for this service.  There are potential benefits and risks of telephone visits (e.g. limits to patient confidentiality) that differ from in-person visits.?  Confidentiality still applies for telephone services, and nobody will record the visit.  It is important to be in a quiet, private space that is free of distractions (including cell phone or other devices) during the visit.?? If during the course of the call I believe a telephone visit is not appropriate, you will not be charged for this service\"  Consent has been obtained for this service by care team member: Yes, per verbal agreement     All of patient's visits will be via telephone during the pandemic due to patient's lack of ability to run phone and hypervigilance of people hearing or seeing him.     Session Type: Patient is participating in a telephone visit    Chief Complaint   Patient presents with      Follow Up     telephone visit mental health       New symptoms or complaints: None    Functional Impairment:   Personal: 4  Family: 2  Work: 2  Social:4          ASSESSMENT: Current Emotional / Mental Status (status of significant symptoms):              Patient denies personal safety " concerns.               Patient denies current or recent suicidal ideation or behaviors.              Patient denies current or recent homicidal ideation or behaviors.              Patient denies current or recent self injurious behavior or ideation.              Patient denies other safety concerns.              Patient denies changes in protective factors              Recommended that patient call 911 or go to the local ED should there be a change in any of these risk factors.                Attitude:                                   Cooperative               Orientation:                             Person, place, time, situation              Speech                          Rate / Production:       Pressure                          Volume:                       Elevated              Mood:                                      Angry              Thought Content:                    Flight of ideas               Thought Form:                        Circulatory               Insight:                                     Poor      Patient's impression of their current status: Patient reported feeling angry. Patient indicated he cannot stop [place] go look [place] explosives. Patient reported he is mad at his dad for not taking about his experience with trauma and not allowing patient to talk about his experience. 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 Patient indicated he has not slept.     Therapist impression of patients current state: Patient appears to have poor insight into his mental health. This therapist assisted patient in doing grounding over the phone. By the end of the phone call patient was relaxed with plans to call an old friend. This therapist went over other ways to occupy his mind by using [employer] to look at things of interest to him.     Type of psychotherapeutic technique provided: Client  centered and CBT    Progress toward short term goals: Progress as expected with patient continuing to struggle with his mental health and work on using skills taught in session.     Review of long term goals: Treatment Plan Updated on 4/14/2020 verbally due to season on the phone due to pandemic.        Diagnosis:  1. Posttraumatic stress disorder    2. Delusional disorder (H)    3. Severe alcohol use disorder (H)        Plan and Follow up: Patient will continue with weekly ongoing therapy. Patient should use skills used during today's session to help with reducing anxiety and panic.     Discharge Criteria/Planning: Client has chronic symptoms and ongoing therapy for maintenance stability recommended.    I have reviewed the note as documented above.  This accurately captures the substance of my conversation with the patient.  Marcella Nogueira [facility]

## 2021-06-07 NOTE — PATIENT INSTRUCTIONS - HE
Continue medications as prescribed  Have your pharmacy contact us for a refill if you are running low on medications (We may ask you to come into clinic to get a refill from the nurse  No Alcohol or drug use  No driving if sedated  Call the clinic with any questions or concerns   Reach out for help if you feel like hurting yourself or others ([facility] [phone]: [address], [address] or [facility] Suicide Hotline [phone] , call 911 or go to nearest Emergency room    Follow up as directed, for your appointments, per your After Visit Summary Form.

## 2021-06-07 NOTE — PROGRESS NOTES
"Choco Claudio is a 54 y.o. male who is being evaluated via a billable telephone visit.      The patient has been notified of following:     \"This telephone visit will be conducted via a call between you and your physician/provider. We have found that certain health care needs can be provided without the need for a physical exam.  This service lets us provide the care you need with a short phone conversation.  If a prescription is necessary we can send it directly to your pharmacy.  If lab work is needed we can place an order for that and you can then stop by our lab to have the test done at a later time.    Telephone visits are billed at different rates depending on your insurance coverage. During this emergency period, for some insurers they may be billed the same as an in-person visit.  Please reach out to your insurance provider with any questions.    If during the course of the call the physician/provider feels a telephone visit is not appropriate, you will not be charged for this service.\"    Patient has given verbal consent to a Telephone visit? Yes    Patient would like to receive their AVS by AVS Preference: Mail a copy.      Psychiatric  Progress Note  Date of visit:4/14/2020         Discussion of Care and Treatment Recommendations:   This is a 54 y.o. male with  a significant history of Alcohol Use Disorder, Etoh induced gastritis , DENISE and PTSD, Delusional Disorder      Last visit 3/31/2020  Recommendation at last visit .  .-Continue with current medications Trazodone  100 mg at Bedtime, g  Prazosin 2 mg at bedtime -PTSD, Continue Seroquel  200 mg  at bedtime - psychosis , delusional thoughts  2- Highly recommend outpatient CD treatment Alcohol Use Disorder - Severe. Pt  opposed to CD treatment .  Continues to use alcohol when he can afford it.  3- Continue with  Psychotherapy:  4- 5-Make efforts to stay away from alcohol and drug  5- RTC- 2  weeks call in between visit with any questions or " "concerns  Patient and I reviewed diagnosis and treatment plan and patient agrees with following recommendations:  Ongoing education given regarding diagnostic and treatment options with adequate verbalization of understanding.  Plan   .-Continue with current medications Trazodone  100 mg at Bedtime,  Increase Prazosin to  5 mg at bedtime -PTSD, Continue Seroquel  200 mg  at bedtime - psychosis , delusional thoughts  2- Highly recommend outpatient CD treatment Alcohol Use Disorder - Severe. Pt  opposed to CD treatment .  Continues to use alcohol when he can afford it.  3- Continue with  Psychotherapy:  4- 5-Make efforts to stay away from alcohol and drug  5- RTC- 2  weeks call in between visit with any questions or concerns         DIagnoses:   Alcohol Use Disorder , Severe   Delusional disorder R/O Schizophrenia   PTSD      Patient Active Problem List   Diagnosis     Chest pain     Alcohol withdrawal, with unspecified complication (H)     Sinus tachycardia     Lactic acidosis     Dehydration     Microcytosis     Non-traumatic rhabdomyolysis     Nausea     Alcoholic ketoacidosis     Epigastric pain     Alcoholism, chronic (H)     High anion gap metabolic acidosis     Hypomagnesemia     Alcohol intoxication, uncomplicated (H)     GI bleed     Anemia associated with acute blood loss     Disorder due to alcohol abuse (H)     Substance induced mood disorder (H)     Anxiety disorder     History of posttraumatic stress disorder (PTSD)     Erosive esophagitis     Polyarthralgia     Cervical spondylosis     VIKI positive     Inflammatory arthritis     Arthritis of right ankle     Gouty arthritis of toe of left foot     Pulmonary embolism (H)     Primary osteoarthritis involving multiple joints     Supratherapeutic INR     Hypokalemia     Epistaxis             Chief Complaint / Subjective:    Chief complaint: \" I had a bad PTSD experience today during grocery shopping with my mum \"     History of Present Illness:   Per patient " statement : He had a bad PTSD moment during a grocery shopping and had to cut this trip shot. He though he was going to lose it and punch someone at the store but he was able to avoid it by retreating to his mum's car. He endorses compliance with current medications and denies side effects. He is currently on 2 mg of prazosin and Seroquel 200 mg . Given that he is still struggling with active PTSD symptoms we discussed  Increasing  his prazosin to 5 mg . Medication benefits and side effects profile extensively discussed . He endorsed understanding  and is consenting to the increase. He will continue with therapy. I will see him again on 2 weeks for a follow up appoitment     Mental Status Examination:   General: Unable to assess telephone  Speech: Normal in rate and tone  Language: Intact  Thought process: Coherent  Thought content:                           Auditory hallucinations-Endorses non commanding in nature                           Visual Hallucinations - Denies                         Delusions: Denies but noted during our conversation                           Loose Associations:  No                          Suicidal thoughts: Denies                          Homicidal thoughts:Denies                        Affect: Neutral  Mood: Neutral   Intellectual functioning: Within normal limits  Memory: Within normal limits  Fund of knowledge: Average  Attention and concentration: Within normal limits  Gait: Steady  Psychomotor activity: Unable to assess telephone visit  Muscles: Unable to assess telephone visit  InSight and judgment: Fair      Drug/treatment history and current pattern of use:   Drug of choice: Alcohol   Hx Alcohol Abuse     Medication changes: See Above   Medication adherence: compliant  Medication side effects: absent  Information about medications: Side effects, benefits and alternative treatments discussed and patient agrees .    Psychotherapy: Supportive therapy day-to-day  living    Education: Diet, exercise, abstinence from drugs and alcohol, patient will not drive if sedated and medications or  under influence of any substance    Lab Results:  Personally reviewed and discussed with the patient    Lab Results   Component Value Date    WBC 7.8 02/24/2020    HGB 15.2 02/24/2020    HCT 48.0 02/24/2020     02/24/2020    ALT 19 05/08/2019    AST 26 05/08/2019     02/24/2020    K 3.8 02/24/2020     02/24/2020    CREATININE 0.88 02/24/2020    BUN 7 (L) 02/24/2020    CO2 24 02/24/2020    TSH 2.17 03/24/2018    INR 1.05 01/28/2020       Vital signs:  There were no vitals taken for this visit.  Unable to assess telephone visit  Allergies: Patient has no known allergies.         Medications:     Current Outpatient Medications on File Prior to Visit   Medication Sig Dispense Refill     acetaminophen (TYLENOL) 500 MG tablet Take 1,000 mg by mouth every 6 (six) hours as needed for pain.       albuterol (PROAIR HFA;PROVENTIL HFA;VENTOLIN HFA) 90 mcg/actuation inhaler Inhale 2 puffs every 4 (four) hours as needed (cough). 1 Inhaler 0     cetirizine (ZYRTEC) 10 MG tablet Take 10 mg by mouth at bedtime.        omeprazole (PRILOSEC) 20 MG capsule Take 20 mg by mouth daily as needed.        ranitidine (ZANTAC) 300 MG tablet Take 300 mg by mouth daily.  1     [DISCONTINUED] prazosin (MINIPRESS) 2 MG capsule Take 1 capsule (2 mg total) by mouth at bedtime. 90 capsule 0     [DISCONTINUED] QUEtiapine (SEROQUEL) 200 MG tablet Take 1 tablet (200 mg total) by mouth at bedtime. 90 tablet 0     [DISCONTINUED] traZODone (DESYREL) 100 MG tablet TAKE 1 TABLET(100 MG) BY MOUTH AT BEDTIME 90 tablet 0     No current facility-administered medications on file prior to visit.            Current Outpatient Medications on File Prior to Visit   Medication Sig Dispense Refill     acetaminophen (TYLENOL) 500 MG tablet Take 1,000 mg by mouth every 6 (six) hours as needed for pain.       albuterol (PROAIR  HFA;PROVENTIL HFA;VENTOLIN HFA) 90 mcg/actuation inhaler Inhale 2 puffs every 4 (four) hours as needed (cough). 1 Inhaler 0     cetirizine (ZYRTEC) 10 MG tablet Take 10 mg by mouth at bedtime.        omeprazole (PRILOSEC) 20 MG capsule Take 20 mg by mouth daily as needed.        ranitidine (ZANTAC) 300 MG tablet Take 300 mg by mouth daily.  1     [DISCONTINUED] prazosin (MINIPRESS) 2 MG capsule Take 1 capsule (2 mg total) by mouth at bedtime. 90 capsule 0     [DISCONTINUED] QUEtiapine (SEROQUEL) 200 MG tablet Take 1 tablet (200 mg total) by mouth at bedtime. 90 tablet 0     [DISCONTINUED] traZODone (DESYREL) 100 MG tablet TAKE 1 TABLET(100 MG) BY MOUTH AT BEDTIME 90 tablet 0     No current facility-administered medications on file prior to visit.            This note was created using a dictation system. All typing errors or contextual distortion is unintentional and software inherent.  Phone call duration: 30 minutes    Debra Lane NP

## 2021-06-07 NOTE — PROGRESS NOTES
"Mental Health Visit Note    Patient: Choco Claudio    : 1965 MRN: [id number]    Date: 2020   Start time: 1001  Stop Time: 1045   Session # 8    This therapist talked to patient twice this week due to patient contacting the clinic indicating he is having a hard time coping and needs to get in contact with this therapist.     The patient has been notified of the following:   \"We have found that certain health care needs can be provided without the need for a face to face visit.  This service lets us provide the care you need with a phone conversation.  I will have full access to your [facility] medical record during this entire phone call.   I will be taking notes for your medical record. Since this is like an office visit, we will bill your insurance company for this service.  There are potential benefits and risks of telephone visits (e.g. limits to patient confidentiality) that differ from in-person visits.?  Confidentiality still applies for telephone services, and nobody will record the visit.  It is important to be in a quiet, private space that is free of distractions (including cell phone or other devices) during the visit.?? If during the course of the call I believe a telephone visit is not appropriate, you will not be charged for this service\"  Consent has been obtained for this service by care team member: Yes, per verbal agreement     All of patient's visits will be via telephone during the pandemic due to patient's lack of ability to run phone and hypervigilance of people hearing or seeing him.     This therapist and patient attempted video but patient was unable to get his camera on to allow this therapist to be able to see him.     Session Type: Patient is participating in a telephone visit    Chief Complaint   Patient presents with      Follow Up     Video Visit Mental Health       New symptoms or complaints: Patient reported feeling extremely upset.     Functional Impairment: "   Personal: 4  Family: 2  Work: 2  Social:4          ASSESSMENT: Current Emotional / Mental Status (status of significant symptoms):              Patient denies personal safety concerns.               Patient denies current or recent suicidal ideation or behaviors.              Patient denies current or recent homicidal ideation or behaviors.              Patient denies current or recent self injurious behavior or ideation.              Patient denies other safety concerns.              Patient denies changes in protective factors              Recommended that patient call 911 or go to the local ED should there be a change in any of these risk factors.                Attitude:                                   Cooperative               Orientation:                             Person, place, time, situation              Speech                          Rate / Production:       Normal                           Volume:                       Normal               Mood:                                      Happy              Thought Content:                    Clear               Thought Form:                        Coherent                Insight:                                     Fair      Patient's impression of their current status: Patient reported feeling happy. Patient indicated he has not felt happy in a long time. Patient reported he is feeling happy due to his parents buying him a new TV today. Patient indicated his old one broke so he has not had a TV. Patient reported he went for a bike ride this weekend which also brought taz for him. Patient indicated he is accepting that his friendship is over with his friend and not calling him anymore.    Therapist impression of patients current state: Patient appears to have poor insight into his mental health. This therapist challenged patient on things he can do to continue to feel happy. This therapist processed with patient ways a schedule can help with feeling  better. This therapist processed with patient getting outside daily by walking or biking.     Type of psychotherapeutic technique provided: Client centered and CBT    Progress toward short term goals: Progress as expected with patient continuing to struggle with his mental health.     Review of long term goals: Treatment Plan Updated on 4/14/2020 verbally due to season on the phone due to pandemic.        Diagnosis:  1. PTSD (post-traumatic stress disorder)    2. Delusional disorder (H)    3. Severe alcohol use disorder (H)        Plan and Follow up: Patient will continue with weekly ongoing therapy. Patient should use skills used during today's session to help with reducing anxiety and panic.     Discharge Criteria/Planning: Client has chronic symptoms and ongoing therapy for maintenance stability recommended.    I have reviewed the note as documented above.  This accurately captures the substance of my conversation with the patient.  Marcella Nogueira [facility]

## 2021-06-07 NOTE — PROGRESS NOTES
Patient verified allergies, medications and pharmacy via phone. Patient states he is ready for visit.    Pt states he had a PTSD moment while shopping today cause he remembered that he had appointment today with provider.  Pt was able to get home and take initial call for check in.     Please mail AVS today     MN :  No activity found

## 2021-06-08 NOTE — PROGRESS NOTES
Mental Health Visit Note    This is a dual signature report.  My supervising clinician is LAWANDA Kaminski.    1/31/2017    Start time: 0805    Stop Time: 0855   Session # 2    Choco Claudio is a 51 y.o. male is being seen today for a follow-up psychotherapy appointment    Chief Complaint   Patient presents with      Follow Up   .     New symptoms or complaints:  None    Functional Impairment:   The patient identifies the how daily stressors affect daily functioning in today's session as follows (Scale is 1-4, 1=not at all or rarely; 4=extremely so/everyday.)    Personal: 4  Family: 3  Social: 4  Work: 3    Clinical assessment of mental status:   Choco Claudio presented on time.   He was oriented x3, open and cooperative, and dressed appropriately for this session and weather. His memory was Normal cognitive functioning .  His speech was  Excessive and Tangential.  Language was focused on desire for symptom relief.  Concentration and focus is Within normal. Psychosis is not noted or reported. He reports his mood is Irritable, Anxious and Depressed.  Affect is congruent with speech and is Irritable, Anxious and Depressed.  Fund of knowledge is adequate. Insight is adequate for therapy.     Suicidal/Homicidal Ideation present:   No,  Patient denies suicidal and homicidal ideations/means or plans.      Patient's impression of their current status:  Patient presents a session and reports completing between session activity of reading common reactions to trauma bibliotherapy resource.  Client reports driving significant benefit from psychoeducational regarding PTSD symptoms.  Client reports he did not share this resource with his mother as he is concerned about how his stepfather would react if she were to share this information with him.  Client reports ongoing alcohol use though does report days of abstinence and days of harm reduction via lower intake, when possible.  Client is presented PTSD  skills and provided additional psychoeducation regarding PTSD symptoms.  Client is able to identify benefit from education.  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  Client would like to continue in trauma treatment.    Therapist impression of patient's current state:   Choco Claudio presents with PTSD, alcohol use disorder, and depression.  Client is semi-compliant with between session activities and completes bibliotherapy reading but does not track alcohol intake.  Client's verbal recount of alcohol intake indicates some efforts towards sobriety and/or harm reduction. I continue to provide client with information regarding PTSD.  Client is somewhat tangential and requires multiple redirections to remain on topic.  Client's primary concern is a symptom reduction and I indicated symptom reduction requires ongoing treatment and I established realistic expectations involving timeframe to complete treatment.  Continue to provide client with information regarding PTSD and take-home activities such as readings.  I encouraged client to consider psychiatric consult to discuss medications for anxiety and/or sleep.  Client has been very clear he does not desire rule 25 and/or chemical health treatment at this time.  I continue to offer this option and client.    Type of psychotherapeutic technique provided:   CBT    Progress toward short term goals:Client completes between session reading and reports is useful.  Client does not track alcohol intake as requested.    Review of long term goals: Treatment Plan updated .     Diagnosis:   1. Chronic post-traumatic stress disorder (PTSD)    2. Severe alcohol use disorder    3. Major depressive disorder, recurrent episode, moderate     No Change.     Plan and Follow-up:   Patient will follow safety plan of seeking help from friend/family, calling [facility], calling 911, and/or presenting to the  ED if necessary.  Patient will be compliant with medications and attend appointments as scheduled.  Client will complete between session readings regarding PTSD and/or treatment via the PTSD skills group material.    Discharge Criteria/Planning: Client has chronic symptoms and ongoing therapy for maintenance stability recommended.    Signatures:   Performed and documented by KAYLA Long, MEREDITH.    I have read, discussed and agree the documentation as presented by KAYLA Long, MEREDITH.  Allyson Raymundo [facility]        This note was created with help of Dragon dictation software. Grammatical / typing errors are not intentional and inherent to the software.

## 2021-06-08 NOTE — TELEPHONE ENCOUNTER
Returned call to patient. He reports that he does not want to speak with a nurse. He's looking to only speak with Marcella. Informed him that he can talk with a nurse in triage anytime, but pt kindly declined. He reports that he's safe, just more comfortable talking to her.

## 2021-06-08 NOTE — PROGRESS NOTES
"Mental Health Visit Note    Patient: Choco Clauido    : 1965 MRN: [id number]    Date: 2020   Start time: 1001  Stop Time: 1041   Session # 11    The patient has been notified of the following:   \"We have found that certain health care needs can be provided without the need for a face to face visit.  This service lets us provide the care you need with a phone conversation.  I will have full access to your [facility] medical record during this entire phone call.   I will be taking notes for your medical record. Since this is like an office visit, we will bill your insurance company for this service.  There are potential benefits and risks of telephone visits (e.g. limits to patient confidentiality) that differ from in-person visits.?  Confidentiality still applies for telephone services, and nobody will record the visit.  It is important to be in a quiet, private space that is free of distractions (including cell phone or other devices) during the visit.?? If during the course of the call I believe a telephone visit is not appropriate, you will not be charged for this service\"  Consent has been obtained for this service by care team member: Yes, per verbal agreement     All of patient's visits will be via telephone during the pandemic due to patient's lack of ability to run phone and hypervigilance of people hearing or seeing him.     Session Type: Patient is participating in a telephone visit    Chief Complaint   Patient presents with     MH Follow Up     Telephone Visit Mental Health     New symptoms or complaints: None    Functional Impairment:   Personal: 4  Family: 2  Work: 2  Social:4          ASSESSMENT: Current Emotional / Mental Status (status of significant symptoms):              Patient denies personal safety concerns.               Patient denies current or recent suicidal ideation or behaviors.              Patient denies current or recent homicidal ideation or behaviors.              " Patient denies current or recent self injurious behavior or ideation.              Patient denies other safety concerns.              Patient denies changes in protective factors              Recommended that patient call 911 or go to the local ED should there be a change in any of these risk factors.                Attitude:                                   Cooperative               Orientation:                             Person, place, time, situation              Speech                          Rate / Production:       Pressure                          Volume:                       Elevated              Mood:                                      Scared              Thought Content:                    Flight of ideas               Thought Form:                        Circulatory               Insight:                                     Poor      Patient's impression of their current status: Patient reported feeling down. Patient indicated he is struggling with staying inside all day. Patient reported he plans to go ride the buses today. Patient indicated he is not able to see his parents or get a hold of his friend. Patient reported this leaves him feeling very alone so riding the bus will help. Patient indicated he continues to look around him at all times and that he cannot trust people.     Therapist impression of patients current state: Patient appears to have poor insight into his mental health. This therapist processed with patient the importance of getting out of his apartment daily. This therapist processed with patient ways he can connect with his parents without seeing them.     Type of psychotherapeutic technique provided: Client centered and CBT    Progress toward short term goals: Progress as expected with patient continuing with scheduled session    Review of long term goals: Treatment Plan Updated on 4/14/2020 verbally due to season on the phone due to pandemic.        Diagnosis:  1.  Posttraumatic stress disorder    2. Delusional disorder (H)    3. Severe alcohol use disorder (H)        Plan and Follow up: Patient will continue with weekly ongoing therapy. Patient should use skills used during today's session to help with reducing anxiety and panic. Patient would benefit from continuing to reach out to his parents. Patient should work on sleep hygiene.     Discharge Criteria/Planning: Client has chronic symptoms and ongoing therapy for maintenance stability recommended.    I have reviewed the note as documented above.  This accurately captures the substance of my conversation with the patient.  Marcella Nogueira [facility]

## 2021-06-08 NOTE — PROGRESS NOTES
"Mental Health Visit Note    Patient: Choco Claudio    : 1965 MRN: [id number]    Date: 2020   Start time: 241  Stop Time: 300   Session # 13    The patient has been notified of the following:   \"We have found that certain health care needs can be provided without the need for a face to face visit.  This service lets us provide the care you need with a phone conversation.  I will have full access to your [facility] medical record during this entire phone call.   I will be taking notes for your medical record. Since this is like an office visit, we will bill your insurance company for this service.  There are potential benefits and risks of telephone visits (e.g. limits to patient confidentiality) that differ from in-person visits.?  Confidentiality still applies for telephone services, and nobody will record the visit.  It is important to be in a quiet, private space that is free of distractions (including cell phone or other devices) during the visit.?? If during the course of the call I believe a telephone visit is not appropriate, you will not be charged for this service\"  Consent has been obtained for this service by care team member: Yes, per verbal agreement     All of patient's visits will be via telephone during the pandemic due to patient's lack of ability to run phone and hypervigilance of people hearing or seeing him.     Session Type: Patient is participating in a telephone visit    Chief Complaint   Patient presents with      Follow Up     Telephone Visit Mental Health     New symptoms or complaints: None    Functional Impairment:   Personal: 4  Family: 2  Work: 2  Social:4          ASSESSMENT: Current Emotional / Mental Status (status of significant symptoms):              Patient denies personal safety concerns.               Patient denies current or recent suicidal ideation or behaviors.              Patient denies current or recent homicidal ideation or behaviors.              " Patient denies current or recent self injurious behavior or ideation.              Patient denies other safety concerns.              Patient denies changes in protective factors              Recommended that patient call 911 or go to the local ED should there be a change in any of these risk factors.                Attitude:                                   Cooperative               Orientation:                             Person, place, time, situation              Speech                          Rate / Production:       Pressure                          Volume:                       Elevated              Mood:                                      Anxious             Thought Content:                    Flight of ideas               Thought Form:                        Circulatory               Insight:                                     Poor      Patient's impression of their current status: Patient reported feeling anxious. Patient indicated feeling lonely so needing to get out of the house. Patient reported going to the store and getting frustrated with people. Patient indicated he has very little patience for people. Patient indicated he has a hard time being around people but feels lonely when he is not around people. Patient reported reaching out to his friend but still not hearing back.     Therapist impression of patients current state: Patient appears to have poor insight into his mental health. This therapist processed with patient the importance of getting out of the apartment and using skills to be around people.     Type of psychotherapeutic technique provided: Client centered and CBT    Progress toward short term goals: Progress as expected with patient continuing with scheduled session    Review of long term goals: Treatment Plan Updated on 4/14/2020 verbally due to season on the phone due to pandemic.        Diagnosis:  1. PTSD (post-traumatic stress disorder)    2. Delusional disorder (H)    3.  Severe alcohol use disorder (H)        Plan and Follow up: Patient will continue with weekly ongoing therapy. Patient should use skills used during today's session to help with reducing anxiety and panic. Patient would benefit from continuing to reach out to his parents. Patient should work on sleep hygiene.     Discharge Criteria/Planning: Client has chronic symptoms and ongoing therapy for maintenance stability recommended.    I have reviewed the note as documented above.  This accurately captures the substance of my conversation with the patient.  Marcella Nogueira [facility]

## 2021-06-08 NOTE — PROGRESS NOTES
"Choco Claudio is a 54 y.o. male who is being evaluated via a billable telephone visit.      The patient has been notified of following:     \"This telephone visit will be conducted via a call between you and your physician/provider. We have found that certain health care needs can be provided without the need for a physical exam.  This service lets us provide the care you need with a short phone conversation.  If a prescription is necessary we can send it directly to your pharmacy.  If lab work is needed we can place an order for that and you can then stop by our lab to have the test done at a later time.    Telephone visits are billed at different rates depending on your insurance coverage. During this emergency period, for some insurers they may be billed the same as an in-person visit.  Please reach out to your insurance provider with any questions.    If during the course of the call the physician/provider feels a telephone visit is not appropriate, you will not be charged for this service.\"    Patient has given verbal consent to a Telephone visit? Yes    What phone number would you like to be contacted at? [phone]    Patient would like to receive their AVS by AVS Preference: Mail a copy.    Additional provider notes:   I last saw Ry on 3/13/2020. I recommended a VQ scan and echo, neither of which he got done.  No benefit from albuterol.   He smokes pipe. No cigarettes.   Has some shortness of breath with activities like walking and biking.   Still unable to lay flat due to shortness of breath.  No syncope.    Studies:  Nothing new to review.     54 year old man with history of alcoholism, GERD, tremor, PTSD, small PE diagnosed in 2018 no longer on anticoagulation, presents for follow up of orthopnea and dyspnea on exertion. He did not get the VQ scan or the echo done that I requested. His symptoms appear to be persistent and stable. He had no response to the albuterol inhaler. Review of prior CT " showed slightly elevated left hemidiaphragm but no other concerning findings. I don't think the elevated diaphragm would explain the orthopnea and dyspnea on exertion and normally would cause restriction on PFT's, which he did not demonstrate.     Recommendations:  - Echocardiogram and VQ scan to look for PE since he is unable to tolerate CT scan - will ask our office to schedule these for him. I reassured him that he doesn't need to lay flat for these tests (he could have the head of the bed up for the echo)  - OK to stop the albuterol     Follow up in 2-3 months after above testing is complete.      Phone call duration: 15 minutes    Juan Manuel Brower) MD [place]  [facility]  Pager [phone]  Clinic [phone]

## 2021-06-08 NOTE — TELEPHONE ENCOUNTER
Patient is calling in advising he feels depressed and wants to connect with you. He can be reached at [phone].  I also looped Marcella Backer in as well.
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
## 2021-06-08 NOTE — PROGRESS NOTES
"Choco Claudio is a 54 y.o. male who is being evaluated via a billable telephone visit.      The patient has been notified of following:     \"This telephone visit will be conducted via a call between you and your physician/provider. We have found that certain health care needs can be provided without the need for a physical exam.  This service lets us provide the care you need with a short phone conversation.  If a prescription is necessary we can send it directly to your pharmacy.  If lab work is needed we can place an order for that and you can then stop by our lab to have the test done at a later time.    Telephone visits are billed at different rates depending on your insurance coverage. During this emergency period, for some insurers they may be billed the same as an in-person visit.  Please reach out to your insurance provider with any questions.    If during the course of the call the physician/provider feels a telephone visit is not appropriate, you will not be charged for this service.\"    Patient has given verbal consent to a Telephone visit? Yes      Patient would like to receive their AVS by AVS Preference: Mail a copy.  Psychiatric  Progress Note  Date of visit:5/29/2020         Discussion of Care and Treatment Recommendations:   This is a 54 y.o. male with with  significant history of Alcohol Use Disorder, Etoh induced gastritis , DENISE and PTSD, Delusional Disorder      Last visit 04/30/2020  Recommendation at last visit .  .-Continue with current medications Trazodone  100 mg at Bedtime, continue  Prazosin to  5 mg at bedtime -PTSD, Continue Seroquel  200 mg  at bedtime - psychosis , delusional thoughts  2- Highly recommend outpatient CD treatment Alcohol Use Disorder - Severe. Pt  opposed to CD treatment .  Continues to use alcohol when he can afford it.  3- Continue with  Psychotherapy:  4- 5-Make efforts to stay away from alcohol and drug  5- RTC- 2  weeks call in between visit with any " "questions or concerns  Patient and I reviewed diagnosis and treatment plan and patient agrees with following recommendations:  Ongoing education given regarding diagnostic and treatment options with adequate verbalization of understanding.  Plan   .-Continue with current medications Trazodone  100 mg at Bedtime, continue  Prazosin to  4 mg at bedtime -PTSD, Continue Seroquel  200 mg  at bedtime - psychosis , delusional thoughts  2- Highly recommend outpatient CD treatment Alcohol Use Disorder - Severe. Pt  opposed to CD treatment .  Continues to use alcohol when he can afford it.  3- Continue with  Psychotherapy:  4- 5-Make efforts to stay away from alcohol and drug  5- RTC- 2  weeks call in between visit with any questions or concerns         DIagnoses:   Delusional disorder R/O Schizophrenia   PTSD  Alcohol Use Disorder , Severe      Patient Active Problem List   Diagnosis     Chest pain     Alcohol withdrawal, with unspecified complication (H)     Sinus tachycardia     Lactic acidosis     Dehydration     Microcytosis     Non-traumatic rhabdomyolysis     Nausea     Alcoholic ketoacidosis     Epigastric pain     Alcoholism, chronic (H)     High anion gap metabolic acidosis     Hypomagnesemia     Alcohol intoxication, uncomplicated (H)     GI bleed     Anemia associated with acute blood loss     Disorder due to alcohol abuse (H)     Substance induced mood disorder (H)     Anxiety disorder     History of posttraumatic stress disorder (PTSD)     Erosive esophagitis     Polyarthralgia     Cervical spondylosis     VIKI positive     Inflammatory arthritis     Arthritis of right ankle     Gouty arthritis of toe of left foot     Pulmonary embolism (H)     Primary osteoarthritis involving multiple joints     Supratherapeutic INR     Hypokalemia     Epistaxis             Chief Complaint / Subjective:    Chief complaint: \" I am feeling very lonely \"     History of Present Illness:   Per patient's statement : Patient reports " compliance with current medications and denies side effects.  He reports experiencing some low mornings and feeling lonely especially with decreased public transportation hours which he relies on.  He has been biking in the evenings in his neighborhood.  He has been talking with his therapist on a weekly basis and reached out when he was feeling some distress the past week.  He states current medications have been helpful and he is not willing to have any medications adjustments today which was recommended.  He denies suicidal homicidal ideations he says he feels safe and verbally contracts for safety.  He denies visual or auditory hallucinations.  He denies delusions although hormonal composition which is his baseline the is notable delusions and paranoia.  He states that he has not been drinking alcohol abuse in the past week.  I applauded him for that.  His mom continues to set up medications for him and he is happy with current medications.  He assures me that he is taking his medications consistently.  Patient will therefore continue the same medications.  Crisis numbers have been provided for him he was to call and has call in the past when he was in distress go to the emergency room he was feeling unsafe.  Mental Status Examination:   General: Alert and oriented x 3   Speech: Normal in rate and tone  Language: Intact  Thought process: Coherent  Thought content:                           Auditory hallucinations-Denies                          Visual Hallucinations - Denies                         Delusions -patient denies but delusions and paranoia notably present during our telephone conversation and is also patient's baseline                          Loose Associations:  No                          Suicidal thoughts: Denies                          Homicidal thoughts:Denies                        Affect: Neutral  Mood: Neutral   Intellectual functioning: Within normal limits  Memory: Within normal  limits  Fund of knowledge: Average  Attention and concentration: Within normal limits  Gait: Unable to assess telephone visit  Psychomotor activity: Unable to assess telephone visit  Muscles: Unable to assess telephone visit  InSight and judgment: Fair      Drug/treatment history and current pattern of use:   History of severe alcohol use disorder    Medication changes: See Above   Medication adherence: compliant  Medication side effects: absent  Information about medications: Side effects, benefits and alternative treatments discussed and patient agrees .    Psychotherapy: Supportive therapy day-to-day living    Education: Diet, exercise, abstinence from drugs and alcohol, patient will not drive if sedated and medications or  under influence of any substance    Lab Results:   Personally reviewed and discussed with the patient    Lab Results   Component Value Date    WBC 7.8 02/24/2020    HGB 15.2 02/24/2020    HCT 48.0 02/24/2020     02/24/2020    ALT 19 05/08/2019    AST 26 05/08/2019     02/24/2020    K 3.8 02/24/2020     02/24/2020    CREATININE 0.88 02/24/2020    BUN 7 (L) 02/24/2020    CO2 24 02/24/2020    TSH 2.17 03/24/2018    INR 1.05 01/28/2020       Vital signs:  There were no vitals taken for this visit.  Unable to assess telephone visit  Allergies: Patient has no known allergies.         Medications:     Current Outpatient Medications on File Prior to Visit   Medication Sig Dispense Refill     acetaminophen (TYLENOL) 500 MG tablet Take 1,000 mg by mouth every 6 (six) hours as needed for pain.       cetirizine (ZYRTEC) 10 MG tablet Take 10 mg by mouth at bedtime.        omeprazole (PRILOSEC) 20 MG capsule Take 20 mg by mouth daily as needed.        QUEtiapine (SEROQUEL) 200 MG tablet Take 1 tablet (200 mg total) by mouth at bedtime. 90 tablet 0     traZODone (DESYREL) 100 MG tablet TAKE 1 TABLET(100 MG) BY MOUTH AT BEDTIME 90 tablet 0     prazosin (MINIPRESS) 2 MG capsule Take 2  capsules (4 mg total) by mouth at bedtime. 60 capsule 0     No current facility-administered medications on file prior to visit.              Coordination of Care:   More than 25 minutes spent on this visit  with more than 50% of time spent on coordination of care including: Educating patient about diagnosis, prognosis, side effects and benefits of medications, diet, exercise.  Time also spent providing supportive therapy regarding above issues.    This note was created using a dictation system. All typing errors or contextual distortion is unintentional and software inherent.    Phone call duration: 30 minutes    Debra Lane NP
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
## 2021-06-08 NOTE — TELEPHONE ENCOUNTER
Pt called having concerns with PTSD and anger. PT requested to speak with a member of the RN team ASAP. Pt appears to be in distress.

## 2021-06-08 NOTE — PROGRESS NOTES
This therapist attempted to call patient back after patient called clinic asking to speak with this therapist.

## 2021-06-08 NOTE — PROGRESS NOTES
This video/telephone visit will be conducted via a call between you and your physician/provider. We have found that certain health care needs can be provided without the need for an in-person physical exam. This service lets us provide the care you need with a video /telephone conversation. If a prescription is necessary we can send it directly to your pharmacy. If lab work is needed we can place an order for that and you can then stop by our lab to have the test done at a later time.    Just as we bill insurance for in-person visits, we also bill insurance for video/telephone visits. If you have questions about your insurance coverage, we recommend that you speak with your insurance company.    Patient has given verbal consent for video/Telephone visit? yes  Patient would like the video visit invitation sent by: Text to cell phone: MIRNA or Send to email: MIRNA LAGUNAS/LPN: AD, LPN  Pt says his mother helps him with medications set up.   Pt is complaining of increase depression, denies having any SI  Patient verified allergies, medications and pharmacy via phone.  Patient states he is ready for visit.    : NA    ________________________________________  Medications Phoned  to Pharmacy [x] yes []no  Name of Pharmacist:  List Medications, including dose, quantity and instructions    Medications ordered this visit were e-scribed.  Verified by order class [x] yes  [x] no   Prazosin 4 mg  Medication changes or discontinuations were communicated to patient's pharmacy: [] yes  [x] no    Dictation completed at time of chart check: [x] yes  [] no  I have checked the documentation for today s encounters and the above information has been reviewed and completed.
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
## 2021-06-08 NOTE — PROGRESS NOTES
"Mental Health Visit Note    Patient: Choco Claudio    : 1965 MRN: [id number]    Date: 2020   Start time: 1000  Stop Time: 1045   Session # 10    This therapist talked to patient twice this week due to patient contacting the clinic indicating he is having a hard time coping and needs to get in contact with this therapist.     The patient has been notified of the following:   \"We have found that certain health care needs can be provided without the need for a face to face visit.  This service lets us provide the care you need with a phone conversation.  I will have full access to your [facility] medical record during this entire phone call.   I will be taking notes for your medical record. Since this is like an office visit, we will bill your insurance company for this service.  There are potential benefits and risks of telephone visits (e.g. limits to patient confidentiality) that differ from in-person visits.?  Confidentiality still applies for telephone services, and nobody will record the visit.  It is important to be in a quiet, private space that is free of distractions (including cell phone or other devices) during the visit.?? If during the course of the call I believe a telephone visit is not appropriate, you will not be charged for this service\"  Consent has been obtained for this service by care team member: Yes, per verbal agreement     All of patient's visits will be via telephone during the pandemic due to patient's lack of ability to run phone and hypervigilance of people hearing or seeing him.     Session Type: Patient is participating in a telephone visit    Chief Complaint   Patient presents with      Follow Up     Telephone Visit Mental Health     New symptoms or complaints: Patient indicated having extreme PTSD symptoms.     Functional Impairment:   Personal: 4  Family: 2  Work: 2  Social:4          ASSESSMENT: Current Emotional / Mental Status (status of significant symptoms):    "           Patient denies personal safety concerns.               Patient denies current or recent suicidal ideation or behaviors.              Patient denies current or recent homicidal ideation or behaviors.              Patient denies current or recent self injurious behavior or ideation.              Patient denies other safety concerns.              Patient denies changes in protective factors              Recommended that patient call 911 or go to the local ED should there be a change in any of these risk factors.                Attitude:                                   Cooperative               Orientation:                             Person, place, time, situation              Speech                          Rate / Production:       Pressure                          Volume:                       Elevated              Mood:                                      Scared              Thought Content:                    Flight of ideas               Thought Form:                        Circulatory               Insight:                                     Poor      Patient's impression of their current status: Patient indicated feeling scared. Patient reported hs PTSD symptoms are extremely high the last few days. Patient indicated due to the virus his parents do not want to be around him. Patient reported this hurting him due to them being his main support. Patient indicated he does not want to go outside due to fear that someone will come too close to him. Patient reported he is struggling with delusions of seeing his son on the bed but knowing his son is not there. Patient indicated his neighbor above him is really loud so he is not sleeping.     Therapist impression of patients current state: Patient appears to have poor insight into his mental health. This therapist went over coping skills including reaching out to his parents via phone to get support from them. This therapist processed with patient ways  that he could get out of the apartment while still feeling safe including biking. This therapist went over sleep hygiene.     Type of psychotherapeutic technique provided: Client centered and CBT    Progress toward short term goals: Progress as expected with patient continuing to struggle with his mental health.    Review of long term goals: Treatment Plan Updated on 4/14/2020 verbally due to season on the phone due to pandemic.        Diagnosis:  1. Posttraumatic stress disorder    2. Delusional disorder (H)    3. Severe alcohol use disorder (H)        Plan and Follow up: Patient will continue with weekly ongoing therapy. Patient should use skills used during today's session to help with reducing anxiety and panic. Patient would benefit from continuing to reach out to his parents. Patient should work on sleep hygiene.     Discharge Criteria/Planning: Client has chronic symptoms and ongoing therapy for maintenance stability recommended.    I have reviewed the note as documented above.  This accurately captures the substance of my conversation with the patient.  Marcella Nogueira [facility]

## 2021-06-08 NOTE — PATIENT INSTRUCTIONS - HE
Continue medications as prescribed  Have your pharmacy contact us for a refill if you are running low on medications (We may ask you to come into clinic to get a refill from the nurse  No Alcohol or drug use  No driving if sedated  Call the clinic with any questions or concerns   Reach out for help if you feel like hurting yourself or others ([facility] [phone]: [address] [facility], [address] or [facility] Suicide Hotline [phone] , call 911 or go to nearest Emergency room    Follow up as directed, for your appointments, per your After Visit Summary Form.

## 2021-06-08 NOTE — PROGRESS NOTES
"Mental Health Visit Note    Patient: Choco Claudio    : 1965 MRN: [id number]    Date: 2020   Start time: 103  Stop Time: 133   Session # 12    The patient has been notified of the following:   \"We have found that certain health care needs can be provided without the need for a face to face visit.  This service lets us provide the care you need with a phone conversation.  I will have full access to your [facility] medical record during this entire phone call.   I will be taking notes for your medical record. Since this is like an office visit, we will bill your insurance company for this service.  There are potential benefits and risks of telephone visits (e.g. limits to patient confidentiality) that differ from in-person visits.?  Confidentiality still applies for telephone services, and nobody will record the visit.  It is important to be in a quiet, private space that is free of distractions (including cell phone or other devices) during the visit.?? If during the course of the call I believe a telephone visit is not appropriate, you will not be charged for this service\"  Consent has been obtained for this service by care team member: Yes, per verbal agreement     All of patient's visits will be via telephone during the pandemic due to patient's lack of ability to run phone and hypervigilance of people hearing or seeing him.     Session Type: Patient is participating in a telephone visit    Chief Complaint   Patient presents with      Follow Up     Telephone Visit Mental Health     New symptoms or complaints: None    Functional Impairment:   Personal: 4  Family: 2  Work: 2  Social:4          ASSESSMENT: Current Emotional / Mental Status (status of significant symptoms):              Patient denies personal safety concerns.               Patient denies current or recent suicidal ideation or behaviors.              Patient denies current or recent homicidal ideation or behaviors.              " Patient denies current or recent self injurious behavior or ideation.              Patient denies other safety concerns.              Patient denies changes in protective factors              Recommended that patient call 911 or go to the local ED should there be a change in any of these risk factors.                Attitude:                                   Cooperative               Orientation:                             Person, place, time, situation              Speech                          Rate / Production:       Pressure                          Volume:                       Elevated              Mood:                                      Worried             Thought Content:                    Flight of ideas               Thought Form:                        Circulatory               Insight:                                     Poor      Patient's impression of their current status: Patient indicated feeling worried. Patient reported with what is happening in the world it has increased his PTSD symptoms. Patient indicated he feels he needs to leave his house due to gong stir crazy. Patient reported he needs a distraction so going to ride the bus. Patient indicated he is not watching TV due to it always being news and that triggering him.     Therapist impression of patients current state: Patient appears to have poor insight into his mental health. This therapist processed with patient ways to work on managing his symptoms and the importance of reaching out to his support network.     Type of psychotherapeutic technique provided: Client centered and CBT    Progress toward short term goals: Progress as expected with patient continuing with scheduled session    Review of long term goals: Treatment Plan Updated on 4/14/2020 verbally due to season on the phone due to pandemic.        Diagnosis:  1. PTSD (post-traumatic stress disorder)    2. Delusional disorder (H)    3. Severe alcohol use disorder (H)         Plan and Follow up: Patient will continue with weekly ongoing therapy. Patient should use skills used during today's session to help with reducing anxiety and panic. Patient would benefit from continuing to reach out to his parents. Patient should work on sleep hygiene.     Discharge Criteria/Planning: Client has chronic symptoms and ongoing therapy for maintenance stability recommended.    I have reviewed the note as documented above.  This accurately captures the substance of my conversation with the patient.  Marcella Nogueira [facility]

## 2021-06-08 NOTE — PROGRESS NOTES
Outpatient Mental Health Treatment Plan    Name:  Choco Claudio  :  1965  MRN:  [id number]    Treatment Plan:  Initial Treatment Plan  Intake/initial treatment plan date:  2017  Benefit and risks and alternatives have been discussed: Yes  Is this treatment appropriate with minimal intrusion/restrictions: Yes  Estimated duration of treatment:  Trial of approximatly 12 sessions  Anticipated frequency of services:  Every 1 weeks  Necessity for frequency: This frequency is needed to establish therapeutic goals and for continuity of care in order to monitor progress.  Necessity for treatment: To address cognitive, behavioral, and/or emotional barriers in order to work toward goals and to improve quality of life.    Plan:           ?   ? Posttraumatic Stress Disorder    Goal:  Reduced the signs and symptoms of PTSD and increase patient's ability to tolerate breakthrough stressors surrounding his various traumas.   Strategies: ? [x]Learn and practice relaxation techniques and other coping strategies (e.g., thought stopping, reframing, meditation)     ? [x] Increase involvement in meaningful activities     ? [x] Discuss sleep hygiene     ? [x] Explore thoughts and expectations about self and others     ? [x] Identify and monitor triggers for panic/anxiety symptoms     ? [x] Implement physical activity routine (with physician approval)     ? [x] Consider introduction of bibliotherapy and/or videos     ? [x] Continue compliance with medical treatment plan (or explore barriers)   ?Degree to which this is a problem: 4  Degree to which goal is met: 1  Date of Review: 2017    Substance use  Goal:  Increase patient awareness regarding substance use triggers.  Consider harm reduction and/or chemical dependency treatment.   Strategies: ? [x] Consider referral for chemical dependency evaluation     ? [x] Discuss barriers to participating in AA or other peer-facilitated groups         [x] Address environmental  factors which may interfere with sobriety     ? [x] Explore short-term versus long-term consequences of use  ?  Degree to which this is a problem: 4  Degree to which goal is met: 1  Date of Review: 01/31/2017         Functional Impairment:  1=Not at all/Rarely  2=Some days  3=Most Days  4=Every Day    Personal : 4  Family : 3  Social : 4   Work/school : 3    Diagnosis:  (EXAMPLE of DSM V: Major depressive disorder, recurrent, moderate; Generalized Anxiety disorder; borderline personality per patient PHI; fibromyalgia, History of breast cancer in remission; Problem with primary relationship.)   Posttraumatic stress disorder; alcohol use disorder, severe; and major depressive disorder, recurrent, moderate.    Strengths:  supported by mother, one good friend, employed, and motivated for change.  Limitations:  isolation, ambivalence about discontinuing alcohol use, and difficulty connecting with others due to trauma symptoms  Cultural Considerations: Client identifies is half .    Persons responsible for this plan: Patient and Provider            Psychotherapist Signature           Patient Signature:                This note was created with help of Dragon dictation software.  Grammatical / typing errors are not intentional and inherent to the software.

## 2021-06-08 NOTE — PROGRESS NOTES
Mental Health Visit Note    This is a dual signature report.  My supervising clinician is LAWANDA Kaminski.    1/24/2017    Start time: 0810    Stop Time: 0900   Session # 1    Choco Claudio is a 51 y.o. male is being seen today for a follow-up psychotherapy appointment    Chief Complaint   Patient presents with      Follow Up   .     New symptoms or complaints:  None    Functional Impairment:   The patient identifies the how daily stressors affect daily functioning in today's session as follows (Scale is 1-4, 1=not at all or rarely; 4=extremely so/everyday.)    Personal: 4  Family: 4  Social: 4  Work: 4    Clinical assessment of mental status:   Choco Claudio presented on time.   He was oriented x3, open and cooperative, and dressed appropriately for this session and weather. His memory was mildly impaired and patient may not be most accurate historian.  His speech was  Excessive and Loud.  Language was focused on desire for symptom relief and tangential.  Concentration and focus is Within normal. Psychosis is not noted or reported. He reports his mood is Anxious and Depressed.  Affect is congruent with speech and is Anxious and Depressed.  Fund of knowledge is adequate. Insight is adequate for therapy.     Suicidal/Homicidal Ideation present:   No,  Patient denies suicidal and homicidal ideations/means or plans.      Patient's impression of their current status:  Patient presents of her sessions since completing diagnostic assessment.  Patient is provided feedback on outcome of D and he agrees with diagnosis.  Client desires prior to history of mental health over chemical health.  Client is open to harm reduction model for chemical health in conjunction with primary mental health treatment.  Discussion was had regarding client trauma symptoms and he provides additional information regarding how PTSD symptoms negatively affect his daily living.  Client is provided with psychoeducation on  trauma treatments including PTSD skills and prolonged exposure.  Client refuses to participate in PTSD skills group at [facility].  Client will participate in one-to-one PTSD skills with this provider in individual therapy.  Treatment plan includes: PTSD skills, prolonged exposure, harm reduction model for alcohol use at this time.  Client will consider CD treatment once he does not feel alcohol is the only way to manage mental health symptoms.     Therapist impression of patient's current state:   Choco Claudio presents with PTSD alcohol use concerns and depression.  Client is loud and somewhat tangential in today's session.  He is overly focused on desire for symptom relief and has trouble participating in psychoeducation and/or treatment planning.  I redirect client back to task multiple times and above-mentioned treatment plan is agreed on.  I have significant concerns regarding client's reported alcohol use.  At this time client is refusing rule 25 and/or CD treatment as he feels alcohol is the only thing that manages PTSD symptoms.  I assisted client in identifying exceptions to belief that alcohol is the only thing that manages PTSD symptoms, and he is able to identify several ways he manages PTSD symptoms that do not involve alcohol.  It is unclear if client is able to make connection that other interventions such as psychotherapy and/or mental health medications can manage PTSD symptoms.  Continue to encourage client to establish service with psychiatry.  Continue to resent PTSD skills material.  Continue to consider client for prolonged exposure.  Continue to assess alcohol use concerns and encouraged client to seek assistance for CD issues.    Type of psychotherapeutic technique provided:   Solution-focused    Progress toward short term goals:Progress as expected, Clinical returns to psychotherapy to establish care after completing diagnostic assessment.    Review of long term goals: Not  "done at today's visit .     Diagnosis:   1. Chronic post-traumatic stress disorder (PTSD)    2. Severe alcohol use disorder    3. Major depressive disorder, recurrent episode, moderate     No Change.     Plan and Follow-up:   Patient will follow safety plan of seeking help from friend/family, calling [facility] crisis, calling 911, and/or presenting to the ED if necessary.  Patient will be compliant with medications and attend appointments as scheduled.  Patient will read \"common reactions to trauma\" information provided in session and share with others,'s put call.  Client will track his alcohol intake, medication compliance, and sleep.    Discharge Criteria/Planning: Client has chronic symptoms and ongoing therapy for maintenance stability recommended.    Signatures:   Performed and documented by KAYLA Long, LGSW.    I have read, discussed and agree the documentation as presented by KAYLA Long, LGSW.  Allyson Raymundo, [facility]        This note was created with help of Dragon dictation software. Grammatical / typing errors are not intentional and inherent to the software.  "
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
## 2021-06-09 NOTE — PROGRESS NOTES
"Mental Health Visit Note    Patient: Choco Claudio    : 1965 MRN: [id number]    Date: 2020   Start time: 1110  Stop Time: 1150  Session # 19    The patient has been notified of the following:   \"We have found that certain health care needs can be provided without the need for a face to face visit.  This service lets us provide the care you need with a phone conversation.  I will have full access to your [facility] medical record during this entire phone call.   I will be taking notes for your medical record. Since this is like an office visit, we will bill your insurance company for this service.  There are potential benefits and risks of telephone visits (e.g. limits to patient confidentiality) that differ from in-person visits.?  Confidentiality still applies for telephone services, and nobody will record the visit.  It is important to be in a quiet, private space that is free of distractions (including cell phone or other devices) during the visit.?? If during the course of the call I believe a telephone visit is not appropriate, you will not be charged for this service\"  Consent has been obtained for this service by care team member: Yes, per verbal agreement     All of patient's visits will be via telephone during the pandemic due to patient's lack of ability to run phone and hypervigilance of people hearing or seeing him.     Session Type: Patient is participating in a telephone visit    Chief Complaint   Patient presents with     MH Follow Up     Telephone Visit Mental Health     New symptoms or complaints: None    Functional Impairment:   Personal: 4  Family: 2  Work: 2  Social:4          ASSESSMENT: Current Emotional / Mental Status (status of significant symptoms):              Patient denies personal safety concerns.               Patient denies current or recent suicidal ideation or behaviors.              Patient denies current or recent homicidal ideation or behaviors.              " Patient denies current or recent self injurious behavior or ideation.              Patient denies other safety concerns.              Patient denies changes in protective factors              Recommended that patient call 911 or go to the local ED should there be a change in any of these risk factors.                Attitude:                                   Cooperative               Orientation:                             Person, place, time, situation              Speech                          Rate / Production:       Pressure                          Volume:                       Elevated              Mood:                                      Sad and anxious             Thought Content:                    Flight of ideas               Thought Form:                        Circulatory               Insight:                                     Poor      Patient's impression of their current status: Patient reported feeling sad and anxious. Patient indicated that Monday was a really hard day for him. Patient reported he was finally able to get a hold of his friend that he has not talked to for over 6 months. Patient indicated the friend admitted that he relapsed and then had to get off the phone due to his girlfriend. Patient reported being sad that his friend could not talk more or able to spend time with him. Patient indicated he then turned to drinking which resulted in the  bring him to his parents house. Patient reported they found him sleeping on a park bench. Patient indicated he has been drinking many days in a row but now has not drank since Monday.     Therapist impression of patients current state: Patient appears to have poor insight into his mental health. This therapist processed with patient ways to work on meeting new people and letting go of that friendship for this time. This therapist processed with patient not having to say good-bye to the friend forever but it being unhealthy to hold  onto at this time. This therapist processed with patient ways alcohol continues to have negative impact on his life.     Type of psychotherapeutic technique provided: Client centered and CBT    Progress toward short term goals: Progress as expected with patient continuing with scheduled session    Review of long term goals: Treatment Plan Updated on 7/23/2020 verbally due to season on the phone due to pandemic.        Diagnosis:  1. PTSD (post-traumatic stress disorder)    2. Delusional disorder (H)    3. Severe alcohol use disorder (H)        Plan and Follow up: Patient will continue with weekly ongoing therapy. Patient should work on sobriety. Patient would benefit from continuing to work on letting go of unhealthy relationships.     Discharge Criteria/Planning: Client has chronic symptoms and ongoing therapy for maintenance stability recommended.    I have reviewed the note as documented above.  This accurately captures the substance of my conversation with the patient.  Marcella Nogueira [facility]

## 2021-06-09 NOTE — TELEPHONE ENCOUNTER
7/21/20 Received call from patient, requesting a call from Marcella Nogueira (therapist) but she is out of the office today; patient then stated he would like to speak with Dr. Lane, due to his PTSD have increased.
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
## 2021-06-09 NOTE — PROGRESS NOTES
"Mental Health Visit Note    Patient: Choco Claudio    : 1965 MRN: [id number]    Date: 7/10/2020   Start time: 505  Stop Time: 525   Session # 17    The patient has been notified of the following:   \"We have found that certain health care needs can be provided without the need for a face to face visit.  This service lets us provide the care you need with a phone conversation.  I will have full access to your [facility] medical record during this entire phone call.   I will be taking notes for your medical record. Since this is like an office visit, we will bill your insurance company for this service.  There are potential benefits and risks of telephone visits (e.g. limits to patient confidentiality) that differ from in-person visits.?  Confidentiality still applies for telephone services, and nobody will record the visit.  It is important to be in a quiet, private space that is free of distractions (including cell phone or other devices) during the visit.?? If during the course of the call I believe a telephone visit is not appropriate, you will not be charged for this service\"  Consent has been obtained for this service by care team member: Yes, per verbal agreement     All of patient's visits will be via telephone during the pandemic due to patient's lack of ability to run phone and hypervigilance of people hearing or seeing him.     Session Type: Patient is participating in a telephone visit    Chief Complaint   Patient presents with      Follow Up     Telephone Visit Mental Health     New symptoms or complaints: None    Functional Impairment:   Personal: 4  Family: 2  Work: 2  Social:4          ASSESSMENT: Current Emotional / Mental Status (status of significant symptoms):              Patient denies personal safety concerns.               Patient denies current or recent suicidal ideation or behaviors.              Patient denies current or recent homicidal ideation or behaviors.              " Patient denies current or recent self injurious behavior or ideation.              Patient denies other safety concerns.              Patient denies changes in protective factors              Recommended that patient call 911 or go to the local ED should there be a change in any of these risk factors.                Attitude:                                   Cooperative               Orientation:                             Person, place, time, situation              Speech                          Rate / Production:       Pressure                          Volume:                       Elevated              Mood:                                      Anxious             Thought Content:                    Flight of ideas               Thought Form:                        Circulatory               Insight:                                     Poor      Patient's impression of their current status: Patient reported being extremely anxious. Patient indicated calling the clinic and wanting to speak with this therapist due to an increase in his PTSD symptoms. Patient reported he is safe with no thoughts of hurting himself just struggling with his symptoms. Patient indicated he is unsure what set off the symptoms but they were triggered. Patient reported life continues to be hard due to always feeling he needs to watch his back and be on guard. Patient indicated this being trained to him in the .     Therapist impression of patients current state: Patient appears to have poor insight into his mental health. This therapist processed with patient skills he can use to work on reducing symptoms. This therapist processed with patient the importance of reaching out to his support network. This therapist informed patient and patient agreed to call 911 and/or report to ER if suicidal ideations were to occur.     Type of psychotherapeutic technique provided: Client centered and CBT    Progress toward short term goals:  Progress as expected with patient continuing with scheduled session.    Review of long term goals: Treatment Plan Updated on 4/14/2020 verbally due to season on the phone due to pandemic.        Diagnosis:  1. PTSD (post-traumatic stress disorder)    2. Delusional disorder (H)    3. Severe alcohol use disorder (H)        Plan and Follow up: Patient will continue with weekly ongoing therapy. Patient should use PTSD skills taught and reach out to his support network. Patient will call 911 and/or report to ER if suicidal ideations were to occur.     Discharge Criteria/Planning: Client has chronic symptoms and ongoing therapy for maintenance stability recommended.    I have reviewed the note as documented above.  This accurately captures the substance of my conversation with the patient.  Marcella Nogueira LPCC

## 2021-06-09 NOTE — PROGRESS NOTES
"Mental Health Visit Note    Patient: Choco Claudio    : 1965 MRN: [id number]    Date: 2020   Start time: 1001  Stop Time: 1042  Session # 18    The patient has been notified of the following:   \"We have found that certain health care needs can be provided without the need for a face to face visit.  This service lets us provide the care you need with a phone conversation.  I will have full access to your [facility] medical record during this entire phone call.   I will be taking notes for your medical record. Since this is like an office visit, we will bill your insurance company for this service.  There are potential benefits and risks of telephone visits (e.g. limits to patient confidentiality) that differ from in-person visits.?  Confidentiality still applies for telephone services, and nobody will record the visit.  It is important to be in a quiet, private space that is free of distractions (including cell phone or other devices) during the visit.?? If during the course of the call I believe a telephone visit is not appropriate, you will not be charged for this service\"  Consent has been obtained for this service by care team member: Yes, per verbal agreement     All of patient's visits will be via telephone during the pandemic due to patient's lack of ability to run phone and hypervigilance of people hearing or seeing him.     Session Type: Patient is participating in a telephone visit    Chief Complaint   Patient presents with      Follow Up     Telephone Visit Mental Health     New symptoms or complaints: None    Functional Impairment:   Personal: 4  Family: 2  Work: 2  Social:4          ASSESSMENT: Current Emotional / Mental Status (status of significant symptoms):              Patient denies personal safety concerns.               Patient denies current or recent suicidal ideation or behaviors.              Patient denies current or recent homicidal ideation or behaviors.              " Patient denies current or recent self injurious behavior or ideation.              Patient denies other safety concerns.              Patient denies changes in protective factors              Recommended that patient call 911 or go to the local ED should there be a change in any of these risk factors.                Attitude:                                   Cooperative               Orientation:                             Person, place, time, situation              Speech                          Rate / Production:       Pressure                          Volume:                       Elevated              Mood:                                      Anxious             Thought Content:                    Flight of ideas               Thought Form:                        Circulatory               Insight:                                     Poor      Patient's impression of their current status: Patient indicated continuing to feel anxious. Patient reported he does not remember talking to this therapist last Friday. Patient indicated he took his medication and believes that is why he does not remember. Patient denied drinking. Patient reported he is struggling with feeling unsafe to go out of the house but then struggling with isolation. Patient indicated knowing he needs to leave the house and to reduce his symptoms.     Therapist impression of patients current state: Patient appears to have poor insight into his mental health. This therapist processed with patient the concern over not remembering the therapy session last Friday and reaching out asking for this therapist contacting him. This therapist messaged his prescriber Debra Lane CNP.     Type of psychotherapeutic technique provided: Client centered and CBT    Progress toward short term goals: Progress as expected with patient continuing with scheduled session. Poor progress as evidenced by not remembering last therapy session.     Review of long term  goals: Treatment Plan Updated on 4/14/2020 verbally due to season on the phone due to pandemic.        Diagnosis:  1. PTSD (post-traumatic stress disorder)    2. Delusional disorder (H)    3. Severe alcohol use disorder (H)        Plan and Follow up: Patient will continue with weekly ongoing therapy. Patient would benefit from talking with his medication management about medication concerns. Patient should continue to work on using PTSD skills.     Discharge Criteria/Planning: Client has chronic symptoms and ongoing therapy for maintenance stability recommended.    I have reviewed the note as documented above.  This accurately captures the substance of my conversation with the patient.  Marcella Nogueira [facility]
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
## 2021-06-09 NOTE — PROGRESS NOTES
Mental Health Visit Note    This is a dual signature report.  My supervising clinician is LAWANDA Kaminski.    3/9/2017    Start time: 1405    Stop Time: 1455   Session # 7    Choco Claudio is a 51 y.o. male is being seen today for a follow-up psychotherapy appointment    Chief Complaint   Patient presents with      Follow Up   .     New symptoms or complaints:  None    Functional Impairment:   The patient identifies the how daily stressors affect daily functioning in today's session as follows (Scale is 1-4, 1=not at all or rarely; 4=extremely so/everyday.)    Personal: 4  Family: 3  Social: 4  Work: 3    Clinical assessment of mental status:   Choco Claudio presented on time.   He was oriented x3, open and cooperative, and dressed appropriately for this session and weather. His memory was Normal cognitive functioning .  His speech was  Excessive and Dramatic.  Language was fear based.  Concentration and focus is Brief. Psychosis is not noted or reported. He reports his mood is Anxious and Depressed.  Affect is congruent with speech and is Anxious and Depressed.  Fund of knowledge is adequate. Insight is adequate for therapy.  Of note, client has deferred a personal hygiene with body odor.    Suicidal/Homicidal Ideation present:   No,  Patient denies suicidal and homicidal ideations/means or plans.      Patient's impression of their current status:  Client reports his session and indicates ongoing fear concerns in the community secondary to hearing the [employer] was in the local area.  The client indicates ongoing alcohol use to manage anxiety symptoms.  The client continues discussion of desiring relief from symptoms though he is unable to contract to complete between session tasks.  Client reports he continues to desire to seek social security disability claim secondary to physical and mental health concerns.  Client indicates his mother was questioning why he wanted to see primary care.  We  discussed client's various physical concerns including bilateral foot pain, bilateral hip pain, shoulder pain, wrist pain, and also to follow-up in endoscopy performed earlier this week.  Client reports he has physical difficulty completing his job secondary to pain.  Client and I discussed his PTSD, depression, and alcohol use in the context of his desire for social security disability claim.  I advised client to continue working towards resolving symptoms through psychotherapy and/or psychiatry services.  Client expresses a wide variety of concerns regarding seeing new doctors and/or engaging the legal system.    Therapist impression of patient's current state:   Choco Claudio presents with PTSD, depression, and alcohol use concerns.  The client continues to report ongoing symptomology and this week reports increased secondary to unconfirmed news as outlined above.  Despite multiple efforts to introduce client to activities to manage PTSD symptoms including breathing exercises, engaging in hobbies, and/or distraction the client continues to utilize alcohol is his primary source of relief.  The client also continues to decline rule 25 assessment and refuses to seek chemical dependency treatment at this time.  The client does have future psychiatry appointment scheduled and if relief from anxiety can be received via pharmaceuticals client would potentially consider focusing on alcohol related concerns.  Due to client's reported poor memory and/or difficulty communicating I am write the above-mentioned physical concerns for him to show his mother.  At this point the client does appear to have significant physical and/or mental health difficulties which likely impact his ability to function in the community.  I will continue to work with client via cognitive and/or behavioral approaches along with motivational interviewing to assist the client to the stages of change.  At this point in time the client appears to  be in a contemplative stage of change.    Type of psychotherapeutic technique provided:   Client centered and CBT    Progress toward short term goals:Poor progress, Client indicates he has yet to schedule primary care appointment.  Client reports continued alcohol use.    Review of long term goals: Not done at today's visit . Date of last review 1/31/2017.    Diagnosis:   1. Chronic post-traumatic stress disorder (PTSD)    2. Severe alcohol use disorder    3. Major depressive disorder, recurrent episode, moderate     Worsening.     Plan and Follow-up:   Patient will follow safety plan of seeking help from friend/family, calling [facility], calling 911, and/or presenting to the ED if necessary.  Patient will be compliant with medications and attend appointments as scheduled.  Patient will show his mother the list of physical concerns that I documented for him in session.  The patient will attempt to engage in activities to soothe anxiety is that do not involve alcohol.    Discharge Criteria/Planning: Client has chronic symptoms and ongoing therapy for maintenance stability recommended.    Signatures:   Performed and documented by KAYLA Long, MEREDITH.    I have read, discussed and agree the documentation as presented by KAYLA Long, MEREDITH.  Allyson Raymundo [facility]        This note was created with help of Dragon dictation software. Grammatical / typing errors are not intentional and inherent to the software.

## 2021-06-09 NOTE — TELEPHONE ENCOUNTER
Called patient back after placing a call to provider with no response; patient did not . VM left for him to call clinic as needed and inform staff that he is ok. Left message on provider phone to call triage.     Called patient's mom and she said she spoke with patient a few hours ago and she thinks he is ok and that sometimes he turns his phone off.     Called patient a second time and got him on the phone. He denied harm to self stating that killing himself is a one way ticket to hell and that he is a born again Zoroastrian.He agreed that he is safe and will remain safe.

## 2021-06-09 NOTE — PROGRESS NOTES
"Mental Health Visit Note    4/6/2017    Start time: 1305    Stop Time: 1355   Session # 11    Choco Claudio is a 51 y.o. male is being seen today for a follow-up psychotherapy appointment    Chief Complaint   Patient presents with      Follow Up   .     New symptoms or complaints:  Client is walking with a notable limp.  He reports increasing symptoms of lower back, bilateral hip, bilateral knee, bilateral leg, and right foot and toe swelling involving significant pain and movement limitation.  Client reports he will see his primary care physician.    Functional Impairment:   The patient identifies the how daily stressors affect daily functioning in today's session as follows (Scale is 1-4, 1=not at all or rarely; 4=extremely so/everyday.)    Personal: 3  Family: 3  Social: 4  Work: 4    Clinical assessment of mental status:   Choco Claudio presented on time.   He was oriented x3, open and cooperative, and dressed appropriately for this session and weather. His memory was Mildly impaired per client's mother.  His speech was  Excessive and Tangential.  Language was focused on desire for sobriety without treatment and reduction of PTSD symptoms.  Concentration and focus is Brief. Psychosis is not noted or reported. He reports his mood is Anxious and Depressed.  Affect is congruent with speech and is Anxious and Depressed.  Fund of knowledge is adequate. Insight is adequate for therapy.     Suicidal/Homicidal Ideation present:   No,  Patient denies suicidal and homicidal ideations/means or plans.      Patient's impression of their current status:  Patient presents to session reports completing a rule 25 at [facility].  Client indicates having overheard conversation of several [facility] group members he does not want to attend outpatient treatment.  Client reports he feels overheard conversations were \"whiny\" and that the [employer] taught him to be \"tough\".  Client states he desires to continue and " psychotherapy with this provider and also in psychiatry at [facility]'s outpatient clinic.  Client does not desire other providers to treat chemical health and/or trauma symptoms.  Client reports trazodone has been very effective in helping him sleep.  Client indicates increased sleep has helped improve mood and/or anxiety.  Client also reports trauma symptoms have softened since improvement and sleep.  Client reports he feels his mood also improved as he has been watching more funny TV and has been laughing more.    Therapist impression of patient's current state:   Choco Claudio presents with alcohol use disorder, PTSD, and major depressive disorder.  Client's affect is notably lighter than in previous session.  Client also has a notable improvement in his body odor.  I continue to recommend client follow recommendation of rule 25 which was for inpatient treatment followed by outpatient treatment.  Client has been unwilling to pursue inpatient treatment throughout our conversations.  Previously client indicated he would attempt outpatient treatment and now he is not willing to do so.  I am very concerned with client's ongoing alcohol use though he continues to decline my recommendation to pursue chemical health treatment.  I outlined with client alcohol use can be detrimental to his health and potentially include death, and he continues to decline my recommendations.  I discuss client increasing his support in the community via [facility] and he also declines.  I will meet client's request at this time and bring in posttraumatic stress disorder material to manage symptomology.  Client indicates he continues to take psychiatric medication with benefit.  Client indicates he will attempt to limit alcohol use, when possible.    Type of psychotherapeutic technique provided:   Solution-focused    Progress toward short term goals:Progress as expected, Client does not complete rule 25 assessment.  Poor progress  towards willingness to attend outpatient treatment.    Review of long term goals: Not done at today's visit . Date of last review 1/31/2017.    Diagnosis:   1. Chronic post-traumatic stress disorder (PTSD)    2. Severe alcohol use disorder    3. Major depressive disorder, recurrent episode, moderate     Improving.     Plan and Follow-up:   Patient will follow safety plan of seeking help from friend/family, calling [facility], calling 911, and/or presenting to the ED if necessary.  Patient will be compliant with medications and attend appointments as scheduled.  Patient will see his primary care physician regarding physical complaints.  Client will limit alcohol use, as possible.  Client will continue to be compliant with psychiatric medications.    Discharge Criteria/Planning: Client has chronic symptoms and ongoing therapy for maintenance stability recommended.    Signatures:   Performed and documented by KAYLA Long, [facility].      This note was created with help of Dragon dictation software. Grammatical / typing errors are not intentional and inherent to the software.

## 2021-06-09 NOTE — TELEPHONE ENCOUNTER
Called patient and he stated that he is safe at home and will remain safe, however, when he is out there he is looking over his shoulders in regards to the [place] and thinks people are hostile, it is dangerous out there and he needs to protect himself. When he goes to the bus stop or the store if someone comes behind him which he is unsure if they are out for him he will hit them. Patient did mention again that he knows how to handle a weapon.     He wants to speak with Marcella and staff told him to call clinic and     Called patient's mom mikel who stated that she is not picking patient up as he stated and that he does not have money to even leave the house or go get a weapon. She also said that patient wanted her to pick him up at 11 to go to [employer] for groceries but she did not, because, he has been drink (he is drinking according to his mom). And when he is drinking, she does not pick him up.     Patient advice to go to the ER for help or call 911 if he feels unsafe.     Staff informed pt. Therapist via teams and route her the message as well.

## 2021-06-09 NOTE — PROGRESS NOTES
Mental Health Visit Note    3/28/2017    Start time: 0900    Stop Time: 0945   Session # 10    Choco Claudio is a 51 y.o. male is being seen today for a follow-up psychotherapy appointment    Chief Complaint   Patient presents with      Follow Up   .     New symptoms or complaints:  None    Functional Impairment:   The patient identifies the how daily stressors affect daily functioning in today's session as follows (Scale is 1-4, 1=not at all or rarely; 4=extremely so/everyday.)    Personal: 4  Family: 4  Social: 4  Work: 4    Clinical assessment of mental status:   Choco Claudio presented on time.   He was oriented x3, open and cooperative, and dressed appropriately for this session and weather. His memory was moderately impaired as patient is a poor historian.  His speech was  Excessive and Tangential.  Language was focused on desire for symptom relief.  Concentration and focus is Brief. Psychosis is not noted or reported. He reports his mood is Anxious and Depressed.  Affect is congruent with speech and is Anxious and Depressed.  Fund of knowledge is adequate. Insight is adequate for therapy.  Patient body odor is notable.    Suicidal/Homicidal Ideation present:   No,  Patient denies suicidal and homicidal ideations/means or plans.      Patient's impression of their current status:  Patient presents to session and discusses desire for symptom relief from PTSD via medications and also to discontinue alcohol use.  Client indicates he continues to refuse inpatient/residential treatment though is willing to do outpatient treatment.  Client reports he believes he has a rule 25 scheduled at [facility] though he is unsure when.  Client discusses recent exacerbation of PTSD symptoms secondary to comments made to him while he was working by various strangers.  Client reports difficulty in managing increased PTSD symptomatology leading to increased alcohol use.  At the time of this meeting the client  "reports he has not drank for 2 days and is not experiencing withdrawal symptoms.  Client reports he did not go to his primary care physician appointment as he had so much hip pain he was unable to get out of bed.    Therapist impression of patient's current state:   Choco Claudio presents with PTSD, depression, and alcohol use concerns.  The client's affect is withdrawn initially in session though he becomes more interactive throughout session.  Client had his hand up throughout the duration of his session.  I reiterate to the client my recommendation of completing a rule 25 and following any outcome which would likely include inpatient/residential treatment.  Client continues to insist he will not \"live in a group home\" and is refusing any kind of treatment with a residential component.  I discussed the clinic coping strategies and he reports many barriers including lack of equipment such as DVD players and/or CD players, client also reports not having funds to buy such items.  Client does report watching TV and/or listening to comedy helps calm him without requiring alcohol use.  Continue to provide client with a client-centered environment though continue to stress following rule 25 recommendations, including inpatient/residential if applicable.  Client works best when he feels providers are in his corner so aligning self or client while providing feedback regarding recommendations will likely benefit his outcomes.    Type of psychotherapeutic technique provided:   Client centered and Solution-focused    Progress toward short term goals:Poor progress, Client reports exacerbated PTSD symptoms increased his alcohol use between sessions.    Review of long term goals: Not done at today's visit . Date of last review 1/31/2017.    Diagnosis:   1. Chronic post-traumatic stress disorder (PTSD)    2. Severe alcohol use disorder    3. Major depressive disorder, recurrent episode, moderate     Worsening.     Plan and " Follow-up:   Patient will follow safety plan of seeking help from friend/family, calling [facility], calling 911, and/or presenting to the ED if necessary.  Patient will be compliant with medications and attend appointments as scheduled.  Patient will attend psychiatric provider meeting.  Patient will be compliant with medications.  Client will utilize strategies to reduce alcohol use, when possible.  Client will complete Rule 25.    Discharge Criteria/Planning: Client has chronic symptoms and ongoing therapy for maintenance stability recommended.    Signatures:   Performed and documented by KAYLA Long, LICSW.      This note was created with help of Dragon dictation software. Grammatical / typing errors are not intentional and inherent to the software.

## 2021-06-09 NOTE — TELEPHONE ENCOUNTER
"Patient called requesting to talk to either Diann or Ariana- would not divulge any information just said \"general information that I need to talk to either Diann or Debra about\"    Attempted warm transfer- phone went to .    Please call: [phone]        Thanks!  "

## 2021-06-09 NOTE — PROGRESS NOTES
"Mental Health Visit Note    Patient: Choco Claudio    : 1965 MRN: [id number]    Date: 2020   Start time: 1001  Stop Time: 1047   Session # 15    The patient has been notified of the following:   \"We have found that certain health care needs can be provided without the need for a face to face visit.  This service lets us provide the care you need with a phone conversation.  I will have full access to your [place] medical record during this entire phone call.   I will be taking notes for your medical record. Since this is like an office visit, we will bill your insurance company for this service.  There are potential benefits and risks of telephone visits (e.g. limits to patient confidentiality) that differ from in-person visits.?  Confidentiality still applies for telephone services, and nobody will record the visit.  It is important to be in a quiet, private space that is free of distractions (including cell phone or other devices) during the visit.?? If during the course of the call I believe a telephone visit is not appropriate, you will not be charged for this service\"  Consent has been obtained for this service by care team member: Yes, per verbal agreement     All of patient's visits will be via telephone during the pandemic due to patient's lack of ability to run phone and hypervigilance of people hearing or seeing him.     Session Type: Patient is participating in a telephone visit    Chief Complaint   Patient presents with      Follow Up     Telephone Visit Mental Health     New symptoms or complaints: None    Functional Impairment:   Personal: 4  Family: 2  Work: 2  Social:4          ASSESSMENT: Current Emotional / Mental Status (status of significant symptoms):              Patient denies personal safety concerns.               Patient denies current or recent suicidal ideation or behaviors.              Patient denies current or recent homicidal ideation or behaviors.              " Patient denies current or recent self injurious behavior or ideation.              Patient denies other safety concerns.              Patient denies changes in protective factors              Recommended that patient call 911 or go to the local ED should there be a change in any of these risk factors.                Attitude:                                   Cooperative               Orientation:                             Person, place, time, situation              Speech                          Rate / Production:       Pressure                          Volume:                       Elevated              Mood:                                      Down             Thought Content:                    Flight of ideas               Thought Form:                        Circulatory               Insight:                                     Poor      Patient's impression of their current status: Patient reported feeling down. Patient indicated he wants to be more connected with the native americans. Patient reported with everything going on he feels it is important to be part of his culture. Patient talked about how important his culture is to him and how he has not been connected. Patient indicated most things are closed now due to the virus so he is not able to go to pow wows or talk with people. Patient reported he is working to try and get out of the house due to not leaving the house since Saturday.     Therapist impression of patients current state: Patient appears to have poor insight into his mental health. This therapist processed ways he can connect with his phone to online pow wows and reaching out to support networks. This therapist processed with patient the importance of continuing to use skills taught in session when leaving the house.     Type of psychotherapeutic technique provided: Client centered and CBT    Progress toward short term goals: Progress as expected with patient continuing with scheduled  session and identifying needs at this time    Review of long term goals: Treatment Plan Updated on 4/14/2020 verbally due to season on the phone due to pandemic.        Diagnosis:  1. PTSD (post-traumatic stress disorder)    2. Delusional disorder (H)    3. Severe alcohol use disorder (H)        Plan and Follow up: Patient will continue with weekly ongoing therapy. Patient should use skills used during today's session to help with reducing anxiety and panic. Patient would benefit from continuing to reach out to his parents. Patient should work on sleep hygiene. Patient would benefit from working to connect on his phone with  websites.     Discharge Criteria/Planning: Client has chronic symptoms and ongoing therapy for maintenance stability recommended.    I have reviewed the note as documented above.  This accurately captures the substance of my conversation with the patient.  Marcella Nogueira [facility]
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
## 2021-06-09 NOTE — TELEPHONE ENCOUNTER
Patient called back and he said he is doing much better today.  He said Monday and Tuesday were rough for him.  He is aware of his appointment with Marcella on 8-4-20 at 10:00 am.  Patient said he just got back from a bus ride to get out of his apartment for a little bit which really helps his anxiety.  He states he is taking his medications.

## 2021-06-09 NOTE — PROGRESS NOTES
Mental Health Visit Note    This is a dual signature report.  My supervising clinician is LAWANDA Kaminski.    3/16/2017    Start time: 1405    Stop Time: 1455   Session # 8    Choco Claudio is a 51 y.o. male is being seen today for a follow-up psychotherapy appointment    Chief Complaint   Patient presents with      Follow Up   .     New symptoms or complaints:  None    Functional Impairment:   The patient identifies the how daily stressors affect daily functioning in today's session as follows (Scale is 1-4, 1=not at all or rarely; 4=extremely so/everyday.)    Personal: 4  Family: 4  Social: 4  Work: 4    Clinical assessment of mental status:   Choco Claudio presented on time.   He was oriented x3, open and cooperative, and dressed appropriately for this session and weather. His memory was Mildly impaired, per mother .  His speech was  Excessive and Dramatic.  Language was focused on desire for relief but not wanting to follow recommendations.  Concentration and focus is Within normal. Psychosis is not noted or reported. He reports his mood is Anxious and Depressed.  Affect is congruent with speech and is Anxious and Depressed.  Fund of knowledge is adequate. Insight is adequate for therapy.     Suicidal/Homicidal Ideation present:   No,  Patient denies suicidal and homicidal ideations/means or plans.      Patient's impression of their current status:  Patient presents session and invites his mother, Claudia, to join session.  Client indicates he desires this provider to communicate to his mother the client's concerns as he feels he has trouble doing so.  I assist mother and son in having a conversation regarding client's future housing situation and concerns surrounding his alcohol use.  Client's mother would desire client to complete a residential treatment program and then move into sober housing.  Client reports he'll not stop drinking without psychiatric medications to help with anxiety  secondary to post dramatic stress disorder.  Client reports once medications or play he refuses to attend residential treatment as he does not want to lose his job and he also does not want to live with strangers.  Client does indicate he is willing to attend outpatient treatment along with taking psychiatric medications.  Discussion was had regarding dangerousness of taking psychiatric medications if he continues to drink.  Client states he will discontinue drinking one psychiatric medications are in place.  Also, client's mother coordinate all of his care and client will need to become more independent if he is to seek additional services.    Therapist impression of patient's current state:   Choco Claudio presents with PTSD, alcohol use concerns, and depression.  The client's mother join session and is very direct and her concerns and/or desires for clients treatment.  Claudia appears caring though she appears frustrated at having spent many years coordinating her son's care.  The client's refusal to attend residential treatment is discouraging as this will be the likely recommendation from a rule 25.  In addition, I do not know if outpatient treatment would be enough for client to successfully manage his alcohol concerns.  Client's discussion of willingness to take psychiatric medications and then discontinue alcohol is notable.  It is unclear if client is able to follow through on this commitment as alcohol use has been a significant part of his life for many years.  My recommendation is for client to complete a rule 25, follow recommendations, and then discharged down to a lower level of care and/or sober housing.  Moreover, client would benefit from an intensive outpatient program which would focus on PTSD, such as [facility].    Type of psychotherapeutic technique provided:   Solution-focused and CBT    Progress toward short term goals:Progress as expected, Patient is in communication with  his mother regarding his needs.    Review of long term goals: Not done at today's visit . Date of last review 1/31/2017.    Diagnosis:   1. Chronic post-traumatic stress disorder (PTSD)    2. Severe alcohol use disorder    3. Major depressive disorder, recurrent episode, moderate     No Change.     Plan and Follow-up:   Patient will follow safety plan of seeking help from friend/family, calling [facility], calling 911, and/or presenting to the ED if necessary.  Patient will be compliant with medications and attend appointments as scheduled.  Patient will attend Rule 25, as scheduled by his mother.    Discharge Criteria/Planning: Client has chronic symptoms and ongoing therapy for maintenance stability recommended.    Signatures:   Performed and documented by KAYLA Long, MEREDITH.    I have read, discussed and agree the documentation as presented by KAYLA Long, LGSW.  Allyson Raymundo [facility]        This note was created with help of Dragon dictation software. Grammatical / typing errors are not intentional and inherent to the software.

## 2021-06-09 NOTE — TELEPHONE ENCOUNTER
Patient returned call asking to speak with Diann or Debra. Staff informed him that he can leave a message for Debra as staff works in triage.     Patient reported that he is having a hard time on deciding whether to get a 45 hang gun/weapon. He wants to know from Debra if it is ok for him to have. Patient also stated that he is a former  personal and can handle weapons. Patient stated that he feels safe and has no thoughts of harming himself.

## 2021-06-09 NOTE — PROGRESS NOTES
"Mental Health Visit Note    This is a dual signature report.  My supervising clinician is LAWANDA Kaminski.    3/21/2017    Start time: 1005    Stop Time: 1100   Session # 9    Choco Claudio is a 51 y.o. male is being seen today for a follow-up psychotherapy appointment    Chief Complaint   Patient presents with      Follow Up   .     New symptoms or complaints:  None    Functional Impairment:   The patient identifies the how daily stressors affect daily functioning in today's session as follows (Scale is 1-4, 1=not at all or rarely; 4=extremely so/everyday.)    Personal: 4  Family: 4  Social: 4  Work: 4    Clinical assessment of mental status:   Choco Claudio presented on time.   He was oriented x3, open and cooperative, and dressed appropriately for this session and weather. His memory was Mildly impaired per his mother's report .  His speech was  Excessive and Tangential.  Language was focused on desire for change.  Concentration and focus is Brief. Psychosis is not noted or reported. He reports his mood is Tearful, Anxious and Depressed.  Affect is congruent with speech and is Anxious and Depressed.  Fund of knowledge is adequate. Insight is adequate for therapy.     Suicidal/Homicidal Ideation present:   No,  Patient denies suicidal and homicidal ideations/means or plans.      Patient's impression of their current status:  Patient presents his session and states \"I hate being a loser\" and \"because I'm a , I am a bad person.\"  Discussion was had regarding previous session and patient reports being upset with this provider due to this provider \"taking my mom side\" in previous session.  Client reiterates his desire for outpatient versus residential/inpatient chemical dependency treatment.  Client reiterates his desire for psychiatric meds to manage anxiety and/or sleep (including nightmares).  Client reports a lifetime of people telling him he can't do things, and client " reports he has a history of doing those things and he was despite opposition.  Client educates he desires a stronger therapeutic relationship where his provider is strongly on his side.  Client indicates he'll see his primary care physician tomorrow to discuss his various aches and pains discussed in psychotherapy several weeks prior.  Client also indicates he does not want to return to living in a tent when his mom moves next year.  Client reports he lived in a tent for 8 years through all [place] seasons.  Client desires to start psychiatric medications, reduce alcohol use, and treat PTSD.    Therapist impression of patient's current state:   Choco Claudio presents with PTSD, alcohol use disorder, and depression.  Client indicates he believes I was triangulated to his mother's way of thinking in previous session.  Upon reflection this may be the case and I work to repair the relationship with client.  Client is offered an apology and my way of thinking is clarified.  I discussed the client with desire to see him succeed in chemical dependency treatment as his alcohol use appears dangerous.  Client requests I said with him despite our differences regarding level of care and I indicate client I will maintain my beliefs though support him in any way I can.  Relationship repair appears to be successful and client begins discussing other topics including primary care physician visit and his desires for the future.  Of note, client requests I attend his psychiatry appointment in April 2017 with him.  He desires my support in communicating to his new provider as he feels he is not good at saying what is on his mind.  I will connect with this provider to check on this possibility.  I will advise client if possible, this would be a singular event.    Type of psychotherapeutic technique provided:   Client centered    Progress toward short term goals:Progress as expected, Client is working to schedule 25 in  accordance with his mother's help.    Review of long term goals: Not done at today's visit . Date of last review 1/31/2017.    Diagnosis:   1. Chronic post-traumatic stress disorder (PTSD)    2. Severe alcohol use disorder    3. Major depressive disorder, recurrent episode, moderate     No Change.     Plan and Follow-up:   Patient will follow safety plan of seeking help from friend/family, calling [facility], calling 911, and/or presenting to the ED if necessary.  Patient will be compliant with medications and attend appointments as scheduled.  Patient will present to primary care physician appointment tomorrow and discuss his various medical concerns.  Client will continue to limit alcohol intake, as possible.  Client will attend rule 25, as scheduled.    Discharge Criteria/Planning: Client has chronic symptoms and ongoing therapy for maintenance stability recommended.    Signatures:   Performed and documented by KAYLA Long, LGSARINA.        This note was created with help of Dragon dictation software. Grammatical / typing errors are not intentional and inherent to the software.

## 2021-06-09 NOTE — PROGRESS NOTES
"Mental Health Visit Note    Patient: Choco Claudio    : 1965 MRN: [id number]    Date: 2020   Start time: 1003  Stop Time: 1043   Session # 16    The patient has been notified of the following:   \"We have found that certain health care needs can be provided without the need for a face to face visit.  This service lets us provide the care you need with a phone conversation.  I will have full access to your [place] medical record during this entire phone call.   I will be taking notes for your medical record. Since this is like an office visit, we will bill your insurance company for this service.  There are potential benefits and risks of telephone visits (e.g. limits to patient confidentiality) that differ from in-person visits.?  Confidentiality still applies for telephone services, and nobody will record the visit.  It is important to be in a quiet, private space that is free of distractions (including cell phone or other devices) during the visit.?? If during the course of the call I believe a telephone visit is not appropriate, you will not be charged for this service\"  Consent has been obtained for this service by care team member: Yes, per verbal agreement     All of patient's visits will be via telephone during the pandemic due to patient's lack of ability to run phone and hypervigilance of people hearing or seeing him.     Session Type: Patient is participating in a telephone visit    Chief Complaint   Patient presents with      Follow Up     Telephone Visit Mental Health     New symptoms or complaints: None    Functional Impairment:   Personal: 4  Family: 2  Work: 2  Social:4          ASSESSMENT: Current Emotional / Mental Status (status of significant symptoms):              Patient denies personal safety concerns.               Patient denies current or recent suicidal ideation or behaviors.              Patient denies current or recent homicidal ideation or behaviors.              " Patient denies current or recent self injurious behavior or ideation.              Patient denies other safety concerns.              Patient denies changes in protective factors              Recommended that patient call 911 or go to the local ED should there be a change in any of these risk factors.                Attitude:                                   Cooperative               Orientation:                             Person, place, time, situation              Speech                          Rate / Production:       Pressure                          Volume:                       Elevated              Mood:                                      Worried             Thought Content:                    Flight of ideas               Thought Form:                        Circulatory               Insight:                                     Poor      Patient's impression of their current status: Patient indicated feeling worried. Patient reported he has not been able to get a hold of his friend. Patient indicated he has reached out mutual friends and they have not heard either. Patient reported he has found it best to get out of his apartment daily. Patient indicated he has been going to beach and this is bringing him taz. Patient reported he has even found someone who he is comfortable talking with at the beach. Patient indicated he continues to look over his shoulder all the time.     Therapist impression of patients current state: Patient appears to have poor insight into his mental health. This therapist challenged patient on the importance of continuing to get out of the apartment daily. This therapist processed with patient ways to continue to work on being comfortable with being out of the apartment even with his PTSD symptoms.     Type of psychotherapeutic technique provided: Client centered and CBT    Progress toward short term goals: Progress as expected with patient continuing with scheduled session,  getting out of his apartment and identifying needs at this time    Review of long term goals: Treatment Plan Updated on 4/14/2020 verbally due to season on the phone due to pandemic.        Diagnosis:  1. PTSD (post-traumatic stress disorder)    2. Delusional disorder (H)    3. Severe alcohol use disorder (H)        Plan and Follow up: Patient will continue with weekly ongoing therapy. Patient should use skills used during today's session to help with reducing anxiety and panic. Patient would benefit from continuing to reach out to his parents. Patient should work on sleep hygiene. Patient would benefit from working to connect on his phone with  [employer].     Discharge Criteria/Planning: Client has chronic symptoms and ongoing therapy for maintenance stability recommended.    I have reviewed the note as documented above.  This accurately captures the substance of my conversation with the patient.  Marcella Nogueira [facility]

## 2021-06-10 NOTE — TELEPHONE ENCOUNTER
Date of Last Office Visit: 8-4-20  Date of Next Office Visit: tomorrow 8-13-20  No shows since last visit: 0  Cancellations since last visit: 1 provider initiated  ED visits since last visit:  0  Medication prazosin  date last ordered: 5-29-20  Qty: 60  Refills: 1  Lapse in therapy greater than 7 days: yes  Medication refill request verified as identical to current order: yes  Result of Last DAM, VPA, Li+ Level, CBC, or Carbamazepine Level (at or since last visit): N/A     [x] Medication refilled per [facility] M-1.   [] Medication unable to be refilled by RN due to criteria not met as indicated below:     []Eligibility - not seen in last year    []Supervision - no future appointment    []Compliance     []Verification - order discrepancy    []Controlled Medication    []Medication not included in RN Protocol    []90 - day supply request    [x]Other-patient has appointment tomorrow   Current Medication list:  Your Current Medications Are     acetaminophen (TYLENOL) 500 MG tablet  Take 1,000 mg by mouth every 6 (six) hours as needed for pain.    cetirizine (ZYRTEC) 10 MG tablet  Take 10 mg by mouth at bedtime.    omeprazole (PRILOSEC) 20 MG capsule  Take 20 mg by mouth daily as needed.    prazosin (MINIPRESS) 2 MG capsule  Take 2 capsules (4 mg total) by mouth at bedtime.    QUEtiapine (SEROQUEL) 200 MG tablet  Take 1 tablet (200 mg total) by mouth at bedtime.    traZODone (DESYREL) 100 MG tablet  TAKE 1 TABLET(100 MG) BY MOUTH AT BEDTIME        Medication Plan of Care at last office visit with MD/CNP:  Plan   .-Continue with current medications Trazodone  100 mg at Bedtime, continue  Prazosin to  4 mg at bedtime -PTSD, Continue Seroquel  200 mg  at bedtime - psychosis , delusional thoughts  2- Highly recommend outpatient CD treatment Alcohol Use Disorder - Severe. Pt  opposed to CD treatment .  Continues to use alcohol when he can afford it.  3- Continue with  Psychotherapy:  4- 5-Make efforts to stay away from alcohol and  drug  5- RTC- 2  weeks call in between visit with any questions or concerns

## 2021-06-10 NOTE — PROGRESS NOTES
"Mental Health Visit Note    Patient: Choco Claudio    : 1965 MRN: [id number]    Date: 2020   Start time: 1003  Stop Time: 1043  Session # 23    The patient has been notified of the following:   \"We have found that certain health care needs can be provided without the need for a face to face visit.  This service lets us provide the care you need with a phone conversation.  I will have full access to your [place] medical record during this entire phone call.   I will be taking notes for your medical record. Since this is like an office visit, we will bill your insurance company for this service.  There are potential benefits and risks of telephone visits (e.g. limits to patient confidentiality) that differ from in-person visits.?  Confidentiality still applies for telephone services, and nobody will record the visit.  It is important to be in a quiet, private space that is free of distractions (including cell phone or other devices) during the visit.?? If during the course of the call I believe a telephone visit is not appropriate, you will not be charged for this service\"  Consent has been obtained for this service by care team member: Yes, per verbal agreement     All of patient's visits will be via telephone during the pandemic due to patient's lack of ability to run phone and hypervigilance of people hearing or seeing him.     Session Type: Patient is participating in a telephone visit    Chief Complaint   Patient presents with     MH Follow Up     Telephone Visit Mental Health     New symptoms or complaints: None    Functional Impairment:   Personal: 4  Family: 2  Work: 2  Social:4          ASSESSMENT: Current Emotional / Mental Status (status of significant symptoms): Reviewed on 2020              Patient denies personal safety concerns.               Patient denies current or recent suicidal ideation or behaviors.              Patient denies current or recent homicidal ideation or " behaviors.              Patient denies current or recent self injurious behavior or ideation.              Patient denies other safety concerns.              Patient denies changes in protective factors              Recommended that patient call 911 or go to the local ED should there be a change in any of these risk factors.                Attitude:                                   Cooperative               Orientation:                             Person, place, time, situation              Speech                          Rate / Production:       Within normal                           Volume:                       Soft              Mood:                                      Anxious             Thought Content:                    Flight of ideas               Thought Form:                        Circulatory               Insight:                                     Poor      Patient's impression of their current status: Patient reported feeling anxious. Patient indicated today does not seem like a good day for him to leave the house. Patient reported the thought of having to be around anyone causes him extreme anxiety. Patient indicated he is also very worried about losing his housing. Patient reported he has not done anything that would cause him to lose his housing but worried about the funding. Patient indicated he has not been in contact with his  recently. Patient reported he is still working on trying to meet new people but does not feel he knows how to make friends.     Therapist impression of patients current state: Patient appears to have poor insight into his mental health. This therapist processed with patient needing to reach out to his  to ensure that there is nothing that needs to be done with his housing. This therapist processed with patient ways to start a conversation with people and see if they have anything in common.     Type of psychotherapeutic technique provided:  Client centered and CBT    Progress toward short term goals: Progress as expected with patient continuing with scheduled session and getting out of the house with his mom.     Review of long term goals: Treatment Plan Updated on 7/23/2020 verbally due to season on the phone due to pandemic.        Diagnosis:  1. PTSD (post-traumatic stress disorder)    2. Delusional disorder (H)        Plan and Follow up: Patient will continue with weekly ongoing therapy. Patient would benefit from reaching out to his  to get details related to his housing questions. Patient should continue to work on looking at facts instead of jumping to conclusions.     Discharge Criteria/Planning: Client has chronic symptoms and ongoing therapy for maintenance stability recommended.    I have reviewed the note as documented above.  This accurately captures the substance of my conversation with the patient.  Marcella Nogueira [facility]

## 2021-06-10 NOTE — PROGRESS NOTES
Psychiatric  Progress Note  Date of visit:5/16/2017         Discussion of Care and Treatment Recommendations:   Choco Claudio is a 51 y.o. male with a significant history of Alcohol Use Disorder, Etoh induced gastritis , DENISE and PTSD who presents to the clinic today     Last visit  3/28/17 Recommendation at last visit     1: Ambulatory referral to CD. Recommend Inpatient CD treatment but patient unwilling to go in patient but willing to go to outpatient CD, therefore recommend outpatient CD treatment   2 Start Prazosin 1 mg daily at bedtime, - PTSD  3. Continue with Trazodone 100 mg daily at bedtime  4. Return to clinic in 6 weeks       Patient and I reviewed diagnosis and treatment plan and patient agrees with following recommendations:  Ongoing education given regarding diagnostic and treatment options with adequate verbalization of understanding.  Plan   1. Stop Prazosin - per patient request  2- Continue with current medications Trazodone  100 mg at Bedtime, gabapentin 100  mg at bedtime,   3- Highly recommend Inpatient CD treatment Alcohol Use Disorder - Severe. Pt  opposed to CD treatment   4- Continue with psychotherapy  5-Make efforts to stay away from alcohol and drug  ^- RTC- 6 weeks          Diagnoses:   PTSD  Alcohol Use Disorder , Severe       Patient Active Problem List   Diagnosis     Chest pain     Alcohol withdrawal, with unspecified complication     Sinus tachycardia     Lactic acidosis     Dehydration     Microcytosis     Non-traumatic rhabdomyolysis     Nausea     Alcoholic ketoacidosis     Epigastric pain     Alcoholism, chronic     High anion gap metabolic acidosis     Hypomagnesemia     Alcohol intoxication, uncomplicated     GI bleed     Anemia associated with acute blood loss     Disorder due to alcohol abuse     Substance induced mood disorder     Anxiety disorder     History of posttraumatic stress disorder (PTSD)     Erosive esophagitis             Chief Complaint / Subjective:   "  Chief complaint: \" I just cannot take any other medications \"      History of Present Illness:   Patient presented to the appointment by himself.  He told me that he would be comfortable if his therapist HORTENCIA Rios was present.  I did ask him he was available and he was in his start in the interview for a few minutes before he left to see his next patient.  Patient remained pleasant and engaged.  He tells me that prazosin has not walked for him it makes him restless and so he stopped taking it.  He does not want to continue taking any medications for PTSD symptoms.  He continues to report PTSD symptoms which he says that it feels like a repeat nail plate injury and is setting him up.  He also tells me that he continues to look around for explosive on the ground.  He also tells me that he continues to drink alcohol sometimes a bottle of vodka ×3-4 cans of beer.  He still opposed to trying inpatient CD treatment. he would like to go to an outpatient CD treatment that he is run by  [place] all of his own because he tells me he does not trust anybody else.  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  He denies delusions.  He denies paranoia but he displays symptoms of paranoia and tells me that he only trusts his therapist and she mentioned people from his native [place] only.  Spent a lot of time trying to educate him of the importance of addressing his severe alcohol use disorder the detrimental effects of his health and her interactions with his medications rendering them to have sub therapeutic effects for him.  Patient endorsed understanding but remains skeptical to aggressively find and agreeing to my recommendation for CD treatment.  There is also some elements of patient's presentation that make me lean towards him having some psychotic features which I will try and explore more during our next visit.  I did propose " treating his PTSD symptoms with SSRI's are trying a low-dose of Seroquel to see if his symptoms will rika given my suspicion of psychosis but the patient is however posterior trying any kind of new medication.  He tells me the only medication he wants to take is trazodone.  At this point I will continue working closely with patient and his therapist to encourage him to seek CD treatment.  I will make no changes to his medication as he is strongly opposed to any changes.  We will also collaborate to his mother who sets up his medications to have a good feeling of how he is taking his medications.  Patient tells me that he is not suicidal or homicidal and that he feels safe and verbally contracts for safety.  I will see patient back in 6 weeks    Mental Status Examination:   General: Adequate hygiene, cooperative  Speech: Normal in rate and tone  Language: Intact  Thought process: Coherent  Thought content: Denies  of delusions and hallucinations. I suspect some delusions .  Paranoia present   Suicidal thoughts: Absent  Homicidal thoughts: Absent  Associations: Connected  Affect: Neutral  Mood:Neutral   Intellectual functioning: Within normal limits  Memory: Within normal limits  Fund of knowledge: Fair  Attention and concentration: Within normal limits  Gait: Steady  Psychomotor activity: Calm, no agitation  Muscles: No atrophy, no abnormal movements  InSight and judgment: Fair    Medication changes: see above   Medication adherence: compliant  Medication side effects: absent  Information about medications: Side effects, benefits and alternative treatments discussed and patient agrees with capacity to do so.    Psychotherapy: Supportive therapy day-to-day living    Education: Diet, exercise, abstinence from drugs and alcohol, patient will not drive if sedated and medications or  under influence of any substance    Lab Results:  Personally reviewed and discussed with the patient    Lab Results   Component Value Date     WBC 7.9 01/16/2017    HGB 10.3 (L) 01/16/2017    HCT 29.9 (L) 01/16/2017     (L) 01/16/2017    ALT 11 01/15/2017    AST 15 01/15/2017     01/16/2017    K 3.7 01/16/2017     01/16/2017    CREATININE 0.64 (L) 01/16/2017    BUN 6 (L) 01/16/2017    CO2 20 (L) 01/16/2017    INR 1.15 (H) 01/14/2017       Vital signs:  Vitals:    05/16/17 1157   BP: 134/76   Patient Site: Right Arm   Patient Position: Sitting   Cuff Size: Adult Regular   Pulse: (!) 103   Temp: 98.4  F (36.9  C)   TempSrc: Oral   Height: 6' (1.829 m)     Allergies: Review of patient's allergies indicates no known allergies.         Medications:     Current Outpatient Prescriptions on File Prior to Visit   Medication Sig Dispense Refill     cetirizine (ZYRTEC) 10 MG tablet Take 10 mg by mouth daily.       traZODone (DESYREL) 100 MG tablet Take 1 tablet (100 mg total) by mouth at bedtime. 30 tablet 1     folic acid (FOLVITE) 1 MG tablet Take 1 mg by mouth daily.       gabapentin (NEURONTIN) 100 MG capsule Take 100 mg by mouth bedtime as needed (anxiety).  0     magnesium chloride (SLOW-MAG) 64 mg TbEC delayed-release tablet Take 64 mg by mouth 2 (two) times a day.       omeprazole (PRILOSEC) 20 MG capsule Take 1 capsule (20 mg total) by mouth 2 (two) times a day before meals. Take for 2 months then daily therafter, refills per primary MD 60 capsule 0     thiamine 100 MG tablet Take 100 mg by mouth daily.       [DISCONTINUED] prazosin (MINIPRESS) 1 MG capsule Take 1 capsule (1 mg total) by mouth at bedtime. 30 capsule 1     No current facility-administered medications on file prior to visit.             Review of Systems:    Otherwise reminder of review of systems is negative    Coordination of Care:   More than 25 minutes spent on this visit  with more than 50% of time spent on coordination of care including: Educating patient about diagnosis, prognosis, side effects and benefits of medications, diet, exercise.  Time also spent on  entering orders and preparing documentation for the visit.Time also spent providing supportive therapy regarding above issues.    This note was created using a dictation system. All typing errors or contextual distortion is unintentional and software inherent.

## 2021-06-10 NOTE — TELEPHONE ENCOUNTER
----- Message from Mitrha Caceres CMA sent at 8/17/2020 10:43 AM CDT -----  Regarding: Schedule Echo  Can we see if we can get an Echo scheduled before his appointment on Friday?

## 2021-06-10 NOTE — TELEPHONE ENCOUNTER
Clients Mom called / No rina   Per Mom client told her to call.   Per client pharmacy will not re-fill:   prazosin (MINIPRESS) 2 MG capsule    Needs refill order.

## 2021-06-10 NOTE — PROGRESS NOTES
Pt in for follow up.  Accompanied by therapist, Bo Gonzalez.      Pt states he stopped taking prazosin after 1 dose due to feeling jumpy and jittery.      Unsure of other medications, states his mother sets up meds for him.    Placed phone call to mother, Claudia Farah.  Updated med list according to what she reports he is taking.  Per mother, pt is drinking daily, basically if he has money, he is drinking.  She holds meds if pt is intoxicated.  Mother states, pt can be charming, but has been banned from most bars in [place].  Also, per mother, pt had stalked a  that worked in a local bar, this was within the last 2 years.

## 2021-06-10 NOTE — PROGRESS NOTES
Correct pharmacy verified with patient and confirmed in snapshot? [x] yes []no    Medications Phoned  to Pharmacy [] yes [x]no  Name of Pharmacist:  List Medications, including dose, quantity and instructions      Medication Prescriptions given to patient   [] yes  [x] no   List the name of the drug the prescription was written for.       Medications ordered this visit were e-scribed.  Verified by order class [] yes  [x] no    Medication changes or discontinuations were communicated to patient's pharmacy: [] yes  [x] no    UA collected [] yes    [x] no    [place] Prescription Monitoring Program Reviewed? [x] yes  [] no    Referrals were made to:  No referrals were made    Future appointment was made: [x] yes  [] no 6/27/17    Dictation completed at time of chart check: [] yes  [x] no    I have checked the documentation for today s encounters and the above information has been reviewed and completed.

## 2021-06-10 NOTE — PROGRESS NOTES
Mental Health Visit Note    4/20/2017    Start time: 1305    Stop Time: 1356   Session # 13    Choco Claudio is a 51 y.o. male is being seen today for a follow-up psychotherapy appointment    Chief Complaint   Patient presents with      Follow Up   .     New symptoms or complaints:  None    Functional Impairment:   The patient identifies the how daily stressors affect daily functioning in today's session as follows (Scale is 1-4, 1=not at all or rarely; 4=extremely so/everyday.)    Personal: 3  Family: 3  Social: 3  Work: 3    Clinical assessment of mental status:   Choco Claudio presented on time.   He was oriented x3, open and cooperative, and dressed appropriately for this session and weather. His memory was Mildly impaired .  His speech was  Excessive and Tangential.  Language was focused on work stressors.  Concentration and focus is Brief. Psychosis is not noted or reported. He reports his mood is Anxious.  Affect is congruent with speech and is Anxious.  Fund of knowledge is adequate. Insight is adequate for therapy.     Suicidal/Homicidal Ideation present:   No,  Patient denies suicidal and homicidal ideations/means or plans.      Patient's impression of their current status:  Patient reports improved stabilization and anxiety levels secondary to avoiding television news between sessions.  Client reports he was watching significant amounts of television news which was making him extremely anxious.  Discussion regarding patient's daily routines and problem solving around how he can maintain low levels of anxiety without alcohol use.  Client discusses workplace stressors and his workplace routine.  With efforts at redirection the client is able to identify periods within his work day that are not extremely anxiety provoking.  Client desires to continue to manage anxiety levels which improves mood, improves his ability to relax, and lowers his need for alcohol use to manage  symptomology.    Therapist impression of patient's current state:   Choco Claudio presents with PTSD, alcohol use disorder, and depression.  Client's compliance with between session activity of avoiding television news yields benefits, per his report.  Client likely to continue to benefit from avoiding of television news and also limiting exposure to unnecessary stressors which exacerbated PTSD symptoms.  Client continues to report alcohol use between sessions though does report alcohol use is within his normal range and he has not required additional alcohol to manage symptomology.  Client to build exceptions to concrete thinking around stress levels in his workday.  Client requires significant redirection to find exceptions to high levels of stress, and he is able to do so.  Client is agreeable to continued avoidance of television news and also to be mindful of lower levels of stress during his workday.    Type of psychotherapeutic technique provided:   CBT    Progress toward short term goals:Progress as expected, Client limits his exposure to television news between sessions.    Review of long term goals: Not done at today's visit . Date of last review 1/31/2017.    Diagnosis:   1. Chronic post-traumatic stress disorder (PTSD)    2. Severe alcohol use disorder    3. Major depressive disorder, recurrent episode, moderate     Improving.     Plan and Follow-up:   Patient will follow safety plan of seeking help from friend/family, [place] [facility], and/or presenting to the ED if necessary.  Patient will be compliant with medications and attend appointments as scheduled.  Patient will limit his exposure to television news.  Client will be mindful of periods of lower stress levels during his workday.    Discharge Criteria/Planning: Client has chronic symptoms and ongoing therapy for maintenance stability recommended.    Signatures:   Performed and documented by KAYLA Long, LICSW.      This  note was created with help of Dragon dictation software. Grammatical / typing errors are not intentional and inherent to the software.

## 2021-06-10 NOTE — PROGRESS NOTES
"Choco Claudio is a 54 y.o. male who is being evaluated via a billable telephone visit.      The patient has been notified of following:     \"This telephone visit will be conducted via a call between you and your physician/provider. We have found that certain health care needs can be provided without the need for a physical exam.  This service lets us provide the care you need with a short phone conversation.  If a prescription is necessary we can send it directly to your pharmacy.  If lab work is needed we can place an order for that and you can then stop by our lab to have the test done at a later time.    Telephone visits are billed at different rates depending on your insurance coverage. During this emergency period, for some insurers they may be billed the same as an in-person visit.  Please reach out to your insurance provider with any questions.    If during the course of the call the physician/provider feels a telephone visit is not appropriate, you will not be charged for this service.\"    Patient has given verbal consent to a Telephone visit? Yes      Patient would like to receive their AVS by AVS Preference: Mail a copy.    Psychiatric  Out patient Follow Up Progress Note  Date of visit:8/13/2020         Discussion of Care and Treatment Recommendations:   This is a 54 y.o. male with significant history of Alcohol Use Disorder, Etoh induced gastritis , DENISE and PTSD, Delusional Disorder         Last visit  05/29/2020  Recommendation at last visit .  .-Continue with current medications Trazodone  100 mg at Bedtime, continue  Prazosin to  4 mg at bedtime -PTSD, Continue Seroquel  200 mg  at bedtime - psychosis , delusional thoughts  2- Highly recommend outpatient CD treatment Alcohol Use Disorder - Severe. Pt  opposed to CD treatment .  Continues to use alcohol when he can afford it.  3- Continue with  Psychotherapy:  4- 5-Make efforts to stay away from alcohol and drug  5- RTC- 2  weeks call in between " "visit with any questions   Patient and I reviewed diagnosis and treatment plan and patient agrees with following recommendations:  Ongoing education given regarding diagnostic and treatment options with adequate verbalization of understanding.  Plan   1.-Continue with current medications Trazodone  100 mg at Bedtime, continue  Prazosin to  4 mg at bedtime -PTSD, Continue Seroquel  200 mg  at bedtime - psychosis , delusional thoughts  2- Highly recommend outpatient CD treatment Alcohol Use Disorder - Severe. Pt  opposed to CD treatment .  Continues to use alcohol when he can afford it.  3- Continue with  Psychotherapy:  4- 5-Make efforts to stay away from alcohol and drug  5- RTC- 2  weeks call in between visit with any questions          DIagnoses:     Delusional disorder R/O Schizophrenia   PTSD  Alcohol Use Disorder , Severe       Patient Active Problem List   Diagnosis     Chest pain     Alcohol withdrawal, with unspecified complication (H)     Sinus tachycardia     Lactic acidosis     Dehydration     Microcytosis     Non-traumatic rhabdomyolysis     Nausea     Alcoholic ketoacidosis     Epigastric pain     Alcoholism, chronic (H)     High anion gap metabolic acidosis     Hypomagnesemia     Alcohol intoxication, uncomplicated (H)     GI bleed     Anemia associated with acute blood loss     Disorder due to alcohol abuse (H)     Substance induced mood disorder (H)     Anxiety disorder     History of posttraumatic stress disorder (PTSD)     Erosive esophagitis     Polyarthralgia     Cervical spondylosis     VIKI positive     Inflammatory arthritis     Arthritis of right ankle     Gouty arthritis of toe of left foot     Pulmonary embolism (H)     Primary osteoarthritis involving multiple joints     Supratherapeutic INR     Hypokalemia     Epistaxis             Chief Complaint / Subjective:    Chief complaint: \" I have not been sleeping very well \"     History of Present Illness:   Per patient's statement : He has been " struggling with PTSD flashback memories especially in the evening into the nighttime and not sleeping very well at night.  During the day he has been trying to keep busy by going for walks but he is disappointed that he is not able to take enough bus rides like he would like to reduce the pandemic.  He was thinking of finding a job to keep him busy and occupied and distracted himself from flashback memories but he is worried that if he starts making money he will lose his government subsidized housing.  We discussed current medications benefits and side effects profile.  He reports that he has been compliant with current medications.  Also reports that he only is not drinking alcohol about 3 cans of beers on Saturdays only.  I applauded him for this effort because previously he had been drinking almost daily.  He is not smoking any cigarettes and not using any drugs.  We discussed possibility of adding daytime Seroquel at 25 mg and also increasing his bedtime Seroquel to 300 mg.  Patient is not interested in medications at this time.  States he will be speaking with his therapist to find ways to help him distract himself and if this does not work then we can discuss more about medications and on next visit.  He denies auditory or visual hallucinations.  Denies suicidal homicidal ideations.  He endorses some level of anxiety and paranoia.  He denies delusions however.  He states he feels safe and verbally contracts for safety and reports that he would follow-up clinic on 911 should he feel overwhelmed or unsafe.  Mental Status Examination:   General: Alert and oriented x 3   Speech: Normal in rate and tone  Language: Intact  Thought process: Coherent  Thought content:                           Auditory hallucinations-Denies                          Visual Hallucinations - Denies                         Delusions: Present - chronic                           Loose Associations:  No                          Suicidal  thoughts: Denies                          Homicidal thoughts:Denies                        Affect: Neutral  Mood: Neutral   Intellectual functioning: Within normal limits  Memory: Within normal limits  Fund of knowledge: Average  Attention and concentration: Within normal limits  Gait: Unable to assess telephone visit  Psychomotor activity: Unable to assess telephone visit  Muscles: Unable to assess telephone visit  InSight and judgment: Fair      Drug/treatment history and current pattern of use:     Medication changes: See Above   Medication adherence: compliant  Medication side effects: absent  Information about medications: Side effects, benefits and alternative treatments discussed and patient agrees .    Psychotherapy: Supportive therapy day-to-day living    Education: Diet, exercise, abstinence from drugs and alcohol, patient will not drive if sedated and medications or  under influence of any substance    Lab Results:   Personally reviewed and discussed with the patient    Lab Results   Component Value Date    WBC 7.8 02/24/2020    HGB 15.2 02/24/2020    HCT 48.0 02/24/2020     02/24/2020    ALT 19 05/08/2019    AST 26 05/08/2019     02/24/2020    K 3.8 02/24/2020     02/24/2020    CREATININE 0.88 02/24/2020    BUN 7 (L) 02/24/2020    CO2 24 02/24/2020    TSH 2.17 03/24/2018    INR 1.05 01/28/2020       Vital signs:  There were no vitals taken for this visit.  Unable to assess telephone visit  Allergies: Patient has no known allergies.           Review of Systems:      ROS:       10 point ROS was negative except for the items listed in HPI- Subjective data only              Medications:     Current Outpatient Medications on File Prior to Visit   Medication Sig Dispense Refill     acetaminophen (TYLENOL) 500 MG tablet Take 1,000 mg by mouth every 6 (six) hours as needed for pain.       cetirizine (ZYRTEC) 10 MG tablet Take 10 mg by mouth at bedtime.        cetirizine (ZYRTEC) 10 MG tablet  Take 10 mg by mouth.       omeprazole (PRILOSEC) 20 MG capsule Take 20 mg by mouth daily as needed.        prazosin (MINIPRESS) 2 MG capsule Take 2 capsules (4 mg total) by mouth at bedtime. 60 capsule 0     [DISCONTINUED] QUEtiapine (SEROQUEL) 200 MG tablet Take 1 tablet (200 mg total) by mouth at bedtime. 90 tablet 0     [DISCONTINUED] traZODone (DESYREL) 100 MG tablet TAKE 1 TABLET(100 MG) BY MOUTH AT BEDTIME 90 tablet 0     No current facility-administered medications on file prior to visit.                  Coordination of Care:   More than 25 minutes spent on this visit  with more than 50% of time spent on coordination of care including: Educating patient about diagnosis, prognosis, side effects and benefits of medications, diet, exercise.  Time also spent providing supportive therapy regarding above issues.    This note was created using a dictation system. All typing errors or contextual distortion is unintentional and software inherent.  Phone call duration: 25 minutes    Debra Lane NP

## 2021-06-10 NOTE — PROGRESS NOTES
Mental Health Visit Note    5/16/2017    Start time: 1105    Stop Time: 1145   Session # 17    Choco Claudio is a 51 y.o. male is being seen today for a follow-up psychotherapy appointment    Chief Complaint   Patient presents with      Follow Up   .     New symptoms or complaints:  None    Functional Impairment:   The patient identifies the how daily stressors affect daily functioning in today's session as follows (Scale is 1-4, 1=not at all or rarely; 4=extremely so/everyday.)    Personal: 4  Family: 4  Social: 4  Work: 4    Clinical assessment of mental status:   Choco Claudio presented on time.   He was oriented x3, open and cooperative, and dressed appropriately for this session and weather. His memory was Moderate ly impaired .  His speech was  Excessive and Tangential.  Language was focused on desire for symptom relief.  Concentration and focus is Within normal. Psychosis is not noted or reported. He reports his mood is Anxious and Depressed.  Affect is congruent with speech and is Anxious and Depressed.  Fund of knowledge is adequate. Insight is adequate for therapy.     Suicidal/Homicidal Ideation present:   No,  Patient denies suicidal and homicidal ideations/means or plans.      Patient's impression of their current status:  Patient reports a session and indicates increased in symptomology.  Client reports conflict with father in the home may have been triggering.  Client reports having no notable experiences over the weekend at work that would have triggered his symptoms  Client reports ongoing intrusive thoughts [place] and also regarding several friends who completed suicides.  Client denies suicidal ideation and commits to safety in community.  Client requests I accompany him to psychiatry appointment happening directly after psychotherapy and I report I will attempt to transition him to other providers.  I reminded client of efforts to  connect him with an [facility] worker.  Client reports he continues to desires of service.  Client continues to decline CD treatment, step up to higher level of mental health care, group residential housing, and group therapy.    Therapist impression of patient's current state:   Choco Claudio presents with PTSD, depression, and severe alcohol use disorder.  Client symptoms are notably exacerbated in today's session and he repetitively speaks about needing to ensure his safety.  I do assess science safety in community and he reports himself, and others, are safe if he is in the community.  Client continues to report invalidating home environment which may also exacerbate his mental symptoms.  Client is unwilling for referral to group residential housing.  We discussed intrusive thoughts and client reports feeling responsible for actions of his friends who ended her life.  I attempt to bring in a cognitive approach and question responsibility percentages though client has difficulty with this concept.  Transition client to psychiatry services.  Client referred to [facility] services.    Type of psychotherapeutic technique provided:   CBT    Progress toward short term goals:Client reports continued alcohol use to manage symptoms.    Review of long term goals: Not done at today's visit . Date of last review 01/31/2017.    Diagnosis:   1. Chronic post-traumatic stress disorder (PTSD)    2. Severe alcohol use disorder    3. Major depressive disorder, recurrent episode, moderate     Worsening.     Plan and Follow-up:   Patient will follow safety plan of seeking help from friend/family, calling [facility] crisis, calling 911, and/or presenting to the ED if necessary.  Patient will be compliant with medications and attend appointments as scheduled.  Client is agreeable to harm reduction efforts and reducing the amount of alcohol he purchases in an effort to reduce his overall blood alcohol level.    Discharge  Criteria/Planning: Client has chronic symptoms and ongoing therapy for maintenance stability recommended.    Signatures:   Performed and documented by KAYLA Long, LICSW.      This note was created with help of Dragon dictation software. Grammatical / typing errors are not intentional and inherent to the software.

## 2021-06-10 NOTE — PROGRESS NOTES
"Mental Health Visit Note    4/27/2017    Start time: 1310    Stop Time: 1400   Session # 14    Choco Claudio is a 51 y.o. male is being seen today for a follow-up psychotherapy appointment    Chief Complaint   Patient presents with      Follow Up   .     New symptoms or complaints:  Client reports exacerbated PTSD symptoms secondary to speaking with former  personnel in the community.    Functional Impairment:   The patient identifies the how daily stressors affect daily functioning in today's session as follows (Scale is 1-4, 1=not at all or rarely; 4=extremely so/everyday.)    Personal: 4  Family: 4  Social: 4  Work: 4    Clinical assessment of mental status:   Choco Claudio presented on time.   He was oriented x3, open and cooperative, and dressed appropriately for this session and weather. His memory was Moderate ly impaired.  His speech was  Excessive and Tangential.  Language was focused on symptoms and desire for symptom relief.  Concentration and focus is Brief. Psychosis is not noted or reported. He reports his mood is Anxious and Depressed.  Affect is congruent with speech and is Anxious and Depressed.  Fund of knowledge is adequate. Insight is adequate for therapy.  Patient has notable body odor.    Suicidal/Homicidal Ideation present:   No,  Patient denies suicidal and homicidal ideations/means or plans.      Patient's impression of their current status:  Patient reports increased PTSD symptomology secondary to speaking with a former  member in the community.  Client reports this conversation [place] him [place] \"[place] airport\" or client feels his PTSD symptoms originate.  Client indicates due to increased PTSD symptoms he has increased his alcohol use.  Client discusses desire for symptom relief.  Client refuses to follow recommendations of Rule 25 completed at [employer] [place], refuses to step up to a higher level of care to treat mental health concerns, and refuses to limit his " alcohol intake independently.  Discussion was had regarding client's alcohol use being potentially dangerous with possible consequence of death.  Client reports he understands.  Client is in agreement to continue to limit his exposure to news which can also trigger PTSD response.    Therapist impression of patient's current state:   Choco Claudio presents with PTSD, depression, and an alcohol use disorder all severely exacerbated.  Client's unwillingness to follow treatment recommendations is very concerning.  I discuss client's potential for death if he is to continue utilizing excessive amounts of alcohol.  This potential outcome does not appear to sway client into becoming compliant with following treatment recommendations as discussed above.  I engage client in problem solving surrounding reducing current PTSD symptoms.  Client is actively passive and desires me to resolve problem for him.  I am extremely concerned about the client's mental and/or chemical health at this time.  I will continue to work from a cognitive and behavioral therapy approach utilizing motivational interviewing in an effort to assist client through stages of change.  I desire client follow all treatment recommendations including chemical health treatment, increasing mental health services, engaging mental health services in community such as [facility], and eliminating alcohol use.    Type of psychotherapeutic technique provided:   CBT and MI    Progress toward short term goals:Poor progress, Client reports increased mental health symptoms and increase chemical health symptoms leading to excessive alcohol use.    Review of long term goals: Not done at today's visit . Date of last review 1/31/2017.    Diagnosis:   1. Chronic post-traumatic stress disorder (PTSD)    2. Severe alcohol use disorder    3. Major depressive disorder, recurrent episode, moderate     Worsening.     Plan and Follow-up:   Patient will follow safety plan of seeking  help from friend/family, calling [facility] crisis, calling 911, and/or presenting to the ED if necessary.  Patient will be compliant with medications and attend appointments as scheduled.  Patient will continue to avoid television news in an effort to maintain lower levels of PTSD symptoms.  Patient will attempt to limit alcohol intake and will report to emergency department if alcohol leads to health concerns.    Discharge Criteria/Planning: Client has chronic symptoms and ongoing therapy for maintenance stability recommended.    Signatures:   Performed and documented by KAYLA Long, [facility] A.[facility] Gould HospitalSW.      This note was created with help of Dragon dictation software. Grammatical / typing errors are not intentional and inherent to the software.

## 2021-06-10 NOTE — PATIENT INSTRUCTIONS - HE
Continue medications as prescribed  Have your pharmacy contact us for a refill if you are running low on medications (We may ask you to come into clinic to get a refill from the nurse  No Alcohol or drug use  No driving if sedated  Call the clinic with any questions or concerns   Reach out for help if you feel like hurting yourself or others ([facility] [phone]: [address] [facility], [address] or [facility] Suicide Hotline [phone] , call 911 or go to nearest Emergency room    Follow up as directed, for your appointments, per your After Visit Summary Form.

## 2021-06-10 NOTE — PROGRESS NOTES
"Mental Health Visit Note    Patient: Choco Claudio    : 1965 MRN: [id number]    Date: 2020   Start time: 1001  Stop Time: 1041  Session # 20    The patient has been notified of the following:   \"We have found that certain health care needs can be provided without the need for a face to face visit.  This service lets us provide the care you need with a phone conversation.  I will have full access to your [facility] medical record during this entire phone call.   I will be taking notes for your medical record. Since this is like an office visit, we will bill your insurance company for this service.  There are potential benefits and risks of telephone visits (e.g. limits to patient confidentiality) that differ from in-person visits.?  Confidentiality still applies for telephone services, and nobody will record the visit.  It is important to be in a quiet, private space that is free of distractions (including cell phone or other devices) during the visit.?? If during the course of the call I believe a telephone visit is not appropriate, you will not be charged for this service\"  Consent has been obtained for this service by care team member: Yes, per verbal agreement     All of patient's visits will be via telephone during the pandemic due to patient's lack of ability to run phone and hypervigilance of people hearing or seeing him.     Session Type: Patient is participating in a telephone visit    Chief Complaint   Patient presents with      Follow Up     Telephone Visit Mental Health     New symptoms or complaints: None    Functional Impairment:   Personal: 4  Family: 2  Work: 2  Social:4          ASSESSMENT: Current Emotional / Mental Status (status of significant symptoms):              Patient denies personal safety concerns.               Patient denies current or recent suicidal ideation or behaviors.              Patient denies current or recent homicidal ideation or behaviors.              " Patient denies current or recent self injurious behavior or ideation.              Patient denies other safety concerns.              Patient denies changes in protective factors              Recommended that patient call 911 or go to the local ED should there be a change in any of these risk factors.                Attitude:                                   Cooperative               Orientation:                             Person, place, time, situation              Speech                          Rate / Production:       Within normal                           Volume:                       Soft              Mood:                                      Anxious             Thought Content:                    Flight of ideas               Thought Form:                        Circulatory               Insight:                                     Poor      Patient's impression of their current status: Patient indicated feeling anxious. Patient reported having a nightmare of a plane blowing up. Patient indicated wanting to know why he had this nightmare. Patient reported believing it has to do [place]. Patient indicated he was able to connect with his friend again which brought him taz. Patient reported working to continue to connect with his friend as much as possible. Patient indicated he has not left his apartment since Saturday and knowing it is important that he gets out.     Therapist impression of patients current state: Patient appears to have poor insight into his mental health. This therapist processed with patient ways to work on grounding himself after a nightmare occurs. This therapist processed with patient not always being aware of why a nightmare occurs but how to move forward. This therapist processed with patient the importance of getting out of the house.    Type of psychotherapeutic technique provided: Client centered and CBT    Progress toward short term goals: Progress as  expected with patient continuing with scheduled session and noticing symptoms    Review of long term goals: Treatment Plan Updated on 7/23/2020 verbally due to season on the phone due to pandemic.        Diagnosis:  1. PTSD (post-traumatic stress disorder)    2. Delusional disorder (H)    3. Severe alcohol use disorder (H)        Plan and Follow up: Patient will continue with weekly ongoing therapy. Patient should work on sobriety. Patient should use skills taught to work on nightmare retraining and grounding when a nightmare does occur.     Discharge Criteria/Planning: Client has chronic symptoms and ongoing therapy for maintenance stability recommended.    I have reviewed the note as documented above.  This accurately captures the substance of my conversation with the patient.  Marcella Nogueira LPCC

## 2021-06-10 NOTE — PROGRESS NOTES
"Mental Health Visit Note    Patient: Choco Claudio    : 1965 MRN: [id number]    Date: 2020   Start time:   Stop Time: 102  Session # 22    The patient has been notified of the following:   \"We have found that certain health care needs can be provided without the need for a face to face visit.  This service lets us provide the care you need with a phone conversation.  I will have full access to your [place] medical record during this entire phone call.   I will be taking notes for your medical record. Since this is like an office visit, we will bill your insurance company for this service.  There are potential benefits and risks of telephone visits (e.g. limits to patient confidentiality) that differ from in-person visits.?  Confidentiality still applies for telephone services, and nobody will record the visit.  It is important to be in a quiet, private space that is free of distractions (including cell phone or other devices) during the visit.?? If during the course of the call I believe a telephone visit is not appropriate, you will not be charged for this service\"  Consent has been obtained for this service by care team member: Yes, per verbal agreement     All of patient's visits will be via telephone during the pandemic due to patient's lack of ability to run phone and hypervigilance of people hearing or seeing him.     Session Type: Patient is participating in a telephone visit    Chief Complaint   Patient presents with      Follow Up     Telephone Visit Mental Health     New symptoms or complaints: None    Functional Impairment:   Personal: 4  Family: 2  Work: 2  Social:4          ASSESSMENT: Current Emotional / Mental Status (status of significant symptoms): Reviewed on 2020              Patient denies personal safety concerns.               Patient denies current or recent suicidal ideation or behaviors.              Patient denies current or recent homicidal ideation or " behaviors.              Patient denies current or recent self injurious behavior or ideation.              Patient denies other safety concerns.              Patient denies changes in protective factors              Recommended that patient call 911 or go to the local ED should there be a change in any of these risk factors.                Attitude:                                   Cooperative               Orientation:                             Person, place, time, situation              Speech                          Rate / Production:       Within normal                           Volume:                       Soft              Mood:                                      Sad             Thought Content:                    Flight of ideas               Thought Form:                        Circulatory               Insight:                                     Poor      Patient's impression of their current status: Patient indicated feeling sad. Patient reported he is struggling with being lonely. Patient indicated he was suppose to hang out with his friend this weekend but his friend never called. Patient reported knowing his friend is drinking and this has affected their relationship. Patient indicated knowing he needs to get out of the house and go for a bike ride. Patient reported this is something he has enjoyed in the past and has not done since June. Patient indicated trying to keep his mind busy and off the fact that his friend did not call.     Therapist impression of patients current state: Patient appears to have poor insight into his mental health. This therapist processed with patient having a schedule that involved leaving the house daily to help with feeling bored and lonely. This therapist processed with patient realistically when he can expect from his friend at this time now that he is drinking.     Type of psychotherapeutic technique provided: Client centered and CBT    Progress toward short  term goals: Progress as expected with patient continuing with scheduled session, identifying emotions and hurt from friend.     Review of long term goals: Treatment Plan Updated on 7/23/2020 verbally due to season on the phone due to pandemic.        Diagnosis:  1. Delusional disorder (H)    2. PTSD (post-traumatic stress disorder)    3. Posttraumatic stress disorder        Plan and Follow up: Patient will continue with weekly ongoing therapy. Patient should work on sobriety. Patient should work on having a schedule that helps him to get out of the house.     Discharge Criteria/Planning: Client has chronic symptoms and ongoing therapy for maintenance stability recommended.    I have reviewed the note as documented above.  This accurately captures the substance of my conversation with the patient.  Marcella Nogueira [facility]

## 2021-06-10 NOTE — PROGRESS NOTES
Mental Health Visit Note    4/13/2017    Start time: 1205    Stop Time: 1255   Session # 12    Choco Claudio is a 51 y.o. male is being seen today for a follow-up psychotherapy appointment    Chief Complaint   Patient presents with      Follow Up   .     New symptoms or complaints:  None    Functional Impairment:   The patient identifies the how daily stressors affect daily functioning in today's session as follows (Scale is 1-4, 1=not at all or rarely; 4=extremely so/everyday.)    Personal: 4  Family: 4  Social: 4  Work: 4    Clinical assessment of mental status:   Choco Claudio presented on time.   He was oriented x3, open and cooperative, and dressed appropriately for this session and weather. His memory was Mildly impaired .  His speech was  Excessive and Tangential.  Language was focused on desire for PTSD symptom reduction.  Concentration and focus is Brief. Psychosis is not noted or reported. He reports his mood is Anxious and Depressed.  Affect is congruent with speech and is Anxious and Depressed.  Fund of knowledge is adequate. Insight is adequate for therapy.     Suicidal/Homicidal Ideation present:   No,  Patient denies suicidal and homicidal ideations/means or plans.      Patient's impression of their current status:  Patient presents to session and discusses desire for PTSD symptom reduction.  Client indicates between sessions he has attempted to manage PTSD via distraction through television and also with alcohol use.  Discussion had regarding client PTSD symptoms being exacerbated by television news.  Client reports concerns regarding the [place] and/or potential war.  Discussion was had regarding client's ability to take effect of action regarding this situation and client indicates he feels he has very little power.  Discussion was had regarding client limiting and/or eliminating his exposure to television news in an effort to reduce his overall baseline of PTSD symptomology.   Discussion was had regarding other options client can engage in versus television.  Client reports he is open to experiment of limiting or eliminating television news between sessions.    Therapist impression of patient's current state:   Choco Claudio presents with PTSD, alcohol use disorder, and depression.  Client continues to utilize alcohol to manage PTSD symptoms and reports he is unwilling to attend outpatient, residential, or inpatient CD treatment.  Client is provided with psychoeducation on hypervigilance.  Discussion was had regarding ways to reduce client's overall baseline level of anxiety by eliminating TV news.  I utilize repetition as the client has some difficulty with cognitive tasks including memory.  Client is able to repeat between session experiment of limiting TV news and also able to repeat other options he can engage in besides watching the news.  I continue to believe client would significantly benefit from CD treatment and also benefit from a higher level of care to treat PTSD symptoms.  At this time client is unwilling to pursue either of these options.  Of note, client does report trazodone is effective in helping him sleep.    Type of psychotherapeutic technique provided:   CBT    Progress toward short term goals:Progress as expected, Client saw his primary care physician to discuss his concerns with mobility.   and continues to be compliant with psychiatric medications, per report.    Review of long term goals: Not done at today's visit . Date of last review 1/31/2017.    Diagnosis:   1. Chronic post-traumatic stress disorder (PTSD)    2. Severe alcohol use disorder    3. Major depressive disorder, recurrent episode, moderate     No Change.     Plan and Follow-up:   Patient will follow safety plan of seeking help from friend/family, calling [facility] crisis, calling 911, and/or presenting to the ED if necessary.  Patient will be compliant with medications and attend appointments as  scheduled.  Patient will reduce or eliminate his exposure to television news in an effort to reduce his overall baseline anxiety.    Discharge Criteria/Planning: Client has chronic symptoms and ongoing therapy for maintenance stability recommended.    Signatures:   Performed and documented by KAYLA Long, [facility].      This note was created with help of Dragon dictation software. Grammatical / typing errors are not intentional and inherent to the software.

## 2021-06-10 NOTE — PROGRESS NOTES
Mental Health Visit Note    5/4/2017    Start time: 1205    Stop Time: 1255   Session # 15    Choco Claudio is a 51 y.o. male is being seen today for a follow-up psychotherapy appointment    Chief Complaint   Patient presents with      Follow Up   .     New symptoms or complaints:  Client reports ongoing increased PTSD symptomology.    Functional Impairment:   The patient identifies the how daily stressors affect daily functioning in today's session as follows (Scale is 1-4, 1=not at all or rarely; 4=extremely so/everyday.)    Personal: 4  Family: 4  Social: 4  Work: 4    Clinical assessment of mental status:   Choco Claudio presented on time.   He was oriented x3, open and cooperative, and dressed appropriately for this session and weather, client has notable body odor. His memory was Moderate ly impaired.  His speech was  Excessive and Tangential.  Language was focused on symptoms and desire for symptom relief.  Concentration and focus is Within normal. Psychosis is not noted or reported. He reports his mood is Anxious and Depressed.  Affect is congruent with speech and is Anxious and Depressed.  Fund of knowledge is adequate. Insight is adequate for therapy.     Suicidal/Homicidal Ideation present:   No,  Patient denies suicidal and homicidal ideations/means or plans.      Patient's impression of their current status:  Patient reports ongoing exacerbated symptomology of PTSD leading to increased anxiety symptoms and continued use of alcohol to manage symptoms.  Client continues to refuse to follow treatment recommendations of rule 25 completed at [facility].  Client continues to refuse increasing level of care to [facility] and/or PHP to treat anxiety/PTSD symptoms.  Client reports having somatic symptoms including pain in his chest when he is holding back his feelings.  Client denies need for medical care and refuses to schedule appointment with his primary care physician or present to the emergency  "department.  Client discusses feeling emotionally \"wiped out\".  Client discusses difficulty of his home life with multiple invalidation by people he lives with.  Client continues to express desire for symptom relief though does not desire to engage in activities to obtain symptom relief.  Client indicates he desires to discontinue alcohol use though does not desire to engage in treatment to do so.    Therapist impression of patient's current state:   Choco Claudio presents with PTSD, alcohol use, and depression.  Client's presentation continues to be extremely concerning due to exacerbated PTSD, anxiety, depressive symptoms.  I discuss somatic symptoms and client reports chest pain is due to holding his emotions versus need for medical care.  Client refuses all my efforts to encourage him to attend CD treatment, increased mental health treatment, and/or seek consult with his primary care doctor regarding somatic complaints.  I assist client in utilizing other methods to reduce anxiety in session such as utilizing preferred music and/or breathing techniques.  Client's future residential situation remains unclear and he has difficulty focusing on this topic due to ongoing immediate symptomology concerns.  I recommend increasing community support via [facility] and client refuses.  Continue to work from a solution focused and motivational interviewing standpoint.    Type of psychotherapeutic technique provided:   Solution-focused and MI    Progress toward short term goals:Progress as expected, Client reports continuing to avoid [employer].  Client has poor progress towards managing his overall increased symptomology.    Review of long term goals: Not done at today's visit . Date of last review 1/31/2017.    Diagnosis:   1. Chronic post-traumatic stress disorder (PTSD)    2. Severe alcohol use disorder    3. Major depressive disorder, recurrent episode, moderate     No Change.     Plan and Follow-up:   Patient will " follow safety plan of seeking help from friend/family, calling [facility] crisis, calling 911, and/or presenting to the ED if necessary.  Patient will be compliant with medications and attend appointments as scheduled.  Patient will continue to avoid [employer] in an effort to manage anxiety.  Client will engage in activities to manage anxiety that do not involve alcohol, such as listening to music.    Discharge Criteria/Planning: Client has chronic symptoms and ongoing therapy for maintenance stability recommended.    Signatures:   Performed and documented by KAYLA Long, [facility].      This note was created with help of Dragon dictation software. Grammatical / typing errors are not intentional and inherent to the software.

## 2021-06-10 NOTE — PROGRESS NOTES
________________________________________  Medications Phoned  to Pharmacy [] yes [x]no  Name of Pharmacist:  List Medications, including dose, quantity and instructions    Medications ordered this visit were e-scribed.  Verified by order class [x] yes  [] no  Seroquel 200 mg  Trazodone 100 mg    Medication changes or discontinuations were communicated to patient's pharmacy: [] yes  [x] no    Dictation completed at time of chart check: [x] yes  [] no    I have checked the documentation for today s encounters and the above information has been reviewed and completed.

## 2021-06-10 NOTE — TELEPHONE ENCOUNTER
[facility]   His refill of Pazosin   Per Mom Darcy Ulloa does not have re-fill order directed client to call clinic

## 2021-06-10 NOTE — PROGRESS NOTES
"Mental Health Visit Note    Patient: Choco Claudio    : 1965 MRN: [id number]    Date: 2020   Start time: 1005  Stop Time: 1047  Session # 21    The patient has been notified of the following:   \"We have found that certain health care needs can be provided without the need for a face to face visit.  This service lets us provide the care you need with a phone conversation.  I will have full access to your [place] medical record during this entire phone call.   I will be taking notes for your medical record. Since this is like an office visit, we will bill your insurance company for this service.  There are potential benefits and risks of telephone visits (e.g. limits to patient confidentiality) that differ from in-person visits.?  Confidentiality still applies for telephone services, and nobody will record the visit.  It is important to be in a quiet, private space that is free of distractions (including cell phone or other devices) during the visit.?? If during the course of the call I believe a telephone visit is not appropriate, you will not be charged for this service\"  Consent has been obtained for this service by care team member: Yes, per verbal agreement     All of patient's visits will be via telephone during the pandemic due to patient's lack of ability to run phone and hypervigilance of people hearing or seeing him.     Session Type: Patient is participating in a telephone visit    Chief Complaint   Patient presents with      Follow Up     Telephone Visit Mental Health     New symptoms or complaints: None    Functional Impairment:   Personal: 4  Family: 2  Work: 2  Social:4          ASSESSMENT: Current Emotional / Mental Status (status of significant symptoms): Reviewed on 2020              Patient denies personal safety concerns.               Patient denies current or recent suicidal ideation or behaviors.              Patient denies current or recent homicidal ideation or " behaviors.              Patient denies current or recent self injurious behavior or ideation.              Patient denies other safety concerns.              Patient denies changes in protective factors              Recommended that patient call 911 or go to the local ED should there be a change in any of these risk factors.                Attitude:                                   Cooperative               Orientation:                             Person, place, time, situation              Speech                          Rate / Production:       Within normal                           Volume:                       Soft              Mood:                                      Anxious             Thought Content:                    Flight of ideas               Thought Form:                        Circulatory               Insight:                                     Poor      Patient's impression of their current status: Patient reported continuing to feel anxious. Patient indicated the nightmare he had a week ago continues to be on his mind. Patient reported believing it has to do with feeling he had no control in the dream. Patient indicated realizing this was very similar [place] at the airport. Patient reported part of him being proud that he was one of the first ones to show up and the last to leave. Patient indicated he took on jobs no one else wanted to but now he struggles daily because of it. Patient reported he has his medication he can take for his anxiety but it makes him loopy so once he takes it he stays home. Patient indicated this being challenging on days he needs to leave his apartment.     Therapist impression of patients current state: Patient appears to have poor insight into his mental health. This therapist processed with patient ways to work on processing his dream and being able to move forward. This therapist challenged patient on the anger he is experiencing due to his job at the  [employer]. This therapist processed with patient skills he can continue to use on days he leaves the apartment and notices anxiety.     This therapist talked with patient about paperwork received from Deborah Del Cid at [place] for Rule 79. Patient indicated he wanted to talk to Deborah about the paperwork before completing it with this therapist or signing an RASHEED.     Type of psychotherapeutic technique provided: Client centered and CBT    Progress toward short term goals: Progress as expected with patient continuing with scheduled session, noticing triggers for him and reaching out to friend.     Review of long term goals: Treatment Plan Updated on 7/23/2020 verbally due to season on the phone due to pandemic.        Diagnosis:  1. PTSD (post-traumatic stress disorder)    2. Delusional disorder (H)    3. Severe alcohol use disorder (H)        Plan and Follow up: Patient will continue with weekly ongoing therapy. Patient should work on sobriety. Patient would benefit from continuing to use skills taught in session to help anxiety. Patient would benefit from continuing to reach out to support network.     Discharge Criteria/Planning: Client has chronic symptoms and ongoing therapy for maintenance stability recommended.    I have reviewed the note as documented above.  This accurately captures the substance of my conversation with the patient.  NETTA De La Paz

## 2021-06-10 NOTE — PROGRESS NOTES
This video/telephone visit will be conducted via a call between you and your physician/provider. We have found that certain health care needs can be provided without the need for an in-person physical exam. This service lets us provide the care you need with a video /telephone conversation. If a prescription is necessary we can send it directly to your pharmacy. If lab work is needed we can place an order for that and you can then stop by our lab to have the test done at a later time.    Just as we bill insurance for in-person visits, we also bill insurance for video/telephone visits. If you have questions about your insurance coverage, we recommend that you speak with your insurance company.    Patient has given verbal consent for video/Telephone visit? Yes  Patient would like telephone visit, please call:  [phone]  JANNETH/JENNY SILVA CMA    States he's having a lot of difficulty sleeping.    Patient verified allergies, medications and pharmacy via phone.  Patient states he is ready for visit.
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
## 2021-06-11 NOTE — PROGRESS NOTES
Outpatient Mental Health Treatment Plan    Name:  Choco Claudio  :  1965  MRN:  [id number]    Treatment Plan:  Updated Treatment Plan  Intake/initial treatment plan date:  2017  Benefit and risks and alternatives have been discussed: Yes  Is this treatment appropriate with minimal intrusion/restrictions: Yes  Estimated duration of treatment:  Ongoing psychotherapy  Anticipated frequency of services:  Every 1 weeks  Necessity for frequency: This frequency is needed to establish therapeutic goals and for continuity of care in order to monitor progress.  Necessity for treatment: To address cognitive, behavioral, and/or emotional barriers in order to work toward goals and to improve quality of life.    Plan:           ?   ? Posttraumatic Stress Disorder    Goal:  Reduced the signs and symptoms of PTSD and increase patient's ability to tolerate breakthrough stressors surrounding his various traumas.   Strategies: ? [x]Learn and practice relaxation techniques and other coping strategies (e.g., thought stopping, reframing, meditation)     ? [x] Increase involvement in meaningful activities     ? [x] Discuss sleep hygiene     ? [x] Explore thoughts and expectations about self and others     ? [x] Identify and monitor triggers for panic/anxiety symptoms     ? [x] Implement physical activity routine (with physician approval)     ? [x] Consider introduction of bibliotherapy and/or videos     ? [x] Continue compliance with medical treatment plan (or explore barriers)   ?Degree to which this is a problem: 4  Degree to which goal is met: 1  Date of Review: 2017    Substance use  Goal:  Increase patient awareness regarding substance use triggers.  Consider harm reduction and/or chemical dependency treatment.   Strategies: ? [x] Consider referral for chemical dependency evaluation     ? [x] Discuss barriers to participating in AA or other peer-facilitated groups         [x] Address environmental factors  which may interfere with sobriety     ? [x] Explore short-term versus long-term consequences of use  ?  Degree to which this is a problem: 4  Degree to which goal is met: 1  Date of Review: 07/06/2017    Depression  Goal:  Reduced the signs and symptoms of depression and increase patient's ability to tolerate breakthrough symptomology.   Strategies: ? [x]Learn and practice relaxation techniques and other coping strategies (e.g., thought stopping, reframing, meditation)     ? [x] Increase involvement in meaningful activities     ? [x] Discuss sleep hygiene     ? [x] Explore thoughts and expectations about self and others     ? [x] Identify and monitor triggers for panic/anxiety symptoms     ? [x] Implement physical activity routine (with physician approval)     ? [x] Consider introduction of bibliotherapy and/or videos     ? [x] Continue compliance with medical treatment plan (or explore barriers)   ?Degree to which this is a problem: 4  Degree to which goal is met: 1  Date of Review: 07/06/2017         Functional Impairment:  1=Not at all/Rarely  2=Some days  3=Most Days  4=Every Day    Personal : 4  Family : 4  Social : 4   Work/school : 3    Diagnosis:  (EXAMPLE of DSM V: Major depressive disorder, recurrent, moderate; Generalized Anxiety disorder; borderline personality per patient PHI; fibromyalgia, History of breast cancer in remission; Problem with primary relationship.)   Posttraumatic stress disorder; alcohol use disorder, severe; and major depressive disorder, recurrent, moderate.    Strengths:  supported by mother, one good friend, employed, and motivated for change.  Limitations:  isolation, ambivalence about discontinuing alcohol use, and difficulty connecting with others due to trauma symptoms  Cultural Considerations: Client identifies is half .    Persons responsible for this plan: Patient and Provider            Psychotherapist Signature           Patient Signature:                This  note was created with help of Dragon dictation software.  Grammatical / typing errors are not intentional and inherent to the software.

## 2021-06-11 NOTE — PROGRESS NOTES
"Mental Health Visit Note    Patient: Choco Claudio    : 1965 MRN: [id number]    Date: 9/15/2020   Start time: 1001  Stop Time: 1041  Session # 27    The patient has been notified of the following:   \"We have found that certain health care needs can be provided without the need for a face to face visit.  This service lets us provide the care you need with a phone conversation.  I will have full access to your [place] medical record during this entire phone call.   I will be taking notes for your medical record. Since this is like an office visit, we will bill your insurance company for this service.  There are potential benefits and risks of telephone visits (e.g. limits to patient confidentiality) that differ from in-person visits.?  Confidentiality still applies for telephone services, and nobody will record the visit.  It is important to be in a quiet, private space that is free of distractions (including cell phone or other devices) during the visit.?? If during the course of the call I believe a telephone visit is not appropriate, you will not be charged for this service\"  Consent has been obtained for this service by care team member: Yes, per verbal agreement     All of patient's visits will be via telephone during the pandemic due to patient's lack of ability to run phone and hypervigilance of people hearing or seeing him.     Session Type: Patient is participating in a telephone visit    Chief Complaint   Patient presents with      Follow Up     Telephone Visit Mental Health     New symptoms or complaints: None    Functional Impairment:   Personal: 4  Family: 2  Work: 2  Social:4        ASSESSMENT: Current Emotional / Mental Status (status of significant symptoms): Reviewed on 9/15/2020              Patient denies personal safety concerns.               Patient denies current or recent suicidal ideation or behaviors.              Patient denies current or recent homicidal ideation or " behaviors.              Patient denies current or recent self injurious behavior or ideation.              Patient denies other safety concerns.              Patient denies changes in protective factors              Recommended that patient call 911 or go to the local ED should there be a change in any of these risk factors.                Attitude:                                   Cooperative               Orientation:                             Person, place, time, situation              Speech                          Rate / Production:       Within normal                           Volume:                       Soft              Mood:                                      Anxious             Thought Content:                    Flight of ideas               Thought Form:                        Circulatory               Insight:                                     Poor      Patient's impression of their current status: Patient indicated feeling anxious. Patient reported he has not left the house since Saturday when he was at [facility]. Patient indicated while at [facility] he had to take one of his anxiety medications due to his anxiety becoming extreme. Patient reported he was suppose to meet someone there but the person did not show up. Patient indicated his anxiety was high due to there being too many people and feeling they were coming towards him.    Therapist impression of patients current state: Patient appears to have poor insight into his mental health. This therapist processed with patient coping skills that can help to reduce his anxiety. This therapist processed with patient being aware of what triggers his anxiety.    Type of psychotherapeutic technique provided: Client centered and CBT    Progress toward short term goals: Progress as expected with patient continuing with scheduled session and being able to identify his anxiety.     Review of long term goals: Treatment Plan Updated on 7/23/2020 verbally due to  season on the phone due to pandemic.        Diagnosis:  1. PTSD (post-traumatic stress disorder)    2. Delusional disorder (H)    3. Severe alcohol use disorder (H)        Plan and Follow up: Patient will continue with weekly ongoing therapy. Patient would benefit from continuing to identify what triggers his anxiety and using skills to help reduce his symptoms.     Discharge Criteria/Planning: Client has chronic symptoms and ongoing therapy for maintenance stability recommended.    I have reviewed the note as documented above.  This accurately captures the substance of my conversation with the patient.  Marcella Nogueira [facility]

## 2021-06-11 NOTE — PROGRESS NOTES
"Mental Health Visit Note    Patient: Choco Claudio    : 1965 MRN: [id number]    Date: 2020   Start time: 1010  Stop Time: 1050  Session # 29    The patient has been notified of the following:   \"We have found that certain health care needs can be provided without the need for a face to face visit.  This service lets us provide the care you need with a phone conversation.  I will have full access to your [place] medical record during this entire phone call.   I will be taking notes for your medical record. Since this is like an office visit, we will bill your insurance company for this service.  There are potential benefits and risks of telephone visits (e.g. limits to patient confidentiality) that differ from in-person visits.?  Confidentiality still applies for telephone services, and nobody will record the visit.  It is important to be in a quiet, private space that is free of distractions (including cell phone or other devices) during the visit.?? If during the course of the call I believe a telephone visit is not appropriate, you will not be charged for this service\"  Consent has been obtained for this service by care team member: Yes, per verbal agreement     All of patient's visits will be via telephone during the pandemic due to patient's lack of ability to run phone and hypervigilance of people hearing or seeing him.     Session Type: Patient is participating in a telephone visit    Chief Complaint   Patient presents with     MH Follow Up     Telephone Visit Mental Health     New symptoms or complaints: None    Functional Impairment:   Personal: 4  Family: 2  Work: 2  Social:4        ASSESSMENT: Current Emotional / Mental Status (status of significant symptoms): Reviewed on 2020              Patient denies personal safety concerns.               Patient denies current or recent suicidal ideation or behaviors.              Patient denies current or recent homicidal ideation or " behaviors.              Patient denies current or recent self injurious behavior or ideation.              Patient denies other safety concerns.              Patient denies changes in protective factors              Recommended that patient call 911 or go to the local ED should there be a change in any of these risk factors.                Attitude:                                   Cooperative               Orientation:                             Person, place, time, situation              Speech                          Rate / Production:       Within normal                           Volume:                       Soft              Mood:                                      Anxious and worried             Thought Content:                    Flight of ideas               Thought Form:                        Circulatory               Insight:                                     Poor      Patient's impression of their current status: Patient indicated feeling both anxious and worried. Patient reported he has not left his house since Saturday and has not showered for many weeks. Patient indicated he has no motivation to shower. Patient reported his friend continues to drink which leaves little to no time for him. Patient indicated his dad has been diagnosed with sundowners diease which is having a negative affect on his family. Patient reported feeling there is a lot that is out of his control.     Therapist impression of patients current state: Patient appears to have poor insight into his mental health. This therapist processed with patient the importance of his self-care including eating, showering and sleeping. This therapist processed with patient ways he can be a support network to his friends and family during this hard time for them.     Type of psychotherapeutic technique provided: Client centered and CBT    Progress toward short term goals: Progress as expected with patient continuing with scheduled session,  identifying hurt and spending time with mom.     Review of long term goals: Treatment Plan Updated on 7/23/2020 verbally due to season on the phone due to pandemic.        Diagnosis:  1. PTSD (post-traumatic stress disorder)    2. Delusional disorder (H)    3. Severe alcohol use disorder (H)        Plan and Follow up: Patient will continue with weekly ongoing therapy. Patient needs to work on self-care including eating, showering and sleep. Patient would benefit from continuing to get out of his apartment and spend time with his support network.     Discharge Criteria/Planning: Client has chronic symptoms and ongoing therapy for maintenance stability recommended.    I have reviewed the note as documented above.  This accurately captures the substance of my conversation with the patient.  Marcella Nogueira LP[facility]

## 2021-06-11 NOTE — PROGRESS NOTES
"Mental Health Visit Note    Patient: Choco Claudio    : 1965 MRN: [id number]    Date: 2020   Start time: 1001  Stop Time: 1039  Session # 28    The patient has been notified of the following:   \"We have found that certain health care needs can be provided without the need for a face to face visit.  This service lets us provide the care you need with a phone conversation.  I will have full access to your [place] medical record during this entire phone call.   I will be taking notes for your medical record. Since this is like an office visit, we will bill your insurance company for this service.  There are potential benefits and risks of telephone visits (e.g. limits to patient confidentiality) that differ from in-person visits.?  Confidentiality still applies for telephone services, and nobody will record the visit.  It is important to be in a quiet, private space that is free of distractions (including cell phone or other devices) during the visit.?? If during the course of the call I believe a telephone visit is not appropriate, you will not be charged for this service\"  Consent has been obtained for this service by care team member: Yes, per verbal agreement     All of patient's visits will be via telephone during the pandemic due to patient's lack of ability to run phone and hypervigilance of people hearing or seeing him.     Session Type: Patient is participating in a telephone visit    Chief Complaint   Patient presents with      Follow Up     Telephone Visit Mental Health     New symptoms or complaints: None    Functional Impairment:   Personal: 4  Family: 2  Work: 2  Social:4        ASSESSMENT: Current Emotional / Mental Status (status of significant symptoms): Reviewed on 2020              Patient denies personal safety concerns.               Patient denies current or recent suicidal ideation or behaviors.              Patient denies current or recent homicidal ideation or " behaviors.              Patient denies current or recent self injurious behavior or ideation.              Patient denies other safety concerns.              Patient denies changes in protective factors              Recommended that patient call 911 or go to the local ED should there be a change in any of these risk factors.                Attitude:                                   Cooperative               Orientation:                             Person, place, time, situation              Speech                          Rate / Production:       Within normal                           Volume:                       Soft              Mood:                                      Tired             Thought Content:                    Flight of ideas               Thought Form:                        Circulatory               Insight:                                     Poor      Patient's impression of their current status: Patient reported feeling extremely tired. Patient indicated he has not been sleeping and did not sleep last night. Patient reported he is feeling that he does not want to be around anyone. Patient indicated he has been isolating and not leaving the house. Patient reported he is feeling mad and angry. Patient indicated he is not specifically feeling this about anyone but more everyone.     Therapist impression of patients current state: Patient appears to have poor insight into his mental health. This therapist processed with patient his struggles with sleep and went over sleep hygiene. This therapist processed with patient the importance of getting out of the apartment even for a few minutes walk.     Type of psychotherapeutic technique provided: Client centered and CBT    Progress toward short term goals: Progress as expected with patient continuing with scheduled session and identifying emotions.      Review of long term goals: Treatment Plan Updated on 7/23/2020 verbally due to season on the phone  due to pandemic.        Diagnosis:  1. PTSD (post-traumatic stress disorder)    2. Delusional disorder (H)    3. Severe alcohol use disorder (H)        Plan and Follow up: Patient will continue with weekly ongoing therapy. Patient should work on sleep hygiene. Patient would benefit from continuing to work on getting out of apartment.     Discharge Criteria/Planning: Client has chronic symptoms and ongoing therapy for maintenance stability recommended.    I have reviewed the note as documented above.  This accurately captures the substance of my conversation with the patient.  Marcella Nogueira LPCC

## 2021-06-11 NOTE — PROGRESS NOTES
________________________________________  Medications Phoned  to Pharmacy [] yes [x]no  Name of Pharmacist:  List Medications, including dose, quantity and instructions    Medications ordered this visit were e-scribed.  Verified by order class [x] yes  [] no  Prazosin 2 mg; Seroquel 100 mg & 300 mg  Medication changes or discontinuations were communicated to patient's pharmacy: [] yes  [x] no    Dictation completed at time of chart check: [x] yes  [] no    I have checked the documentation for today s encounters and the above information has been reviewed and completed.
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
## 2021-06-11 NOTE — PROGRESS NOTES
"Choco Claudio is a 55 y.o. male who is being evaluated via a billable telephone visit.      The patient has been notified of following:     \"This telephone visit will be conducted via a call between you and your physician/provider. We have found that certain health care needs can be provided without the need for a physical exam.  This service lets us provide the care you need with a short phone conversation.  If a prescription is necessary we can send it directly to your pharmacy.  If lab work is needed we can place an order for that and you can then stop by our lab to have the test done at a later time.    Telephone visits are billed at different rates depending on your insurance coverage. During this emergency period, for some insurers they may be billed the same as an in-person visit.  Please reach out to your insurance provider with any questions.    If during the course of the call the physician/provider feels a telephone visit is not appropriate, you will not be charged for this service.\"    Patient has given verbal consent to a Telephone visit? Yes    Patient would like to receive their AVS by AVS Preference: Mail a copy.    Psychiatric  Out patient Follow Up Progress Note  Date of visit:9/10/2020         Discussion of Care and Treatment Recommendations:   This is a 55 y.o. male with male with significant history of Alcohol Use Disorder, Etoh induced gastritis , DENISE and PTSD, Delusional Disorder      Last visit  08/13/2020.  Recommendation at last visit   1.-Continue with current medications Trazodone  100 mg at Bedtime, continue  Prazosin to  4 mg at bedtime -PTSD, Continue Seroquel  200 mg  at bedtime - psychosis , delusional thoughts  2- Highly recommend outpatient CD treatment Alcohol Use Disorder - Severe. Pt  opposed to CD treatment .  Continues to use alcohol when he can afford it.  3- Continue with  Psychotherapy:  4- 5-Make efforts to stay away from alcohol and drug  5- RTC- 2  weeks call in " between visit with any questions      Patient and I reviewed diagnosis and treatment plan and patient agrees with following recommendations:  Ongoing education given regarding diagnostic and treatment options with adequate verbalization of understanding.  Plan   1.Start taking  Seroquel  100 mg in the Morning and Seroquel 300 mg  at bedtime - psychosis , delusional thoughts. Continue with current medications Trazodone  100 mg at Bedtime, continue  Prazosin to  4 mg at bedtime -PTSD  2- Highly recommend outpatient CD treatment Alcohol Use Disorder - Severe. Pt  opposed to CD treatment .  Continues to use alcohol when he can afford it.  3- Continue with  Psychotherapy:  4- 5-Make efforts to stay away from alcohol and drug  5- RTC- 4  weeks call in between visit with any questions             DIagnoses:     Delusional disorder R/O Schizophrenia   PTSD  Alcohol Use Disorder , Severe       Patient Active Problem List   Diagnosis     Chest pain     Alcohol withdrawal, with unspecified complication (H)     Sinus tachycardia     Lactic acidosis     Dehydration     Microcytosis     Non-traumatic rhabdomyolysis     Nausea     Alcoholic ketoacidosis     Epigastric pain     Alcoholism, chronic (H)     High anion gap metabolic acidosis     Hypomagnesemia     Alcohol intoxication, uncomplicated (H)     GI bleed     Anemia associated with acute blood loss     Disorder due to alcohol abuse (H)     Substance induced mood disorder (H)     Anxiety disorder     History of posttraumatic stress disorder (PTSD)     Erosive esophagitis     Polyarthralgia     Cervical spondylosis     VIKI positive     Inflammatory arthritis     Arthritis of right ankle     Gouty arthritis of toe of left foot     Pulmonary embolism (H)     Primary osteoarthritis involving multiple joints     Supratherapeutic INR     Hypokalemia     Epistaxis             Chief Complaint / Subjective:    Chief complaint:       History of Present Illness:   Per patient's  statement : He continues to endorse anxiety, delusional thoughts and auditory hallucinations.  Reports a scant on going outside for walks and being around close proximity to other people.  Endorses flashback memories and nightmares that are distressing and keeping him awake at night.  Today we discussed medication management and the need to increase his current medications.  In the past he had been very hesitant to try a different medication or increase the medication he is already on.  He does not desire to start a different medication but he is open to increasing his current dose of Seroquel.  We will therefore start him on 100 mg of Seroquel in the morning and increase his bedtime dose to 300 mg.  We did discuss that this medication may make him tired or sleepy during the day but he mentioned that yesterday he took an extra dose of Seroquel and he was not sleepy.  I am hoping that this will help him with the psychosis and delusional thoughts during the day and also with anxiety.  He currently denies any medication side effects including dry mouth muscle stiffness or any tremors.  He denies suicidal homicidal ideations.  He offers no other concerns.  He continues to meet with a therapist and finds it.  He has decreased his alcohol intake to once a week and I applauded him for this progress.  I continue to encourage and recommend complete abstinence from alcohol or any other mood altering substances .  I will have him return to the clinic in 4 weeks and call in between visits with any questions or concerns.  Mental Status Examination:   General: Alert and oriented x 3   Speech: Normal in rate and tone  Language: Intact  Thought process: Coherent  Thought content:                           Auditory hallucinations-Denies                          Visual Hallucinations - Denies                         Delusions Absent                           Loose Associations:  No                          Suicidal thoughts: Denies                           Homicidal thoughts:Denies                        Affect: Neutral  Mood: Neutral   Intellectual functioning: Within normal limits  Memory: Within normal limits  Fund of knowledge: Average  Attention and concentration: Within normal limits  Gait: Unable to assess telephone visit  Psychomotor activity: Unable to assess telephone visit  Muscles: Unable to assess telephone visit  InSight and judgment: Fair      Drug/treatment history and current pattern of use:   Delusional disorder R/O Schizophrenia   PTSD  Alcohol Use Disorder , Severe         Medication changes: See Above   Medication adherence: compliant  Medication side effects: absent  Information about medications: Side effects, benefits and alternative treatments discussed and patient agrees .    Psychotherapy: Supportive therapy day-to-day living    Education: Diet, exercise, abstinence from drugs and alcohol, patient will not drive if sedated and medications or  under influence of any substance    Lab Results:  Personally reviewed and discussed with the patient    Lab Results   Component Value Date    WBC 7.8 02/24/2020    HGB 15.2 02/24/2020    HCT 48.0 02/24/2020     02/24/2020    ALT 19 05/08/2019    AST 26 05/08/2019     02/24/2020    K 3.8 02/24/2020     02/24/2020    CREATININE 0.88 02/24/2020    BUN 7 (L) 02/24/2020    CO2 24 02/24/2020    TSH 2.17 03/24/2018    INR 1.05 01/28/2020       Vital signs:  There were no vitals taken for this visit.  Unable to assess telephone visit  Allergies: Patient has no known allergies.           Review of Systems:      ROS:       10 point ROS was negative except for the items listed in HPI- Subjective data only              Medications:     Current Outpatient Medications on File Prior to Visit   Medication Sig Dispense Refill     acetaminophen (TYLENOL) 500 MG tablet Take 1,000 mg by mouth every 6 (six) hours as needed for pain.       cetirizine (ZYRTEC) 10 MG tablet Take 10 mg  by mouth at bedtime.        cetirizine (ZYRTEC) 10 MG tablet Take 10 mg by mouth.       omeprazole (PRILOSEC) 20 MG capsule Take 20 mg by mouth daily as needed.        QUEtiapine (SEROQUEL) 200 MG tablet Take 1 tablet (200 mg total) by mouth at bedtime. 90 tablet 0     traZODone (DESYREL) 100 MG tablet TAKE 1 TABLET(100 MG) BY MOUTH AT BEDTIME 90 tablet 0     [DISCONTINUED] prazosin (MINIPRESS) 2 MG capsule Take 2 capsules (4 mg total) by mouth at bedtime. 60 capsule 0     No current facility-administered medications on file prior to visit.          Coordination of Care:   More than 25 minutes spent on this visit  with more than 50% of time spent on coordination of care including: Educating patient about diagnosis, prognosis, side effects and benefits of medications, diet, exercise.  Time also spent providing supportive therapy regarding above issues.    This note was created using a dictation system. All typing errors or contextual distortion is unintentional and software inherent.    Phone call duration: 25 minutes    Debra Lane NP
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
## 2021-06-11 NOTE — PROGRESS NOTES
Correct pharmacy verified with patient and confirmed in snapshot? [x] yes []no    Medications Phoned  to Pharmacy [] yes [x]no  Name of Pharmacist:  List Medications, including dose, quantity and instructions      Medication Prescriptions given to patient   [] yes  [x] no   List the name of the drug the prescription was written for.       Medications ordered this visit were e-scribed.  Verified by order class [x] yes  [] no    Medication changes or discontinuations were communicated to patient's pharmacy: [] yes  [x] no, none discontinued.    UA collected [] yes  [x] no    [place] Prescription Monitoring Program Reviewed? [x] yes  [] no    Referrals were made to:  NA    Future appointment was made: [x] yes  [] no    Dictation completed at time of chart check: [x] yes  [] no    I have checked the documentation for today s encounters and the above information has been reviewed and completed.

## 2021-06-11 NOTE — PROGRESS NOTES
"Mental Health Visit Note    6/13/2017    Start time: 1310    Stop Time: 1400   Session # 19    Choco Claudio is a 51 y.o. male is being seen today for a follow-up psychotherapy appointment    Chief Complaint   Patient presents with      Follow Up   .     New symptoms or complaints:  None    Functional Impairment:   The patient identifies the how daily stressors affect daily functioning in today's session as follows (Scale is 1-4, 1=not at all or rarely; 4=extremely so/everyday.)    Personal: 4  Family: 3  Social: 4  Work: 3    Clinical assessment of mental status:   Choco Claudio presented on time.   He was oriented x3, open and cooperative, and dressed appropriately for this session and weather. His memory was Moderate ly impaired.  His speech was  Excessive and Tangential.  Language was focused on desire for symptom relief and County assistance.  Concentration and focus is Brief. Psychosis is not noted or reported. He reports his mood is Anxious.  Affect is congruent with speech and is Anxious.  Fund of knowledge is adequate. Insight is adequate for therapy.     Suicidal/Homicidal Ideation present:   No,  Patient denies suicidal and homicidal ideations/means or plans.      Patient's impression of their current status:  Patient presents to session and reports \"good\" weekend working with little symptom exacerbation.  [employer] as contacted via speakerphone with client to check on status of [facility] referral.  Client's referrals pending and he will be contacted once worker is available, I am listed secondary contact.  Client brings in County forms he received an male.  I assist client in reviewing forms which are requesting additional information for general assistance and/or supplemental nutrition.  I assist client in completing forms.  I do complete medical opinion form which will be sent to [facility].  Client will likely benefit from increased professional support in the community and also from " financial assistance.     Therapist impression of patient's current state:   Choco Claudio presents with depression, PTSD, and alcohol use disorder.  Client continues to be open to psychiatry, [facility] referral, and pursuing [facility] benefits of general assistance and supplemental nutrition.  Client continues to be opposed to chemical dependency treatment despite my and other providers recommendations he pursue this option.  Client did [facility] Rule [facility] which supported treatment.  I assist client in following up with [facility] as noted above.  I assist client in completing [facility] paperwork.  I have hernandez client's mother note explaining necessary information required and/or next steps for [facility] application.  Application is due 06/30/2017.    Type of psychotherapeutic technique provided:   CBT and Problem solving    Progress toward short term goals:Progress as expected, Client continues to manage mood.  Client brings in [facility] paperwork.    Review of long term goals: Not done at today's visit . Date of last review 1/31/2017.  We will update next session.    Diagnosis:   1. Major depressive disorder, recurrent episode, moderate    2. Chronic post-traumatic stress disorder (PTSD)    3. Severe alcohol use disorder     No Change.     Plan and Follow-up:   Patient will follow safety plan of seeking help from friend/family, calling [facility] crisis, calling 911, and/or presenting to the ED if necessary.  Patient will be compliant with medications and attend appointments as scheduled.  Patient will get his mother [facility] application for her review.  Client will continue to utilize coping strategies in an effort to manage mental health symptoms and reduce alcohol intake.    Discharge Criteria/Planning: Client has chronic symptoms and ongoing therapy for maintenance stability recommended.    Signatures:   Performed and documented by KAYLA Long, [facility].      This note was created with help of Dragon dictation  software. Grammatical / typing errors are not intentional and inherent to the software.
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
## 2021-06-11 NOTE — PROGRESS NOTES
Psychiatric  Progress Note  Date of visit:6/27/2017         Discussion of Care and Treatment Recommendations:   This is a 51 y.o. male with a significant history of Alcohol Use Disorder, Etoh induced gastritis , DENISE and PTSD who presents to the clinic today  For a follow up appointment     Last visit 5/16/17  Recommendation at last visit   1. Stop Prazosin - per patient request  2- Continue with current medications Trazodone  100 mg at Bedtime, gabapentin 100  mg at bedtime,   3- Highly recommend Inpatient CD treatment Alcohol Use Disorder - Severe. Pt  opposed to CD treatment   4- Continue with psychotherapy  5-Make efforts to stay away from alcohol and drug  ^- RTC- 6 weeks       Patient and I reviewed diagnosis and treatment plan and patient agrees with following recommendations:  Ongoing education given regarding diagnostic and treatment options with adequate verbalization of understanding.    Plan   1-Continue with current medications Trazodone  100 mg at Bedtime, gabapentin 100  mg at bedtime,   2- Highly recommend Inpatient CD treatment Alcohol Use Disorder - Severe. Pt  opposed to CD treatment   3- Continue with psychotherapy  4-Make efforts to stay away from alcohol and drug  5- RTC- 6 weeks            Diagnoses:   PTSD  Alcohol Use Disorder , Severe     Patient Active Problem List   Diagnosis     Chest pain     Alcohol withdrawal, with unspecified complication     Sinus tachycardia     Lactic acidosis     Dehydration     Microcytosis     Non-traumatic rhabdomyolysis     Nausea     Alcoholic ketoacidosis     Epigastric pain     Alcoholism, chronic     High anion gap metabolic acidosis     Hypomagnesemia     Alcohol intoxication, uncomplicated     GI bleed     Anemia associated with acute blood loss     Disorder due to alcohol abuse     Substance induced mood disorder     Anxiety disorder     History of posttraumatic stress disorder (PTSD)     Erosive esophagitis             Chief Complaint / Subjective:   "  Chief complaint: \" Why would God let me have PTSD\"     History of Present Illness:   Patient presents to the clinic alone.  He continues to endorse PTSD symptoms however he still opposed to trying any medications apart from the trazodone.  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  He had some teary moments during our interview describing her PTSD is bothersome to him and that he does not trust anyone who is not  he went on  And off-topic at times and had to be redirected.  He focused on his alcohol and we he cannot do anything without it that it helps him get by.  He is continues to be strongly opposed to any kind of treatment he does not see alcohol as an issue that he continues to drink every day and anytime that he has money to buy himself some alcohol.  He tells me that trazodone is helpful for him and that his only medication he is agreeing to take.  Continues to endorse some voices in his head that tell him that people are picking on him and him being mean to him and saying obscene wants to him.  He denies visual hallucinations he denies ping or hypomania symptoms he denies delusions.  Endorses being paranoid and trust nobody that is  not .  I brought up  the proposal to start him off on prazosin or low-dose Seroquel to target PTSD and my suspicion of psychosis but patient continues to be strongly opposed.  Continues to state that he does not trust anyone  that prescribes medications and who is not .  At this point it was challenging to convince patient of any alternatives of measures or medications that would be helpful for him.  He however he enjoys therapy and tells me that he trusts his therapist.  I will attempt to touch with his therapist to see me can be able to convince him to start low-dose Seroquel to target her " symptoms and seemed to be some improvements before his next appointment in 8 weeks.  He denies suicidal homicidal ideations tells me he feels safe verbally contracts for safety.  Continues to oppose CD treatment for his severe alcohol use disorder patient will return in 8 weeks he was advised to call in between with questions or concerns.      Mental Status Examination:   General: Adequate hygiene, cooperative  Speech: Normal in rate and tone  Language: Intact  Thought process: Coherent  Thought content:                           Auditory hallucinations- present                           Visual Hallucinations - Absent                           Delusions Absent                           Loose Associations:  No                          Suicidal thoughts: Absent                          Homicidal thoughts: Absent                       Affect: Neutral  Mood: Neutral   Intellectual functioning: Within normal limits  Memory: Within normal limits  Fund of knowledge: Average  Attention and concentration: Within normal limits  Gait: Steady  Psychomotor activity: Calm, no agitation  Muscles: No atrophy, no abnormal movements  InSight and judgment: Fair    Medication changes: See Above   Medication adherence: compliant  Medication side effects: absent  Information about medications: Side effects, benefits and alternative treatments discussed and patient agrees with capacity to do so.    Psychotherapy: Supportive therapy day-to-day living    Education: Diet, exercise, abstinence from drugs and alcohol, patient will not drive if sedated and medications or  under influence of any substance    Lab Results:   Personally reviewed and discussed with the patient    Lab Results   Component Value Date    WBC 7.9 01/16/2017    HGB 10.3 (L) 01/16/2017    HCT 29.9 (L) 01/16/2017     (L) 01/16/2017    ALT 11 01/15/2017    AST 15 01/15/2017     01/16/2017    K 3.7 01/16/2017     01/16/2017    CREATININE 0.64 (L) 01/16/2017     BUN 6 (L) 01/16/2017    CO2 20 (L) 01/16/2017    INR 1.15 (H) 01/14/2017       Vital signs:  Vitals:    06/27/17 1205   BP: (!) 133/97   Patient Site: Left Arm   Patient Position: Sitting   Cuff Size: Adult Large   Pulse: (!) 101   Resp: 18   Temp: 98.1  F (36.7  C)   TempSrc: Oral   Weight: 189 lb 12.8 oz (86.1 kg)   Height: 6' (1.829 m)     Allergies: Review of patient's allergies indicates no known allergies.   Allergies   Allergen Reactions     Other Environmental Allergy Other (See Comments)     Seasonal allergies  Nasal congestion            Medications:     Current Outpatient Prescriptions on File Prior to Visit   Medication Sig Dispense Refill     cetirizine (ZYRTEC) 10 MG tablet Take 10 mg by mouth daily.       folic acid (FOLVITE) 1 MG tablet Take 1 mg by mouth daily.       gabapentin (NEURONTIN) 100 MG capsule Take 100 mg by mouth bedtime as needed (anxiety).  0     magnesium chloride (SLOW-MAG) 64 mg TbEC delayed-release tablet Take 64 mg by mouth 2 (two) times a day.       omeprazole (PRILOSEC) 20 MG capsule Take 1 capsule (20 mg total) by mouth 2 (two) times a day before meals. Take for 2 months then daily therafter, refills per primary MD 60 capsule 0     potassium chloride 20 mEq TbER Take 20 mEq by mouth 2 (two) times a day.       thiamine 100 MG tablet Take 100 mg by mouth daily.       traZODone (DESYREL) 100 MG tablet TAKE 1 TABLET BY MOUTH AT BEDTIME 30 tablet 0     VITAMIN B-1 100 mg tablet Take 100 mg by mouth daily.  1     No current facility-administered medications on file prior to visit.                   Review of Systems:    Otherwise reminder of review of systems is negative    Coordination of Care:   More than 25 minutes spent on this visit  with more than 50% of time spent on coordination of care including: Educating patient about diagnosis, prognosis, side effects and benefits of medications, diet, exercise.  Time also spent on entering orders and preparing documentation for the  visit.Time also spent providing supportive therapy regarding above issues.    This note was created using a dictation system. All typing errors or contextual distortion is unintentional and software inherent.

## 2021-06-11 NOTE — PATIENT INSTRUCTIONS - HE
Continue medications as prescribed   Start taking  Seroquel  100 mg in the Morning and Seroquel 300 mg  at bedtime - psychosis , delusional thoughts. Continue with current medications Trazodone  100 mg at Bedtime, continue  Prazosin to  4 mg at bedtime -PTSD    Have your pharmacy contact us for a refill if you are running low on medications (We may ask you to come into clinic to get a refill from the nurse  No Alcohol or drug use  No driving if sedated  Call the clinic with any questions or concerns   Reach out for help if you feel like hurting yourself or others ([facility] [phone]: [address], [address] or [facility] Suicide Hotline [phone] , call 471 or go to nearest Emergency room    Follow up as directed, for your appointments, per your After Visit Summary Form.

## 2021-06-12 NOTE — PROGRESS NOTES
This video/telephone visit will be conducted via a call between you and your physician/provider. We have found that certain health care needs can be provided without the need for an in-person physical exam. This service lets us provide the care you need with a video /telephone conversation. If a prescription is necessary we can send it directly to your pharmacy. If lab work is needed we can place an order for that and you can then stop by our lab to have the test done at a later time.    Just as we bill insurance for in-person visits, we also bill insurance for video/telephone visits. If you have questions about your insurance coverage, we recommend that you speak with your insurance company.    Patient has given verbal consent for video/Telephone visit? yes  Patient would like the video visit invitation sent by: Text to cell phone: NA or Send to email: NA  Patient would like telephone visit, please call: [phone]   JANNETH/LATESHAN : Ed LOVE LPN  Pt says he is afraid to leave his apartment sometimes  Patient verified allergies, medications and pharmacy via phone. Patient states HE  is ready for visit.      : nothing to report    ________________________________________  Medications Phoned  to Pharmacy [] yes [x]no  Name of Pharmacist:  List Medications, including dose, quantity and instructions    Medications ordered this visit were e-scribed.  Verified by order class [x] yes  [] no  Seroquel 100 and 300 mg; Trazodone 100 mg  Medication changes or discontinuations were communicated to patient's pharmacy: [] yes  [x] no    Dictation completed at time of chart check: [x] yes  [] no    I have checked the documentation for today s encounters and the above information has been reviewed and completed.

## 2021-06-12 NOTE — PATIENT INSTRUCTIONS - HE
Continue medications as prescribed  Have your pharmacy contact us for a refill if you are running low on medications (We may ask you to come into clinic to get a refill from the nurse  No Alcohol or drug use  No driving if sedated  Call the clinic with any questions or concerns   Reach out for help if you feel like hurting yourself or others ([facility] [phone]: [address], [address] or [facility] Suicide Hotline [phone] , call 911 or go to nearest Emergency room    Follow up as directed, for your appointments, per your After Visit Summary Form.

## 2021-06-12 NOTE — PROGRESS NOTES
"Choco Claudio is a 55 y.o. male who is being evaluated via a billable telephone visit.      The patient has been notified of following:     \"This telephone visit will be conducted via a call between you and your physician/provider. We have found that certain health care needs can be provided without the need for a physical exam.  This service lets us provide the care you need with a short phone conversation.  If a prescription is necessary we can send it directly to your pharmacy.  If lab work is needed we can place an order for that and you can then stop by our lab to have the test done at a later time.    Telephone visits are billed at different rates depending on your insurance coverage. During this emergency period, for some insurers they may be billed the same as an in-person visit.  Please reach out to your insurance provider with any questions.    If during the course of the call the physician/provider feels a telephone visit is not appropriate, you will not be charged for this service.\"    Patient has given verbal consent to a Telephone visit? Yes      Patient would like to receive their AVS by AVS Preference: Mail a copy.    Psychiatric  Out patient Follow Up Progress Note  Date of visit:10/8/2020         Discussion of Care and Treatment Recommendations:   This is a 55 y.o. male with a significant history of Alcohol Use Disorder, Etoh induced gastritis , DENISE and PTSD, Delusional Disorder         Last visit 09/10/2020.  Recommendation at last visit .  1.Start taking  Seroquel  100 mg in the Morning and Seroquel 300 mg  at bedtime - psychosis , delusional thoughts. Continue with current medications Trazodone  100 mg at Bedtime, continue  Prazosin to  4 mg at bedtime -PTSD  2- Highly recommend outpatient CD treatment Alcohol Use Disorder - Severe. Pt  opposed to CD treatment .  Continues to use alcohol when he can afford it.  3- Continue with  Psychotherapy:  4- 5-Make efforts to stay away from alcohol " "and drug  5- RTC- 4  weeks call in between visit with any questions   Patient and I reviewed diagnosis and treatment plan and patient agrees with following recommendations:  Ongoing education given regarding diagnostic and treatment options with adequate verbalization of understanding.  Plan   1.Continue  taking  Seroquel  100 mg in the Morning and Seroquel 300 mg  at bedtime - psychosis , delusional thoughts. Continue with current medications Trazodone  100 mg at Bedtime, continue  Prazosin to  4 mg at bedtime -PTSDk  2- Highly recommend outpatient CD treatment Alcohol Use Disorder - Severe. Pt  opposed to CD treatment .  Continues to use alcohol when he can afford it.  3- Continue with  Psychotherapy:  4- 5-Make efforts to stay away from alcohol and drug  5- RTC- 4  weeks call in between visit with any questions          DIagnoses:   Delusional disorder R/O Schizophrenia   PTSD  Alcohol Use Disorder , Severe      Patient Active Problem List   Diagnosis     Chest pain     Alcohol withdrawal, with unspecified complication (H)     Sinus tachycardia     Lactic acidosis     Dehydration     Microcytosis     Non-traumatic rhabdomyolysis     Nausea     Alcoholic ketoacidosis     Epigastric pain     Alcoholism, chronic (H)     High anion gap metabolic acidosis     Hypomagnesemia     Alcohol intoxication, uncomplicated (H)     GI bleed     Anemia associated with acute blood loss     Disorder due to alcohol abuse (H)     Substance induced mood disorder (H)     Anxiety disorder     History of posttraumatic stress disorder (PTSD)     Erosive esophagitis     Polyarthralgia     Cervical spondylosis     VIKI positive     Inflammatory arthritis     Arthritis of right ankle     Gouty arthritis of toe of left foot     Pulmonary embolism (H)     Primary osteoarthritis involving multiple joints     Supratherapeutic INR     Hypokalemia     Epistaxis             Chief Complaint / Subjective:    Chief complaint: \" I am lonely\"     History " of Present Illness:  Per patient's statement : He has been feeling very isolated and lonely as he does not have a lot of friends.  Reports that his 1 friend is currently drinking and walking and full does not spend any time with him.  Mostly due to the pandemic distractibility from the room.  Reports that increasing medication was helpful but has not been taking Seroquel in the morning especially when he wants to leave the house as it makes him tired but reports that when he is home he takes it and it helps keep his mind clear even though it makes him tired.  He exercises by taking walks to the mall and wander around.  He remains paranoid when in crowded places.  He reports that he is wearing his mask and maintaining social distances when he is out in public.  Has decreased anything and stopped taking vodka and is currently drinking beer but he states he and.  I continue to recommend complete abstinence from alcohol but he is not interested in abstinence.  He continues to engage in psychotherapy and finds it very therapeutic.  Mental Status Examination:   General: Alert and oriented x 3   Speech: Normal in rate and tone  Language: Intact  Thought process: Coherent  Thought content:                           Auditory yibiwchhdoqekq-Iscypd-kiq history of chronic auditory hallucinations and delusions                          Visual Hallucinations - Denies                          Delusions -denies but present                          Loose Associations:  No                          Suicidal thoughts: Denies                          Homicidal thoughts:Denies                        Affect: Neutral  Mood: Neutral   Intellectual functioning: Within normal limits  Memory: Within normal limits  Fund of knowledge: Average  Attention and concentration: Within normal limits  Gait: Unable to assess telephone visit  Psychomotor activity: Unable to assess telephone visit  Muscles: Unable to assess telephone visit  InSight and  judgment: Fair      Drug/treatment history and current pattern of use:   History of alcohol abuse    Medication changes: See Above   Medication adherence: compliant  Medication side effects: absent  Information about medications: Side effects, benefits and alternative treatments discussed and patient agrees .    Psychotherapy: Supportive therapy day-to-day living    Education: Diet, exercise, abstinence from drugs and alcohol, patient will not drive if sedated and medications or  under influence of any substance    Lab Results:   Personally reviewed and discussed with the patient    Lab Results   Component Value Date    WBC 7.8 02/24/2020    HGB 15.2 02/24/2020    HCT 48.0 02/24/2020     02/24/2020    ALT 19 05/08/2019    AST 26 05/08/2019     02/24/2020    K 3.8 02/24/2020     02/24/2020    CREATININE 0.88 02/24/2020    BUN 7 (L) 02/24/2020    CO2 24 02/24/2020    TSH 2.17 03/24/2018    INR 1.05 01/28/2020       Vital signs:  There were no vitals taken for this visit.  Unable to assess telephone visit  Allergies: Patient has no known allergies.           Review of Systems:      ROS:  Subjective data only - Tele-Health  Visit   10 point ROS was negative except for the items listed in HPI-              Medications:     Current Outpatient Medications on File Prior to Visit   Medication Sig Dispense Refill     acetaminophen (TYLENOL) 500 MG tablet Take 1,000 mg by mouth every 6 (six) hours as needed for pain.       cetirizine (ZYRTEC) 10 MG tablet Take 10 mg by mouth at bedtime.        omeprazole (PRILOSEC) 20 MG capsule Take 20 mg by mouth daily as needed.        prazosin (MINIPRESS) 2 MG capsule Take 2 capsules (4 mg total) by mouth at bedtime. 60 capsule 2     [DISCONTINUED] QUEtiapine (SEROQUEL) 100 MG tablet Take 1 tablet (100 mg total) by mouth every morning. 30 tablet 1     [DISCONTINUED] QUEtiapine (SEROQUEL) 300 MG tablet Take 1 tablet (300 mg total) by mouth at bedtime. 30 tablet 1      [DISCONTINUED] traZODone (DESYREL) 100 MG tablet TAKE 1 TABLET(100 MG) BY MOUTH AT BEDTIME 90 tablet 0     [DISCONTINUED] cetirizine (ZYRTEC) 10 MG tablet Take 10 mg by mouth.       No current facility-administered medications on file prior to visit.              Coordination of Care:   More than 25 minutes spent on this visit  with more than 50% of time spent on coordination of care including: Educating patient about diagnosis, prognosis, side effects and benefits of medications, diet, exercise.  Time also spent providing supportive therapy regarding above issues.    This note was created using a dictation system. All typing errors or contextual distortion is unintentional and software inherent.  Phone call duration: 25  minutes    Debra Lane NP

## 2021-06-12 NOTE — PROGRESS NOTES
________________________________________  Medications Phoned  to Pharmacy [] yes [x]no  Name of Pharmacist:  List Medications, including dose, quantity and instructions    Medications ordered this visit were e-scribed.  Verified by order class [x] yes  [] no  Prazosin 5 mg    Medication changes or discontinuations were communicated to patient's pharmacy: [] yes  [x] no    Dictation completed at time of chart check: [x] yes  [] no    I have checked the documentation for today s encounters and the above information has been reviewed and completed.

## 2021-06-12 NOTE — PROGRESS NOTES
This therapist attempted to reach patient at 10:02 and 10:12. Patient did not answer. Patient is scheduled to meet with this therapist again on 10/27 @ 10.

## 2021-06-12 NOTE — PROGRESS NOTES
"Mental Health Visit Note    Patient: Choco Claudio    : 1965 MRN: [id number]    Date: 10/27/2020   Start time: 1001  Stop Time: 1041  Session # 31    The patient has been notified of the following:   \"We have found that certain health care needs can be provided without the need for a face to face visit.  This service lets us provide the care you need with a phone conversation.  I will have full access to your [facility] medical record during this entire phone call.   I will be taking notes for your medical record. Since this is like an office visit, we will bill your insurance company for this service.  There are potential benefits and risks of telephone visits (e.g. limits to patient confidentiality) that differ from in-person visits.?  Confidentiality still applies for telephone services, and nobody will record the visit.  It is important to be in a quiet, private space that is free of distractions (including cell phone or other devices) during the visit.?? If during the course of the call I believe a telephone visit is not appropriate, you will not be charged for this service\"  Consent has been obtained for this service by care team member: Yes, per verbal agreement     All of patient's visits will be via telephone during the pandemic due to patient's lack of ability to run phone and hypervigilance of people hearing or seeing him.     Session Type: Patient is participating in a telephone visit    Chief Complaint   Patient presents with      Follow Up     Telephone Visit Mental Health     New symptoms or complaints: None    Functional Impairment:   Personal: 4  Family: 2  Work: 2  Social:4        ASSESSMENT: Current Emotional / Mental Status (status of significant symptoms): Reviewed on 10/27/2020              Patient denies personal safety concerns.               Patient denies current or recent suicidal ideation or behaviors.              Patient denies current or recent homicidal ideation or " behaviors.              Patient denies current or recent self injurious behavior or ideation.              Patient denies other safety concerns.              Patient denies changes in protective factors              Recommended that patient call 911 or go to the local ED should there be a change in any of these risk factors.                Attitude:                                   Cooperative               Orientation:                             Person, place,  situation              Speech                          Rate / Production:       Within normal                           Volume:                       Soft              Mood:                                      Anxious             Thought Content:                    Flight of ideas               Thought Form:                        Circulatory               Insight:                                     Poor      Patient's impression of their current status: Patient indicated feeling anxious. Patient reported he is concerned about his medications. Patient indicated feeling that they make him not feel like himself. Patient reported he took one of the anxiety pills now and can tell it is causing him to feel foggy. Patient indicated he is having a lot of anxiety in his apartment. Patient reported he can hear people walking by and causes him to panic.     Therapist impression of patients current state: Patient appears to have poor insight into his mental health. This therapist told patient to talk with his prescriber about his medication concerns. This therapist processed with patient focus on the facts at this time including that his apartment is locked and no one would be able to enter.     Type of psychotherapeutic technique provided: Client centered and CBT    Progress toward short term goals: Progress as expected with patient continuing with scheduled session, identifying medications concerns and anxiety.     Review of long term goals: Treatment Plan  Updated on 10/27/2020 verbally due to season on the phone due to pandemic.     Diagnosis:  1. PTSD (post-traumatic stress disorder)    2. Delusional disorder (H)    3. Severe alcohol use disorder (H)        Plan and Follow up: Patient will continue with weekly ongoing therapy. Patient should talk with is prescriber about medication concerns. Patient would benefit from working on identify ways he is safe in his apartment. Patient should use skills to continue to work on reducing anxiety symptoms.     Discharge Criteria/Planning: Client has chronic symptoms and ongoing therapy for maintenance stability recommended.    I have reviewed the note as documented above.  This accurately captures the substance of my conversation with the patient.  Marcella Nogueira [facility]
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
## 2021-06-12 NOTE — PROGRESS NOTES
"Mental Health Visit Note    Patient: Choco Claudio    : 1965 MRN: [id number]    Date: 10/6/2020   Start time:   Stop Time: 104  Session # 30    The patient has been notified of the following:   \"We have found that certain health care needs can be provided without the need for a face to face visit.  This service lets us provide the care you need with a phone conversation.  I will have full access to your [place] medical record during this entire phone call.   I will be taking notes for your medical record. Since this is like an office visit, we will bill your insurance company for this service.  There are potential benefits and risks of telephone visits (e.g. limits to patient confidentiality) that differ from in-person visits.?  Confidentiality still applies for telephone services, and nobody will record the visit.  It is important to be in a quiet, private space that is free of distractions (including cell phone or other devices) during the visit.?? If during the course of the call I believe a telephone visit is not appropriate, you will not be charged for this service\"  Consent has been obtained for this service by care team member: Yes, per verbal agreement     All of patient's visits will be via telephone during the pandemic due to patient's lack of ability to run phone and hypervigilance of people hearing or seeing him.     Session Type: Patient is participating in a telephone visit    Chief Complaint   Patient presents with      Follow Up     Telephone Visit Mental Health     New symptoms or complaints: None    Functional Impairment:   Personal: 4  Family: 2  Work: 2  Social:4        ASSESSMENT: Current Emotional / Mental Status (status of significant symptoms): Reviewed on 10/6/2020              Patient denies personal safety concerns.               Patient denies current or recent suicidal ideation or behaviors.              Patient denies current or recent homicidal ideation or " behaviors.              Patient denies current or recent self injurious behavior or ideation.              Patient denies other safety concerns.              Patient denies changes in protective factors              Recommended that patient call 911 or go to the local ED should there be a change in any of these risk factors.                Attitude:                                   Cooperative               Orientation:                             Person, place, time, situation              Speech                          Rate / Production:       Within normal                           Volume:                       Soft              Mood:                                      Sad             Thought Content:                    Flight of ideas               Thought Form:                        Circulatory               Insight:                                     Poor      Patient's impression of their current status: Patient reported feeling sad. Patient indicated he is struggling with feeling lonely. Patient reported the women he was suppose to meet up with ghosted him. Patient indicated it keeps happening where women will message him and then not respond back. Patient reported he is hurt by his friend. Patient indicated his friend continues to not answer his calls which hurts.     Therapist impression of patients current state: Patient appears to have poor insight into his mental health. This therapist processed with patient getting out of the apartment daily. This therapist processed with patient places to work on meeting new people.     Type of psychotherapeutic technique provided: Client centered and CBT    Progress toward short term goals: Progress as expected with patient continuing with scheduled session, working on getting out of the apartment and using skills taught in session    Review of long term goals: Treatment Plan Updated on 7/23/2020 verbally due to season on the phone due to pandemic.      Diagnosis:  1. PTSD (post-traumatic stress disorder)    2. Delusional disorder (H)    3. Severe alcohol use disorder (H)        Plan and Follow up: Patient will continue with weekly ongoing therapy. Patient would benefit from working on getting out of the house daily. Patient should work on continuing to use the skills taught in session.     Discharge Criteria/Planning: Client has chronic symptoms and ongoing therapy for maintenance stability recommended.    I have reviewed the note as documented above.  This accurately captures the substance of my conversation with the patient.  Marcella Nogueira [facility]

## 2021-06-12 NOTE — PATIENT INSTRUCTIONS - HE
Continue medications as prescribed  Have your pharmacy contact us for a refill if you are running low on medications (We may ask you to come into clinic to get a refill from the nurse  No Alcohol or drug use  No driving if sedated  Call the clinic with any questions or concerns   Reach out for help if you feel like hurting yourself or others ([facility] [phone]: [address], [address] or [facility] Suicide Hotline [phone] , call 911 or go to nearest Emergency room    Follow up as directed, for your appointments, per your After Visit Summary Form.

## 2021-06-12 NOTE — TELEPHONE ENCOUNTER
Spoke to patient and he would like to go back down to Seroquel 200 mg at night instead of the 300 mg.  His mom is still setting up his medications and he said he is taking them.  He is still in his apartment and does not like to leave.  Patient said his paranoia is when he can hear people going up and down the hallways.  It reminds him of when he was living in a tent.  Patient said that he feel the Seroquel 300 mg is too much.  He describes it as he doesn't  have control over his body and that feeling carries over into morning.

## 2021-06-12 NOTE — PROGRESS NOTES
"Choco Claudio is a 55 y.o. male who is being evaluated via a billable telephone visit.      The patient has been notified of following:     \"This telephone visit will be conducted via a call between you and your physician/provider. We have found that certain health care needs can be provided without the need for a physical exam.  This service lets us provide the care you need with a short phone conversation.  If a prescription is necessary we can send it directly to your pharmacy.  If lab work is needed we can place an order for that and you can then stop by our lab to have the test done at a later time.    Telephone visits are billed at different rates depending on your insurance coverage. During this emergency period, for some insurers they may be billed the same as an in-person visit.  Please reach out to your insurance provider with any questions.    If during the course of the call the physician/provider feels a telephone visit is not appropriate, you will not be charged for this service.\"    Patient has given verbal consent to a Telephone visit? Yes    Patient would like to receive their AVS by AVS Preference: Mail a copy.    Psychiatric  Out patient Follow Up Progress Note  Date of visit:11/2/2020         Discussion of Care and Treatment Recommendations:   This is a 55 y.o. male with a significant history of Alcohol Use Disorder, Etoh induced gastritis , DENISE and PTSD, Delusional Disorder      Last visit  10/08/2020 Recommendation at last visit .  1.Continue  taking  Seroquel  100 mg in the Morning and Seroquel 300 mg  at bedtime - psychosis , delusional thoughts. Continue with current medications Trazodone  100 mg at Bedtime, continue  Prazosin to  4 mg at bedtime -PTSD  2- Highly recommend outpatient CD treatment Alcohol Use Disorder - Severe. Pt  opposed to CD treatment .  Continues to use alcohol when he can afford it.  3- Continue with  Psychotherapy:  4- 5-Make efforts to stay away from alcohol and " "drug  5- RTC- 4  weeks call in between visit with any questions   Patient and I reviewed diagnosis and treatment plan and patient agrees with following recommendations:  Ongoing education given regarding diagnostic and treatment options with adequate verbalization of understanding.  Plan   1.Continue  taking  Seroquel  100 mg in the Morning and Seroquel 200 mg  at bedtime - psychosis , delusional thoughts. Continue with current medications Trazodone  100 mg at Bedtime, Increase  Prazosin to  5 mg at bedtime -PTSD  2- Highly recommend outpatient CD treatment Alcohol Use Disorder - Severe. Pt  opposed to CD treatment .  Continues to use alcohol when he can afford it.  3- Continue with  Psychotherapy:  4- 5-Make efforts to stay away from alcohol and drug  5- RTC- 4  weeks call in between visit with any questions          Diagnoses:   Delusional disorder R/O Schizophrenia   PTSD  Paranoia   Alcohol Use Disorder , Severe       Patient Active Problem List   Diagnosis     Chest pain     Alcohol withdrawal, with unspecified complication (H)     Sinus tachycardia     Lactic acidosis     Dehydration     Microcytosis     Non-traumatic rhabdomyolysis     Nausea     Alcoholic ketoacidosis     Epigastric pain     Alcoholism, chronic (H)     High anion gap metabolic acidosis     Hypomagnesemia     Alcohol intoxication, uncomplicated (H)     GI bleed     Anemia associated with acute blood loss     Disorder due to alcohol abuse (H)     Substance induced mood disorder (H)     Anxiety disorder     History of posttraumatic stress disorder (PTSD)     Erosive esophagitis     Polyarthralgia     Cervical spondylosis     VIKI positive     Inflammatory arthritis     Arthritis of right ankle     Gouty arthritis of toe of left foot     Pulmonary embolism (H)     Primary osteoarthritis involving multiple joints     Supratherapeutic INR     Hypokalemia     Epistaxis             Chief Complaint / Subjective:    Chief complaint: \" My paranoia is " "getting worse, I am afraid of leaving my apartment  \"     History of Present Illness:   Per patient's statement : Continues to report paranoia delusions and hallucinations.  I had increased his bedtime Seroquel to 300 mg but he stopped taking 300 mg.  He is taking 200 mg.  Unable to convince him to take 300 mg to address the continued psychosis and delusions.  However he is agreeable to continue taking the 300 mg.  Also reports flashback memories and nightmares.  Perseverates about he is working at the [employer]  On 911 and being paranoid since then.  He did agree to  increasing Prazosin to 5 mg but very reluctantly.  We have made a lot of progress in the past for this patient especially with his med compliance where we informed him completely refusing to take medications to him agreeing to take some medications so I will not insist on him  taking 300 mg. I am ok for now with his plan to take  200 mg of Seroquel . He has been keeping his appointments with his therapist and with myself.   he denies he denies suicidal homicidal ideation states he feels safe.  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    Mental Status ExaGeneral:   Alert and oriented x 3   Speech: Normal in rate and tone  Language: Intact  Thought process: Racing thoughts   Thought content:                           Auditory [id number] history of chronic auditory hallucinations and delusions                          Visual Hallucinations - Denies                          Delusions -denies but present                          Loose Associations:  No                          Suicidal thoughts: Denies                          Homicidal thoughts:Denies                        Mood: Anxious   Intellectual functioning: Fair   Memory: Fair   Fund of knowledge: Average  Attention and concentration: Needed redirection multiple times   Gait: Unable to assess telephone visit  Psychomotor activity: " Unable to assess telephone visit  Muscles: Unable to assess telephone visit  InSight and judgment: Fair       Drug/treatment history and current pattern of use:   History of alcohol abuse    Medication changes: See Above   Medication adherence: compliant  Medication side effects: absent  Information about medications: Side effects, benefits and alternative treatments discussed and patient agrees .    Psychotherapy: Supportive therapy day-to-day living    Education: Diet, exercise, abstinence from drugs and alcohol, patient will not drive if sedated and medications or  under influence of any substance    Lab Results:   Personally reviewed and discussed with the patient    Lab Results   Component Value Date    WBC 7.8 02/24/2020    HGB 15.2 02/24/2020    HCT 48.0 02/24/2020     02/24/2020    ALT 19 05/08/2019    AST 26 05/08/2019     02/24/2020    K 3.8 02/24/2020     02/24/2020    CREATININE 0.88 02/24/2020    BUN 7 (L) 02/24/2020    CO2 24 02/24/2020    TSH 2.17 03/24/2018    INR 1.05 01/28/2020       Vital signs:  There were no vitals taken for this visit.  Unable to assess telephone visit  Allergies: Patient has no known allergies.           Review of Systems:      ROS:  Subjective data only - Tele-Health  Visit   10 point ROS was negative except for the items listed in HPI-              Medications:       Current Outpatient Medications on File Prior to Visit   Medication Sig Dispense Refill     acetaminophen (TYLENOL) 500 MG tablet Take 1,000 mg by mouth every 6 (six) hours as needed for pain.       cetirizine (ZYRTEC) 10 MG tablet Take 10 mg by mouth at bedtime.        omeprazole (PRILOSEC) 20 MG capsule Take 20 mg by mouth daily as needed.        prazosin (MINIPRESS) 2 MG capsule Take 2 capsules (4 mg total) by mouth at bedtime. 60 capsule 2     QUEtiapine (SEROQUEL) 100 MG tablet Take 1 tablet (100 mg total) by mouth every morning. 90 tablet 0     QUEtiapine (SEROQUEL) 300 MG tablet Take 1  tablet (300 mg total) by mouth at bedtime. 90 tablet 0     traZODone (DESYREL) 100 MG tablet TAKE 1 TABLET(100 MG) BY MOUTH AT BEDTIME 90 tablet 0     No current facility-administered medications on file prior to visit.          Coordination of Care:   More than 25 minutes spent on this visit  with more than 50% of time spent on coordination of care including: Educating patient about diagnosis, prognosis, side effects and benefits of medications, diet, exercise.  Time also spent providing supportive therapy regarding above issues.    This note was created using a dictation system. All typing errors or contextual distortion is unintentional and software inherent.  Phone call duration: 25  minutes    Debra Lane NP

## 2021-06-12 NOTE — PROGRESS NOTES
This video/telephone visit will be conducted via a call between you and your physician/provider. We have found that certain health care needs can be provided without the need for an in-person physical exam. This service lets us provide the care you need with a video /telephone conversation. If a prescription is necessary we can send it directly to your pharmacy. If lab work is needed we can place an order for that and you can then stop by our lab to have the test done at a later time.    Just as we bill insurance for in-person visits, we also bill insurance for video/telephone visits. If you have questions about your insurance coverage, we recommend that you speak with your insurance company.    Patient has given verbal consent for video/Telephone visit? Yes  Patient would like telephone visit, please call:  [phone]   JANNETH/JENNY SILVA CMA  Patient states he is very angry and also loopy because he is on his medications, states she refuses to take 300mg of Seroquel any more.    Patient verified allergies, medications and pharmacy via phone.  Patient states he is ready for visit.    No [place]  available for review
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
## 2021-06-12 NOTE — PROGRESS NOTES
Discharge Summary      Dates of service 01/2017 to 08/2017.   # Sessions completed: 24  Diagnosis at Intake  Chronic post-traumatic stress disorder (PTSD)  - Primary F43.12    Severe alcohol use disorder  F10.20    Major depressive disorder, recurrent episode, moderate          Diagnosis at Discharge  Chronic post-traumatic stress disorder (PTSD)  - Primary F43.12    Severe alcohol use disorder  F10.20    Major depressive disorder, recurrent episode, moderate            Progress toward goal 1: Reduced the signs and symptoms of PTSD and increase patient's ability to tolerate breakthrough stressors surrounding his various traumas.  Unmet    Progress toward goal 2: Increase patient awareness regarding substance use triggers.  Consider harm reduction and/or chemical dependency treatment.  Unmet    Additional goals: Reduced the signs and symptoms of depression and increase patient's ability to tolerate breakthrough symptomology.  Partially Met    Additional comments: Client has been referred to community psychotherapy, ARM, Social Security , and application for food stamps has been approved.  Client has also requested government subsidized cell phone.    Reason for discharge: Provider leaving this clinic.    Prognosis at discharge:Guarded    Recommendations and referrals at discharge: Continue with all mental health services including including psychotherapy and psychiatry.  I recommend chemical health treatment.  I recommend community providers such as [facility].  I recommend connecting client to community resources, as possible.        oB Gonzalez      8/8/2017  12:38 PM

## 2021-06-12 NOTE — PROGRESS NOTES
"Mental Health Visit Note    Patient: Choco Claudio    : 1965 MRN: [id number]    Date: 11/3/2020   Start time: 1001  Stop Time: 1029  Session # 32    The patient has been notified of the following:   \"We have found that certain health care needs can be provided without the need for a face to face visit.  This service lets us provide the care you need with a phone conversation.  I will have full access to your [facility] medical record during this entire phone call.   I will be taking notes for your medical record. Since this is like an office visit, we will bill your insurance company for this service.  There are potential benefits and risks of telephone visits (e.g. limits to patient confidentiality) that differ from in-person visits.?  Confidentiality still applies for telephone services, and nobody will record the visit.  It is important to be in a quiet, private space that is free of distractions (including cell phone or other devices) during the visit.?? If during the course of the call I believe a telephone visit is not appropriate, you will not be charged for this service\"  Consent has been obtained for this service by care team member: Yes, per verbal agreement     All of patient's visits will be via telephone during the pandemic due to patient's lack of ability to run phone and hypervigilance of people hearing or seeing him.     Session Type: Patient is participating in a telephone visit    Chief Complaint   Patient presents with      Follow Up     Telephone Visit Mental Health     New symptoms or complaints: None    Functional Impairment:   Personal: 4  Family: 2  Work: 2  Social:4        ASSESSMENT: Current Emotional / Mental Status (status of significant symptoms): Reviewed on 11/3/2020              Patient denies personal safety concerns.               Patient denies current or recent suicidal ideation or behaviors.              Patient denies current or recent homicidal ideation or " behaviors.              Patient denies current or recent self injurious behavior or ideation.              Patient denies other safety concerns.              Patient denies changes in protective factors              Recommended that patient call 911 or go to the local ED should there be a change in any of these risk factors.                Attitude:                                   Cooperative               Orientation:                             Person, place,  situation              Speech                          Rate / Production:       Within normal                           Volume:                       Soft              Mood:                                      Anxious/worried             Thought Content:                    Flight of ideas               Thought Form:                        Circulatory               Insight:                                     Poor      Patient's impression of their current status: Patient reported feeling anxious and worried. Patient indicated he is going to vote today. Patient reported he is worried over who will win and what that means for our nation. Patient indicated his anxiety has continued to be severe. Patient reported he does not want to go places due to his anxiety. Patient indicated even staying home can be challenging. Patient indicated concerns with his medication and meeting with Debra on Thursday. After reviewing patient's chart I seen he met with her yesterday (11/2/2020). Patient had no memory of talking with her.     Therapist impression of patients current state: Patient appears to have poor insight into his mental health. This therapist processed with patient continuing to use skills to help reduce anxiety. This therapist asked patient about medication which patient indicated his mom is filling and he is taking. This therapist sent an inbox message to Tonia Lane CNP relaying information about patient not remembering visit.     Type of  psychotherapeutic technique provided: Client centered and CBT    Progress toward short term goals: Progress as expected with patient continuing with scheduled session, identifying anxiety and reaching out to mom.     Review of long term goals: Treatment Plan Updated on 11/3/2020 verbally due to season on the phone due to pandemic.     Diagnosis:  1. PTSD (post-traumatic stress disorder)    2. Delusional disorder (H)    3. Severe alcohol use disorder (H)        Plan and Follow up: Patient will continue with weekly ongoing therapy. Patient should talk with is prescriber about medication concerns. Patient should continue to reach out to his mom for support. Patient would benefit from continuing to use skills taught in session to reduce anxiety.     Discharge Criteria/Planning: Client has chronic symptoms and ongoing therapy for maintenance stability recommended.    I have reviewed the note as documented above.  This accurately captures the substance of my conversation with the patient.  Marcella Nogueira [facility]

## 2021-06-12 NOTE — PROGRESS NOTES
Correct pharmacy verified with patient and confirmed in snapshot? [x] yes []no    Medications Phoned  to Pharmacy [] yes [x]no  Name of Pharmacist:  List Medications, including dose, quantity and instructions      Medication Prescriptions given to patient   [] yes  [x] no   List the name of the drug the prescription was written for.       Medications ordered this visit were e-scribed.  Verified by order class [x] yes  [] no    Medication changes or discontinuations were communicated to patient's pharmacy: [] yes  [x] no, none discontinued.    UA collected [] yes  [x] no    [place] Prescription Monitoring Program Reviewed? [x] yes  [] no    Referrals were made to:  NA    Future appointment was made: [x] yes  [] no    Dictation completed at time of chart check: [] yes  [x] no    I have checked the documentation for today s encounters and the above information has been reviewed and completed.

## 2021-06-12 NOTE — PROGRESS NOTES
Outpatient Mental Health Treatment Plan    Name:  Choco Claudio  :  1965  MRN:  [id number]    Treatment Plan:  Updated Treatment Plan  Intake/initial treatment plan date:  2017  Benefit and risks and alternatives have been discussed: Yes  Is this treatment appropriate with minimal intrusion/restrictions: Yes  Estimated duration of treatment:  Ongoing psychotherapy at this time  Anticipated frequency of services:  Weekly  Necessity for frequency: This frequency is needed to establish therapeutic goals and for continuity of care in order to monitor progress.  Necessity for treatment: To address cognitive, behavioral, and/or emotional barriers in order to work toward goals and to improve quality of life.    Plan:       ?   ?  Posttraumatic Stress Disorder    Goal:  Reduced the signs and symptoms of PTSD and increase patient's ability to tolerate breakthrough stressors surrounding his various traumas.   Strategies: ? [x]Learn and practice relaxation techniques and other coping strategies (e.g., thought stopping, reframing, meditation)     ? [x] Increase involvement in meaningful activities     ? [x] Discuss sleep hygiene     ? [x] Explore thoughts and expectations about self and others     ? [x] Identify and monitor triggers for panic/anxiety symptoms     ? [x] Implement physical activity routine (with physician approval)     ? [x] Consider introduction of bibliotherapy and/or videos     ? [x] Continue compliance with medical treatment plan (or explore barriers)   ?Degree to which this is a problem: 4  Degree to which goal is met: 1    Date of next review: 2/3/2021    Substance use  Goal:  Increase patient awareness regarding substance use triggers.  Consider harm reduction and/or chemical dependency treatment.   Strategies: ? [x] Consider referral for chemical dependency evaluation     ? [x] Discuss barriers to participating in AA or other peer-facilitated groups         [x] Address environmental  factors which may interfere with sobriety     ? [x] Explore short-term versus long-term consequences of use    ?  Degree to which this is a problem: 4  Degree to which goal is met: 1    Date of next review: 2/3/2021      Functional Impairment:  1=Not at all/Rarely  2=Some days  3=Most Days  4=Every Day    Personal : 4  Family : 4  Social : 4   Work/school : N/A at this time    Diagnosis:  Posttraumatic stress disorder; alcohol use disorder, severe;     Strengths:  supported by mother, one good friend, employed, and motivated for change.  Limitations:  isolation, ambivalence about discontinuing alcohol use, and difficulty connecting with others due to trauma symptoms  Cultural Considerations: Client identifies is half .    Persons responsible for this plan: Patient and Provider            Psychotherapist Signature           Patient Signature:

## 2021-06-12 NOTE — TELEPHONE ENCOUNTER
Pt wants to discuss medication changes, I advised he would do this at his appt and he still wanted me to get a message over.     I advised the care team will folllow up    appt 11/2/20

## 2021-06-13 NOTE — PROGRESS NOTES
"Pt is here for routine psychiatric med management follow up. Client said he is not doing good, \" I feel lost and confused \" . Client declined nursing assessment today.     [employer] [place] Date: 10/24/17  Query Report Page#: 1  Patient Rx History Report  CHET BLACK  Search Criteria: Last Name 'Chet' and First Name 'Choco' and  =  and Request Period =   to '10/24/17' - 1 out of 1 Recipients Selected.  Fill Date Product, Str, Form Qty Days Pt ID Prescriber Written RX# N/R* Pharm **MED+  ---------- -------------------------------- ------ ---- --------- ---------- ---------- ------------ ----- --------- ------  2017 GABAPENTIN 100 MG CAPSULE 60.00 30 [id number] 2017 [id number] N [id number] 00.0  2017 GABAPENTIN 300 MG CAPSULE 30.00 30 [id number] 2017 [id number] N [id number] 00.0  2017 OXYCODONE HCL 5 MG TABLET 12.00 2 [id number] 2017 [id number] N [id number] 45.0  2017 OXYCODONE-ACETAMINOPHEN 5-325 25.00 4 [id number] 2017 [id number] N [id number] 46.88  *N/R N=New R=Refill  +MED Daily  Prescribers for prescriptions listed  ----------------------------------------------------------------------------------------------------------------------------------  [id number] CHAI DUNNE); [address] [place] [address], [facility] [address]  [id number] BASIL SIFUENTES; [facility] [address]  [id number] GANGA WESTON J, [facility]); [facility],  [facility] [address]  Pharmacies that dispensed prescriptions listed  ----------------------------------------------------------------------------------------------------------------------------------  [id number] [facility]; : [id number], [address] [facility],, [facility] [address],  Patients that match search " criteria  ----------------------------------------------------------------------------------------------------------------------------------  [id number] CHET JOHNSON,  [birth year]; [address], [place] [address]  MED Summary  This section displays cumulative MED values by unique recipient. The MED Max value is the maximum occurrence of cumulative MED  sustained for any 3 consecutive days. This value is calculated based on prescriptions dispensed during the date range requested.  -----------------------------------------------------------------------------------------------------------------------------------  46.88 WAYNE ROSENBERG; 1965; [address], [place] [address]  **Per CDC guidance, the conversion factors and associated daily morphine milligram equivalents for drugs prescribed as part of  medication-assisted treatment for opioid use disorder should not be used to benchmark against dosage thresholds meant for opioids  prescribed for pain.      Correct pharmacy verified with patient and confirmed in snapshot? [x] yes []no    Charge captured ? [x] yes  [] no    Medications Phoned  to Pharmacy [] yes [x]no  Name of Pharmacist:  List Medications, including dose, quantity and instructions      Medication Prescriptions given to patient   [] yes  [x] no   List the name of the drug the prescription was written for.       Medications ordered this visit were e-scribed.  Verified by order class [x] yes  [] no  Prazosin and Seroquel  Medication changes or discontinuations were communicated to patient's pharmacy: [] yes  [x] no    UA collected [] yes  [x] no    [place] Prescription Monitoring Program Reviewed? [x] yes  [] no    Referrals were made to:    Psychotherapy, CM and ACT team    Future appointment was made: [x] yes  [] no  17  Dictation completed at time of chart check: [] yes  [x] no    I have checked the documentation for today s encounters and the above information has  been reviewed and completed.

## 2021-06-13 NOTE — PROGRESS NOTES
Psychiatric  Progress Note  Date of visit:10/24/2017         Discussion of Care and Treatment Recommendations:   This is a 52 y.o. male with significant history of Alcohol Use Disorder, Etoh induced gastritis , DENISE and PTSD who presents to the clinic today  For a follow up appointment       Last visit 8/22/17.  Recommendation at last visit .  1-Continue with current medications Trazodone  100 mg at Bedtime, gabapentin 100  mg at bedtime,   2- Highly recommend Inpatient CD treatment Alcohol Use Disorder - Severe. Pt  opposed to CD treatment   3- Continue with psychotherapy  4-Make efforts to stay away from alcohol and drug  5- RTC- 6 weeks     Patient and I reviewed diagnosis and treatment plan and patient agrees with following recommendations:  Ongoing education given regarding diagnostic and treatment options with adequate verbalization of understanding.  Plan   1-Continue with current medications Trazodone  100 mg at Bedtime, gabapentin 100  mg at bedtime,   2- Highly recommend Inpatient CD treatment Alcohol Use Disorder - Severe. Pt  opposed to CD treatment   3- Recommend  Psychotherapy: Will send a referral to therapist Nandini  for PTSD therapy   4- Start Prazosin 2 mg at bedtime -PTSD, Start Seroquel 25 mg  at bedtime - psychosis , delusional thoughts  5-Make efforts to stay away from alcohol and drug  5- RTC- 2 weeks          DIagnoses:     PTSD  Alcohol Use Disorder , Severe   Psychosis  R/O  schizophrenia     Patient Active Problem List   Diagnosis     Chest pain     Alcohol withdrawal, with unspecified complication     Sinus tachycardia     Lactic acidosis     Dehydration     Microcytosis     Non-traumatic rhabdomyolysis     Nausea     Alcoholic ketoacidosis     Epigastric pain     Alcoholism, chronic     High anion gap metabolic acidosis     Hypomagnesemia     Alcohol intoxication, uncomplicated     GI bleed     Anemia associated with acute blood loss     Disorder due to alcohol abuse     Substance induced  "mood disorder     Anxiety disorder     History of posttraumatic stress disorder (PTSD)     Erosive esophagitis             Chief Complaint / Subjective:    Chief complaint: \" I do not know how to deal with this PTSD\"     History of Present Illness: *  Patient statement-I was hospitalized at [facility] and taken to detox.  I he reports that he turned himself in because he was drinking too much and ended to go to detox.  We did call him when he missed his appointment last time and could not find him then discovered that he had been hospitalized and sent to detox.  Today he continues to complain of PTSD symptoms nightmares and flashbacks in the ability to sleep well.  He also continues to mention  his lack of trust for therapies and paranoia.  He continues to use alcohol on a daily basis almost when he can afford it and continues to  very reluctant for outpatient or inpatient CD treatment.  He was also resistant to trying any anti-craving drug.  Today however we made progress when I suggested that we try prazosin for PTSD and he agreed.  We also made progress when I reminded him of my suggestion  to start him on Seroquel to target racing thoughts, delusions and visual and auditory's hallucinations.  He reports that he continues to see images and collect pieces of metal  and glass and every time he sees any shattered glass  gets really worried.  Sometimes he tells me that he will look down in seeing a broken pieces of minerals and starts going through them only to discover these nothing and before around him tell him that he normally runs on the floor.  It is a huge progress for him to agree to start this medication as I have been trying to have him try the medications and he has been opposed.  He will go on starting them on a low-dose  seroquel I am hoping that they will be able to relieve some of his symptoms and will be able to experience less distress.  I will be sending the message to therapist Nandini to see if we " can take patients for PTSD psychotherapy as patient stopped seeing a therapist once his therapist Bo Gonzalez who walked he had before left [facility].  Patient does have paranoia and has a hard time trusting people and therefore has been reluctant to seek psychotherapy since Bo Gonzalez on the left.. I will have patient return in 2 weeks for follow-up appointment meanwhile I advised him to call in between visits with any questions or concerns    Recommendations and Referral  for community    made through nurse communication. Nursing to facilitate.     Mental Status Examination:   General: Adequate hygiene, cooperative  Speech: Normal in rate and tone  Language: Intact  Thought process: Coherent  Thought content:                           Auditory hallucinations- absent                           Visual Hallucinations - Absent                           Delusions Absent                           Loose Associations:  No                          Suicidal thoughts: Absent                          Homicidal thoughts: Absent                       Affect: Neutral  Mood: Neutral   Intellectual functioning: Within normal limits  Memory: Within normal limits  Fund of knowledge: Average  Attention and concentration: Within normal limits  Gait: Steady  Psychomotor activity: Calm, no agitation  Muscles: No atrophy, no abnormal movements  InSight and judgment: Fair    Medication changes: See Above   Medication adherence: compliant  Medication side effects: absent  Information about medications: Side effects, benefits and alternative treatments discussed and patient agrees with capacity to do so.    Psychotherapy: Supportive therapy day-to-day living    Education: Diet, exercise, abstinence from drugs and alcohol, patient will not drive if sedated and medications or  under influence of any substance    Lab Results:   Personally reviewed and discussed with the patient    Lab Results   Component Value Date    WBC 3.8 (L)  10/15/2017    HGB 12.9 (L) 10/15/2017    HCT 39.1 (L) 10/15/2017     (L) 10/15/2017    ALT 11 01/15/2017    AST 15 01/15/2017     10/15/2017    K 3.3 (L) 10/15/2017     10/15/2017    CREATININE 0.84 10/15/2017    BUN 5 (L) 10/15/2017    CO2 25 10/15/2017    INR 1.15 (H) 01/14/2017       Vital signs:  There were no vitals taken for this visit.  Refused Vital     Allergies: Other environmental allergy         Medications:     Current Outpatient Prescriptions on File Prior to Visit   Medication Sig Dispense Refill     gabapentin (NEURONTIN) 100 MG capsule Take 100 mg by mouth 2 (two) times a day.  1     gabapentin (NEURONTIN) 300 MG capsule Take 300 mg by mouth at bedtime.  1     traZODone (DESYREL) 100 MG tablet TAKE 1 TABLET BY MOUTH AT BEDTIME 3 tablet 0     No current facility-administered medications on file prior to visit.             Review of Systems:    Otherwise reminder of review of systems is negative    Coordination of Care:   More than 25 minutes spent on this visit  with more than 50% of time spent on coordination of care including: Educating patient about diagnosis, prognosis, side effects and benefits of medications, diet, exercise.  Time also spent on entering orders and preparing documentation for the visit.Time also spent providing supportive therapy regarding above issues.    This note was created using a dictation system. All typing errors or contextual distortion is unintentional and software inherent.

## 2021-06-13 NOTE — TELEPHONE ENCOUNTER
Writer confirmed with Provider to forward this paperwork to his therapist Marcella Nogueira who completed last years Medical Opinion forms.     Will forward to Marcella she is aware they are coming to her to complete and return to [facility].

## 2021-06-13 NOTE — PROGRESS NOTES
"  Mental Health Psychotherapy Note    Patient: Choco Claudio    : 1965 MRN: [id number]    Two point identification confirmed     Date: 2020   Start time: 1001 Stop Time: 104  Session # 35    The patient has been notified of the following:   \"We have found that certain health care needs can be provided without the need for a face to face visit.  This service lets us provide the care you need with a phone conversation.  I will have full access to your [place] medical record during this entire phone call.   I will be taking notes for your medical record. Since this is like an office visit, we will bill your insurance company for this service.  There are potential benefits and risks of telephone visits (e.g. limits to patient confidentiality) that differ from in-person visits.?  Confidentiality still applies for telephone services, and nobody will record the visit.  It is important to be in a quiet, private space that is free of distractions (including cell phone or other devices) during the visit.?? If during the course of the call I believe a telephone visit is not appropriate, you will not be charged for this service\"  Consent has been obtained for this service by care team member: Yes, per verbal agreement     All of patient's visits will be via telephone during the pandemic due to patient's lack of ability to run phone and hypervigilance of people hearing or seeing him.     Session Type: Patient is participating in a telephone visit    Chief Complaint   Patient presents with     MH Follow Up     Telephone Visit Mental Health     New symptoms or complaints: None    Functional Impairment:   Personal: 4  Family: 2  Work: N/A  Social:4        ASSESSMENT: Current Emotional / Mental Status (status of significant symptoms): Reviewed on 2020              Patient denies personal safety concerns.               Patient denies current or recent suicidal ideation or behaviors.              Patient " denies current or recent homicidal ideation or behaviors.              Patient denies current or recent self injurious behavior or ideation.              Patient denies other safety concerns.              Patient denies changes in protective factors              Recommended that patient call 911 or go to the local ED should there be a change in any of these risk factors.                Attitude:                                   Cooperative               Orientation:                             Person, place,  situation              Speech                          Rate / Production:       Within normal                           Volume:                       Soft              Mood:                                      Depressed             Thought Content:                    Flight of ideas               Thought Form:                        Circulatory               Insight:                                     Poor      Patient's impression of their current status: Patient indicated feeling depressed. Patient reported knowing it is due to not having friends and isolating. Patient indicated he is still very hurt by his best friend not returning his calls. Patient reported knowing he is drinking but does not feel it is right to stop communication. Patient indicated he has been spending a little more time at his parents which he is enjoying. Patient reported he continues to only shower maybe once a week due to it being too much work.     Therapist impression of patients current state: Patient appears to have poor insight into his mental health. This therapist processed with patient the importance of working on self-care. This therapist processed with patient the importance of spending time with support network.     Type of psychotherapeutic technique provided: Client centered and CBT    Progress toward short term goals: Progress as expected with patient continuing with scheduled session's and getting out of the house.      Review of long term goals: Treatment Plan Updated on 11/3/2020 verbally due to season on the phone due to pandemic.     Diagnosis:  1. PTSD (post-traumatic stress disorder)    2. Delusional disorder (H)    3. Severe alcohol use disorder (H)        Plan and Follow up: Patient will continue with weekly ongoing therapy. Patient would benefit from continuing to work on self-care. Patient should continue to spend time with his family.     Discharge Criteria/Planning: Client has chronic symptoms and ongoing therapy for maintenance stability recommended.    I have reviewed the note as documented above.  This accurately captures the substance of my conversation with the patient.  Mracella Nogueira LPCC

## 2021-06-13 NOTE — TELEPHONE ENCOUNTER
RECEIVED REQUEST FOR RULE 79 FOR CASE MANAGEMENT SERVICES - EMAILED TO PROVIDER AND PLACED IN PROVIDER'S CLINIC MAILBOX

## 2021-06-13 NOTE — PROGRESS NOTES
"  Mental Health Psychotherapy Note    Patient: Choco Claudio    : 1965 MRN: [id number]    Two point identification confirmed with full name and     Date: 2020   Start time: 101 Stop Time: 1030  Session # 36    The patient has been notified of the following:   \"We have found that certain health care needs can be provided without the need for a face to face visit.  This service lets us provide the care you need with a phone conversation.  I will have full access to your [place] medical record during this entire phone call.   I will be taking notes for your medical record. Since this is like an office visit, we will bill your insurance company for this service.  There are potential benefits and risks of telephone visits (e.g. limits to patient confidentiality) that differ from in-person visits.?  Confidentiality still applies for telephone services, and nobody will record the visit.  It is important to be in a quiet, private space that is free of distractions (including cell phone or other devices) during the visit.?? If during the course of the call I believe a telephone visit is not appropriate, you will not be charged for this service\"  Consent has been obtained for this service by care team member: Yes, per verbal agreement     All of patient's visits will be via telephone during the pandemic due to patient's lack of ability to run phone and hypervigilance of people hearing or seeing him.     Those present for session patient and therapist     Session Type: Patient is participating in a telephone visit    Chief Complaint   Patient presents with      Follow Up     Telephone Visit Mental Health     New symptoms or complaints: Patient indicated accidentally taking too much medication last night.     Functional Impairment:   Personal: 4  Family: 2  Work: N/A  Social:4        ASSESSMENT: Current Emotional / Mental Status (status of significant symptoms): Reviewed on 2020              " Patient denies personal safety concerns.               Patient denies current or recent suicidal ideation or behaviors.              Patient denies current or recent homicidal ideation or behaviors.              Patient denies current or recent self injurious behavior or ideation.              Patient denies other safety concerns.              Patient denies changes in protective factors              Recommended that patient call 911 or go to the local ED should there be a change in any of these risk factors.                Attitude:                                   Hesitant               Orientation:                             Person, place,  situation              Speech                          Rate / Production:       Excessive                           Volume:                       Soft              Mood:                                      Anxious             Thought Content:                    Flight of ideas               Thought Form:                        Circulatory               Insight:                                     Poor      Patient's impression of their current status: Patient reported feeling anxious. Patient indicated he believes he took too much of his medication. Patient reported that he took his medication and then woke up and took his medication again. Patient indicated he then walked 7 miles to his parents house. Patient reported he is currently at his parents house. Patient indicated he is having a hard time standing. Patient denied any suicidal ideations, plans and/or means. Patient indicated he did not try to take too much medication. Patient reported just not remembering that he took his medication. Due to patient indicated taking too much medication this therapist consulted with charge nurse Diann Cruz RN. This therapist was able to have Diann call and connect with patient to access due to overtaking medications. This therapist then confirmed with Diann that she was able to  connect with patient and put a note in epic.     Therapist impression of patients current state: Patient appears to have poor insight into his mental health. This therapist went over suicidal ideations, plans and or means which patient denied. This therapist informed patient if any suicidal ideations were to occur to call 911. This therapist ended conversation with Diann Cruz RN connecting with patient.     Type of psychotherapeutic technique provided: Client centered     Progress toward short term goals: Progress as expected with patient continuing with scheduled session's    Review of long term goals: Treatment Plan Updated on 11/3/2020 verbally due to season on the phone due to pandemic.     Diagnosis:  1. PTSD (post-traumatic stress disorder)    2. Delusional disorder (H)    3. Severe alcohol use disorder (H)        Plan and Follow up: Patient will continue with weekly ongoing therapy. Patient should continue to connect with jose Lane CNP for all medication concerns. Patient should continue to work on skills taught in session.     Discharge Criteria/Planning: Client has chronic symptoms and ongoing therapy for maintenance stability recommended.    I have reviewed the note as documented above.  This accurately captures the substance of my conversation with the patient.  Marcella Nogueira [facility]

## 2021-06-13 NOTE — TELEPHONE ENCOUNTER
Patient was seeing his therapist and there was concern over his medication use.  Patient asked therapist If I could call him to discuss this. Spoke to patient and he said he drank a couple cans of beer last night.  He took his bedtime medications, but only 200 mg of Seroquel instead of the prescribed 300 mg as he said he gets too dizzy from the 300 mg dose.  Patient then said he woke up in the middle of the night and took another 100 mg of seroquel which is his morning dose.  He wasn't sure what time it was.  He states he feels okay now.  It was explained to him he can only take his medications as prescribed.  He was advised not to take medication when he wakes up during the night.  He agreed.  His mother still sets up his medications for him.   He is at his moms house, but will be going back to his apartment soon.  He said he feels safest at his apartment.  Ry said it was good talking to a familiar voice.

## 2021-06-13 NOTE — PROGRESS NOTES
"  Mental Health Psychotherapy Note    Patient: Choco Claudio    : 1965 MRN: [id number]    Two point identification confirmed with full name and     Date: 2020   Start time: 1005 Stop Time: 102  Session # 37    The patient has been notified of the following:   \"We have found that certain health care needs can be provided without the need for a face to face visit.  This service lets us provide the care you need with a phone conversation.  I will have full access to your [place] medical record during this entire phone call.   I will be taking notes for your medical record. Since this is like an office visit, we will bill your insurance company for this service.  There are potential benefits and risks of telephone visits (e.g. limits to patient confidentiality) that differ from in-person visits.?  Confidentiality still applies for telephone services, and nobody will record the visit.  It is important to be in a quiet, private space that is free of distractions (including cell phone or other devices) during the visit.?? If during the course of the call I believe a telephone visit is not appropriate, you will not be charged for this service\"  Consent has been obtained for this service by care team member: Yes, per verbal agreement     All of patient's visits will be via telephone during the pandemic due to patient's lack of ability to run phone and hypervigilance of people hearing or seeing him.     Those present for session patient and therapist     Session Type: Patient is participating in a telephone visit    Chief Complaint   Patient presents with     MH Follow Up     Telephone Visit Mental Health     New symptoms or complaints: None    Functional Impairment:   Personal: 4  Family: 2  Work: N/A  Social:4        ASSESSMENT: Current Emotional / Mental Status (status of significant symptoms): Reviewed on 2020              Patient denies personal safety concerns.               Patient denies " current or recent suicidal ideation or behaviors.              Patient denies current or recent homicidal ideation or behaviors.              Patient denies current or recent self injurious behavior or ideation.              Patient denies other safety concerns.              Patient denies changes in protective factors              Recommended that patient call 911 or go to the local ED should there be a change in any of these risk factors.                Attitude:                                   Cooperative               Orientation:                             Person, place,  situation              Speech                          Rate / Production:       Excessive                           Volume:                       Soft              Mood:                                      Down             Thought Content:                    Flight of ideas               Thought Form:                        Circulatory               Insight:                                     Poor      Patient's impression of their current status: Patient indicated feeling down. Patient reported he is struggling with feeling lonely again. Patient indicated he would like to ride the bus but it cost too much money. Patient reported he did get another phone but is waiting for his brother to come set it up. Patient indicated he has been spending time at his parents which he is enjoying. Patient reported wanting more people in his life but not trusting people.    Therapist impression of patients current state: Patient appears to have poor insight into his mental health. This therapist informed patient she received information for rule 79 and general assistance. Patient indicated he already talked to the person from general assistance and does not need to complete the paperwork. He indicated he did not want more services and did not want to complete a rule 79. This therapist encouraged patient to talk to his general assistance worker and his   to ensure that his paperwork is not needed to complete services. This therapist informed patient he could come to clinic to sign RASHEED's if needed. This therapist processed with patient the importance of continuing to work on getting out of the house.     Type of psychotherapeutic technique provided: Client centered     Progress toward short term goals: Progress as expected with patient continuing with scheduled session's and spending time at his parents house    Review of long term goals: Treatment Plan Updated on 11/3/2020 verbally due to season on the phone due to pandemic.     Diagnosis:  1. PTSD (post-traumatic stress disorder)    2. Delusional disorder (H)    3. Severe alcohol use disorder (H)        Plan and Follow up: Patient will continue with weekly ongoing therapy. Patient should continue to connect with jose Lane CNP for all medication concerns. Patient would benefit from reaching out to his two workers to determine if paperwork needs to be completed. Patient should continue to work on getting out of the house.     Discharge Criteria/Planning: Client has chronic symptoms and ongoing therapy for maintenance stability recommended.    I have reviewed the note as documented above.  This accurately captures the substance of my conversation with the patient.  Marcella Nogueira [facility]

## 2021-06-13 NOTE — PROGRESS NOTES
"Mental Health Psychotherapy Note    Patient: Choco Claudio    : 1965 MRN: [id number]    Two point identification confirmed     Date: 11/10/2020   Start time: 1005  Stop Time: 1045  Session # 33    The patient has been notified of the following:   \"We have found that certain health care needs can be provided without the need for a face to face visit.  This service lets us provide the care you need with a phone conversation.  I will have full access to your [facility] medical record during this entire phone call.   I will be taking notes for your medical record. Since this is like an office visit, we will bill your insurance company for this service.  There are potential benefits and risks of telephone visits (e.g. limits to patient confidentiality) that differ from in-person visits.?  Confidentiality still applies for telephone services, and nobody will record the visit.  It is important to be in a quiet, private space that is free of distractions (including cell phone or other devices) during the visit.?? If during the course of the call I believe a telephone visit is not appropriate, you will not be charged for this service\"  Consent has been obtained for this service by care team member: Yes, per verbal agreement     All of patient's visits will be via telephone during the pandemic due to patient's lack of ability to run phone and hypervigilance of people hearing or seeing him.     Session Type: Patient is participating in a telephone visit    Chief Complaint   Patient presents with      Follow Up     Video Visit Mental Health     New symptoms or complaints: None    Functional Impairment:   Personal: 4  Family: 2  Work: N/A  Social:4        ASSESSMENT: Current Emotional / Mental Status (status of significant symptoms): Reviewed on 11/10/2020              Patient denies personal safety concerns.               Patient denies current or recent suicidal ideation or behaviors.              Patient denies " current or recent homicidal ideation or behaviors.              Patient denies current or recent self injurious behavior or ideation.              Patient denies other safety concerns.              Patient denies changes in protective factors              Recommended that patient call 911 or go to the local ED should there be a change in any of these risk factors.                Attitude:                                   Cooperative               Orientation:                             Person, place,  situation              Speech                          Rate / Production:       Within normal                           Volume:                       Soft              Mood:                                      Depressed             Thought Content:                    Flight of ideas               Thought Form:                        Circulatory               Insight:                                     Poor      Patient's impression of their current status: Patient indicated feeling depressed. Patient reported there are many different reasons why he feels depressed. Patient indicated the pandemic, the weather, his father and his friend all play into his mood. Patient reported all of these are things that are out of his control. Patient indicated he has continued to isolate and not leave the house.     Therapist impression of patients current state: Patient appears to have poor insight into his mental health. This therapist challenged patient on what he does have control over at this time. This therapist processed with patient places he feels safe and spending time in those places     Type of psychotherapeutic technique provided: Client centered and CBT    Progress toward short term goals: Progress as expected with patient continuing with scheduled session, taking medications and identifying mood    Review of long term goals: Treatment Plan Updated on 11/3/2020 verbally due to season on the phone due to pandemic.      Diagnosis:  1. PTSD (post-traumatic stress disorder)    2. Delusional disorder (H)    3. Severe alcohol use disorder (H)        Plan and Follow up: Patient will continue with weekly ongoing therapy. Patient would benefit from continuing to work on getting out of the house. Patient should continue to work on self-care daily.     Discharge Criteria/Planning: Client has chronic symptoms and ongoing therapy for maintenance stability recommended.    I have reviewed the note as documented above.  This accurately captures the substance of my conversation with the patient.  Marcella Nogueira LPC
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
## 2021-06-13 NOTE — PROGRESS NOTES
"Mental Health Psychotherapy Note    Patient: Choco Claudio    : 1965 MRN: [id number]    Two point identification confirmed     Date: 2020   Start time: 102 Stop Time: 141  Session # 34    The patient has been notified of the following:   \"We have found that certain health care needs can be provided without the need for a face to face visit.  This service lets us provide the care you need with a phone conversation.  I will have full access to your [facility] medical record during this entire phone call.   I will be taking notes for your medical record. Since this is like an office visit, we will bill your insurance company for this service.  There are potential benefits and risks of telephone visits (e.g. limits to patient confidentiality) that differ from in-person visits.?  Confidentiality still applies for telephone services, and nobody will record the visit.  It is important to be in a quiet, private space that is free of distractions (including cell phone or other devices) during the visit.?? If during the course of the call I believe a telephone visit is not appropriate, you will not be charged for this service\"  Consent has been obtained for this service by care team member: Yes, per verbal agreement     All of patient's visits will be via telephone during the pandemic due to patient's lack of ability to run phone and hypervigilance of people hearing or seeing him.     Session Type: Patient is participating in a telephone visit    Chief Complaint   Patient presents with     MH Follow Up     Telephone Visit Mental Health     New symptoms or complaints: None    Functional Impairment:   Personal: 4  Family: 2  Work: N/A  Social:4        ASSESSMENT: Current Emotional / Mental Status (status of significant symptoms): Reviewed on 2020              Patient denies personal safety concerns.               Patient denies current or recent suicidal ideation or behaviors.              Patient denies " current or recent homicidal ideation or behaviors.              Patient denies current or recent self injurious behavior or ideation.              Patient denies other safety concerns.              Patient denies changes in protective factors              Recommended that patient call 911 or go to the local ED should there be a change in any of these risk factors.                Attitude:                                   Cooperative               Orientation:                             Person, place,  situation              Speech                          Rate / Production:       Within normal                           Volume:                       Soft              Mood:                                      Anxious             Thought Content:                    Flight of ideas               Thought Form:                        Circulatory               Insight:                                     Poor      Patient's impression of their current status: Patient reported feeling anxious. Patient indicated he left the house on Saturday but has no desire to leave the house now. Patient reported he wants to stay inside away from people. Patient indicated he is starting to feel sick as well. Patient reported he has been up for 24 hours and knows he needs to sleep.     Therapist impression of patients current state: Patient appears to have poor insight into his mental health. This therapist processed with patient the importance of working on self-care. This therapist processed with patient letting his mom know that he is not feeling well and for her support. dx    Type of psychotherapeutic technique provided: Client centered and CBT    Progress toward short term goals: Progress as expected with patient continuing with scheduled session and getting out of the house    Review of long term goals: Treatment Plan Updated on 11/3/2020 verbally due to season on the phone due to pandemic.     Diagnosis:  1. PTSD (post-traumatic  stress disorder)    2. Delusional disorder (H)    3. Severe alcohol use disorder (H)        Plan and Follow up: Patient will continue with weekly ongoing therapy. Patient should continue to work on sleep hygiene. Patient would benefit from continuing to get out of the apartment.     Discharge Criteria/Planning: Client has chronic symptoms and ongoing therapy for maintenance stability recommended.    I have reviewed the note as documented above.  This accurately captures the substance of my conversation with the patient.  Marcella Nogueira [facility]

## 2021-06-13 NOTE — PROGRESS NOTES
"Reason for visit - follow-up  Sleep - has been sleeping better, tired, sometimes thoughts rush into mind and hard to quit them at times.  Depression - \"feels worthless piece of garbage\"  Anxiety - unsure  SI/HI - no  Therapist - to start with Marcella today.  Side effects from medication - doesn't think so.  Unsure what medications he is on.  States Mom gives them to him and he just takes them.  Smells of ETOH.    [employer] [place] Date: 17  Query Report Page#: 1  Patient Rx History Report  CHET BLACK  Search Criteria: Last Name 'chet' and First Name 'kandace' and  =  and Request Period =   to ' - 1 out of 1 Recipients Selected.  Fill Date Product, Str, Form Qty Days Pt ID Prescriber Written RX# N/R* Pharm **MED+  ---------- -------------------------------- ------ ---- --------- ---------- ---------- ------------ ----- --------- ------  2017 GABAPENTIN 100 MG CAPSULE 60.00 30 [id number] 2017 [id number]  [id number] 00.0  2017 GABAPENTIN 300 MG CAPSULE 30.00 30 [id number] 2017 [id number] N [id number] 00.0  2017 OXYCODONE HCL 5 MG TABLET 12.00 2 [id number] 2017 [id number] N [id number] 45.0  *N/R N=New R=Refill  +MED Daily  Prescribers for prescriptions listed  ----------------------------------------------------------------------------------------------------------------------------------  [id number] BASIL SIFUENTES; [facility] [address]  [id number] GANGA WESTON J, [facility],  [facility],, [facility] [address]  Pharmacies that dispensed prescriptions listed  ----------------------------------------------------------------------------------------------------------------------------------  [id number] [facility]; : [facility]ID # [id number], [address] [facility],, [facility] [place] [address],  Patients that match search " criteria  ----------------------------------------------------------------------------------------------------------------------------------  [id number] CHET JOHNSON,  [birth year]; [address], [place] [address]  MED Summary  This section displays cumulative MED values by unique recipient. The MED Max value is the maximum occurrence of cumulative MED  sustained for any 3 consecutive days. This value is calculated based on prescriptions dispensed during the date range requested.  -----------------------------------------------------------------------------------------------------------------------------------  0 WAYNE ROSENBERG; 1965; [address], [place] [address]  **Per CDC guidance, the conversion factors and associated daily morphine milligram equivalents for drugs prescribed as part of  medication-assisted treatment for opioid use disorder should not be used to benchmark against dosage thresholds meant for opioids  prescribed for pain.  Report Disclaimers:  The report provided above is based upon the search criteria and the data provided by the dispensing entities. For more information  about any prescription, please contact the dispenser or the prescriber.  This report contains confidential information, including patient identifiers, and is not a public record. The information on this  report must be treated as protected health information and is to be disclosed to others only as authorized by applicable state  and Federal regulations.

## 2021-06-13 NOTE — TELEPHONE ENCOUNTER
Patient calling requesting RN- making bizarre statements about long hair, writer reminding him of someone, and making a comment about PTSD- Said he lived in a tent and the twigs snapping would trigger him. Patient says he thinks he overdosed. Kept making nonsensical comments.    Writer warm transferred to RN staff to screen.

## 2021-06-14 NOTE — PROGRESS NOTES
Correct pharmacy verified with patient and confirmed in snapshot? [x] yes []no    Charge captured ? [x] yes  [] no    Medications Phoned  to Pharmacy [] yes [x]no  Name of Pharmacist:  List Medications, including dose, quantity and instructions      Medication Prescriptions given to patient   [] yes  [x] no   List the name of the drug the prescription was written for.       Medications ordered this visit were e-scribed.  Verified by order class [] yes  [x] no    Medication changes or discontinuations were communicated to patient's pharmacy: [] yes  [x] no    UA collected [] yes  [x] no    [place] Prescription Monitoring Program Reviewed? [x] yes  [] no    Referrals were made to:  none    Future appointment was made: [x] yes  [] no  1/9/2018    Dictation completed at time of chart check: [x] yes  [] no    I have checked the documentation for today s encounters and the above information has been reviewed and completed.
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
## 2021-06-14 NOTE — PROGRESS NOTES
Correct pharmacy verified with patient and confirmed in snapshot? [x] yes []no    Charge captured ? [x] yes  [] no    Medications Phoned  to Pharmacy [] yes [x]no  Name of Pharmacist:  List Medications, including dose, quantity and instructions      Medication Prescriptions given to patient   [] yes  [x] no   List the name of the drug the prescription was written for.       Medications ordered this visit were e-scribed.  Verified by order class [] yes  [x] no, none ordered today.    Medication changes or discontinuations were communicated to patient's pharmacy: [] yes  [x] no, none discontinued today.    UA collected [] yes  [x] no    [place] Prescription Monitoring Program Reviewed? [x] yes  [] no    Referrals were made to:  NA    Future appointment was made: [x] yes  [] no    Dictation completed at time of chart check: [x] yes  [] no    I have checked the documentation for today s encounters and the above information has been reviewed and completed.
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
## 2021-06-14 NOTE — PROGRESS NOTES
"  Mental Health Psychotherapy Note    Patient: Choco Claudio    : 1965 MRN: [id number]    Two point identification confirmed with full name and     Date: 2021   Start time: 100 Stop Time: 104  Session # 2    The patient has been notified of the following:   \"We have found that certain health care needs can be provided without the need for a face to face visit.  This service lets us provide the care you need with a phone conversation.  I will have full access to your [facility] medical record during this entire phone call.   I will be taking notes for your medical record. Since this is like an office visit, we will bill your insurance company for this service.  There are potential benefits and risks of telephone visits (e.g. limits to patient confidentiality) that differ from in-person visits.?  Confidentiality still applies for telephone services, and nobody will record the visit.  It is important to be in a quiet, private space that is free of distractions (including cell phone or other devices) during the visit.?? If during the course of the call I believe a telephone visit is not appropriate, you will not be charged for this service\"  Consent has been obtained for this service by care team member: Yes, per verbal agreement     All of patient's visits will be via telephone during the pandemic due to patient's lack of ability to run phone and hypervigilance of people hearing or seeing him.     Those present for session patient and therapist     Session Type: Patient is participating in a telephone visit    Chief Complaint   Patient presents with     MH Follow Up     Telephone Visit Mental Healtj     New symptoms or complaints: None    Functional Impairment:   Personal: 4  Family: 2  Work: N/A  Social:4        ASSESSMENT: Current Emotional / Mental Status (status of significant symptoms): Reviewed on 2021              Patient denies personal safety concerns.               Patient denies " current or recent suicidal ideation or behaviors.              Patient denies current or recent homicidal ideation or behaviors.              Patient denies current or recent self injurious behavior or ideation.              Patient denies other safety concerns.              Patient denies changes in protective factors              Recommended that patient call 911 or go to the local ED should there be a change in any of these risk factors.                Attitude:                                   Cooperative               Orientation:                             Person, place,  situation              Speech                          Rate / Production:       Excessive                           Volume:                       Soft              Mood:                                      Anxious             Thought Content:                    Flight of ideas               Thought Form:                        Circulatory               Insight:                                     Poor      Patient's impression of their current status: Patient reported continuing to feel anxious. Patient indicated his anxiety has gone done a lot since being in the hospital. Patient reported he was able to relax in the hospital which he has not done in a long time. Patient indicated they were not able to complete the CT scan. Patient reported he is working on getting out of the apartment more often due to his causing him a lot of anxiety by staying in too long.     Therapist impression of patients current state: Patient appears to have poor insight into his mental health. This therapist processed with patient ways to use skills taught in session to continue to reduce anxiety. This therapist processed with patient the importance of connecting with his support network.     Type of psychotherapeutic technique provided: Client centered     Progress toward short term goals: Progress as expected with patient reaching out for medical needs,  returning for scheduled session and identifying change    Review of long term goals: Treatment Plan Updated on 11/3/2020 verbally due to season on the phone due to pandemic.     Diagnosis:  1. PTSD (post-traumatic stress disorder)    2. Delusional disorder (H)    3. Severe alcohol use disorder (H)        Plan and Follow up: Patient will continue with weekly ongoing therapy. Patient need to work on a schedule daily that includes getting out of the apartment. Patient needs to continue to work with his medication provider. Patient needs to continue to talk with his support network.    Discharge Criteria/Planning: Client has chronic symptoms and ongoing therapy for maintenance stability recommended.    I have reviewed the note as documented above.  This accurately captures the substance of my conversation with the patient.  NETTA De La Paz
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
## 2021-06-14 NOTE — PROGRESS NOTES
Outpatient Mental Health Treatment Plan    Name:  Choco Claudio  :  1965  MRN:  [id number]    Treatment Plan:  Updated Treatment Plan  Intake/initial treatment plan date:  2017  Benefit and risks and alternatives have been discussed: Yes  Is this treatment appropriate with minimal intrusion/restrictions: Yes  Estimated duration of treatment:  Ongoing psychotherapy  Anticipated frequency of services:  Every 1 week  Necessity for frequency: This frequency is needed to establish therapeutic goals and for continuity of care in order to monitor progress.  Necessity for treatment: To address cognitive, behavioral, and/or emotional barriers in order to work toward goals and to improve quality of life.    Plan:           ?   ? Posttraumatic Stress Disorder    Goal:  Reduced the signs and symptoms of PTSD and increase patient's ability to tolerate breakthrough stressors surrounding his various traumas.   Strategies: ? [x]Learn and practice relaxation techniques and other coping strategies (e.g., thought stopping, reframing, meditation)     ? [x] Increase involvement in meaningful activities     ? [x] Discuss sleep hygiene     ? [x] Explore thoughts and expectations about self and others     ? [x] Identify and monitor triggers for panic/anxiety symptoms     ? [x] Implement physical activity routine (with physician approval)     ? [x] Consider introduction of bibliotherapy and/or videos     ? [x] Continue compliance with medical treatment plan (or explore barriers)   ?Degree to which this is a problem: 4  Degree to which goal is met: 1  Date of next review: 2018    Substance use  Goal:  Increase patient awareness regarding substance use triggers.  Consider harm reduction and/or chemical dependency treatment.   Strategies: ? [x] Consider referral for chemical dependency evaluation     ? [x] Discuss barriers to participating in AA or other peer-facilitated groups         [x] Address environmental factors  which may interfere with sobriety     ? [x] Explore short-term versus long-term consequences of use  ?  Degree to which this is a problem: 4  Degree to which goal is met: 1  Date of next review: 02/16/2018    Depression  Goal:  Reduced the signs and symptoms of depression and increase patient's ability to tolerate breakthrough symptomology.   Strategies: ? [x]Learn and practice relaxation techniques and other coping strategies (e.g., thought stopping, reframing, meditation)     ? [x] Increase involvement in meaningful activities     ? [x] Discuss sleep hygiene     ? [x] Explore thoughts and expectations about self and others     ? [x] Identify and monitor triggers for panic/anxiety symptoms     ? [x] Implement physical activity routine (with physician approval)     ? [x] Consider introduction of bibliotherapy and/or videos     ? [x] Continue compliance with medical treatment plan (or explore barriers)   ?Degree to which this is a problem: 4  Degree to which goal is met: 1  Date of next review: 02/16/2018       Functional Impairment:  1=Not at all/Rarely  2=Some days  3=Most Days  4=Every Day    Personal : 4  Family : 4  Social : 4   Work/school : 3    Diagnosis:  Posttraumatic stress disorder; alcohol use disorder, severe; and major depressive disorder, recurrent, moderate.    Strengths:  supported by mother, one good friend, employed, and motivated for change.  Limitations:  isolation, ambivalence about discontinuing alcohol use, and difficulty connecting with others due to trauma symptoms  Cultural Considerations: Client identifies is half .    Persons responsible for this plan: Patient and Provider            Psychotherapist Signature           Patient Signature:                This note was created with help of Dragon dictation software.  Grammatical / typing errors are not intentional and inherent to the software.

## 2021-06-14 NOTE — PROGRESS NOTES
This therapist attempted to call patient after patient no showed for scheduled session. Patient's father answered and indicated he was not home. No message was left due to instructions indicating do not leave messages with father. Patient is cheduled to meet with this therapist again on 12/5/2017.

## 2021-06-14 NOTE — PROGRESS NOTES
Psychiatric  Progress Note  Date of visit: 11/28/2017         Discussion of Care and Treatment Recommendations:   This is a 52 y.o. male with  significant history of Alcohol Use Disorder, Etoh induced gastritis , DENISE and PTSD who presents to the clinic today  For a follow up appointment              Last visit 11/7/17  Recommendation at last visit .  1-Continue with current medications Trazodone  100 mg at Bedtime, gabapentin 100  mg at bedtime,   2- Highly recommend Inpatient CD treatment Alcohol Use Disorder - Severe. Pt  opposed to CD treatment   3- Recommend  Psychotherapy: Will send a referral to therapist Nandini  for PTSD therapy   4- Start Prazosin 2 mg at bedtime -PTSD, Start Seroquel 25 mg  at bedtime - psychosis , delusional thoughts  5-Make efforts to stay away from alcohol and drug  5- RTC- 2 weeks   Patient and I reviewed diagnosis and treatment plan and patient agrees with following recommendations:  Ongoing education given regarding diagnostic and treatment options with adequate verbalization of understanding.  Plan   1-Continue with current medications Trazodone  100 mg at Bedtime, gabapentin 100  mg at bedtime, Start Prazosin 2 mg at bedtime -PTSD, Start Seroquel 25 mg  at bedtime - psychosis , delusional thoughts    2- Highly recommend Inpatient CD treatment Alcohol Use Disorder - Severe. Pt  opposed to CD treatment   3- Continue with  Psychotherapy:  4- 5-Make efforts to stay away from alcohol and drug  5- RTC-  2 weeks            Diagnoses:   PTSD  Alcohol Use Disorder , Severe   R/O Schizophrenia           Patient Active Problem List   Diagnosis     Chest pain     Alcohol withdrawal, with unspecified complication     Sinus tachycardia     Lactic acidosis     Dehydration     Microcytosis     Non-traumatic rhabdomyolysis     Nausea     Alcoholic ketoacidosis     Epigastric pain     Alcoholism, chronic     High anion gap metabolic acidosis     Hypomagnesemia     Alcohol intoxication, uncomplicated  "    GI bleed     Anemia associated with acute blood loss     Disorder due to alcohol abuse     Substance induced mood disorder     Anxiety disorder     History of posttraumatic stress disorder (PTSD)     Erosive esophagitis             Chief Complaint / Subjective:    Chief complaint: \"I do not know why it keeps him broken glasses on the floor\"     History of Present Illness:  The patient statement-he reports that he has been feeling a little bit dizzy and thinks is the medications.  His vital signs are within normal limits today.  He also reports that he continues to drink every day and isolate himself in the basement.  Nightmares remain but have slightly improved with the initiation of Prazosin  He is not interested in increasing the medication.  He has  from [facility] that is trying to work with him to get neuro psych testing completed and I did encourage him to follow through with the testing.  He reports that he is taking his medication as prescribed.  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  He reports that alcohol is his coping mechanism and is unwilling to give up.  We discussed in details how alcohol could be contributing to some of his symptoms causing him anxiety hallucinations and dizziness and also it is detrimental to his health and would affect his liver.  I did ask him to compromise and cut down on how his drinking because he adamantly told me he would not stop drinking.  he promised to attempt to cut down the reluctantly.  He continues to refuse CD treatment for his alcohol use disorder.  I did request him to ask his mother to come with him for his next appointment so we can try and gather some information on how patient is doing at home.  He was " agreeable to this plan.  He is agreeable to continue taking his current medications also.  He does see a therapist on a weekly basis and wants to continue with that.  I will have him return in 2-3 weeks for follow-up appointment meanwhile encourage him to call between visits with any questions or concerns  Mental Status Examination:   General: Adequate hygiene, cooperative  Speech: Normal in rate and tone  Language: Intact  Thought process: Coherent  Thought content:                           Auditory hallucinations- absent                           Visual Hallucinations - Absent                           Delusions Absent                           Loose Associations:  No                          Suicidal thoughts: Absent                          Homicidal thoughts: Absent                       Affect: Neutral  Mood: Neutral   Intellectual functioning: Within normal limits  Memory: Within normal limits  Fund of knowledge: Average  Attention and concentration: Within normal limits  Gait: Steady  Psychomotor activity: Calm, no agitation  Muscles: No atrophy, no abnormal movements  InSight and judgment: Fair    Medication changes:See Above   Medication adherence: compliant  Medication side effects: absent  Information about medications: Side effects, benefits and alternative treatments discussed and patient agrees with capacity to do so.    Psychotherapy: Supportive therapy day-to-day living    Education: Diet, exercise, abstinence from drugs and alcohol, patient will not drive if sedated and medications or  under influence of any substance    Lab Results:   Personally reviewed and discussed with the patient    Lab Results   Component Value Date    WBC 3.8 (L) 10/15/2017    HGB 12.9 (L) 10/15/2017    HCT 39.1 (L) 10/15/2017     (L) 10/15/2017    ALT 11 01/15/2017    AST 15 01/15/2017     10/15/2017    K 3.3 (L) 10/15/2017     10/15/2017    CREATININE 0.84 10/15/2017    BUN 5 (L) 10/15/2017    CO2 25  10/15/2017    INR 1.15 (H) 01/14/2017       Vital signs:    Vitals:    11/28/17 0931 11/28/17 0936   BP: 119/70 107/60   Patient Site: Left Arm Left Arm   Patient Position: Sitting Standing   Cuff Size: Adult Large Adult Large   Pulse: 98 (!) 115   Resp: 18    Temp: 97.9  F (36.6  C)    TempSrc: Oral    Weight: 203 lb (92.1 kg)    Height: 6' (1.829 m)      Allergies: Other environmental allergy         Medications:       Current Outpatient Prescriptions on File Prior to Visit   Medication Sig Dispense Refill     cetirizine (ZYRTEC) 10 MG tablet Take 10 mg by mouth daily as needed for allergies.       omeprazole (PRILOSEC) 20 MG capsule Take 20 mg by mouth daily before breakfast.       prazosin (MINIPRESS) 2 MG capsule Take 1 capsule (2 mg total) by mouth at bedtime. 30 capsule 1     QUEtiapine (SEROQUEL) 25 MG tablet Take 1 tablet (25 mg total) by mouth at bedtime. 30 tablet 1     traZODone (DESYREL) 100 MG tablet Take 100 mg by mouth at bedtime.       gabapentin (NEURONTIN) 300 MG capsule Take 1 capsule (300 mg total) by mouth 3 (three) times a day. 30 capsule 1     No current facility-administered medications on file prior to visit.             Review of Systems:    Otherwise reminder of review of systems is negative    Coordination of Care:   More than 25 minutes spent on this visit  with more than 50% of time spent on coordination of care including: Educating patient about diagnosis, prognosis, side effects and benefits of medications, diet, exercise.  Time also spent on entering orders and preparing documentation for the visit.Time also spent providing supportive therapy regarding above issues.    This note was created using a dictation system. All typing errors or contextual distortion is unintentional and software inherent.

## 2021-06-14 NOTE — PROGRESS NOTES
This therapist received phone from patient. Patient indicated struggling with is PTSD flashbacks. This therapist went over skills to help with the flashbacks. This therapist processed with patient the importance of continuing to use skills discussed. Patient had calmed down by the end of the phone call and indicated no longer having the panic attack.

## 2021-06-14 NOTE — TELEPHONE ENCOUNTER
Pt calling stating he is having flash backs and not thinking straight. Requested to speak with Marcella or Diann. Please respond ASAP as pt is in crisis.

## 2021-06-14 NOTE — PROGRESS NOTES
Mental Health Visit Note    11/14/2017   Start time:400    Stop Time: 445   Session # 25    Choco Claudio is a 52 y.o. male is being seen today for a follow-up psychotherapy appointment    Chief Complaint   Patient presents with      Follow Up     Patient transferred from Bo Davisons, KAYLA, [facility]    New symptoms or complaints:  Patient reported not being able to handle his PTSD.     Functional Impairment:   The patient identifies the how daily stressors affect daily functioning in today's session as follows (Scale is 1-4, 1=not at all or rarely; 4=extremely so/everyday.)    Personal: 4  Family: 3  Social: 4  Work: 4    Clinical assessment of mental status:   Choco Claudio presented on time.   He was oriented x3, open and cooperative, and dressed appropriately for this session and weather. His memory was fair.  His speech was  Excessive and tangential.  Language was focused on desire for change.  Concentration and focus is Brief. Psychosis is not noted or reported. He reports his mood is depressed.  Affect is congruent with speech and is depressed.  Fund of knowledge is adequate. Insight is adequate for therapy.     Suicidal/Homicidal Ideation present:   No,  Patient denies suicidal and homicidal ideations/means or plans.      Patient's impression of their current status: Patient indicated feeling extremely depressed. Patient reported he started crying at work on Saturday and does not think he can go back. Patient reported he is struggling to handle his PTSD. Patient indicated he thinks he may want to quit his job and be homeless again. Patient reported life being hard but denying any suicidal ideation, plans and/or means. Patient indicated he needed to cry but did not want to cry in the session. This therapist informed patient this is a safe place to cry. Patient reported he still did not feel comfortable and asked to leave the session.     Therapist impression of patient's current state:   Patient  appears to have fair insight into his mental health. This therapist challenged patient on what specifically is hard about his PTSD. This therapist processed with patient his struggles with trust and people around people.     Type of psychotherapeutic technique provided:   Solution-focused and CBT    Progress toward short term goals:Progress as expected, Client schedules intake for     Review of long term goals: Not done at today's visit . Date of last review 07/06/2017 with KAYLA Gonzalez, [facility]    Diagnosis:   1. PTSD (post-traumatic stress disorder)    2. Major depressive disorder, recurrent episode, moderate    3. Severe alcohol use disorder      Plan and Follow-up:   Patient will return in one week for scheduled session. Patient will benefit from continuing to follow medication mangement. Patient should continue to work on sobriety. Patient should look at ways his drinking affects his mental health. Patient should work on getting out of the house 5 days a week.     Discharge Criteria/Planning: Client has chronic symptoms and ongoing therapy for maintenance stability recommended.    Signatures:   Performed and documented by NETTA De La Paz

## 2021-06-14 NOTE — TELEPHONE ENCOUNTER
for Ry (Deborah) called regarding a DA that is needed for this pt in order to continue having a . Deborah stated they have faxed over the paperwork but haven't had a response yet.  looking for follow up on status of paperwork.

## 2021-06-14 NOTE — PROGRESS NOTES
Here for follow-up.  Wants to discuss medication, as he is not liking how it is making him feel.  Just wanting to isolate.    [facility] [place] Date: 17  Query Report Page#: 1  Patient Rx History Report  CHET BLACK  Search Criteria: Last Name 'chet' and First Name 'kandace' and  =  and Request Period =   to ' - 1 out of 1 Recipients Selected.  Fill Date Product, Str, Form Qty Days Pt ID Prescriber Written RX# N/R* Pharm **MED+  ---------- -------------------------------- ------ ---- --------- ---------- ---------- ------------ ----- --------- ------  2017 GABAPENTIN 100 MG CAPSULE 60.00 30 [id number] 2017 [id number] N [id number] 00.0  2017 GABAPENTIN 300 MG CAPSULE 30.00 30 [id number] 2017 [id number] N [id number] 00.0  *N/R N=New R=Refill  +MED Daily  Prescribers for prescriptions listed  ----------------------------------------------------------------------------------------------------------------------------------  [id number] [facility]ON [facility]  [facility],, [facility] [address]  Pharmacies that dispensed prescriptions listed  ----------------------------------------------------------------------------------------------------------------------------------  [id number] [facility]; : [facility] # [id number], [address] [facility] [address],  Patients that match search criteria  ----------------------------------------------------------------------------------------------------------------------------------  [id number] CHET JOHNSON, SARAH [address]; [address], [place] [address]  MED Summary  This section displays cumulative MED values by unique recipient. The MED Max value is the maximum occurrence of cumulative MED  sustained for any 3 consecutive days. This value is calculated based on prescriptions dispensed during the date range  requested.  -----------------------------------------------------------------------------------------------------------------------------------  0 WAYNE ROSENBERG; 1965; [address], [place] [address]  **Per CDC guidance, the conversion factors and associated daily morphine milligram equivalents for drugs prescribed as part of  medication-assisted treatment for opioid use disorder should not be used to benchmark against dosage thresholds meant for opioids  prescribed for pain.  Report Disclaimers:  The report provided above is based upon the search criteria and the data provided by the dispensing entities. For more information  about any prescription, please contact the dispenser or the prescriber.  This report contains confidential information, including patient identifiers, and is not a public record. The information on this  report must be treated as protected health information and is to be disclosed to others only as authorized by applicable state  and Federal regulations.

## 2021-06-14 NOTE — PROGRESS NOTES
"  Mental Health Psychotherapy Note    Patient: Choco Claudio    : 1965 MRN: [id number]    Two point identification confirmed with full name and     Date: 2021   Start time: 450 Stop Time: 520  Session # 1    The patient has been notified of the following:   \"We have found that certain health care needs can be provided without the need for a face to face visit.  This service lets us provide the care you need with a phone conversation.  I will have full access to your [place] medical record during this entire phone call.   I will be taking notes for your medical record. Since this is like an office visit, we will bill your insurance company for this service.  There are potential benefits and risks of telephone visits (e.g. limits to patient confidentiality) that differ from in-person visits.?  Confidentiality still applies for telephone services, and nobody will record the visit.  It is important to be in a quiet, private space that is free of distractions (including cell phone or other devices) during the visit.?? If during the course of the call I believe a telephone visit is not appropriate, you will not be charged for this service\"  Consent has been obtained for this service by care team member: Yes, per verbal agreement     All of patient's visits will be via telephone during the pandemic due to patient's lack of ability to run phone and hypervigilance of people hearing or seeing him.     Those present for session patient and therapist     Session Type: Patient is participating in a telephone visit    Chief Complaint   Patient presents with     MH Follow Up     Telephone Visit Mental Health     New symptoms or complaints: None    Functional Impairment:   Personal: 4  Family: 2  Work: N/A  Social:4        ASSESSMENT: Current Emotional / Mental Status (status of significant symptoms): Reviewed on 2021              Patient denies personal safety concerns.               Patient denies " current or recent suicidal ideation or behaviors.              Patient denies current or recent homicidal ideation or behaviors.              Patient denies current or recent self injurious behavior or ideation.              Patient denies other safety concerns.              Patient denies changes in protective factors              Recommended that patient call 911 or go to the local ED should there be a change in any of these risk factors.                Attitude:                                   Cooperative               Orientation:                             Person, place,  situation              Speech                          Rate / Production:       Excessive                           Volume:                       Soft              Mood:                                      Anxious             Thought Content:                    Flight of ideas               Thought Form:                        Circulatory               Insight:                                     Poor      Patient's impression of their current status: Patient indicated feeling extremely anxious. Patient reported he is continuing to be triggered with flashbacks. Patient indicated he see's himself back in the tent. Patient reported he did stop taking his medications. Patient indicated he has not slept for 4 days. Patient reported he is planning to spend time with his mom tomorrow. Patient indicated knowing he needs to get out of the house.     Therapist impression of patients current state: Patient appears to have poor insight into his mental health. This therapist went over coping skills and had patient use skills while on the phone. This therapist processed with patient the importance of talking with his provider being stopping medications.     Type of psychotherapeutic technique provided: Client centered     Progress toward short term goals: Poor progress as evidenced by patient stopping medication.     Review of long term goals:  Treatment Plan Updated on 11/3/2020 verbally due to season on the phone due to pandemic.     Diagnosis:  1. PTSD (post-traumatic stress disorder)    2. Delusional disorder (H)        Plan and Follow up: Patient will continue with weekly ongoing therapy. Patient should continue to work on using skills to help with flashbacks. Patient needs to continue to work on talking with his prescriber about medication concerns.      Discharge Criteria/Planning: Client has chronic symptoms and ongoing therapy for maintenance stability recommended.    I have reviewed the note as documented above.  This accurately captures the substance of my conversation with the patient.  Marcella Nogueira, [facility]

## 2021-06-14 NOTE — TELEPHONE ENCOUNTER
Date of Last Office Visit: 11/2/20  Date of Next Office Visit: None. Central scheduling attempting to contact pt  No shows since last visit: 1/18/21 and 12/10/20  Cancellations since last visit: NONE  ED visits since last visit: NONE    Medication Seroquel 100 mg date last ordered: 10/8/20  Qty: 90  Refills: 0    QUEtiapine (SEROQUEL) 100 MG tablet 90 tablet 0 10/8/2020     Sig - Route: Take 1 tablet (100 mg total) by mouth every morning. - Oral        Lapse in therapy greater than 7 days: appears yes  Medication refill request verified as identical to current order: yes  Result of Last DAM, VPA, Li+ Level, CBC, or Carbamazepine Level (at or since last visit): N/A        []Eligibility - not seen in last year    [x]Supervision - no future appointment    [x]Compliance : unclear med compliance, visit compliance    []Verification - order discrepancy    []Controlled Medication    [x]90 - day supply request    [x]Other LPN pending medications    Current Medication list:      acetaminophen (TYLENOL) 500 MG tablet Take 1,000 mg by mouth every 6 (six) hours as needed for pain.   cetirizine (ZYRTEC) 10 MG tablet Take 10 mg by mouth at bedtime.    omeprazole (PRILOSEC) 20 MG capsule Take 20 mg by mouth daily as needed.    prazosin (MINIPRESS) 5 MG capsule Take 1 capsule (5 mg total) by mouth at bedtime.   QUEtiapine (SEROQUEL) 100 MG tablet Take 1 tablet (100 mg total) by mouth every morning.   QUEtiapine (SEROQUEL) 200 MG tablet Take 1.5 tablets (300 mg total) by mouth at bedtime.   traZODone (DESYREL) 100 MG tablet TAKE 1 TABLET(100 MG) BY MOUTH AT BEDTIME       Medication Plan of Care at last office visit with MD/CNP:    PLAN:  Plan   1.Continue  taking  Seroquel  100 mg in the Morning and Seroquel 200 mg  at bedtime - psychosis , delusional thoughts. Continue with current medications Trazodone  100 mg at Bedtime, Increase  Prazosin to  5 mg at bedtime -PTSD  2- Highly recommend outpatient CD treatment Alcohol Use Disorder  - Severe. Pt  opposed to CD treatment .  Continues to use alcohol when he can afford it.  3- Continue with  Psychotherapy:  4- 5-Make efforts to stay away from alcohol and drug  5- RTC- 4  weeks call in between visit with any questions     DILMA YORK, [place], and [place] : NA
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
## 2021-06-14 NOTE — PROGRESS NOTES
This therapist attempted to reach patient after receiving message from intake patient requesting a call back. Patient did not answer so VM was left indicating that patient would call back.

## 2021-06-14 NOTE — TELEPHONE ENCOUNTER
Date of Last Office Visit: 11/2/20  Date of Next Office Visit: None scheduled; messages have been left to schedule f/u.  No shows since last visit: 12/10/20  Cancellations since last visit: 1/18/21 (Provider initiated)    Medication requested: prazosin 2 mg (last script was for 5 mg) Date last ordered: 11/2/20 Qty: 90 Refills: 0     Review of [place] ?: N/A    Lapse in medication adherence greater than 5 days?: No  If yes, call patient and gather details: N/A  Medication refill request verified as identical to current order?: No, request was for 2 mg. Provider note and last refill are for 5 mg.   Result of Last DAM, VPA, Li+ Level, CBC, or Carbamazepine Level (at or since last visit): N/A    []Medication refilled per  Medication Refill in Ambulatory Care  policy.  [x]Medication unable to be refilled by RN due to criteria not met as indicated below:    []Eligibility - not seen in the last year   []Supervision - no future appointment   []Compliance - no shows, cancellations or lapse in therapy   [x]Verification - order discrepancy   []Controlled medication   []Medication not included in policy   [x]90-day supply request   []Other
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
## 2021-06-14 NOTE — TELEPHONE ENCOUNTER
Date of Last Office Visit: 11/2/20  Date of Next Office Visit: 1/18/21  No shows since last visit: 12/10/20  Cancellations since last visit: 0  ED visits since last visit:  0    Medication quetiapine 100 mg date last ordered: 10/8/20  Qty: 90  Refills: 0    Lapse in therapy greater than 7 days: 7 days today  Medication refill request verified as identical to current order: Yes  Result of Last DAM, VPA, Li+ Level, CBC, or Carbamazepine Level (at or since last visit): N/A     [] Medication refilled per [facility] M-1.   [x] Medication unable to be refilled by RN due to criteria not met as indicated below:     []Eligibility - not seen in last year    []Supervision - no future appointment    []Compliance - no show    []Verification - order discrepancy    []Controlled Medication    []Medication not included in RN Protocol    [x]90 - day supply request    []Other     Current Medication list:  Choco Claudio   (MRN [id number])  Your Current Medications Are    acetaminophen (TYLENOL) 500 MG tablet Take 1,000 mg by mouth every 6 (six) hours as needed for pain.   cetirizine (ZYRTEC) 10 MG tablet Take 10 mg by mouth at bedtime.    omeprazole (PRILOSEC) 20 MG capsule Take 20 mg by mouth daily as needed.    prazosin (MINIPRESS) 5 MG capsule Take 1 capsule (5 mg total) by mouth at bedtime.   QUEtiapine (SEROQUEL) 100 MG tablet Take 1 tablet (100 mg total) by mouth every morning.   QUEtiapine (SEROQUEL) 200 MG tablet Take 1.5 tablets (300 mg total) by mouth at bedtime.   traZODone (DESYREL) 100 MG tablet TAKE 1 TABLET(100 MG) BY MOUTH AT BEDTIME       Medication Plan of Care at last office visit with MD/CNP:  Plan   1.Continue  taking  Seroquel  100 mg in the Morning and Seroquel 200 mg  at bedtime - psychosis , delusional thoughts. Continue with current medications Trazodone  100 mg at Bedtime, Increase  Prazosin to  5 mg at bedtime -PTSD  2- Highly recommend outpatient CD treatment Alcohol Use Disorder - Severe. Pt  opposed to CD  treatment .  Continues to use alcohol when he can afford it.  3- Continue with  Psychotherapy:  4- 5-Make efforts to stay away from alcohol and drug  5- RTC- 4  weeks call in between visit with any questions
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
## 2021-06-14 NOTE — PROGRESS NOTES
Correct pharmacy verified with patient and confirmed in snapshot? [x] yes []no    Charge captured ? [x] yes  [] no    Medications Phoned  to Pharmacy [] yes [x]no  Name of Pharmacist:  List Medications, including dose, quantity and instructions      Medication Prescriptions given to patient   [] yes  [x] no   List the name of the drug the prescription was written for.       Medications ordered this visit were e-scribed.  Verified by order class [x] yes  [] no    Medication changes or discontinuations were communicated to patient's pharmacy: [x] yes  [] no  All Gabapentin except the new one for 300 mg taking TID, that was sent over electronically today.    UA collected [] yes  [x] no    [place] Prescription Monitoring Program Reviewed? [x] yes  [] no    Referrals were made to:  Spoke to Claudia Odette, and explained there may be a call from [facility] as we are trying to have them help with housing and services for Choco.  She said she will be watchful for that.  She said that she has not been giving Choco the Gabapentin for quite a while.  Did not feel comfortable doing that with the drinking and falling down.  She has been giving him the Quetiapine and Prazosin, but did not know she could also give the Trazodone, so she will start giving him all three.  She said she cannot get the pharmacy and the primary MD to work together to get the PA done for the Omeprazole, but she will continue to try to get this to occur.  She asked if we would call her if any medication change, or if [employer] contacts us at all, just so she know things are occurring.  [employer] requested, RASHEED, order for services through them, Bo BARBA, Debra Lane's initial assessment, previous assessment, and today's encounter, as well as the last ED note and  note from ED sent to [facility].     Future appointment was made: [x] yes  [] no    Dictation completed at time of chart check: [x] yes  [] no    I have  checked the documentation for today s encounters and the above information has been reviewed and completed.

## 2021-06-15 PROBLEM — K22.10 EROSIVE ESOPHAGITIS: Status: ACTIVE | Noted: 2017-01-16

## 2021-06-15 PROBLEM — K92.2 GI BLEED: Status: ACTIVE | Noted: 2017-01-14

## 2021-06-15 PROBLEM — D62 ANEMIA ASSOCIATED WITH ACUTE BLOOD LOSS: Status: ACTIVE | Noted: 2017-01-14

## 2021-06-15 NOTE — PROGRESS NOTES
Psychiatric  Progress Note  Date of visit:1/9/2018         Discussion of Care and Treatment Recommendations:   This is a 52 y.o. male with significant history of Alcohol Use Disorder, Etoh induced gastritis , DENISE and PTSD who presents to the clinic today  For a follow up appointment       Last visit 11/7/17  Recommendation at last visit .  1-Continue with current medications Trazodone  100 mg at Bedtime, gabapentin 100  mg at bedtime, Start Prazosin 2 mg at bedtime -PTSD, Start Seroquel 25 mg  at bedtime - psychosis , delusional thoughts  2- Highly recommend Inpatient CD treatment Alcohol Use Disorder - Severe. Pt  opposed to CD treatment   3- Continue with  Psychotherapy:  4- 5-Make efforts to stay away from alcohol and drug  5- RTC-  2 weeks   Patient and I reviewed diagnosis and treatment plan and patient agrees with following recommendations:  Ongoing education given regarding diagnostic and treatment options with adequate verbalization of understanding.    Plan   1-Continue with current medications Trazodone  100 mg at Bedtime, gabapentin 100  mg at bedtime, Start Prazosin 2 mg at bedtime -PTSD, Start Seroquel 25 mg  at bedtime - psychosis , delusional thoughts  2- Highly recommend Inpatient CD treatment Alcohol Use Disorder - Severe. Pt  opposed to CD treatment   3- Continue with  Psychotherapy:  4- 5-Make efforts to stay away from alcohol and drug  5- RTC-  4 weeks          Diagnoses:     PTSD  Alcohol Use Disorder , Severe   R/O Schizophrenia     Patient Active Problem List   Diagnosis     Chest pain     Alcohol withdrawal, with unspecified complication     Sinus tachycardia     Lactic acidosis     Dehydration     Microcytosis     Non-traumatic rhabdomyolysis     Nausea     Alcoholic ketoacidosis     Epigastric pain     Alcoholism, chronic     High anion gap metabolic acidosis     Hypomagnesemia     Alcohol intoxication, uncomplicated     GI bleed     Anemia associated with acute blood loss     Disorder due  "to alcohol abuse     Substance induced mood disorder     Anxiety disorder     History of posttraumatic stress disorder (PTSD)     Erosive esophagitis             Chief Complaint / Subjective:    Chief complaint: \" I am loosing my job and my parents will be moving to a senior living housing , I chaitanya have no place to stay in \"     History of Present Illness:   Patient statement-he is worried of being homeless once again as his parents are moving to senior living housing and the company that gives an part-time employment is also closing down therefore he will have no money and no place to stay.  I reminded him I had made a referral for  ACT but patient was noncompliant with the assessment. He has  from [email or web address] who was also trying to work with patient but patient again was noncompliant.  I was attempting to educate patient that the services I was referring him to would enable him at [facility] for housing and finances that he remains paranoid and unwilling to cooperate.  He also continues to drink alcohol and is unwilling to seek CD treatment are abstain despite continuous efforts and education on the detrimental effects on his health.  It is questionable with the patient's name and understands the negative impact of all these to his overall health due to his level of illness.  he continues to attend therapy sessions and come to appointments religiously that he will not follow any recommendation from myself or the psychotherapist.  His case is complicated due to his refusal to follow any of the recommendations.  When we last met in December together with his mother he his mother was not sure over the patient did take all the medications that she offered him because sometimes she would find some of the medications still in the cup and sometimes it would be gone.  However patient claims that he does take his medications as offered by the mother.    Patient will continue with psychotherapy and I will see " him back in 4 weeks for follow-up appointment meanwhile encourage him to call in between visits with any questions or concerns.  He is adamantly refusing community services and engagement of the ACT team and told me that he would not be speaking to the [place] social work      Mental Status Examination:   General: Adequate hygiene, cooperative  Speech: Normal in rate and tone  Language: Intact  Thought process: Coherent  Thought content:                           Auditory hallucinations- absent                           Visual Hallucinations - Absent                           Delusions Absent                           Loose Associations:  No                          Suicidal thoughts: Absent                          Homicidal thoughts: Absent                       Affect: Neutral  Mood: Neutral   Intellectual functioning: Within normal limits  Memory: Within normal limits  Fund of knowledge: Average  Attention and concentration: Within normal limits  Gait: Steady  Psychomotor activity: Calm, no agitation  Muscles: No atrophy, no abnormal movements  InSight and judgment: Fair    Medication changes:None   Medication adherence: compliant  Medication side effects: absent  Information about medications: Side effects, benefits and alternative treatments discussed and patient agrees with capacity to do so.    Psychotherapy: Supportive therapy day-to-day living    Education: Diet, exercise, abstinence from drugs and alcohol, patient will not drive if sedated and medications or  under influence of any substance    Lab Results:   Personally reviewed and discussed with the patient    Lab Results   Component Value Date    WBC 3.8 (L) 10/15/2017    HGB 12.9 (L) 10/15/2017    HCT 39.1 (L) 10/15/2017     (L) 10/15/2017    ALT 11 01/15/2017    AST 15 01/15/2017     10/15/2017    K 3.3 (L) 10/15/2017     10/15/2017    CREATININE 0.84 10/15/2017    BUN 5 (L) 10/15/2017    CO2 25 10/15/2017    INR 1.15 (H) 01/14/2017        Vital signs:  Vitals:    01/09/18 1031   BP: 110/83   Patient Site: Left Arm   Patient Position: Sitting   Cuff Size: Adult Large   Pulse: (!) 115   Resp: 16   Temp: 98.3  F (36.8  C)   TempSrc: Oral   Weight: 205 lb (93 kg)   Height: 6' (1.829 m)     Allergies: Other environmental allergy         Medications:     Current Outpatient Prescriptions on File Prior to Visit   Medication Sig Dispense Refill     cetirizine (ZYRTEC) 10 MG tablet Take 10 mg by mouth daily as needed for allergies.       gabapentin (NEURONTIN) 300 MG capsule Take 1 capsule (300 mg total) by mouth 3 (three) times a day. 30 capsule 1     omeprazole (PRILOSEC) 20 MG capsule Take 20 mg by mouth daily before breakfast.       prazosin (MINIPRESS) 2 MG capsule Take 1 capsule (2 mg total) by mouth at bedtime. 30 capsule 1     QUEtiapine (SEROQUEL) 25 MG tablet Take 1 tablet (25 mg total) by mouth at bedtime. 30 tablet 1     traZODone (DESYREL) 100 MG tablet Take 100 mg by mouth at bedtime.       No current facility-administered medications on file prior to visit.                     Review of Systems:    Otherwise reminder of review of systems is negative    Coordination of Care:   More than 25 minutes spent on this visit  with more than 50% of time spent on coordination of care including: Educating patient about diagnosis, prognosis, side effects and benefits of medications, diet, exercise.  Time also spent on entering orders and preparing documentation for the visit.Time also spent providing supportive therapy regarding above issues.    This note was created using a dictation system. All typing errors or contextual distortion is unintentional and software inherent.

## 2021-06-15 NOTE — PATIENT INSTRUCTIONS - HE
Continue medications as prescribed  Have your pharmacy contact us for a refill if you are running low on medications (We may ask you to come into clinic to get a refill from the nurse  No Alcohol or drug use  No driving if sedated  Call the clinic with any questions or concerns   Reach out for help if you feel like hurting yourself or others ([facility] [phone]: [address] [facility], [address] or [facility] Suicide Hotline [phone] , call 911 or go to nearest Emergency room    Follow up as directed, for your appointments, per your After Visit Summary Form.

## 2021-06-15 NOTE — TELEPHONE ENCOUNTER
Confirmed med changes with provider via phone.   Spoke to Claudia and updated that day time Seroquel changed to PRN and bedtime dose went up to 400 mg Q HS. Mother verbalized understanding.

## 2021-06-15 NOTE — TELEPHONE ENCOUNTER
"Phone call made to his mother Claudia to update of med changes. Mother was very surprised that pt in agreement with the Seroquel dose increase. Mother says Ry is often complaining of falling over from medications. She is also very concerned about mixing meds with alcohol. Personally \" I would say NO to this\". Updated the mother above info will be send to provider to review  "

## 2021-06-15 NOTE — PATIENT INSTRUCTIONS - HE
Continue medications as prescribed  Have your pharmacy contact us for a refill if you are running low on medications (We may ask you to come into clinic to get a refill from the nurse  No Alcohol or drug use  No driving if sedated  Call the clinic with any questions or concerns   Reach out for help if you feel like hurting yourself or others ([facility] [phone]: [address] [facility], [address] or [facility] Suicide Hotline [phone] , call 911 or go to nearest Emergency room    Follow up as directed, for your appointments, per your After Visit Summary Form.

## 2021-06-15 NOTE — PROGRESS NOTES
"  Mental Health Psychotherapy Note    Patient: Choco Claudio    : 1965 MRN: [id number]    Two point identification confirmed with full name and     Date: 2/15/2021   Start time: 915 Stop Time: 935  Session # 3    The patient has been notified of the following:   \"We have found that certain health care needs can be provided without the need for a face to face visit.  This service lets us provide the care you need with a phone conversation.  I will have full access to your [place] medical record during this entire phone call.   I will be taking notes for your medical record. Since this is like an office visit, we will bill your insurance company for this service.  There are potential benefits and risks of telephone visits (e.g. limits to patient confidentiality) that differ from in-person visits.?  Confidentiality still applies for telephone services, and nobody will record the visit.  It is important to be in a quiet, private space that is free of distractions (including cell phone or other devices) during the visit.?? If during the course of the call I believe a telephone visit is not appropriate, you will not be charged for this service\"  Consent has been obtained for this service by care team member: Yes, per verbal agreement     All of patient's visits will be via telephone during the pandemic due to patient's lack of ability to run phone and hypervigilance of people hearing or seeing him.     Those present for session patient and therapist     Session Type: Patient is participating in a telephone visit    Chief Complaint   Patient presents with     MH Follow Up     Telephone Visit Mental Health     New symptoms or complaints: None    Functional Impairment:   Personal: 4  Family: 2  Work: N/A  Social:4        ASSESSMENT: Current Emotional / Mental Status (status of significant symptoms): Reviewed on 2/15/2021              Patient denies personal safety concerns.               Patient denies " current or recent suicidal ideation or behaviors.              Patient denies current or recent homicidal ideation or behaviors.              Patient denies current or recent self injurious behavior or ideation.              Patient denies other safety concerns.              Patient denies changes in protective factors              Recommended that patient call 911 or go to the local ED should there be a change in any of these risk factors.                Attitude:                                   Cooperative               Orientation:                             Person, place,  situation              Speech                          Rate / Production:      Slow                           Volume:                       Soft              Mood:                                      Depressed             Thought Content:                    Flight of ideas               Thought Form:                        Circulatory               Insight:                                     Poor      Patient's impression of their current status: Patient called clinic saying he did not have an appointment but needed to talk to this therapist. When this therapist called patient indicated that he has been crying for 4 days. Patient indicated his PTSD continues to take over his life. Patient reported he has no plans of hurting himself just depressed.     Therapist impression of patients current state: Patient appears to have poor insight into his mental health. This therapist went over relaxation skills with patient. Patient was able to identify that he was feeling calmer at end of session. This therapist went over a plan with patient to help him stay active today.     Type of psychotherapeutic technique provided: Client centered     Progress toward short term goals: Progress as expected with patient identifying emotions and reaching out for help    Review of long term goals: Treatment Plan Updated on 2/15/2021 verbally due to season on the  phone due to pandemic.     Diagnosis:  1. Delusional disorder (H)    2. PTSD (post-traumatic stress disorder)    3. Severe alcohol use disorder (H)        Plan and Follow up: Patient will continue with weekly ongoing therapy. Patient would benefit from continuing to reach out to his support network. Patient should continue to use the relaxation skills taught to him in session.     Discharge Criteria/Planning: Client has chronic symptoms and ongoing therapy for maintenance stability recommended.    I have reviewed the note as documented above.  This accurately captures the substance of my conversation with the patient.  Marcella Nogueira [facility]

## 2021-06-15 NOTE — TELEPHONE ENCOUNTER
Patient's  is calling in regards to needing paperwork/form filled out for DA (diagnostic assessment) these are done typically within 3 years. She advised they have requested this more than three times. I verified the fax she is sending it to is correct.     Deborah BAKER CM - [facility]   Phone#: [phone] , ( will be there today and tomorrow).  Fax#: [phone]

## 2021-06-15 NOTE — PROGRESS NOTES
"  Mental Health Psychotherapy Note    Patient: Choco Claudio    : 1965 MRN: [id number]    Two point identification confirmed with full name and     Date: 3/9/2021   Start time: 11:15 Stop Time: 11:55  Session # 5    The patient has been notified of the following:   \"We have found that certain health care needs can be provided without the need for a face to face visit.  This service lets us provide the care you need with a phone conversation.  I will have full access to your [place] medical record during this entire phone call.   I will be taking notes for your medical record. Since this is like an office visit, we will bill your insurance company for this service.  There are potential benefits and risks of telephone visits (e.g. limits to patient confidentiality) that differ from in-person visits.?  Confidentiality still applies for telephone services, and nobody will record the visit.  It is important to be in a quiet, private space that is free of distractions (including cell phone or other devices) during the visit.?? If during the course of the call I believe a telephone visit is not appropriate, you will not be charged for this service\"  Consent has been obtained for this service by care team member: Yes, per verbal agreement     All of patient's visits will be via telephone during the pandemic due to patient's lack of ability to run phone and hypervigilance of people hearing or seeing him.     Those present for session patient and therapist     Session Type: Patient is participating in a telephone visit    Chief Complaint   Patient presents with     MH Follow Up     Telephone Visit Mental Health     New symptoms or complaints: None    Functional Impairment:   Personal: 4  Family: 2  Work: N/A  Social:4        ASSESSMENT: Current Emotional / Mental Status (status of significant symptoms): Reviewed on 3/9/2021              Patient denies personal safety concerns.               Patient denies " current or recent suicidal ideation or behaviors.              Patient denies current or recent homicidal ideation or behaviors.              Patient denies current or recent self injurious behavior or ideation.              Patient denies other safety concerns.              Patient denies changes in protective factors              Recommended that patient call 911 or go to the local ED should there be a change in any of these risk factors.                Attitude:                                   Cooperative               Orientation:                             Person, place,  situation              Speech                          Rate / Production:      Slow                           Volume:                       Soft              Mood:                                      Anxious             Thought Content:                    Flight of ideas               Thought Form:                        Circulatory               Insight:                                     Poor      Patient's impression of their current status: Patient reported feeling anxious. Patient indicated due to his anxiety he does not want to leave the house. Patient reported right now he is watching a movie which seems to be keeping him distracted. Patient indicated he knows later he will likely feel lonely. Patient reported he is trying to stay in the moment. Patient indicated he heard from his best friend and asked him to be in his wedding. Patient reported he does not want to wear a tux so he said no. Patient indicated it did feel nice to have him ask.    Therapist impression of patients current state: Patient appears to have poor insight into his mental health. This therapist talked with patient about the idea of in-home therapy due to his recent increase in mental health symptoms. Patient indicated not being comfortable with that at this time. This therapist talked with patient about the different services available to him and how they could  be beneficial. Patient indicated he will think about them. This therapist went over skills to continue to work on reducing anxiety.     Type of psychotherapeutic technique provided: Client centered     Progress toward short term goals: Poor progress as evidenced by patient continuing to present late for session, continuing to drink and not following recommendations.     Review of long term goals: Treatment Plan Updated on 2/15/2021 verbally due to season on the phone due to pandemic.     Diagnosis:  1. Delusional disorder (H)    2. PTSD (post-traumatic stress disorder)    3. Severe alcohol use disorder (H)        Plan and Follow up: Patient will continue with weekly ongoing therapy. Patient would benefit from trying in- him therapy and a nurse to help with medications. Patient should continue to work on using skills taught in session. Patient would benefit from continuing to work on getting out of the house.     Discharge Criteria/Planning: Client has chronic symptoms and ongoing therapy for maintenance stability recommended.    I have reviewed the note as documented above.  This accurately captures the substance of my conversation with the patient.  Marcella Nogueira [facility]

## 2021-06-15 NOTE — PROGRESS NOTES
Outpatient Mental Health Treatment Plan    Name:  Choco Claudio  :  1965  MRN:  [id number]    Treatment Plan:  Updated Treatment Plan  Intake/initial treatment plan date:  2017  Benefit and risks and alternatives have been discussed: Yes  Is this treatment appropriate with minimal intrusion/restrictions: Yes  Estimated duration of treatment:  Ongoing psychotherapy at this time  Anticipated frequency of services:  Weekly  Necessity for frequency: This frequency is needed to establish therapeutic goals and for continuity of care in order to monitor progress.  Necessity for treatment: To address cognitive, behavioral, and/or emotional barriers in order to work toward goals and to improve quality of life.    Plan:       ?   ?  Posttraumatic Stress Disorder    Goal:  Reduced the signs and symptoms of PTSD and increase patient's ability to tolerate breakthrough stressors surrounding his various traumas.   Strategies: ? [x]Learn and practice relaxation techniques and other coping strategies (e.g., thought stopping, reframing, meditation)     ? [x] Increase involvement in meaningful activities     ? [x] Discuss sleep hygiene     ? [x] Explore thoughts and expectations about self and others     ? [x] Identify and monitor triggers for panic/anxiety symptoms     ? [x] Implement physical activity routine (with physician approval)     ? [x] Consider introduction of bibliotherapy and/or videos     ? [x] Continue compliance with medical treatment plan (or explore barriers)   ?Degree to which this is a problem: 4  Degree to which goal is met: 1    Date of next review: 5/15/2021    Delusion Disorder   Goal: Reduce the level of daily distress caused by delusion disorders   Strategies: Identity trigger's to delusions           Continue with medication management           Discuss period preceding memory loss and periods after memory returns                Substance use  Goal:  Increase patient awareness regarding  substance use triggers.  Consider harm reduction and/or chemical dependency treatment.   Strategies: ? [x] Consider referral for chemical dependency evaluation     ? [x] Discuss barriers to participating in AA or other peer-facilitated groups         [x] Address environmental factors which may interfere with sobriety     ? [x] Explore short-term versus long-term consequences of use    ?  Degree to which this is a problem: 4  Degree to which goal is met: 1      Date of next review: 5/15/2021      Functional Impairment:  1=Not at all/Rarely  2=Some days  3=Most Days  4=Every Day    Personal : 4  Family : 4  Social : 4   Work/school : N/A at this time    Diagnosis:  Posttraumatic stress disorder; Delusion disorder, alcohol use disorder, severe;     Strengths:  supported by mother, one good friend, employed, and motivated for change.  Limitations:  isolation, ambivalence about discontinuing alcohol use, and difficulty connecting with others due to trauma symptoms  Cultural Considerations: Client identifies is half .    Persons responsible for this plan: Patient and Provider            Psychotherapist Signature           Patient Signature:

## 2021-06-15 NOTE — PROGRESS NOTES
"Choco Claudio is a 55 y.o. male who is being evaluated via a billable telephone visit.      The patient has been notified of following:     \"This telephone visit will be conducted via a call between you and your physician/provider. We have found that certain health care needs can be provided without the need for a physical exam.  This service lets us provide the care you need with a short phone conversation.  If a prescription is necessary we can send it directly to your pharmacy.  If lab work is needed we can place an order for that and you can then stop by our lab to have the test done at a later time.    Telephone visits are billed at different rates depending on your insurance coverage. During this emergency period, for some insurers they may be billed the same as an in-person visit.  Please reach out to your insurance provider with any questions.    If during the course of the call the physician/provider feels a telephone visit is not appropriate, you will not be charged for this service.\"    Patient has given verbal consent to a Telephone visit? Yes        Patient would like to receive their AVS by AVS Preference: Mail a copy.  Psychiatric  Out patient Follow Up Progress Note  Date of visit:2/25/2021         Discussion of Care and Treatment Recommendations:   This is a 55 y.o. male with a history of paranoid schizophrenia, severe alcohol use disorder that presents for a follow up appointment t post hospitalization             Last visit  02/10/2021.  Recommendation at last visit   1.Change  AM  Seroquel  100 mg  To PRN   titrate bedtime  Seroquel to  400 mg  at bedtime - psychosis , delusional thoughts. Continue with current medications Trazodone  100 mg at Bedtime, Continue  Prazosin to  5 mg at bedtime -PTSD  2- Highly recommend outpatient CD treatment Alcohol Use Disorder - Severe. Pt  opposed to CD treatment .  Continues to use alcohol when he can afford it.  3- Continue with "  Psychotherapy:  4- 5-Make efforts to stay away from alcohol and drug  5- RTC- 2  weeks call in between visit with any questions   Patient and I reviewed diagnosis and treatment plan and patient agrees with following recommendations:  Ongoing education given regarding diagnostic and treatment options with adequate verbalization of understanding.  Plan   1. Continue   Seroquel  100 mg  To PRN ,   Seroquel to  400 mg  at bedtime - psychosis , delusional thoughts, Trazodone  100 mg at Bedtime Prazosin to  5 mg at bedtime -PTSD  Continue gabapentin taper per recent hospitalization discharge instructions.   2- Highly recommend outpatient CD treatment Alcohol Use Disorder - Severe. Pt  opposed to CD treatment .  Continues to use alcohol when he can afford it.  3- Continue with  Psychotherapy:  4- 5-Make efforts to stay away from alcohol and drug  5- RTC- 1 weeks call in between visit with any questions          DIagnoses:     Schizophrenia   PTSD  Psychosis   Paranoia   Alcohol Use Disorder , Severe       Patient Active Problem List   Diagnosis     Chest pain     Alcohol withdrawal, with delirium (H)     Sinus tachycardia     Lactic acidosis     Dehydration     Microcytosis     Non-traumatic rhabdomyolysis     Nausea     Alcoholic ketoacidosis     Epigastric pain     Alcohol abuse     High anion gap metabolic acidosis     Hypomagnesemia     Alcohol intoxication, uncomplicated (H)     GI bleed     Anemia associated with acute blood loss     Disorder due to alcohol abuse (H)     Substance induced mood disorder (H)     Anxiety disorder     History of posttraumatic stress disorder (PTSD)     Erosive esophagitis     Polyarthralgia     Cervical spondylosis     VIKI positive     Inflammatory arthritis     Arthritis of right ankle     Gouty arthritis of toe of left foot     Pulmonary embolism (H)     Primary osteoarthritis involving multiple joints     Supratherapeutic INR     Hypokalemia     Epistaxis     Shortness of breath      "Orthopnea     PTSD (post-traumatic stress disorder)     Severe alcohol use disorder (H)     Chronic alcoholism with psychosis, with delusions (H)     Hypoxia     Schizophrenia (H)     Acute encephalopathy     Thrombocytopenia (H)     Paranoid psychosis (H)     Major depressive disorder, single episode, severe with psychotic features (H)             Chief Complaint / Subjective:    Chief complaint: \" I am upset that they held e I the hospital \"     History of Present Illness  Per patient's statement : He was hospitalized recently and is unhappy about his hospitalization.  Continues to present with poor insight into his illness.  Paranoia still present and he continues to inconsistently take his medications.  He perseverates about his \" PTSD\" symptoms which include feel being around many people and not trusting anyone.  He also continues to drink daily and takes his medications inconsistently.  Still not interested in chemical dependency treatment but interested in a support group.  Unable to point out the support group to him that he is not virtual at this time during the pandemic.  He does have a  who is actively involved in his care in the community.  No medication changes were made at the hospital therefore patient will continue the same medication for right now.        Collateral Infro recent hospitalization \" Discharged from inpatient psychiatry yesterday 2/24/21 after he was admitted for anxiety , shortness of breath and developed severe alcohol withdrawal with delirium,Alcohol hepatitis and Elevated liver enzymes . He was placed on a 72 hour hold this hospitalized  , a petition for MICD  commitment  was filed but not supported therefore he was discharged yesterday upon stabilization.PT to Continue clonidine and gabapentin taper at discharge\"            Mental Status Examination:   Alert and oriented x 3   Speech: Normal in rate and tone  Language: Intact  Thought process: Racing thoughts   Thought " content:                           Auditory [id number]-[id number] history of chronic auditory hallucinations and delusions                          Visual Hallucinations - Denies                          Delusions -denies but present                          Loose Associations:  No                          Suicidal thoughts: Denies                          Homicidal thoughts:Denies                        Mood: Anxious   Intellectual functioning: Limited  Memory: Fair   Fund of knowledge: Limited  Attention and concentration: Needed redirection multiple times   Gait: Unable to assess telephone visit  Psychomotor activity: Unable to assess telephone visit  Muscles: Unable to assess telephone visit  InSight and judgment: Poor      Drug/treatment history and current pattern of use:   Severe alcohol Use Disorder chronic   Medication changes: See Above   Medication adherence: compliant  Medication side effects: absent  Information about medications: Side effects, benefits and alternative treatments discussed and patient agrees .    Psychotherapy: Supportive therapy day-to-day living    Education: Diet, exercise, abstinence from drugs and alcohol, patient will not drive if sedated and medications or  under influence of any substance    Lab Results:  Personally reviewed and discussed with the patient    Lab Results   Component Value Date    WBC 4.0 02/22/2021    HGB 13.0 (L) 02/22/2021    HCT 41.1 02/22/2021    PLT 93 (L) 02/22/2021    ALT 45 02/22/2021    AST 43 (H) 02/22/2021     02/22/2021    K 3.6 02/24/2021     02/22/2021    CREATININE 0.67 (L) 02/22/2021    BUN 6 (L) 02/22/2021    CO2 30 02/22/2021    TSH 2.18 01/23/2021    INR 1.06 02/18/2021       Vital signs:  There were no vitals taken for this visit.  Unable to assess telephone visit  Allergies: Patient has no known allergies.           Review of Systems:      ROS:  Subjective data only - Tele-Health  Visit   10 point ROS was negative except for  the items listed in HPI-              Medications:     Current Outpatient Medications on File Prior to Visit   Medication Sig Dispense Refill     cetirizine (ZYRTEC) 10 MG tablet Take 10 mg by mouth at bedtime.        folic acid (FOLVITE) 1 MG tablet Take 1 tablet (1 mg total) by mouth daily. 30 tablet 0     [START ON 2/27/2021] gabapentin (NEURONTIN) 100 MG capsule Take 100 mg by mouth every 8 (eight) hours for 9 doses. 9 capsule 0     gabapentin (NEURONTIN) 300 MG capsule Take 2 capsules (600 mg total) by mouth every 8 (eight) hours for 1 day. 6 capsule 0     gabapentin (NEURONTIN) 300 MG capsule Take 1 capsule (300 mg total) by mouth every 8 (eight) hours for 6 doses. 6 capsule 0     melatonin 5 mg Tab tablet Take 1 tablet (5 mg total) by mouth at bedtime as needed.  0     multivitamin therapeutic tablet Take 1 tablet by mouth daily.  0     prazosin (MINIPRESS) 5 MG capsule Take 1 capsule (5 mg total) by mouth at bedtime. 90 capsule 0     QUEtiapine (SEROQUEL) 100 MG tablet Take 1 tablet (100 mg total) by mouth daily as needed. 90 tablet 0     QUEtiapine (SEROQUEL) 400 MG tablet Take 1 tablet (400 mg total) by mouth at bedtime. 90 tablet 0     thiamine 100 MG tablet Take 1 tablet (100 mg total) by mouth daily. 30 tablet 0     traZODone (DESYREL) 100 MG tablet TAKE 1 TABLET(100 MG) BY MOUTH AT BEDTIME 90 tablet 0     Current Facility-Administered Medications on File Prior to Visit   Medication Dose Route Frequency Provider Last Rate Last Admin     [DISCONTINUED] acetaminophen suppository 650 mg (TYLENOL)  650 mg Rectal Q4H PRN Roseann Boyle DO         [DISCONTINUED] acetaminophen tablet 500-1,000 mg (TYLENOL)  500-1,000 mg Oral Q4H PRN Roseann Boyle DO   1,000 mg at 02/22/21 1627     [DISCONTINUED] bisacodyL suppository 10 mg (DULCOLAX)  10 mg Rectal Daily PRN Roseann Boyle DO         [DISCONTINUED] cetirizine tablet 10 mg (ZyrTEC)  10 mg Oral QHS Roseann Boyle  DO   10 mg at 02/23/21 2024     [DISCONTINUED] cloNIDine HCL tablet 0.1 mg (CATAPRES)  0.1 mg Oral Q8H Iker Andre MD   0.1 mg at 02/24/21 0824     [DISCONTINUED] flumazeniL injection 0.2 mg (ROMAZICON)  0.2 mg Intravenous Q1 Min PRN Iker Andre MD         [DISCONTINUED] folic acid tablet 1 mg (FOLVITE)  1 mg Oral DAILY Roseann Boyle DO   1 mg at 02/24/21 0823     [DISCONTINUED] gabapentin capsule 100 mg (NEURONTIN)  100 mg Oral Q8H Iker Andre MD         [DISCONTINUED] gabapentin capsule 300 mg (NEURONTIN)  300 mg Oral Q8H Iker Andre MD         [DISCONTINUED] gabapentin capsule 600 mg (NEURONTIN)  600 mg Oral Q8H Iker Andre MD   600 mg at 02/24/21 0825     [DISCONTINUED] haloperidol lactate injection 2.5-5 mg (HALDOL)  2.5-5 mg Intramuscular Q6H PRN Roseann Boyle DO         [DISCONTINUED] heparin (PF) ANTICOAGULANT subcutaneous injection 5,000 Units  5,000 Units Subcutaneous Q8H FIXED TIMES Richy Najera MD   5,000 Units at 02/24/21 1414     [DISCONTINUED] ibuprofen tablet 200 mg (ADVIL,MOTRIN)  200 mg Oral Q6H PRN Richy Najera MD   200 mg at 02/23/21 1427     [DISCONTINUED] LORazepam 1-2 mg injection (diluted concentration)  1-2 mg Intravenous Q30 Min PRN Roseann Boyle DO   2 mg at 02/20/21 0655     [DISCONTINUED] LORazepam tablet 1-2 mg (ATIVAN)  1-2 mg Oral Q30 Min PRN Roseann Boyle DO   1 mg at 02/21/21 2014     [DISCONTINUED] magnesium hydroxide suspension 30 mL (MILK OF MAG)  30 mL Oral Daily PRN Roseann Boyle DO         [DISCONTINUED] melatonin tablet 5 mg  5 mg Oral Bedtime PRN Iker Andre MD   5 mg at 02/24/21 0019     [DISCONTINUED] multivitamin therapeutic tablet 1 tablet  1 tablet Oral DAILY Roseann Byole DO   1 tablet at 02/24/21 0824     [DISCONTINUED] OLANZapine zydis disintegrating tablet 5-10 mg (ZyPREXA Zydis)  5-10 mg Oral Q6H PRN Roseann Boyle DO   10 mg at 02/20/21 0139      [DISCONTINUED] ondansetron injection 4 mg (ZOFRAN)  4 mg Intravenous Q4H PRN Roseann Boyle DO   4 mg at 02/18/21 1922     [DISCONTINUED] ondansetron tablet 8 mg (ZOFRAN)  8 mg Oral Q8H PRN Roseann Boyle DO         [DISCONTINUED] polyethylene glycol packet 17 g (MIRALAX)  17 g Oral DAILY Roseann Boyle DO   17 g at 02/23/21 1011     [DISCONTINUED] prazosin (MINIPRESS) capsule 5 mg  5 mg Oral QHS Roseann Boyle DO   5 mg at 02/23/21 2024     [DISCONTINUED] QUEtiapine (SEROquel) tablet 400 mg  400 mg Oral QHS Roseann Boyle DO   400 mg at 02/23/21 2023     [DISCONTINUED] QUEtiapine tablet 100 mg (SEROquel)  100 mg Oral Daily PRN Roseann Boyle DO   100 mg at 02/24/21 0411     [DISCONTINUED] sodium chloride flush 2.5 mL (NS)  2.5 mL Intravenous Line Care Roseann Boyle DO   10 mL at 02/23/21 1012     [DISCONTINUED] thiamine tablet 100 mg  100 mg Oral DAILY Roseann Boyle DO   100 mg at 02/24/21 0825     [DISCONTINUED] traZODone tablet 100 mg (DESYREL)  100 mg Oral QHS Roseann Boyle DO   100 mg at 02/23/21 2024               Coordination of Care:   More than 30 minutes spent on this visit  with more than 50% of time spent on coordination of care including: Educating patient about diagnosis, prognosis, side effects and benefits of medications, diet, exercise.  Time also spent providing supportive therapy regarding above issues.    This note was created using a dictation system. All typing errors or contextual distortion is unintentional and software inherent.  Phone call duration: 31 minutes    Debra Lane NP

## 2021-06-15 NOTE — PROGRESS NOTES
Mental Health Visit Note    01/30/2018  Start time: 1200  Stop Time: 1255 Session # 4    Choco Claudio is a 52 y.o. male is being seen today for a follow-up psychotherapy appointment    Chief Complaint   Patient presents with      Follow Up       New symptoms or complaints:  None    Functional Impairment:   The patient identifies the how daily stressors affect daily functioning in today's session as follows (Scale is 1-4, 1=not at all or rarely; 4=extremely so/everyday.)    Personal: 4  Family: 4  Social: 4  Work: 4    Clinical assessment of mental status:   Choco Claudio presented late for scheduled session.   He was oriented x3, open and cooperative, and dressed appropriately for this session and weather. His memory was fair.  His speech was  Excessive and tangential.  Language was focused on desire for change.  Concentration and focus is Brief. Psychosis is not noted or reported. He reports his mood is sad.  Affect is congruent with speech and is depressed.  Fund of knowledge is adequate. Insight is adequate for therapy.     Suicidal/Homicidal Ideation present:   No,  Patient denies suicidal and homicidal ideations/means or plans.      Patient's impression of their current status: Patient reported feeling sad. Patient indicated that people do not understand his culture. Patient talked about culture being an important part of his life but his mom saying not to tell people he is native. Patient indicated this telling him that he should not be proud of who he is as a person. Patient reported he continues to struggle with finding housing. Patient indicated PAOLA is coming on Tuesday to try and help with housing.     Therapist impression of patient's current state:   Patient appears to have fair insight into his mental health. This therapist challenged patient on ways he has struggled with his own identify due to his culture. This therapist processed with patient ways this has lead to a lot of loneliness  in his life. This therapist processed with patient the stressors around housing and important questions to ask PAOLA.      Type of psychotherapeutic technique provided:   Solution-focused and CBT    Progress toward short term goals:   Patient returned to schedules session and medication management with Debra and recognized his depression.     Review of long term goals:Treatment Plan Updated on 11/16/2017    Diagnosis:   1. PTSD (post-traumatic stress disorder)    2. Major depressive disorder, recurrent episode, moderate    3. Severe alcohol use disorder      Plan and Follow-up:   Patient will return in one week for scheduled session. Patient will benefit from continuing to follow medication mangement. Patient should continue to work on sobriety. Patient should look at ways his drinking affects his mental health. Patient should work on getting out of the house 5 days a week. Patient should work with his [facility] worker and GUILD on housing. Patient should continue identify goals for himself.     Discharge Criteria/Planning: Client has chronic symptoms and ongoing therapy for maintenance stability recommended.    Signatures:   Performed and documented by NETTA De La Paz

## 2021-06-15 NOTE — PROGRESS NOTES
Mental Health Visit Note    01/23/2018  Start time: 1155  Stop Time: 1255 Session # 3    Choco Claudio is a 52 y.o. male is being seen today for a follow-up psychotherapy appointment    Chief Complaint   Patient presents with      Follow Up       New symptoms or complaints:  None    Functional Impairment:   The patient identifies the how daily stressors affect daily functioning in today's session as follows (Scale is 1-4, 1=not at all or rarely; 4=extremely so/everyday.)    Personal: 4  Family: 4  Social: 4  Work: 4    Clinical assessment of mental status:   Choco Claudio presented late for scheduled session.   He was oriented x3, open and cooperative, and dressed appropriately for this session and weather. His memory was fair.  His speech was  Excessive and tangential.  Language was focused on desire for change.  Concentration and focus is Brief. Psychosis is not noted or reported. He reports his mood is anxious.  Affect is congruent with speech and is depressed.  Fund of knowledge is adequate. Insight is adequate for therapy.     Suicidal/Homicidal Ideation present:   No,  Patient denies suicidal and homicidal ideations/means or plans.      Patient's impression of their current status: Patient indicated feeling anxious. Patient reported he has used drinking as a way to cope a lot in life. Patient talked about how his routine includes drinking before going to work. Patient indicated he does not get drunk just takes the edge of his job. 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 Patient indicated being  has had many challenges for him throughout his life. Patient talked about the fights he use to get into and now just wanting to isolate.     Therapist impression of patient's current state:   Patient appears to have fair insight into his mental health. This therapist processed with patient ways drinking works in the short run but not in the long  run. This therapist challenged patient on what life is like when he is not drinking. This therapist processed with patient finding supportive people that could help him to not isolate.     Type of psychotherapeutic technique provided:   Solution-focused and CBT    Progress toward short term goals:   Patient returned to schedules session and medication management with Debra and recognized his depression.     Review of long term goals:Treatment Plan Updated on 11/16/2017    Diagnosis:   1. PTSD (post-traumatic stress disorder)    2. Major depressive disorder, recurrent episode, moderate    3. Severe alcohol use disorder      Plan and Follow-up:   Patient will return in one week for scheduled session. Patient will benefit from continuing to follow medication mangement. Patient should continue to work on sobriety. Patient should look at ways his drinking affects his mental health. Patient should work on getting out of the house 5 days a week. Patient should work with his [facility] worker on housing.     Discharge Criteria/Planning: Client has chronic symptoms and ongoing therapy for maintenance stability recommended.    Signatures:   Performed and documented by NETTA De La Paz

## 2021-06-15 NOTE — PATIENT INSTRUCTIONS - HE
Continue medications as prescribed  Have your pharmacy contact us for a refill if you are running low on medications (We may ask you to come into clinic to get a refill from the nurse  No Alcohol or drug use  No driving if sedated  Call the clinic with any questions or concerns   Reach out for help if you feel like hurting yourself or others ([facility] [phone]: [address] [facility] [facility], [address] or [facility] Suicide Hotline [phone] , call 911 or go to nearest Emergency room    Follow up as directed, for your appointments, per your After Visit Summary Form.

## 2021-06-15 NOTE — PROGRESS NOTES
"  Mental Health Psychotherapy Note    Patient: Choco Claudio    : 1965 MRN: [id number]    Two point identification confirmed with full name and     Date: 3/2/2021   Start time: 2:36 Stop Time: 2:56  Session # 4    The patient has been notified of the following:   \"We have found that certain health care needs can be provided without the need for a face to face visit.  This service lets us provide the care you need with a phone conversation.  I will have full access to your [facility] medical record during this entire phone call.   I will be taking notes for your medical record. Since this is like an office visit, we will bill your insurance company for this service.  There are potential benefits and risks of telephone visits (e.g. limits to patient confidentiality) that differ from in-person visits.?  Confidentiality still applies for telephone services, and nobody will record the visit.  It is important to be in a quiet, private space that is free of distractions (including cell phone or other devices) during the visit.?? If during the course of the call I believe a telephone visit is not appropriate, you will not be charged for this service\"  Consent has been obtained for this service by care team member: Yes, per verbal agreement     All of patient's visits will be via telephone during the pandemic due to patient's lack of ability to run phone and hypervigilance of people hearing or seeing him.     Those present for session patient and therapist     Session Type: Patient is participating in a telephone visit    Chief Complaint   Patient presents with     MH Follow Up     Telephone Visit Mental Health     New symptoms or complaints: None    Functional Impairment:   Personal: 4  Family: 2  Work: N/A  Social:4        ASSESSMENT: Current Emotional / Mental Status (status of significant symptoms): Reviewed on 3/2/2021              Patient denies personal safety concerns.               Patient denies " current or recent suicidal ideation or behaviors.              Patient denies current or recent homicidal ideation or behaviors.              Patient denies current or recent self injurious behavior or ideation.              Patient denies other safety concerns.              Patient denies changes in protective factors              Recommended that patient call 911 or go to the local ED should there be a change in any of these risk factors.                Attitude:                                   Cooperative               Orientation:                             Person, place,  situation              Speech                          Rate / Production:      Slow                           Volume:                       Soft              Mood:                                      Depressed             Thought Content:                    Flight of ideas               Thought Form:                        Circulatory               Insight:                                     Poor      Patient's impression of their current status: Patient called clinic 3 times indicating knowing he missed his appointment today but cannot stop crying. Patient indicated he had his ringer off which is why he missed his appointment. Patient reported every time he hears a plane he wants to cry. Patient indicated it [place] him of [place] airport and having to look for explosives.     Therapist impression of patients current state: Patient appears to have poor insight into his mental health. This therapist processed with patient ways he can work on distractions by going for a walk, visiting his parents and going for a bus ride. This therapist talked with patient about the importance of reaching out to his support network. This therapist reminded patient to call 911 and/or report to ER if suicidal ideations were to occur. Patient denied any suicidal ideations, plans and/or means.     Type of psychotherapeutic technique provided:  Client centered     Progress toward short term goals: Poor progress as evidenced by patient not presenting for scheduled session and continuing to drink    Review of long term goals: Treatment Plan Updated on 2/15/2021 verbally due to season on the phone due to pandemic.     Diagnosis:  1. Delusional disorder (H)    2. PTSD (post-traumatic stress disorder)    3. Severe alcohol use disorder (H)        Plan and Follow up: Patient will continue with weekly ongoing therapy. Patient should reach out to his support network. Patient should go for a walk, visit his parents and go for a bus ride.     Discharge Criteria/Planning: Client has chronic symptoms and ongoing therapy for maintenance stability recommended.    I have reviewed the note as documented above.  This accurately captures the substance of my conversation with the patient.  Mracella Nogueira [facility]

## 2021-06-15 NOTE — TELEPHONE ENCOUNTER
Contacted CM re: DISCUS exam assessment and notified that she needs to bring him in to the clinic in order to perform the exam. Updated Deborah that pt is afraid of coming to [facility] and [place]'s location is permanently closed. CM agreed with putting the exam on hold  - due to COVID , said they do not see people in person . She expressed a lot of concerns in regards to needing DA assessment to be completed ASAP (overdue since December 2020 despite requesting this many times). The form needs to be updated every 3 years and it may lead to termination of CM services. CM will be faxing ppwk to the clinic today

## 2021-06-15 NOTE — PROGRESS NOTES
This video/telephone visit will be conducted via a call between you and your physician/provider. We have found that certain health care needs can be provided without the need for an in-person physical exam. This service lets us provide the care you need with a video /telephone conversation. If a prescription is necessary we can send it directly to your pharmacy. If lab work is needed we can place an order for that and you can then stop by our lab to have the test done at a later time.    Just as we bill insurance for in-person visits, we also bill insurance for video/telephone visits. If you have questions about your insurance coverage, we recommend that you speak with your insurance company.    Patient has given verbal consent for video/Telephone visit? yes  Patient would like telephone visit, please call: [phone]  JANNETH/JENNY : Ed LOVE LPN  Pt is ready, reports ongoing anxiety, talking about past experiences, went to the [employer] and had to leave right away, denies having any SI/HI. Unclear med compliance. Expressed concerns and fear of going to [facility].  Patient verified allergies, medications and pharmacy via phone.  Patient states he  is ready for visit.    : provider to review    ________________________________________  Medications Phoned  to Pharmacy [] yes [x]no  Name of Pharmacist:  List Medications, including dose, quantity and instructions    Medications ordered this visit were e-scribed.  Verified by order class [x] yes  [] no  Seroquel 100 mg PRN and Seroquel 400 mg Q HS.   Medication changes or discontinuations were communicated to patient's pharmacy: [x] yes  [] no  Called [facility] and d/c'd Seroquel 200 mg and 100 mg every morning - replaced with PRN   Dictation completed at time of chart check: [x] yes  [] no    I have checked the documentation for today s encounters and the above information has been reviewed and completed.

## 2021-06-15 NOTE — PROGRESS NOTES
Mental Health Visit Note    01/09/2018  Start time: 1145  Stop Time: 1205  Session # 1    Choco Claudio is a 52 y.o. male is being seen today for a follow-up psychotherapy appointment    Chief Complaint   Patient presents with      Follow Up       New symptoms or complaints:  Patient indicated being very unhappy.     Functional Impairment:   The patient identifies the how daily stressors affect daily functioning in today's session as follows (Scale is 1-4, 1=not at all or rarely; 4=extremely so/everyday.)    Personal: 4  Family: 4  Social: 4  Work: 4    Clinical assessment of mental status:   Choco Claudio presented late for scheduled session.   He was oriented x3, open and cooperative, and dressed appropriately for this session and weather. His memory was fair.  His speech was  Excessive and tangential.  Language was focused on desire for change.  Concentration and focus is Brief. Psychosis is not noted or reported. He reports his mood is irritable.  Affect is congruent with speech and is depressed.  Fund of knowledge is adequate. Insight is adequate for therapy.     Suicidal/Homicidal Ideation present:   No,  Patient denies suicidal and homicidal ideations/means or plans.      Patient's impression of their current status: Patient indicated feeling irritable. Patient reported Debra informing him that the housing place will no longer work with him. Patient indicated this means he will have to live on the streets again. Patient talked about not wanting to go back to this life. Patient indicated he also is losing his job. Patient talked about just wanting to be alone and end the session early.     Therapist impression of patient's current state:   Patient appears to have fair insight into his mental health. This therapist processed with patient talking with Debra about why the housing is not working with him and looking into other options.     Type of psychotherapeutic technique provided:    Solution-focused and CBT    Progress toward short term goals:   Patient returned to schedules session and medication management with Debra.    Review of long term goals:Treatment Plan Updated on 11/16/2017    Diagnosis:   1. Paranoid schizophrenia    2. PTSD (post-traumatic stress disorder)    3. Major depressive disorder, recurrent episode, moderate    4. Severe alcohol use disorder      Plan and Follow-up:   Patient will return in one week for scheduled session. Patient will benefit from continuing to follow medication mangement. Patient should continue to work on sobriety. Patient should look at ways his drinking affects his mental health. Patient should work on getting out of the house 5 days a week. Patient should work with his [facility] worker on housing.     Discharge Criteria/Planning: Client has chronic symptoms and ongoing therapy for maintenance stability recommended.    Signatures:   Performed and documented by NETTA De La Paz

## 2021-06-15 NOTE — PROGRESS NOTES
Mental Health Visit Note    01/16/2018  Start time: 1200   Stop Time: 1245 Session # 2    Choco Claudio is a 52 y.o. male is being seen today for a follow-up psychotherapy appointment    Chief Complaint   Patient presents with      Follow Up       New symptoms or complaints:  None    Functional Impairment:   The patient identifies the how daily stressors affect daily functioning in today's session as follows (Scale is 1-4, 1=not at all or rarely; 4=extremely so/everyday.)    Personal: 4  Family: 4  Social: 4  Work: 4    Clinical assessment of mental status:   Choco Claudio presented late for scheduled session.   He was oriented x3, open and cooperative, and dressed appropriately for this session and weather. His memory was fair.  His speech was  Excessive and tangential.  Language was focused on desire for change.  Concentration and focus is Brief. Psychosis is not noted or reported. He reports his mood is depressed.  Affect is congruent with speech and is depressed.  Fund of knowledge is adequate. Insight is adequate for therapy.     Suicidal/Homicidal Ideation present:   No,  Patient denies suicidal and homicidal ideations/means or plans.      Patient's impression of their current status: Patient reported feeling depressed. Patient indicated he does not know what to do about housing. Patient talked about [facility] coming to his place but not hearing back from them. Patient indicated he does not feel he can trust anymore. Patient reported every time he has trusted someone they have let him down or disappeared.     Therapist impression of patient's current state:   Patient appears to have fair insight into his mental health. This therapist challenged patient on ways he can trust people different amounts. This therapist processed with patient ways he may struggle with depression due to isolation.     Type of psychotherapeutic technique provided:   Solution-focused and CBT    Progress toward short term  goals:   Patient returned to schedules session and medication management with Debra and recognized his depression.     Review of long term goals:Treatment Plan Updated on 11/16/2017    Diagnosis:   1. PTSD (post-traumatic stress disorder)    2. Major depressive disorder, recurrent episode, moderate    3. Severe alcohol use disorder      Plan and Follow-up:   Patient will return in one week for scheduled session. Patient will benefit from continuing to follow medication mangement. Patient should continue to work on sobriety. Patient should look at ways his drinking affects his mental health. Patient should work on getting out of the house 5 days a week. Patient should work with his [facility] worker on housing.     Discharge Criteria/Planning: Client has chronic symptoms and ongoing therapy for maintenance stability recommended.    Signatures:   Performed and documented by NETTA De La Paz

## 2021-06-15 NOTE — PROGRESS NOTES
This video/telephone visit will be conducted via a call between you and your physician/provider. We have found that certain health care needs can be provided without the need for an in-person physical exam. This service lets us provide the care you need with a video /telephone conversation. If a prescription is necessary we can send it directly to your pharmacy. If lab work is needed we can place an order for that and you can then stop by our lab to have the test done at a later time.    Just as we bill insurance for in-person visits, we also bill insurance for video/telephone visits. If you have questions about your insurance coverage, we recommend that you speak with your insurance company.    Patient has given verbal consent for video/Telephone visit? Yes  Patient would like telephone visit, please call:  [phone]   JANNETH/JENNY SILVA CMA    States he has been crying his eyes out for 4 days, feels betrayed by best friend.    Patient verified allergies, medications and pharmacy via phone.  Patient states he is ready for visit.

## 2021-06-15 NOTE — PROGRESS NOTES
________________________________________  Medications Phoned  to Pharmacy [] yes [x]no  Name of Pharmacist:  List Medications, including dose, quantity and instructions    Medications ordered this visit were e-scribed.  Verified by order class [x] yes  [] no  Trazodone 100 mg    Medication changes or discontinuations were communicated to patient's pharmacy: [] yes  [x] no    Dictation completed at time of chart check: [x] yes  [] no    I have checked the documentation for today s encounters and the above information has been reviewed and completed.

## 2021-06-15 NOTE — PROGRESS NOTES
"Choco Claudio is a 55 y.o. male who is being evaluated via a billable telephone visit.      The patient has been notified of following:     \"This telephone visit will be conducted via a call between you and your physician/provider. We have found that certain health care needs can be provided without the need for a physical exam.  This service lets us provide the care you need with a short phone conversation.  If a prescription is necessary we can send it directly to your pharmacy.  If lab work is needed we can place an order for that and you can then stop by our lab to have the test done at a later time.    Telephone visits are billed at different rates depending on your insurance coverage. During this emergency period, for some insurers they may be billed the same as an in-person visit.  Please reach out to your insurance provider with any questions.    If during the course of the call the physician/provider feels a telephone visit is not appropriate, you will not be charged for this service.\"    Patient has given verbal consent to a Telephone visit? Yes       Patient would like to receive their AVS by AVS Preference: Mail a copy.  Psychiatric  Out patient Follow Up Progress Note  Date of visit:2/10/2021         Discussion of Care and Treatment Recommendations:   This is a 55 y.o. male with a significant history of Alcohol Use Disorder, Etoh induced gastritis , DENISE and PTSD with psychotic ,schizophrenia presenting to the clinic for a follow up appointment .       Last visit 11/02/2020 Recommendation at last visit .  1.Continue  taking  Seroquel  100 mg in the Morning and Seroquel 200 mg  at bedtime - psychosis , delusional thoughts. Continue with current medications Trazodone  100 mg at Bedtime, Increase  Prazosin to  5 mg at bedtime -PTSD  2- Highly recommend outpatient CD treatment Alcohol Use Disorder - Severe. Pt  opposed to CD treatment .  Continues to use alcohol when he can afford it.  3- Continue with "  Psychotherapy:  4- 5-Make efforts to stay away from alcohol and drug  5- RTC- 4  weeks call in between visit with any questions   Patient and I reviewed diagnosis and treatment plan and patient agrees with following recommendations:  Ongoing education given regarding diagnostic and treatment options with adequate verbalization of understanding.  Plan   1.Change  AM  Seroquel  100 mg  To PRN   titrate bedtime  Seroquel to  400 mg  at bedtime - psychosis , delusional thoughts. Continue with current medications Trazodone  100 mg at Bedtime, Continue  Prazosin to  5 mg at bedtime -PTSD  2- Highly recommend outpatient CD treatment Alcohol Use Disorder - Severe. Pt  opposed to CD treatment .  Continues to use alcohol when he can afford it.  3- Continue with  Psychotherapy:  4- 5-Make efforts to stay away from alcohol and drug  5- RTC- 2  weeks call in between visit with any questions          DIagnoses:     Schizophrenia   PTSD  Psychosis   Paranoia   Alcohol Use Disorder , Severe       Patient Active Problem List   Diagnosis     Chest pain     Alcohol withdrawal syndrome without complication (H)     Sinus tachycardia     Lactic acidosis     Dehydration     Microcytosis     Non-traumatic rhabdomyolysis     Nausea     Alcoholic ketoacidosis     Epigastric pain     Alcohol abuse     High anion gap metabolic acidosis     Hypomagnesemia     Alcohol intoxication, uncomplicated (H)     GI bleed     Anemia associated with acute blood loss     Disorder due to alcohol abuse (H)     Substance induced mood disorder (H)     Anxiety disorder     History of posttraumatic stress disorder (PTSD)     Erosive esophagitis     Polyarthralgia     Cervical spondylosis     VIKI positive     Inflammatory arthritis     Arthritis of right ankle     Gouty arthritis of toe of left foot     Pulmonary embolism (H)     Primary osteoarthritis involving multiple joints     Supratherapeutic INR     Hypokalemia     Epistaxis     Shortness of breath      "Orthopnea     PTSD (post-traumatic stress disorder)     Severe alcohol use disorder (H)     Chronic alcoholism with psychosis, with delusions (H)             Chief Complaint / Subjective:    Chief complaint: \" I am scared going to the mall, I am very paranoid\"     History of Present Illness:   Per patient's statement : Reports inconsistency in taking his a.m. Seroquel.  Endorses continued alcohol use but minimizes how heavy he is drinking.  He recently had a ED encounter and was held on a 72-hour hold from 1/23 to 1/26/2021 at the ED after he presented with Orthopnea, shortness of breath.  He was uncooperative at the ED for CT scan most likely due to his paranoia the result of his illness.  He was also noted to have alcohol withdrawal and was treated at this ED with it.  He was not admitted but was released after D-dimer and medication were prescribed.  Today patient continues to present as very paranoid with delusional thoughts.  I am highly doubtful that he is taking his medications as prescribed and especially his morning dose of Seroquel.  His case  has been very challenging due to lack of insight into his illness and noncompliance with medication and his continued alcohol use and refusal for chemical dependency treatment.  Over the course of time I have been able to convince him to be on Seroquel which he takes inconsistently.  He would benefit from either a different neuroleptic or an increased dose of Seroquel but due to his paranoia will not consent to any changes.  After much convincing he was agreeable to switching AM Seroquel to as   needed and titrating his bedtime Seroquel to 400 mg.  I am hoping that having him take a higher dose of Seroquel in the evening may promote compliance.  Nursing support staff did communicate with his mother of the new med changes.  had previously requested for a DISCUS evaluation and a new diagnostic assessment to be completed for patient.  Patient is also very " paranoid and will not accept to go to the [facility] clinic.  Initially he was at [facility] which was recently reassigned to partial hospitalization .   is unable to accompany patient to the [facility] therefore will defer DISCSS.  Patient therapist to discuss with  has been notified to assist in completing the DA assessment for patient.  I will have patient return to the clinic in approximately 2 weeks call in between visits with any questions or concerns.             Mental Status Examination:   Alert and oriented x 3   Speech: Normal in rate and tone  Language: Intact  Thought process: Racing thoughts   Thought content:                           Auditory hbeckjzxujyyhu-Mfelbr-ary history of chronic auditory hallucinations and delusions                          Visual Hallucinations - Denies                          Delusions -denies but present                          Loose Associations:  No                          Suicidal thoughts: Denies                          Homicidal thoughts:Denies                        Mood: Anxious   Intellectual functioning: Limited  Memory: Fair   Fund of knowledge: Limited  Attention and concentration: Needed redirection multiple times   Gait: Unable to assess telephone visit  Psychomotor activity: Unable to assess telephone visit  Muscles: Unable to assess telephone visit  InSight and judgment: Poor      Drug/treatment history and current pattern of use:   Alcohol Use Disorder - Severe -patient not interested in chemical dependency treatment.  Patient not interested in anticraving medications.      Medication changes: See Above   Medication adherence: compliant  Medication side effects: absent  Information about medications: Side effects, benefits and alternative treatments discussed and patient agrees .    Psychotherapy: Supportive therapy day-to-day living    Education: Diet, exercise, abstinence from drugs and alcohol, patient will not  drive if sedated and medications or  under influence of any substance    Lab Results:   Personally reviewed and discussed with the patient    Lab Results   Component Value Date    WBC 4.8 01/26/2021    HGB 14.8 01/26/2021    HCT 45.4 01/26/2021     01/26/2021    ALT 56 (H) 01/26/2021    AST 38 01/26/2021     01/26/2021    K 3.7 01/26/2021     01/26/2021    CREATININE 0.81 01/26/2021    BUN 8 01/26/2021    CO2 25 01/26/2021    TSH 2.18 01/23/2021    INR 1.31 (H) 01/26/2021       Vital signs:  There were no vitals taken for this visit.  Unable to assess telephone visit  Allergies: Patient has no known allergies.           Review of Systems:      ROS:  Subjective data only - Tele-Health  Visit   10 point ROS was negative except for the items listed in HPI-              Medications:     Current Outpatient Medications on File Prior to Visit   Medication Sig Dispense Refill     QUEtiapine (SEROQUEL) 100 MG tablet Take 1 tablet (100 mg total) by mouth every morning. 90 tablet 0     QUEtiapine (SEROQUEL) 200 MG tablet Take 1.5 tablets (300 mg total) by mouth at bedtime. 90 tablet 0     cetirizine (ZYRTEC) 10 MG tablet Take 10 mg by mouth at bedtime.        folic acid (FOLVITE) 1 MG tablet Take 1 tablet (1 mg total) by mouth daily. 30 tablet 0     prazosin (MINIPRESS) 2 MG capsule Take 4 mg by mouth at bedtime.        prazosin (MINIPRESS) 5 MG capsule Take 1 capsule (5 mg total) by mouth at bedtime. 90 capsule 0     rivaroxaban ANTICOAGULANT (XARELTO) 15 mg tablet Take 1 tablet (15 mg total) by mouth 2 (two) times a day with meals for 21 days. Then after this you need a new prescription for 20 mg daily 42 tablet 0     thiamine 100 MG tablet Take 1 tablet (100 mg total) by mouth daily. 30 tablet 0     traZODone (DESYREL) 100 MG tablet TAKE 1 TABLET(100 MG) BY MOUTH AT BEDTIME 90 tablet 0     No current facility-administered medications on file prior to visit.            Coordination of Care:   More than 40  minutes spent on this visit  with more than 50% of time spent on coordination of care including: Educating patient about diagnosis, prognosis, side effects and benefits of medications, diet, exercise.  Time also spent providing supportive therapy regarding above issues.    This note was created using a dictation system. All typing errors or contextual distortion is unintentional and software inherent.    Debra Lane, NP

## 2021-06-15 NOTE — PROGRESS NOTES
" This video/telephone visit will be conducted via a call between you and your physician/provider. We have found that certain health care needs can be provided without the need for an in-person physical exam. This service lets us provide the care you need with a video /telephone conversation. If a prescription is necessary we can send it directly to your pharmacy. If lab work is needed we can place an order for that and you can then stop by our lab to have the test done at a later time.    Just as we bill insurance for in-person visits, we also bill insurance for video/telephone visits. If you have questions about your insurance coverage, we recommend that you speak with your insurance company.    Patient has given verbal consent for video/Telephone visit? yes  Patient would like telephone visit, please call: [phone]  JANNETH/JENNY : Ed LOVE LPN  Mother is in charge of his meds, pt said she ended up in the hospital because he stopped taking his meds for a few day because \" they relax my muscles but not my brain\" . Pt states he is looking for support groups nod needs a good friend to talk when he doesn't feel well. Reports having urges to drink but does not want any medication for that.   Unable to verify  medications and pharmacy via phone. Patient states he is ready for visit.    : Provider to review    ________________________________________  Medications Phoned  to Pharmacy [] yes [x]no  Name of Pharmacist:  List Medications, including dose, quantity and instructions    Medications ordered this visit were e-scribed.  Verified by order class [] yes  [x] no    Medication changes or discontinuations were communicated to patient's pharmacy: [] yes  [x] no    Dictation completed at time of chart check: [x] yes  [] no    I have checked the documentation for today s encounters and the above information has been reviewed and completed.        "

## 2021-06-15 NOTE — TELEPHONE ENCOUNTER
This therapist attempted to call patient back after he called the clinic twice within an hour. This therapist also tried to reach out to patient at 10:01 and 10:15 for scheduled session today.

## 2021-06-15 NOTE — PROGRESS NOTES
"Choco Claudio is a 55 y.o. male who is being evaluated via a billable telephone visit.      The patient has been notified of following:     \"This telephone visit will be conducted via a call between you and your physician/provider. We have found that certain health care needs can be provided without the need for a physical exam.  This service lets us provide the care you need with a short phone conversation.  If a prescription is necessary we can send it directly to your pharmacy.  If lab work is needed we can place an order for that and you can then stop by our lab to have the test done at a later time.    Telephone visits are billed at different rates depending on your insurance coverage. During this emergency period, for some insurers they may be billed the same as an in-person visit.  Please reach out to your insurance provider with any questions.    If during the course of the call the physician/provider feels a telephone visit is not appropriate, you will not be charged for this service.\"    Patient has given verbal consent to a Telephone visit? Yes    Patient would like to receive their AVS by AVS Preference: Mail a copy.    Psychiatric  Out patient Follow Up Progress Note  Date of visit:3/3/2021         Discussion of Care and Treatment Recommendations:   This is a 55 y.o. male with a history of paranoid schizophrenia, severe alcohol use disorder that presents for a follow up appointment       Last visit  02/25/2021  Recommendation at last visit .  1. Continue   Seroquel  100 mg  To PRN ,   Seroquel to  400 mg  at bedtime - psychosis , delusional thoughts, Trazodone  100 mg at Bedtime Prazosin to  5 mg at bedtime -PTSD  Continue gabapentin taper per recent hospitalization discharge instructions.   2- Highly recommend outpatient CD treatment Alcohol Use Disorder - Severe. Pt  opposed to CD treatment .  Continues to use alcohol when he can afford it.  3- Continue with  Psychotherapy:  4- 5-Make efforts to " stay away from alcohol and drug  5- RTC- 1 weeks call in between visit with any questions   Patient and I reviewed diagnosis and treatment plan and patient agrees with following recommendations:  Ongoing education given regarding diagnostic and treatment options with adequate verbalization of understanding.  Plan   1. Continue   Seroquel  100 mg  To PRN ,   Seroquel to  400 mg  at bedtime - psychosis , delusional thoughts, Trazodone  100 mg at Bedtime Prazosin to  5 mg at bedtime -PTSD  Continue gabapentin taper per recent hospitalization discharge instructions.   2- Highly recommend outpatient CD treatment Alcohol Use Disorder - Severe. Pt  opposed to CD treatment .  Continues to use alcohol when he can afford it.  3- Continue with  Psychotherapy:  4- 5-Make efforts to stay away from alcohol and drug  5- RTC- 2 weeks call in between visit with any questions   6 - Highly recommend Long term injectable neuroleptic to enhance compliance - Pt would like to think more about it - will continue this discussion during ur next visist         DIagnoses:     Schizophrenia   PTSD  Psychosis   Paranoia   Alcohol Use Disorder , Severe    Patient Active Problem List   Diagnosis     Chest pain     Alcohol withdrawal, with delirium (H)     Sinus tachycardia     Lactic acidosis     Dehydration     Microcytosis     Non-traumatic rhabdomyolysis     Nausea     Alcoholic ketoacidosis     Epigastric pain     Alcohol abuse     High anion gap metabolic acidosis     Hypomagnesemia     Alcohol intoxication, uncomplicated (H)     GI bleed     Anemia associated with acute blood loss     Disorder due to alcohol abuse (H)     Substance induced mood disorder (H)     Anxiety disorder     History of posttraumatic stress disorder (PTSD)     Erosive esophagitis     Polyarthralgia     Cervical spondylosis     VIKI positive     Inflammatory arthritis     Arthritis of right ankle     Gouty arthritis of toe of left foot     Pulmonary embolism (H)      "Primary osteoarthritis involving multiple joints     Supratherapeutic INR     Hypokalemia     Epistaxis     Shortness of breath     Orthopnea     PTSD (post-traumatic stress disorder)     Severe alcohol use disorder (H)     Chronic alcoholism with psychosis, with delusions (H)     Hypoxia     Schizophrenia (H)     Acute encephalopathy     Thrombocytopenia (H)     Paranoid psychosis (H)     Major depressive disorder, single episode, severe with psychotic features (H)             Chief Complaint / Subjective:    Chief complaint: \" I am not  doing very well \"     History of Present Illness:   Per patient's statement : He is still drinking daily . Still not interested in CD treatment .  He remains paranoid and delusional.  Still with poor insight into his illness but does continue to follow through with his therapy appointments and his psychiatric appointments.  He is taking his medications inconsistently.  He was interested in a support group but most AA groups currently are doing virtual visit which he is not interested with at this time.   Today I engaged patient in a long discussion regarding my recommendations for a trial of long-term injectable neuroleptic medications to enhance compliance.  Patient was ambivalent about this but will give it a thought and is open to discussing it further during our next visit.  Patient is paranoid about coming to [facility]  clinic where he would come in for his injection.  If he can concerns I am hoping that his  can arrange a ride for patient to decrease his anxiety and paranoia as this would really help with compliance and hopefully symptom management.  Patient will return to the clinic in approximately 2 weeks and call in between visits any questions or concerns  Mental Status Examination:     Appearance: unable to assess  Orientation: Patient alert and oriented to person, place, time, and situation  Reliability:  Patient appears to be an poor - fair  historian.  " "  Behavior: unable to assess  Speech: Speech is spontaneous and coherent, with a normal rate, rhythm and tone.    Language:There are no difficulties with expressive or receptive language as observed throughout the interview.    Mood: Described as \"   \" I am not  doing very well \"   Affect: unable to assess  Judgement: Fair  Insight: Poor insight into illness  Gait and station: unable to assess  Thought process: Logical   Hallucinations : History of chronic hallucinations, history of attending to internal stimuli  Thought content: There is evidence of delusions and paranoia.  This is also patient's baseline evidence.  Suicidal /Homical Ideations:  No thoughts of self harm or suicide. No thoughts of harming others.  Associations: Connected  Fund of knowledge: Average  Attention / Concentration: Able to remain focused during the interview with minimal distractibility or need for redirection.  Short Term Memory: Grossly intact as evidence by client recalling themes and ideas discussed.  Long Term Memory: Intact  Motor Status: unable to asse      Drug/treatment history and current pattern of use:   Hx of Alcohol Abuse     Medication changes: See Above   Medication adherence: compliant  Medication side effects: absent  Information about medications: Side effects, benefits and alternative treatments discussed and patient agrees .    Psychotherapy: Supportive therapy day-to-day living    Education: Diet, exercise, abstinence from drugs and alcohol, patient will not drive if sedated and medications or  under influence of any substance    Lab Results:  Personally reviewed and discussed with the patient    Lab Results   Component Value Date    WBC 4.0 02/22/2021    HGB 13.0 (L) 02/22/2021    HCT 41.1 02/22/2021    PLT 93 (L) 02/22/2021    ALT 45 02/22/2021    AST 43 (H) 02/22/2021     02/22/2021    K 3.6 02/24/2021     02/22/2021    CREATININE 0.67 (L) 02/22/2021    BUN 6 (L) 02/22/2021    CO2 30 02/22/2021    TSH " 2.18 2021    INR 1.06 2021       Vital signs:  There were no vitals taken for this visit.  Unable to assess telephone visit  Allergies: Patient has no known allergies.           Review of Systems:      ROS:  Subjective data only - Tele-Health  Visit   10 point ROS was negative except for the items listed in HPI-              Medications:       Current Outpatient Medications on File Prior to Visit   Medication Sig Dispense Refill     cetirizine (ZYRTEC) 10 MG tablet Take 10 mg by mouth at bedtime.        folic acid (FOLVITE) 1 MG tablet Take 1 tablet (1 mg total) by mouth daily. 30 tablet 0     melatonin 5 mg Tab tablet Take 1 tablet (5 mg total) by mouth at bedtime as needed.  0     multivitamin therapeutic tablet Take 1 tablet by mouth daily.  0     prazosin (MINIPRESS) 5 MG capsule Take 1 capsule (5 mg total) by mouth at bedtime. 90 capsule 0     QUEtiapine (SEROQUEL) 100 MG tablet Take 1 tablet (100 mg total) by mouth daily as needed. 90 tablet 0     QUEtiapine (SEROQUEL) 400 MG tablet Take 1 tablet (400 mg total) by mouth at bedtime. 90 tablet 0     thiamine 100 MG tablet Take 1 tablet (100 mg total) by mouth daily. 30 tablet 0     traZODone (DESYREL) 100 MG tablet TAKE 1 TABLET(100 MG) BY MOUTH AT BEDTIME 90 tablet 0     [] gabapentin (NEURONTIN) 100 MG capsule Take 100 mg by mouth every 8 (eight) hours for 9 doses. 9 capsule 0     No current facility-administered medications on file prior to visit.              Coordination of Care:   More than 30 minutes spent on this visit  with more than 50% of time spent on coordination of care including: Educating patient about diagnosis, prognosis, side effects and benefits of medications, diet, exercise.  Time also spent providing supportive therapy regarding above issues.    This note was created using a dictation system. All typing errors or contextual distortion is unintentional and software inherent.    Phone call duration: 23 minutes    Debra  ABBIE Lane, NP

## 2021-06-15 NOTE — TELEPHONE ENCOUNTER
As per DARRYL Lane's directive DA form deferred to psychotherapist. Pt has an upcoming appt with DARRYL Nogueira on 2/23/21

## 2021-06-15 NOTE — TELEPHONE ENCOUNTER
"Caller states he wants to talk to  Nuvia his therapist about his  PTSD; states he is having a hard time and only she can talk him down. Caller sttes he does not know what to do.   review of  EMR reveals he was just discharge from  [facility] inpatient but does not feel he needs to go back. Caller states he is thinking about drinking and feels like he  needs to hit someone but sates he will not. Thinking about going for a bus ride.   Caller states he has no history of  Violence and  reports that he feels like this a lot and will be better if he can talk to his therapist   Attempted to reassure and redirect  patient and   suggested he take his medication and  use therapeutic techniques to  calm self   Patient returns  to original state of crying and  Wanting to talk to \"Nuvia\"   Contacted CH[facility] Chou  mental health intake who  Assumed call  And  Transferred to crisis line          Reason for Disposition    Requesting to talk to a counselor (e.g., mental health worker, psychiatrist)    Additional Information    Negative: Severe difficulty breathing (e.g., struggling for each breath, speaks in single words)    Negative: Bluish (or gray) lips or face now    Negative: Difficult to awaken or acting confused (e.g., disoriented, slurred speech)    Negative: Hysterical or combative behavior    Negative: Sounds like a life-threatening emergency to the triager    Negative: Chest pain    Negative: Palpitations, skipped heart beat, or rapid heart beat    Negative: Cough is main symptom    Negative: Suicide thoughts, threats, attempts, or questions    Negative: Depression is main problem or symptom (e.g., feelings of sadness or hopelessness)    Negative: [1] Difficulty breathing AND [2] persists > 10 minutes AND [3] not relieved by reassurance provided by triager    Negative: [1] Lightheadedness or dizziness AND [2] persists > 10 minutes AND [3] not relieved by reassurance provided by triager    Negative: [1] Alcohol or drug " abuse, known or suspected AND [2] feeling very shaky (i.e., visible tremors of hands)    Negative: Patient sounds very sick or weak to the triager    Protocols used: ANXIETY AND PANIC ATTACK-A-AH

## 2021-06-15 NOTE — PROGRESS NOTES
"Pt was grumpy, pt stated \"this will be a short visit\". Pt did not want to talk much, pt expressed frustration with having to live on the streets again once his parents move to a assisted facility. Pt also expressed frustration due to losing job soon. Pt states he only needs his medications refilled and does not want to talk long with provider.    Correct pharmacy verified with patient and confirmed in snapshot? [x] yes []no    Medications Phoned  to Pharmacy [] yes [x]no  Name of Pharmacist:  List Medications, including dose, quantity and instructions      Medication Prescriptions given to patient   [] yes  [x] no   List the name of the drug the prescription was written for.      Medications ordered this visit were e-scribed.  Verified by order class [x] yes  [] no  Gabapentin, Minipress, Seroquel, Trazodone    Medication changes or discontinuations were communicated to patient's pharmacy:  [] yes  [x] no  Pharmacist Spoke With:     UA collected [] yes  [x] no    [place] Prescription Monitoring Program Reviewed? [x] yes  [] no    Referrals/Labs were made to:     Completed Charge Capture?  [x] yes  [] no    Future appointment was made: [] yes  [x] no  Pt did not make follow up appt after appt today  Dictation completed at time of chart check: [x] yes  [] no    I have checked the documentation for today s encounters and the above information has been reviewed and completed.      "

## 2021-06-15 NOTE — TELEPHONE ENCOUNTER
Patient calling intake requesting to speak with his therapist. Reports he is crying his eyes out and had an appointment scheduled with her this morning at 10am.    Writer said she would put a note in for the provider to return his call.    [phone].

## 2021-06-15 NOTE — PROGRESS NOTES
Mental Health Visit Note    02/6/2018  Start time: 1200  Stop Time: 1240 Session # 5    Choco Claudio is a 52 y.o. male is being seen today for a follow-up psychotherapy appointment    Chief Complaint   Patient presents with      Follow Up       New symptoms or complaints:  Patient indicated feeling alone.     Functional Impairment:   The patient identifies the how daily stressors affect daily functioning in today's session as follows (Scale is 1-4, 1=not at all or rarely; 4=extremely so/everyday.)    Personal: 4  Family: 4  Social: 4  Work: 4    Clinical assessment of mental status:   Choco Claudio presented on time for scheduled session.   He was oriented x3, open and cooperative, and dressed appropriately for this session and weather. His memory was fair.  His speech was  Excessive and tangential.  Language was focused on desire for change.  Concentration and focus is Brief. Psychosis is not noted or reported. He reports his mood is worried.  Affect is congruent with speech and is depressed.  Fund of knowledge is adequate. Insight is adequate for therapy.     Suicidal/Homicidal Ideation present:   No,  Patient denies suicidal and homicidal ideations/means or plans.      Patient's impression of their current status: Patient indicated feeling worried. Patient reported he continues to have no idea what his going to happen in the future. Patient indicated in a week or two he will no longer have a job. Patient reported once his parents sell the house he will no no where to live. Patient indicated PAOLA is suppose to be coming to see him but he can't remember when they are coming. Patient reported feeling very alone. Patient talked about wanting friends yet not wanting to allow people to get close to him.     Therapist impression of patient's current state:   Patient appears to have fair insight into his mental health. This therapist processed with patient his different options for a job and ways he can  start looking. This therapist processed with patient the importance of working with PAOLA to help find housing. This therapist challenged patient on his fears of getting too close to someone and ways he feels vulnerable.    Type of psychotherapeutic technique provided:   Solution-focused and CBT    Progress toward short term goals:   Patient returned to schedules session and medication management with Debra and recognized his depression.     Review of long term goals:Treatment Plan Updated on 11/16/2017    Diagnosis:   1. PTSD (post-traumatic stress disorder)    2. Major depressive disorder, recurrent episode, moderate    3. Severe alcohol use disorder      Plan and Follow-up:   Patient will return in one week for scheduled session. Patient will benefit from continuing to follow medication mangement. Patient should continue to work on sobriety. Patient should look at ways his drinking affects his mental health. Patient should work on getting out of the house 4 days a week. Patient should work with his [facility] worker and GUILD on housing. Patient should continue identify goals for himself. Patient needs to start looking for a new job.      Discharge Criteria/Planning: Client has chronic symptoms and ongoing therapy for maintenance stability recommended.    Signatures:   Performed and documented by NETTA De La Paz

## 2021-06-16 PROBLEM — G93.40 ACUTE ENCEPHALOPATHY: Status: ACTIVE | Noted: 2021-02-20

## 2021-06-16 PROBLEM — M19.071 ARTHRITIS OF RIGHT ANKLE: Status: ACTIVE | Noted: 2018-02-22

## 2021-06-16 PROBLEM — R79.1 SUPRATHERAPEUTIC INR: Status: ACTIVE | Noted: 2018-07-23

## 2021-06-16 PROBLEM — R76.8 ANA POSITIVE: Status: ACTIVE | Noted: 2018-02-13

## 2021-06-16 PROBLEM — M47.812 CERVICAL SPONDYLOSIS: Status: ACTIVE | Noted: 2018-02-13

## 2021-06-16 PROBLEM — R06.02 SHORTNESS OF BREATH: Status: ACTIVE | Noted: 2021-01-23

## 2021-06-16 PROBLEM — R09.02 HYPOXIA: Status: ACTIVE | Noted: 2021-02-18

## 2021-06-16 PROBLEM — M10.9 GOUTY ARTHRITIS OF TOE OF LEFT FOOT: Status: ACTIVE | Noted: 2018-02-22

## 2021-06-16 PROBLEM — M25.50 POLYARTHRALGIA: Status: ACTIVE | Noted: 2018-02-13

## 2021-06-16 PROBLEM — M15.0 PRIMARY OSTEOARTHRITIS INVOLVING MULTIPLE JOINTS: Status: ACTIVE | Noted: 2018-05-03

## 2021-06-16 PROBLEM — M19.90 INFLAMMATORY ARTHRITIS: Status: ACTIVE | Noted: 2018-02-22

## 2021-06-16 PROBLEM — E87.6 HYPOKALEMIA: Status: ACTIVE | Noted: 2018-07-24

## 2021-06-16 PROBLEM — R04.0 EPISTAXIS: Status: ACTIVE | Noted: 2018-07-24

## 2021-06-16 PROBLEM — F20.9 SCHIZOPHRENIA (H): Status: ACTIVE | Noted: 2021-02-20

## 2021-06-16 PROBLEM — D69.6 THROMBOCYTOPENIA (H): Status: ACTIVE | Noted: 2021-02-20

## 2021-06-16 PROBLEM — I26.99 PULMONARY EMBOLISM (H): Status: ACTIVE | Noted: 2018-03-24

## 2021-06-16 PROBLEM — R06.01 ORTHOPNEA: Status: ACTIVE | Noted: 2021-01-23

## 2021-06-16 NOTE — PROGRESS NOTES
ASSESSMENT AND PLAN:  Choco Claudio 52 y.o. male is seen here on 02/13/18 for evaluation of intermittent pain and swelling in the ankles.  His knees have been affected.  While he has evidence of degenerative joint disease affecting his knees, cervical spine, the features that he describes appear to be more in common with an inflammatory process.  We discussed differential diagnosis.  Crystalline arthropathy can behave in similar fashion, psoriatic arthritis and rheumatoid arthritis would also be considerations.  With his family history of rheumatoid arthritis these symptoms need to be continued to be monitored carefully.  He has a positive VIKI without definite evidence of connective tissue disease.  X-rays of the ankle joints taken today, personally reviewed, findings: Without evidence of destructive arthropathy erosions.  This may be associated with the sister's history of rheumatoid arthritis.  Diagnoses and all orders for this visit:    Polyarthralgia  -     CCP Antibodies  -     Hepatitis C Antibody (Anti-HCV)  -     XR Ankles Bilateral 3 Or More Vws; Future; Expected date: 2/13/18  -     XR Ankles Bilateral 3 Or More Vws    Cervical spondylosis    VIKI positive      HISTORY OF PRESENTING ILLNESS:  Choco Claudio, 52 y.o., male is here for evaluation of painful joints.  He is accompanied by his mom.  He reports that along with his mom's elevation that over the past 6 months he has had joint pains.  This tends to happen in his ankles.  Sometimes 1 sometimes the others.  Between the episodes they can be very little pain.  Today's visit is just like 1 of those days when he has less pain or literally no pain.  He reports that when the pain comes on this is associated with redness and swelling of the ankle, and then also sometimes the knees are involved.  During these episodes it is hard for him to even get out of his bed and he finds himself crawling.  He has not had any significant involvement of his  hands wrists elbows shoulders.  He did have fracture of the clavicle July 2017 that has left him with some discomfort there.  His workup has included positive VIKI.  His rheumatoid factor was negative.  His sed rate was normal.  He has several comorbidities as noted in the section below.  He has cervical spondylosis and MRI from elsewhere in the care everywhere is reviewed.   Further historical information and ADL limitations as noted in the multidimensional health assessment questionnaire attached in the EMR. Rest of the 13 system ROS is negative.     ALLERGIES:Other environmental allergy    PAST MEDICAL/ACTIVE PROBLEMS/MEDICATION/ FAMILY HISTORY/SOCIAL DATA:  The patient has a family history of  Past Medical History:   Diagnosis Date     Acid reflux      Alcohol withdrawal seizure      Alcoholic ketoacidosis 12/4/2016     Alcoholism      Anxiety disorder      Arthritis      Back pain      Essential tremor      GI bleed 1/14/2017     Learning disability      PTSD (post-traumatic stress disorder)      History   Smoking Status     Current Every Day Smoker     Types: Pipe   Smokeless Tobacco     Never Used     Comment: just when at pow wows and use of pipe     Patient Active Problem List   Diagnosis     Chest pain     Alcohol withdrawal, with unspecified complication     Sinus tachycardia     Lactic acidosis     Dehydration     Microcytosis     Non-traumatic rhabdomyolysis     Nausea     Alcoholic ketoacidosis     Epigastric pain     Alcoholism, chronic     High anion gap metabolic acidosis     Hypomagnesemia     Alcohol intoxication, uncomplicated     GI bleed     Anemia associated with acute blood loss     Disorder due to alcohol abuse     Substance induced mood disorder     Anxiety disorder     History of posttraumatic stress disorder (PTSD)     Erosive esophagitis     Current Outpatient Prescriptions   Medication Sig Dispense Refill     cetirizine (ZYRTEC) 10 MG tablet Take 10 mg by mouth daily as needed for  allergies.       omeprazole (PRILOSEC) 20 MG capsule Take 20 mg by mouth daily before breakfast.       potassium chloride SA (K-DUR,KLOR-CON) 20 MEQ tablet Take 20 mEq by mouth.       prazosin (MINIPRESS) 2 MG capsule Take 1 capsule (2 mg total) by mouth at bedtime. 30 capsule 1     QUEtiapine (SEROQUEL) 25 MG tablet Take 1 tablet (25 mg total) by mouth at bedtime. 30 tablet 1     traZODone (DESYREL) 100 MG tablet Take 1 tablet (100 mg total) by mouth at bedtime. 30 tablet 0     No current facility-administered medications for this visit.        COMPREHENSIVE EXAMINATION:  Vitals:    02/13/18 1307   BP: 134/72   Pulse: 80   Weight: 205 lb (93 kg)   Height: 6' (1.829 m)     A well appearing alert oriented male. Vital data as noted above. His eyes without inflammation/scleromalacia. ENTwithout oral mucositis, thrush, nasal deformity, external ear redness, deformity. His neck is without lymphadenopathy and supple. Lungs normal sounds, no pleural rub. Heart auscultation normal rate, rhythm; no pericardial rub and murmurs. Abdomen soft, non tender, no organomegaly. Skin examined for heliotrope, malar area eruption, lupus pernio, periungual erythema, sclerodactyly, papules, erythema nodosum, purpura, nail pitting, onycholysis, and obvious psoriasis lesion. Neurological examination shows normal alertness, speech, facial symmetry, tone and power in upper and lower extremities, Tinel's and Phalen's at wrist and gait. The joint examination is performed for swelling, tenderness, warmth, erythema, and range of motion in the following joints: DIPs, PIPs, MCPs, wrists, first CMC's, elbows, shoulders, hips, knees, ankles, feet; spine for range of motion and paraspinal muscles for tenderness. The salient normal / abnormal findings are appended.  He has no synovitis of any of the palpable appendicular joints, including the joints in the lower extremity bilaterally.  There is no dactylitis, enthesitis.    LAB / IMAGING DATA:  ALT    Date Value Ref Range Status   01/15/2017 11 0 - 45 U/L Final   01/14/2017 12 0 - 45 U/L Final   01/10/2017 21 0 - 45 U/L Final     Albumin   Date Value Ref Range Status   01/15/2017 3.1 (L) 3.5 - 5.0 g/dL Final   01/14/2017 3.7 3.5 - 5.0 g/dL Final   01/10/2017 3.9 3.5 - 5.0 g/dL Final     Creatinine   Date Value Ref Range Status   10/15/2017 0.84 0.70 - 1.30 mg/dL Final   01/16/2017 0.64 (L) 0.70 - 1.30 mg/dL Final   01/15/2017 0.70 0.70 - 1.30 mg/dL Final       WBC   Date Value Ref Range Status   10/15/2017 3.8 (L) 4.0 - 11.0 thou/uL Final   01/16/2017 7.9 4.0 - 11.0 thou/uL Final   10/23/2014 7.3 4.0 - 11.0 thou/uL Final   06/21/2014 8.7 4.0 - 11.0 thou/uL Final     Hemoglobin   Date Value Ref Range Status   10/15/2017 12.9 (L) 14.0 - 18.0 g/dL Final   01/16/2017 10.3 (L) 14.0 - 18.0 g/dL Final   01/16/2017 10.8 (L) 14.0 - 18.0 g/dL Final     Platelets   Date Value Ref Range Status   10/15/2017 134 (L) 140 - 440 thou/uL Final   01/16/2017 137 (L) 140 - 440 thou/uL Final   01/15/2017 128 (L) 140 - 440 thou/uL Final       No results found for: VIKI, RF, SEDRATE

## 2021-06-16 NOTE — PROGRESS NOTES
Reason for visit - Follow up.  Sleep - Reports sleeping good at night.  Depression - Randomly feeling really depressed during a show, then gets distracted and notices he's laughing and happy again.  Anxiety - High from PTSD. Comes out of no where. When it comes on, pt states he wants to go home and isolate to himself.   Panic attacks - Sometimes.  SI/HI - Denies  Therapist - Yes, [facility] weekly.   Side effects from medication - Denies.    No  reviewed as patient is not taking any medications that require reporting.

## 2021-06-16 NOTE — PROGRESS NOTES
Correct pharmacy verified with patient and confirmed in snapshot? [x] yes []no    Charge captured ? [x] yes  [] no    Medications Phoned  to Pharmacy [] yes [x]no  Name of Pharmacist:  List Medications, including dose, quantity and instructions      Medication Prescriptions given to patient   [] yes  [x] no   List the name of the drug the prescription was written for.       Medications ordered this visit were e-scribed.  Verified by order class [x] yes  [] no    Medication changes or discontinuations were communicated to patient's pharmacy: [] yes  [x] NA    UA collected [] yes  [x] no    [place] Prescription Monitoring Program Reviewed? [] yes  [x] no    Referrals were made to: NA     Future appointment was made: [x] yes  [] no  4/27/18 with Debra Lane CNP    Dictation completed at time of chart check: [] yes  [x] no    I have checked the documentation for today s encounters and the above information has been reviewed and completed.

## 2021-06-16 NOTE — PROGRESS NOTES
Mental Health Visit Note    03/06/2018  Start time: 1200  Stop Time: 1240 Session # 6    Choco Claudio is a 52 y.o. male is being seen today for a follow-up psychotherapy appointment    Chief Complaint   Patient presents with      Follow Up       New symptoms or complaints:  None    Functional Impairment:   The patient identifies the how daily stressors affect daily functioning in today's session as follows (Scale is 1-4, 1=not at all or rarely; 4=extremely so/everyday.)    Personal: 4  Family: 4  Social: 4  Work: 4    Clinical assessment of mental status:   Choco Claudio presented on time for scheduled session.   He was oriented x3, open and cooperative, and dressed appropriately for this session and weather. His memory was fair.  His speech was  Excessive and tangential.  Language was focused change.  Concentration and focus is Brief. Psychosis is not noted or reported. He reports his mood is depressed.  Affect is congruent with speech.  Fund of knowledge is adequate. Insight is adequate for therapy.     Suicidal/Homicidal Ideation present:   No,  Patient denies suicidal and homicidal ideations/means or plans.      Patient's impression of their current status: Patient indicated feeling depressed. Patient reported trying to stay hopeful that he will get housing but knowing that his parents plan to sell their house soon. Patient talked about how he use to find taz in flying. Patient indicated he can no longer afford to fly. Patient talked about his struggles with his depression and not getting out more often. Patient indicated there being a lot of negative people in the world and feeling it is risky for him to make new friends which leaves him isolated.     Therapist impression of patient's current state:   Patient appears to have fair insight into his mental health. This therapist processed with patient goals he can start working on achieving. This therapist challenged patient on ways he can work on  trusting people and being more active.     Type of psychotherapeutic technique provided:   Solution-focused and CBT    Progress toward short term goals:   Patient returned to schedules session and medication management with Debra and recognized his depression.     Review of long term goals:Treatment Plan Updated on 02/27/2018    Diagnosis:   1. PTSD (post-traumatic stress disorder)    2. Major depressive disorder, recurrent episode, moderate    3. Severe alcohol use disorder      Plan and Follow-up:   Patient will return in one week for scheduled session. Patient will benefit from continuing to follow medication mangement. Patient should continue to work on sobriety. Patient should look at ways his drinking affects his mental health. Patient should work on getting out of the house 4 days a week. Patient should work with his [facility] worker and GUILD on housing. Patient should continue identify goals for himself. Patient needs to start looking for a new job.      Discharge Criteria/Planning: Client has chronic symptoms and ongoing therapy for maintenance stability recommended.    Signatures:   Performed and documented by NETTA De La Paz

## 2021-06-16 NOTE — PROGRESS NOTES
Mental Health Visit Note    02/27/2018  Start time: 1200  Stop Time: 1240 Session # 6    Choco Claudio is a 52 y.o. male is being seen today for a follow-up psychotherapy appointment    Chief Complaint   Patient presents with      Follow Up       New symptoms or complaints:  Patient indicated feeling extremely anxious.     Functional Impairment:   The patient identifies the how daily stressors affect daily functioning in today's session as follows (Scale is 1-4, 1=not at all or rarely; 4=extremely so/everyday.)    Personal: 4  Family: 4  Social: 4  Work: 4    Clinical assessment of mental status:   Choco Claudio presented on time for scheduled session.   He was oriented x3, open and cooperative, and dressed appropriately for this session and weather. His memory was fair.  His speech was  Excessive and tangential.  Language was focused on being stuck.  Concentration and focus is Brief. Psychosis is not noted or reported. He reports his mood is anxious.  Affect is congruent with speech and is depressed.  Fund of knowledge is adequate. Insight is adequate for therapy.     Suicidal/Homicidal Ideation present:   No,  Patient denies suicidal and homicidal ideations/means or plans.      Patient's impression of their current status: Patient reported feeling anxious. Patient indicated going to the grocery store with his mom and having to leave before they were finished. Patient talked about his parents continuing to plan on moving which will result in him being homeless. Patient indicated someone from [employer] is suppose to be coming Thursday to help with housing. Patient reported he has had some physical struggles including the diagnosis of gout. Patient indicated he is no longer working due to the company closing.     Therapist impression of patient's current state:   Patient appears to have fair insight into his mental health. This therapist challenged patient on ways he can work on confronting anxiety. This  therapist processed with patient his different options for housing that does not include living in a tent. This therapist processed with patient his emotions related to not working.     Type of psychotherapeutic technique provided:   Solution-focused and CBT    Progress toward short term goals:   Patient returned to schedules session and medication management with Debra and recognized his depression.     Review of long term goals:Treatment Plan Updated on 02/27/2018    Diagnosis:   1. PTSD (post-traumatic stress disorder)    2. Major depressive disorder, recurrent episode, moderate    3. Severe alcohol use disorder      Plan and Follow-up:   Patient will return in one week for scheduled session. Patient will benefit from continuing to follow medication mangement. Patient should continue to work on sobriety. Patient should look at ways his drinking affects his mental health. Patient should work on getting out of the house 4 days a week. Patient should work with his [facility] worker and GUILD on housing. Patient should continue identify goals for himself. Patient needs to start looking for a new job.      Discharge Criteria/Planning: Client has chronic symptoms and ongoing therapy for maintenance stability recommended.    Signatures:   Performed and documented by NETTA De La Paz

## 2021-06-16 NOTE — TELEPHONE ENCOUNTER
This therapist received a call from patient's  Deborah Del Cid. Deborah indicated she received a call today from patient's mom that she found patient dead on Friday in his apartment.

## 2021-06-16 NOTE — TELEPHONE ENCOUNTER
----- Message from Sarah Manning sent at 3/16/2021 11:24 AM CDT -----  Amie CORNEJO,  of Ry' case management team through [employer] called this morning and needs to speak with you rather urgently about the most recent diagnostic assessment for Ry.  The latest paperwork does not include an SPMI diagnosis, which is required for them to provide services to Ry, so she would like to discuss this with you, as they do not want to have to discontinue his case management services.  Please call her at [phone] at your earliest convenience.

## 2021-06-16 NOTE — PROGRESS NOTES
Mental Health Visit Note    03/27/2018  Start time: 1200  Stop Time: 1245 Session # 8    Choco Claudio is a 52 y.o. male is being seen today for a follow-up psychotherapy appointment    Chief Complaint   Patient presents with      Follow Up       New symptoms or complaints:  None    Functional Impairment:   The patient identifies the how daily stressors affect daily functioning in today's session as follows (Scale is 1-4, 1=not at all or rarely; 4=extremely so/everyday.)    Personal: 4  Family: 4  Social: 4  Work: 4    Clinical assessment of mental status:   Choco Claudio presented on time for scheduled session.   He was oriented x3, open and cooperative, and dressed appropriately for this session and weather. His memory was fair.  His speech was  Excessive and tangential.  Language was focused change.  Concentration and focus is Brief. Psychosis is not noted or reported. He reports his mood is worried.  Affect is congruent with speech.  Fund of knowledge is adequate. Insight is adequate for therapy.     Suicidal/Homicidal Ideation present:   No,  Patient denies suicidal and homicidal ideations/means or plans.      Patient's impression of their current status: Patient reported feeling worried. Patient indicated that he came to the ER Saturday and was hospitalized until yesterday. Patient indicated this has left him worried about his health. Patient talked about realizing now that living in a tent could be very challenging. Patient talked about realizing now how important life is to him and wanting to make positive changes. Patient reported still struggling with depression.     Therapist impression of patient's current state:   Patient appears to have fair insight into his mental health. This therapist processed with patient his emotions related to his health. This therapist challenged patient on ways he would like to make changes in his life to feel better mentally.     Type of psychotherapeutic  technique provided:   Solution-focused and CBT    Progress toward short term goals:   Patient returned to schedules session and medication management with Debra and recognized his depression.     Review of long term goals:Treatment Plan Updated on 02/27/2018    Diagnosis:   1. PTSD (post-traumatic stress disorder)    2. Major depressive disorder, recurrent episode, moderate    3. Severe alcohol use disorder      Plan and Follow-up:   Patient will return in one week for scheduled session. Patient will benefit from continuing to follow medication mangement. Patient should continue to work on sobriety. Patient should look at ways his drinking affects his mental health. Patient should work on getting out of the house 4 days a week. Patient should work with his [facility] worker and GUILD on housing. Patient should continue identify goals for himself. Patient needs to start looking for a new job.      Discharge Criteria/Planning: Client has chronic symptoms and ongoing therapy for maintenance stability recommended.    Signatures:   Performed and documented by NETTA De La Paz

## 2021-06-16 NOTE — PROGRESS NOTES
Psychiatric  Progress Note  Date of visit:2/27/2018           Discussion of Care and Treatment Recommendations:   This is a 52 y.o. male with male with significant history of Alcohol Use Disorder, Etoh induced gastritis , DENISE and PTSD who presents to the clinic today , noncompliance with treatment plan.  Patient is here today for  a follow up appointment       Last visit 1/9/18  Recommendation at last visit .  1-Continue with current medications Trazodone  100 mg at Bedtime, gabapentin 100  mg at bedtime, Start Prazosin 2 mg at bedtime -PTSD, Start Seroquel 25 mg  at bedtime - psychosis , delusional thoughts  2- Highly recommend Inpatient CD treatment Alcohol Use Disorder - Severe. Pt  opposed to CD treatment   3- Continue with  Psychotherapy:  4- 5-Make efforts to stay away from alcohol and drug  5- RTC-  4 weeks   Patient and I reviewed diagnosis and treatment plan and patient agrees with following recommendations:  Ongoing education given regarding diagnostic and treatment options with adequate verbalization of understanding.  Plan   1.-Continue with current medications Trazodone  100 mg at Bedtime, gabapentin 100  mg at bedtime, Start Prazosin 2 mg at bedtime -PTSD, Start Seroquel 25 mg  at bedtime - psychosis , delusional thoughts  2- Highly recommend Inpatient CD treatment Alcohol Use Disorder - Severe. Pt  opposed to CD treatment   3- Continue with  Psychotherapy:  4- 5-Make efforts to stay away from alcohol and drug  5- RTC-  8 -10 weeks call in between visit with any questions or concerns          Diagnoses:   PTSD  Alcohol Use Disorder , Severe   R/O Schizophrenia       Patient Active Problem List   Diagnosis     Chest pain     Alcohol withdrawal, with unspecified complication     Sinus tachycardia     Lactic acidosis     Dehydration     Microcytosis     Non-traumatic rhabdomyolysis     Nausea     Alcoholic ketoacidosis     Epigastric pain     Alcoholism, chronic     High anion gap metabolic acidosis      "Hypomagnesemia     Alcohol intoxication, uncomplicated     GI bleed     Anemia associated with acute blood loss     Disorder due to alcohol abuse     Substance induced mood disorder     Anxiety disorder     History of posttraumatic stress disorder (PTSD)     Erosive esophagitis     Polyarthralgia     Cervical spondylosis     VIKI positive     Inflammatory arthritis     Arthritis of right ankle     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  He was started on steroids-methylprednisolone.  He tells me since he started the steroid treatment he has abstain from alcohol because he was told he cannot drink anymore when he is taking these medications.  I applauded him for that and encouraged and recommended that he continues to maintain sobriety.  He reports that he has been taking his medication but his mother gives him.  He denies any side effects.  He perseverated on how he is moving to stay in the winter after his mother moved into senior living apartment because he cannot move in with his mother.  He is now more positive about meeting with the [place]  who may be able to assist him in finding community resources including housing and mental health services for him.  I am hoping patient will be agreeable to community ACT services should he qualify as this would better serve him.  He offers no other complaints.  He was in a hurry to leave and I did not want to agitate him therefore have him return in 8-10 weeks for follow-up appointment I encouraged him to meet with the community health  and he promised to.  He will continue the same medications and continue with therapy.    Mental Status " Examination:   General: Adequate hygiene, cooperative  Speech: tangential.  Language: Intact  Thought process: Fairly organized  Thought content:                           Auditory hallucinations- absent - Denies                           Visual Hallucinations - Absent  - Denies                          Delusions- present                          Loose Associations:  No                          Suicidal thoughts: Absent - Denies                          Homicidal thoughts: Absent- Denies                       Affect: Neutral  Mood: Neutral   Intellectual functioning: Within normal limits  Memory: Fair  Fund of knowledge: Average  Attention and concentration: Within normal limits  Gait: Steady  Psychomotor activity: Calm, no agitation  Muscles: No atrophy, no abnormal movements  InSight and judgment: Limited    Medication changes: see Above   Medication adherence: compliant  Medication side effects: absent  Information about medications: Side effects, benefits and alternative treatments discussed and patient agrees with capacity to do so.    Psychotherapy: Supportive therapy day-to-day living    Education: Diet, exercise, abstinence from drugs and alcohol, patient will not drive if sedated and medications or  under influence of any substance    Lab Results:   Personally reviewed and discussed with the patient    Lab Results   Component Value Date    WBC 3.8 (L) 10/15/2017    HGB 12.9 (L) 10/15/2017    HCT 39.1 (L) 10/15/2017     (L) 10/15/2017    ALT 11 01/15/2017    AST 15 01/15/2017     10/15/2017    K 3.3 (L) 10/15/2017     10/15/2017    CREATININE 0.84 10/15/2017    BUN 5 (L) 10/15/2017    CO2 25 10/15/2017    INR 1.15 (H) 01/14/2017       Vital signs:    Vitals:    02/27/18 1405   BP: 140/76   Patient Site: Left Arm   Patient Position: Sitting   Cuff Size: Adult Regular   Pulse: 95   Temp: 98.2  F (36.8  C)   TempSrc: Oral   Weight: 216 lb (98 kg)     Allergies: Other environmental allergy          Medications:     Current Outpatient Prescriptions on File Prior to Visit   Medication Sig Dispense Refill     cetirizine (ZYRTEC) 10 MG tablet Take 10 mg by mouth daily as needed for allergies.       omeprazole (PRILOSEC) 20 MG capsule Take 20 mg by mouth daily before breakfast.       potassium chloride SA (K-DUR,KLOR-CON) 20 MEQ tablet Take 20 mEq by mouth.       prazosin (MINIPRESS) 2 MG capsule Take 1 capsule (2 mg total) by mouth at bedtime. 30 capsule 1     predniSONE (DELTASONE) 20 MG tablet Take 2 tablets (40 mg total) by mouth daily for 7 days. 14 tablet 2     QUEtiapine (SEROQUEL) 25 MG tablet Take 1 tablet (25 mg total) by mouth at bedtime. 30 tablet 1     traZODone (DESYREL) 100 MG tablet Take 1 tablet (100 mg total) by mouth at bedtime. 30 tablet 0     No current facility-administered medications on file prior to visit.               Review of Systems:    Otherwise reminder of review of systems is negative    Coordination of Care:   More than 25 minutes spent on this visit  with more than 50% of time spent on coordination of care including: Educating patient about diagnosis, prognosis, side effects and benefits of medications, diet, exercise.  Time also spent on entering orders and preparing documentation for the visit.Time also spent providing supportive therapy regarding above issues.    This note was created using a dictation system. All typing errors or contextual distortion is unintentional and software inherent.

## 2021-06-16 NOTE — PROGRESS NOTES
Outpatient Mental Health Treatment Plan    Name:  Choco Claudio  :  1965  MRN:  [id number]    Treatment Plan:  Updated Treatment Plan  Intake/initial treatment plan date:  2017  Benefit and risks and alternatives have been discussed: Yes  Is this treatment appropriate with minimal intrusion/restrictions: Yes  Estimated duration of treatment:  Ongoing psychotherapy at this time  Anticipated frequency of services:  Week;y  Necessity for frequency: This frequency is needed to establish therapeutic goals and for continuity of care in order to monitor progress.  Necessity for treatment: To address cognitive, behavioral, and/or emotional barriers in order to work toward goals and to improve quality of life.    Plan:       ?   ?  Posttraumatic Stress Disorder    Goal:  Reduced the signs and symptoms of PTSD and increase patient's ability to tolerate breakthrough stressors surrounding his various traumas.   Strategies: ? [x]Learn and practice relaxation techniques and other coping strategies (e.g., thought stopping, reframing, meditation)     ? [x] Increase involvement in meaningful activities     ? [x] Discuss sleep hygiene     ? [x] Explore thoughts and expectations about self and others     ? [x] Identify and monitor triggers for panic/anxiety symptoms     ? [x] Implement physical activity routine (with physician approval)     ? [x] Consider introduction of bibliotherapy and/or videos     ? [x] Continue compliance with medical treatment plan (or explore barriers)   ?Degree to which this is a problem: 4  Degree to which goal is met: 1    Date of next review: 2018    Substance use  Goal:  Increase patient awareness regarding substance use triggers.  Consider harm reduction and/or chemical dependency treatment.   Strategies: ? [x] Consider referral for chemical dependency evaluation     ? [x] Discuss barriers to participating in AA or other peer-facilitated groups         [x] Address environmental  factors which may interfere with sobriety     ? [x] Explore short-term versus long-term consequences of use  ?  Degree to which this is a problem: 4  Degree to which goal is met: 1    Date of next review: 05/16/2018    Depression  Goal:  Reduced the signs and symptoms of depression and increase patient's ability to tolerate breakthrough symptomology.   Strategies: ? [x]Learn and practice relaxation techniques and other coping strategies (e.g., thought stopping, reframing, meditation)     ? [x] Increase involvement in meaningful activities     ? [x] Discuss sleep hygiene     ? [x] Explore thoughts and expectations about self and others     ? [x] Identify and monitor triggers for panic/anxiety symptoms     ? [x] Implement physical activity routine (with physician approval)     ? [x] Consider introduction of bibliotherapy and/or videos     ? [x] Continue compliance with medical treatment plan (or explore barriers)   ?Degree to which this is a problem: 4  Degree to which goal is met: 1    Date of next review: 05/16/2018       Functional Impairment:  1=Not at all/Rarely  2=Some days  3=Most Days  4=Every Day    Personal : 4  Family : 4  Social : 4   Work/school : 3    Diagnosis:  Posttraumatic stress disorder; alcohol use disorder, severe; and major depressive disorder, recurrent, moderate.    Strengths:  supported by mother, one good friend, employed, and motivated for change.  Limitations:  isolation, ambivalence about discontinuing alcohol use, and difficulty connecting with others due to trauma symptoms  Cultural Considerations: Client identifies is half .    Persons responsible for this plan: Patient and Provider            Psychotherapist Signature           Patient Signature:                This note was created with help of Dragon dictation software.  Grammatical / typing errors are not intentional and inherent to the software.

## 2021-06-16 NOTE — PROGRESS NOTES
This therapist attempted to call [employer] (patient's guild worker) for the second time at patient's request. A [facility] was left asking guild worker to return this therapist call.

## 2021-06-16 NOTE — PROGRESS NOTES
Mental Health Visit Note    03/13/2018  Start time: 100  Stop Time: 155 Session # 7    Choco Claudio is a 52 y.o. male is being seen today for a follow-up psychotherapy appointment    Chief Complaint   Patient presents with      Follow Up       New symptoms or complaints:  None    Functional Impairment:   The patient identifies the how daily stressors affect daily functioning in today's session as follows (Scale is 1-4, 1=not at all or rarely; 4=extremely so/everyday.)    Personal: 4  Family: 4  Social: 4  Work: 4    Clinical assessment of mental status:   Choco Claudio presented on time for scheduled session.   He was oriented x3, open and cooperative, and dressed appropriately for this session and weather. His memory was fair.  His speech was  Excessive and tangential.  Language was focused change.  Concentration and focus is Brief. Psychosis is not noted or reported. He reports his mood is depressed.  Affect is congruent with speech.  Fund of knowledge is adequate. Insight is adequate for therapy.     Suicidal/Homicidal Ideation present:   No,  Patient denies suicidal and homicidal ideations/means or plans.      Patient's impression of their current status: Patient indicated feeling depressed. Patient indicated still be concerned about his housing. Patient reported he has been in contact with the person from [employer] and they are faxing over paperwork to the doctor. Patient indicated if he has to live in a tent again he will start drinking. Patient talked about living in the tent in the past and it not going well. Patient indicated he is a survivor and if has to be will live in the tent. Patient reported he has not support network and is making the choice to isolate.     Therapist impression of patient's current state:   Patient appears to have fair insight into his mental health. This therapist challenged patient on how isolation is leading to negative thought patterns and feeling alone. This  therapist processed with patient needing to start being around people and slowly starting to trust people.     Type of psychotherapeutic technique provided:   Solution-focused and CBT    Progress toward short term goals:   Patient returned to schedules session and medication management with Debra and recognized his depression.     Review of long term goals:Treatment Plan Updated on 02/27/2018    Diagnosis:   1. PTSD (post-traumatic stress disorder)    2. Major depressive disorder, recurrent episode, moderate    3. Severe alcohol use disorder      Plan and Follow-up:   Patient will return in one week for scheduled session. Patient will benefit from continuing to follow medication mangement. Patient should continue to work on sobriety. Patient should look at ways his drinking affects his mental health. Patient should work on getting out of the house 4 days a week. Patient should work with his [facility] worker and GUILD on housing. Patient should continue identify goals for himself. Patient needs to start looking for a new job.      Discharge Criteria/Planning: Client has chronic symptoms and ongoing therapy for maintenance stability recommended.    Signatures:   Performed and documented by NETTA De La Paz

## 2021-06-16 NOTE — PROGRESS NOTES
ASSESSMENT AND PLAN:  Choco Claudio 52 y.o. male is seen here on 02/22/18 for acute onset of pain affecting his left big toe and the right ankle.  He has hallmarks of acute arthropathy.  He had a double contour sign positive on his left big toe IP joint which is recorded.  This is consistent with gouty.  This is discussed with him.  He wants to proceed local injection.  10 mg of methylprednisolone injected into the left toe IP joint, 20 mg injected into the right ankle both were done under ultrasound guidance.  He tolerated the procedure well and had a brisk Marcaine effect.  I will ask him to take prednisone.  Major side effects including ocular metabolic bone related were reviewed.  Once his current status stabilizes we will meet again further course to be charted including checking for uric acid.  We will meet here in the next 3-4 weeks.  Diagnoses and all orders for this visit:    Inflammatory arthritis  -     predniSONE (DELTASONE) 20 MG tablet; Take 2 tablets (40 mg total) by mouth daily for 7 days.  Dispense: 14 tablet; Refill: 2  -     Uric Acid  -     HM2(CBC w/o Differential)  -     Creatinine  -     ALT (SGPT)    Arthritis of right ankle  -     methylPREDNISolone acetate injection 20 mg (DEPO-MEDROL); Inject 0.5 mL (20 mg total) into the joint once.    Gouty arthritis of toe of left foot  -     methylPREDNISolone acetate injection 10 mg (DEPO-MEDROL); Inject 0.25 mL (10 mg total) into the joint once.    HISTORY OF PRESENTING ILLNESS:  Choco Claudio, 52 y.o., male is here for acute onset of pain.  This is severe, 10/10, affecting his feet bilaterally worse on the right side of the ankle and the left side and the big toe area.  This came on suddenly with 2 days ago.  He is not able to ambulate he came in a wheelchair accompanied by his mom.  He reports that he can do anything by himself now.  He noted no fever weight loss blurry vision eye redness mouth also nausea cough there is no rash.  There  is no trauma to his feet recently.  He did have fracture of the clavicle July 2017 that has left him with some discomfort there.  His workup has included positive VIKI.  His rheumatoid factor was negative.  His sed rate was normal.  He has several comorbidities as noted in the section below.  He has cervical spondylosis and MRI from elsewhere in the care everywhere is reviewed.   Further historical information and ADL limitations as noted in the multidimensional health assessment questionnaire attached in the EMR  Recently he was seen here for joint pain evaluation.Following is the excerpt from a recent note:  .  He is accompanied by his mom.  He reports that along with his mom's elevation that over the past 6 months he has had joint pains.  This tends to happen in his ankles.  Sometimes 1 sometimes the others.  Between the episodes they can be very little pain.  Today's visit is just like 1 of those days when he has less pain or literally no pain.  He reports that when the pain comes on this is associated with redness and swelling of the ankle, and then also sometimes the knees are involved.  During these episodes it is hard for him to even get out of his bed and he finds himself crawling.  He has not had any significant involvement of his hands wrists elbows shoulders.  . Rest of the 13 system ROS is negative.     ALLERGIES:Other environmental allergy    PAST MEDICAL/ACTIVE PROBLEMS/MEDICATION/ FAMILY HISTORY/SOCIAL DATA:  The patient has a family history of  Past Medical History:   Diagnosis Date     Acid reflux      Alcohol withdrawal seizure      Alcoholic ketoacidosis 12/4/2016     Alcoholism      Anxiety disorder      Arthritis      Back pain      Essential tremor      GI bleed 1/14/2017     Learning disability      PTSD (post-traumatic stress disorder)      History   Smoking Status     Current Every Day Smoker     Types: Pipe   Smokeless Tobacco     Never Used     Comment: just when at [employer] [place] and use of  pipe     Patient Active Problem List   Diagnosis     Chest pain     Alcohol withdrawal, with unspecified complication     Sinus tachycardia     Lactic acidosis     Dehydration     Microcytosis     Non-traumatic rhabdomyolysis     Nausea     Alcoholic ketoacidosis     Epigastric pain     Alcoholism, chronic     High anion gap metabolic acidosis     Hypomagnesemia     Alcohol intoxication, uncomplicated     GI bleed     Anemia associated with acute blood loss     Disorder due to alcohol abuse     Substance induced mood disorder     Anxiety disorder     History of posttraumatic stress disorder (PTSD)     Erosive esophagitis     Polyarthralgia     Cervical spondylosis     VIKI positive     Current Outpatient Prescriptions   Medication Sig Dispense Refill     cetirizine (ZYRTEC) 10 MG tablet Take 10 mg by mouth daily as needed for allergies.       omeprazole (PRILOSEC) 20 MG capsule Take 20 mg by mouth daily before breakfast.       potassium chloride SA (K-DUR,KLOR-CON) 20 MEQ tablet Take 20 mEq by mouth.       prazosin (MINIPRESS) 2 MG capsule Take 1 capsule (2 mg total) by mouth at bedtime. 30 capsule 1     QUEtiapine (SEROQUEL) 25 MG tablet Take 1 tablet (25 mg total) by mouth at bedtime. 30 tablet 1     traZODone (DESYREL) 100 MG tablet Take 1 tablet (100 mg total) by mouth at bedtime. 30 tablet 0     No current facility-administered medications for this visit.      DETAILED EXAMINATION  02/22/18  :  Vitals:    02/22/18 1138   BP: 136/72   Pulse: 70   Weight: 205 lb (93 kg)   Height: 6' (1.829 m)     Alert oriented. Head including the face is examined for malar rash, heliotropes, scarring, lupus pernio. Eyes examined for redness such as in episcleritis/scleritis, periorbital lesions.   Neck/ Face examined for parotid gland swelling, range of motion of neck.  Left upper and lower and right upper and lower extremities examined for tenderness, swelling, warmth of the appendicular joints, range of motion, edema, rash.   Some of the important findings included: His right ankle, left toe IP joint are swollen tender and warm.        LAB / IMAGING DATA:  ALT   Date Value Ref Range Status   01/15/2017 11 0 - 45 U/L Final   01/14/2017 12 0 - 45 U/L Final   01/10/2017 21 0 - 45 U/L Final     Albumin   Date Value Ref Range Status   01/15/2017 3.1 (L) 3.5 - 5.0 g/dL Final   01/14/2017 3.7 3.5 - 5.0 g/dL Final   01/10/2017 3.9 3.5 - 5.0 g/dL Final     Creatinine   Date Value Ref Range Status   10/15/2017 0.84 0.70 - 1.30 mg/dL Final   01/16/2017 0.64 (L) 0.70 - 1.30 mg/dL Final   01/15/2017 0.70 0.70 - 1.30 mg/dL Final       WBC   Date Value Ref Range Status   10/15/2017 3.8 (L) 4.0 - 11.0 thou/uL Final   01/16/2017 7.9 4.0 - 11.0 thou/uL Final   10/23/2014 7.3 4.0 - 11.0 thou/uL Final   06/21/2014 8.7 4.0 - 11.0 thou/uL Final     Hemoglobin   Date Value Ref Range Status   10/15/2017 12.9 (L) 14.0 - 18.0 g/dL Final   01/16/2017 10.3 (L) 14.0 - 18.0 g/dL Final   01/16/2017 10.8 (L) 14.0 - 18.0 g/dL Final     Platelets   Date Value Ref Range Status   10/15/2017 134 (L) 140 - 440 thou/uL Final   01/16/2017 137 (L) 140 - 440 thou/uL Final   01/15/2017 128 (L) 140 - 440 thou/uL Final       No results found for: VIKI, RF, SEDRATE

## 2021-06-16 NOTE — PROGRESS NOTES
This therapist obtained an RASHEED at patient's request to Kalie [employer] and contact her to get paperwork faxed over for disability.

## 2021-06-17 NOTE — PROGRESS NOTES
Mental Health Visit Note    04/24/2018  Start time: 1200  Stop Time: 1247 Session # 12    Choco Claudio is a 52 y.o. male is being seen today for a follow-up psychotherapy appointment    Chief Complaint   Patient presents with      Follow Up       New symptoms or complaints:  None    Functional Impairment:   The patient identifies the how daily stressors affect daily functioning in today's session as follows (Scale is 1-4, 1=not at all or rarely; 4=extremely so/everyday.)    Personal: 4  Family: 4  Social: 4  Work: 4    Clinical assessment of mental status:   Choco Claudio presented on time for scheduled session.   He was oriented x3, open and cooperative, and dressed appropriately for this session and weather. His memory was fair.  His speech was  Excessive and tangential.  Language was focused change.  Concentration and focus is Brief. Psychosis is noted or reported. He reports his mood is anxious.  Affect is congruent with speech.  Fund of knowledge is adequate. Insight is adequate for therapy.     Suicidal/Homicidal Ideation present:   No,  Patient denies suicidal and homicidal ideations/means or plans.      Patient's impression of their current status: Patient reported feeling a little anxious. Patient indicated a lot of change is happening in his life. Patient talked about his parents putting a deposit on a new living place and planning to move soon. Patient indicated he also received a letter saying he will have his disability hearing within the next 75 days. Patient reported continuing to feel it is important [place]. Patient talked about continuing to stay sober but having a lot of triggers. Patient indicated he struggles to feel he fits in due to being native and .     Therapist impression of patient's current state:   Patient appears to have fair insight into his mental health. This therapist challenged patient on ways he is working to  accept the many changes. This therapist processed with patient the importance of communicating with his [employer] worker his concerns. This therapist challenged patient on ways he has been able to stay sober using coping skills. This therapist processed with patient different ways he has tried to fit in over the years.     Type of psychotherapeutic technique provided:   Solution-focused and CBT    Progress toward short term goals:   Patient returned to schedules session and medication management with Debra and recognized his depression, following up with [facility], staying sober.     Review of long term goals:Treatment Plan Updated on 02/27/2018    Diagnosis:   1. PTSD (post-traumatic stress disorder)    2. Major depressive disorder, recurrent episode, moderate    3. Severe alcohol use disorder      Plan and Follow-up:   Patient will return in one week for scheduled session. Patient will benefit from continuing to follow medication mangement. Patient should continue to work on sobriety. Patient should look at ways his drinking affects his mental health. Patient should work on getting out of the house 4 days a week. Patient should work with his [employer] worker and [facility] on housing. Patient should continue identify goals for himself. Patient needs to start looking for a new job.  Patient should continue to work on sobriety.     Discharge Criteria/Planning: Client has chronic symptoms and ongoing therapy for maintenance stability recommended.    Signatures:   Performed and documented by NETTA De La Paz

## 2021-06-17 NOTE — PROGRESS NOTES
Mental Health Visit Note    05/08/2018  Start time: 1200  Stop Time: 1255 Session # 14    Choco Claudio is a 52 y.o. male is being seen today for a follow-up psychotherapy appointment    Chief Complaint   Patient presents with      Follow Up       New symptoms or complaints:  None    Functional Impairment:   The patient identifies the how daily stressors affect daily functioning in today's session as follows (Scale is 1-4, 1=not at all or rarely; 4=extremely so/everyday.)    Personal: 4  Family: 4  Social: 4  Work: 4    Clinical assessment of mental status:   Choco Claudio presented on time for scheduled session today.   He was oriented x3, open and cooperative, and dressed appropriately for this session and weather. His memory was fair.  His speech was  Excessive and tangential.  Language was focused on change.  Concentration and focus is Brief. Psychosis is noted.. He reports his mood is depressed.  Affect is congruent with speech.  Fund of knowledge is adequate. Insight is adequate for therapy.     Suicidal/Homicidal Ideation present:   No,  Patient denies suicidal and homicidal ideations/means or plans.      Patient's impression of their current status: Patient reported feeling depressed. Patient indicated he reached out to his  and she informed him there was no housing for him in [place]. Patient reported once he gets social security this will change. Patient indicated this means that he will have to go back to living in a tent. Patient reported right now being sober but no guarantee he will be sober once he is homeless. Patient indicated it is hard to be homeless. Patient reported he is continuing to have struggles with his dad and his dad being disrespectful.     Therapist impression of patient's current state:   Patient appears to have fair insight into his mental health. This therapist challenged patient on options other then staying in a tent including homeless shelter. This  therapist challenged patient on ways drinking causes physical concerns. This therapist processed with patient ways to work on staying active and not allow his dad to ruin his mood.     Type of psychotherapeutic technique provided:   Solution-focused and CBT    Progress toward short term goals:   Patient returned to scheduled session and medication management with Debra staying sober, communicating with his  and looking into housing.     Review of long term goals:Treatment Plan Updated on 02/27/2018    Diagnosis:   1. PTSD (post-traumatic stress disorder)    2. Major depressive disorder, recurrent episode, moderate    3. Severe alcohol use disorder      Plan and Follow-up:   Patient will return in one week for scheduled session. Patient will benefit from continuing to follow medication management with Debra.  Patient should work on getting out of the house 4 days a week. Patient should continue to work with [facility] on housing. Patient should continue to work on maintain sobriety. Patient would benefit from starting to get out and meet sober people.     Discharge Criteria/Planning: Client has chronic symptoms and ongoing therapy for maintenance stability recommended.    Signatures:   Performed and documented by NETTA De La Paz

## 2021-06-17 NOTE — PROGRESS NOTES
Mental Health Visit Note    04/10/2018  Start time: 1200  Stop Time: 1253 Session # 10    Choco Claudio is a 52 y.o. male is being seen today for a follow-up psychotherapy appointment    Chief Complaint   Patient presents with      Follow Up       New symptoms or complaints:  None    Functional Impairment:   The patient identifies the how daily stressors affect daily functioning in today's session as follows (Scale is 1-4, 1=not at all or rarely; 4=extremely so/everyday.)    Personal: 4  Family: 4  Social: 4  Work: 4    Clinical assessment of mental status:   Choco Claudio presented on time for scheduled session.   He was oriented x3, open and cooperative, and dressed appropriately for this session and weather. His memory was fair.  His speech was  Excessive and tangential.  Language was focused change.  Concentration and focus is Brief. Psychosis is not noted or reported. He reports his mood is frustrated.  Affect is congruent with speech.  Fund of knowledge is adequate. Insight is adequate for therapy.     Suicidal/Homicidal Ideation present:   No,  Patient denies suicidal and homicidal ideations/means or plans.      Patient's impression of their current status: Patient reported feeling frustrated. Patient indicated another worker at [employer]. Patient reported this being the third time it has happened to him. Patient talked about feeling he is getting no where with getting assistance in housing. Patient indicated with his health he does not want to be homeless again. Patient reported he is scheduled to meet with someone on Thursday. Patient indicated his PTSD symptoms continuing to be present daily which makes life very hard.     Therapist impression of patient's current state:   Patient appears to have fair insight into his mental health. This therapist processed with patient ways he is working on his own self-care at this time. This therapist challenged patient on ways he tried to avoid his  PTSD symptoms for many years and now feeling no matter how hard he tried unable to avoid the memories.     Type of psychotherapeutic technique provided:   Solution-focused and CBT    Progress toward short term goals:   Patient returned to schedules session and medication management with Debra and recognized his depression, following up with [facility].     Review of long term goals:Treatment Plan Updated on 02/27/2018    Diagnosis:   1. PTSD (post-traumatic stress disorder)    2. Major depressive disorder, recurrent episode, moderate    3. Severe alcohol use disorder      Plan and Follow-up:   Patient will return in one week for scheduled session. Patient will benefit from continuing to follow medication mangement. Patient should continue to work on sobriety. Patient should look at ways his drinking affects his mental health. Patient should work on getting out of the house 4 days a week. Patient should work with his [facility] worker and [facility] on housing. Patient should continue identify goals for himself. Patient needs to start looking for a new job.      Discharge Criteria/Planning: Client has chronic symptoms and ongoing therapy for maintenance stability recommended.    Signatures:   Performed and documented by NETTA De La Paz

## 2021-06-17 NOTE — PROGRESS NOTES
Mental Health Visit Note    04/03/2018  Start time: 1200  Stop Time: 1253 Session # 9    Choco Claudio is a 52 y.o. male is being seen today for a follow-up psychotherapy appointment    Chief Complaint   Patient presents with      Follow Up       New symptoms or complaints:  None    Functional Impairment:   The patient identifies the how daily stressors affect daily functioning in today's session as follows (Scale is 1-4, 1=not at all or rarely; 4=extremely so/everyday.)    Personal: 4  Family: 4  Social: 4  Work: 4    Clinical assessment of mental status:   Choco Claudio presented on time for scheduled session.   He was oriented x3, open and cooperative, and dressed appropriately for this session and weather. His memory was fair.  His speech was  Excessive and tangential.  Language was focused change.  Concentration and focus is Brief. Psychosis is not noted or reported. He reports his mood is content.  Affect is congruent with speech.  Fund of knowledge is adequate. Insight is adequate for therapy.     Suicidal/Homicidal Ideation present:   No,  Patient denies suicidal and homicidal ideations/means or plans.      Patient's impression of their current status: Patient indicated feeling content today. Patient reported he continues to worry about housing daily. Patient talked about planning to go live in a tent again if he does not find housing. Patient indicated having specific requests including [place] the inner [place]. Patient reported getting into a lot of trouble in the past and feeling the suburbs [place]. Patient indicated despite having urges he has not been drinking. Patient reported since he is not working he has spent a lot of time in the basement isolated.     Therapist impression of patient's current state:   Patient appears to have fair insight into his mental health. This therapist processed with patient calling his worker to find out where he is at with his housing at  this time. This therapist challenged patient on ways isolation can lead to depression and negative thoughts for him.     Type of psychotherapeutic technique provided:   Solution-focused and CBT    Progress toward short term goals:   Patient returned to schedules session and medication management with Debra and recognized his depression.     Review of long term goals:Treatment Plan Updated on 02/27/2018    Diagnosis:   1. PTSD (post-traumatic stress disorder)    2. Major depressive disorder, recurrent episode, moderate    3. Severe alcohol use disorder      Plan and Follow-up:   Patient will return in one week for scheduled session. Patient will benefit from continuing to follow medication mangement. Patient should continue to work on sobriety. Patient should look at ways his drinking affects his mental health. Patient should work on getting out of the house 4 days a week. Patient should work with his [facility] worker and [employer] on housing. Patient should continue identify goals for himself. Patient needs to start looking for a new job.      Discharge Criteria/Planning: Client has chronic symptoms and ongoing therapy for maintenance stability recommended.    Signatures:   Performed and documented by NETTA De La Paz

## 2021-06-17 NOTE — PROGRESS NOTES
Mental Health Visit Note    05/02/2018  Start time: 1200  Stop Time: 1255 Session # 13    Choco Claudio is a 52 y.o. male is being seen today for a follow-up psychotherapy appointment    Chief Complaint   Patient presents with      Follow Up       New symptoms or complaints:  None    Functional Impairment:   The patient identifies the how daily stressors affect daily functioning in today's session as follows (Scale is 1-4, 1=not at all or rarely; 4=extremely so/everyday.)    Personal: 4  Family: 4  Social: 4  Work: 4    Clinical assessment of mental status:   Choco Claudio presented on time for scheduled session.   He was oriented x3, open and cooperative, and dressed appropriately for this session and weather. His memory was fair.  His speech was  Excessive and tangential.  Language was focused on change.  Concentration and focus is Brief. Psychosis is noted.. He reports his mood is anxious.  Affect is congruent with speech.  Fund of knowledge is adequate. Insight is adequate for therapy.     Suicidal/Homicidal Ideation present:   No,  Patient denies suicidal and homicidal ideations/means or plans.      Patient's impression of their current status: Patient indicated continuing to feel anxious. Patient reported he has not heard back from his . Patient indicated everything moving forward with his parents selling the house which means he will be homeless if his  is not able to find his housing. Patient talked about realizing this is a likely outcome. Patient indicated he continues to stay sober due his physical illnesses. Patient reported this has been challenging at times but knows it is important.     Therapist impression of patient's current state:   Patient appears to have fair insight into his mental health. This therapist processed with patient ways to work on reducing his anxiety symptoms. This therapist encouraged patient to reach out to his . This therapist  challenged patient on his fears of being homeless again.     Type of psychotherapeutic technique provided:   Solution-focused and CBT    Progress toward short term goals:   Patient returned to schedules session and medication management with Debra and recognized his depression and anxiety, following up with [facility], staying sober.     Review of long term goals:Treatment Plan Updated on 02/27/2018    Diagnosis:   1. PTSD (post-traumatic stress disorder)    2. Major depressive disorder, recurrent episode, moderate    3. Severe alcohol use disorder      Plan and Follow-up:   Patient will return in one week for scheduled session. Patient will benefit from continuing to follow medication management with Debra.  Patient should work on getting out of the house 4 days a week. Patient should work with his [facility] worker and [facility] on housing. Patient should continue identify goals for himself. Patient needs to start looking for a new job.  Patient should continue to work on sobriety. Patient should use skills to reduce his mental health symptoms.     Discharge Criteria/Planning: Client has chronic symptoms and ongoing therapy for maintenance stability recommended.    Signatures:   Performed and documented by NETTA De La Paz

## 2021-06-17 NOTE — PROGRESS NOTES
Mental Health Visit Note    04/17/2018  Start time: 1200  Stop Time: 1253 Session # 11    Choco Claudio is a 52 y.o. male is being seen today for a follow-up psychotherapy appointment    Chief Complaint   Patient presents with      Follow Up       New symptoms or complaints:  None    Functional Impairment:   The patient identifies the how daily stressors affect daily functioning in today's session as follows (Scale is 1-4, 1=not at all or rarely; 4=extremely so/everyday.)    Personal: 4  Family: 4  Social: 4  Work: 4    Clinical assessment of mental status:   Choco Claudio presented on time for scheduled session.   He was oriented x3, open and cooperative, and dressed appropriately for this session and weather. His memory was fair.  His speech was  Excessive and tangential.  Language was focused change.  Concentration and focus is Brief. Psychosis is not noted or reported. He reports his mood is frustrated.  Affect is congruent with speech.  Fund of knowledge is adequate. Insight is adequate for therapy.     Suicidal/Homicidal Ideation present:   No,  Patient denies suicidal and homicidal ideations/means or plans.      Patient's impression of their current status: Patient indicated feeling frustrated. Patient presented the first 25 minutes with his . Patient indicated wanting housing. The  talked about the challenges including that patient does not want [facility] and that patient is not currently homeless. Patient talked about his concerns with being in [place] and that he would go back to drinking. Patient indicated knowing with his health he cannot drink but does not think he can stay sober in [place].     Therapist impression of patient's current state:   Patient appears to have fair insight into his mental health. This therapist processed with patient his frustration over housing at this time. This therapist challenged patient on ways he can work on staying sober so he  does have more sober time if he does have to move to [place]. This therapist processed with patient his options including being homeless verse living in [place].    Type of psychotherapeutic technique provided:   Solution-focused and CBT    Progress toward short term goals:   Patient returned to schedules session and medication management with Debra and recognized his depression, following up with [facility].     Review of long term goals:Treatment Plan Updated on 02/27/2018    Diagnosis:   1. PTSD (post-traumatic stress disorder)    2. Major depressive disorder, recurrent episode, moderate    3. Severe alcohol use disorder      Plan and Follow-up:   Patient will return in one week for scheduled session. Patient will benefit from continuing to follow medication mangement. Patient should continue to work on sobriety. Patient should look at ways his drinking affects his mental health. Patient should work on getting out of the house 4 days a week. Patient should work with his [facility] worker and [facility] on housing. Patient should continue identify goals for himself. Patient needs to start looking for a new job.      Discharge Criteria/Planning: Client has chronic symptoms and ongoing therapy for maintenance stability recommended.    Signatures:   Performed and documented by NETTA De La Paz

## 2021-06-17 NOTE — PROGRESS NOTES
ASSESSMENT AND PLAN:  Choco Claudio 52 y.o. male is seen here on 05/03/18 for follow-up of recent visit where he had an acute arthropathy.  He had several features suggestive of gout.  His serum urate more recently 6.8.  He has clinical features of osteoarthritis such as Heberden nodes, first MTP joint line tenderness.  Radiologically there are a few features for OA.  These are discussed with him.  His VIKI is positive tested elsewhere, without evidence to suggest systemic connective tissue disease.  He is on Coumadin and would like to finish the course of that treatment before considering any other option for his joint symptoms.  Will meet here in 4 months.        Diagnoses and all orders for this visit:    Gouty arthritis of toe of left foot  -     Uric Acid; Future; Expected date: 9/3/18  -     Creatinine; Future; Expected date: 9/3/18  -     ALT (SGPT); Future; Expected date: 9/3/18  -     Albumin; Future; Expected date: 9/3/18    Primary osteoarthritis involving multiple joints    VIKI positive      HISTORY OF PRESENTING ILLNESS:  Choco Claudio, 52 y.o., male is here for follow-up of recent visit here for polyarthralgias, acute arthropathy of the ankle and the feet.  He had features suggestive of gout.  These were ultrasonographic, double contour sign.  His serum urate is 6.8 recently.  He has evidence of osteoarthritis as noted before.  This is more clinical and radiologic.  He noted discomfort in the morning and his first MTPs as he begins to walk that lasts for a few minutes.  He was only able to partially work on the [employer].  He noted difficulty managing his alcohol intake.  He has a positive VIKI.  He has not had stomatitis pleuritic symptoms inflammatory joint conditions of the hands of the wrists, this photosensitivity, pleuritic type symptoms.  He reports no history of seizure disorder.  He has several comorbidities as noted in the section below.  He has cervical spondylosis and MRI from  elsewhere in the care everywhere is reviewed.   Further historical information and ADL limitations as noted in the multidimensional health assessment questionnaire attached in the EMR  Recently he was seen here for joint pain evaluation.Following is the excerpt from a recent note:  .  He is accompanied by his mom.  He reports that along with his mom's elevation that over the past 6 months he has had joint pains.  This tends to happen in his ankles.  Sometimes 1 sometimes the others.  Between the episodes they can be very little pain.  Today's visit is just like 1 of those days when he has less pain or literally no pain.  He reports that when the pain comes on this is associated with redness and swelling of the ankle, and then also sometimes the knees are involved.  During these episodes it is hard for him to even get out of his bed and he finds himself crawling.  He has not had any significant involvement of his hands wrists elbows shoulders.  . Rest of the 13 system ROS is negative.     ALLERGIES:Other environmental allergy    PAST MEDICAL/ACTIVE PROBLEMS/MEDICATION/ FAMILY HISTORY/SOCIAL DATA:  The patient has a family history of  Past Medical History:   Diagnosis Date     Acid reflux      Alcohol intoxication, uncomplicated      Alcohol withdrawal seizure      Alcoholic ketoacidosis 12/4/2016     Alcoholism      Anxiety disorder      Arthritis      Back pain      Essential tremor      GI bleed 1/14/2017     Gout      Learning disability      PTSD (post-traumatic stress disorder)      History   Smoking Status     Current Every Day Smoker     Types: Pipe   Smokeless Tobacco     Never Used     Comment: just when at pow [place] and use of pipe     Patient Active Problem List   Diagnosis     Chest pain     Alcohol withdrawal, with unspecified complication     Sinus tachycardia     Lactic acidosis     Dehydration     Microcytosis     Non-traumatic rhabdomyolysis     Nausea     Alcoholic ketoacidosis     Epigastric pain      Alcoholism, chronic     High anion gap metabolic acidosis     Hypomagnesemia     Alcohol intoxication, uncomplicated     GI bleed     Anemia associated with acute blood loss     Disorder due to alcohol abuse     Substance induced mood disorder     Anxiety disorder     History of posttraumatic stress disorder (PTSD)     Erosive esophagitis     Polyarthralgia     Cervical spondylosis     VIKI positive     Inflammatory arthritis     Arthritis of right ankle     Gouty arthritis of toe of left foot     Pulmonary embolism     Current Outpatient Prescriptions   Medication Sig Dispense Refill     cetirizine (ZYRTEC) 10 MG tablet Take 10 mg by mouth daily as needed for allergies.       omeprazole (PRILOSEC) 20 MG capsule Take 20 mg by mouth daily before breakfast.       prazosin (MINIPRESS) 2 MG capsule Take 1 capsule (2 mg total) by mouth at bedtime. 30 capsule 1     QUEtiapine (SEROQUEL) 25 MG tablet Take 1 tablet (25 mg total) by mouth at bedtime. 30 tablet 1     traZODone (DESYREL) 100 MG tablet Take 1 tablet (100 mg total) by mouth at bedtime. 30 tablet 0     warfarin (COUMADIN) 5 MG tablet Take 1 tablets (5 mg) by mouth daily. Adjust dose based on INR results as directed. 30 tablet 0     No current facility-administered medications for this visit.      DETAILED EXAMINATION  05/03/18  :  Vitals:    05/03/18 1030   BP: 110/74   Patient Site: Right Arm   Patient Position: Sitting   Cuff Size: Adult Regular   Pulse: 92   Weight: 220 lb (99.8 kg)     Alert oriented. Head including the face is examined for malar rash, heliotropes, scarring, lupus pernio. Eyes examined for redness such as in episcleritis/scleritis, periorbital lesions.   Neck/ Face examined for parotid gland swelling, range of motion of neck.  Left upper and lower and right upper and lower extremities examined for tenderness, swelling, warmth of the appendicular joints, range of motion, edema, rash.  Some of the important findings included: He no longer  has acutely inflamed ankles of the feet.  He has joint line tenderness of the first MTPs, Heberden nodes.  There are no visible tophi.  No malar area eruption.  Does not have sclerodactyly periungual erythema.           LAB / IMAGING DATA:  ALT   Date Value Ref Range Status   05/01/2018 18 0 - 45 U/L Final   03/24/2018 22 0 - 45 U/L Final   02/28/2018 41 0 - 45 U/L Final     Albumin   Date Value Ref Range Status   05/01/2018 3.6 3.5 - 5.0 g/dL Final   03/24/2018 4.2 3.5 - 5.0 g/dL Final   01/15/2017 3.1 (L) 3.5 - 5.0 g/dL Final     Creatinine   Date Value Ref Range Status   05/01/2018 0.80 0.70 - 1.30 mg/dL Final   03/25/2018 0.74 0.70 - 1.30 mg/dL Final   03/24/2018 0.93 0.70 - 1.30 mg/dL Final       WBC   Date Value Ref Range Status   05/01/2018 6.4 4.0 - 11.0 thou/uL Final   03/24/2018 7.1 4.0 - 11.0 thou/uL Final   10/23/2014 7.3 4.0 - 11.0 thou/uL Final   06/21/2014 8.7 4.0 - 11.0 thou/uL Final     Hemoglobin   Date Value Ref Range Status   05/01/2018 12.5 (L) 14.0 - 18.0 g/dL Final   03/24/2018 13.2 (L) 14.0 - 18.0 g/dL Final   02/28/2018 11.7 (L) 14.0 - 18.0 g/dL Final     Platelets   Date Value Ref Range Status   05/01/2018 320 140 - 440 thou/uL Final   03/24/2018 268 140 - 440 thou/uL Final   02/28/2018 346 140 - 440 thou/uL Final       No results found for: VIKI, RF, SEDRATE

## 2021-06-17 NOTE — PROGRESS NOTES
Patient is here for follow up appointment.  He denies depression and anxiety.  He states his sleep is the same.  He has trouble falling asleep due to a lot on his mind.  He sees Nuvia for therapy.  He denies delusional thinking.  He said he is not drinking ETOH due to the medications he is on.    Correct pharmacy verified with patient and confirmed in snapshot? [x] yes []no    Charge captured ? [x] yes  [] no    Medications Phoned  to Pharmacy [] yes [x]no  Name of Pharmacist:  List Medications, including dose, quantity and instructions      Medication Prescriptions given to patient   [] yes  [x] no   List the name of the drug the prescription was written for.       Medications ordered this visit were e-scribed.  Verified by order class [] yes  [x] no  No medications ordered at this visit.    Medication changes or discontinuations were communicated to patient's pharmacy: [] yes  [x] NA    UA collected [] yes  [x] no    [place] Prescription Monitoring Program Reviewed? [] yes  [x] no    Referrals were made to:  NA    Future appointment was made: [] yes  [x] no    Dictation completed at time of chart check: [x] yes  [] no    I have checked the documentation for today s encounters and the above information has been reviewed and completed.

## 2021-06-17 NOTE — TELEPHONE ENCOUNTER
Telephone Encounter by Diann Cruz RN at 1/12/2021  9:56 AM     Author: Diann Cruz RN Service: -- Author Type: Registered Nurse    Filed: 1/12/2021 10:01 AM Encounter Date: 1/12/2021 Status: Signed    : Diann Curz RN (Registered Nurse)       Date of Last Office Visit: 11-2-20  Date of Next Office Visit: 1-18-21  No shows since last visit: 1  Cancellations since last visit: 0  ED visits since last visit:  0  Medication seroquel  date last ordered: 11-2-20  Qty: 90  Refills: 0  Lapse in therapy greater than 7 days: no  Medication refill request verified as identical to current order: yes  Result of Last DAM, VPA, Li+ Level, CBC, or Carbamazepine Level (at or since last visit): N/A     [] Medication refilled per [facility] M-1.   [x] Medication unable to be refilled by RN due to criteria not met as indicated below:     []Eligibility - not seen in last year    []Supervision - no future appointment    []Compliance     [x]Verification - order discrepancy    []Controlled Medication    []Medication not included in RN Protocol    []90 - day supply request    []Other   Current Medication list:  Choco JOHNSON Osmanheribertomadeleine   (MRN [id number])  Your Current Medications Are    acetaminophen (TYLENOL) 500 MG tablet Take 1,000 mg by mouth every 6 (six) hours as needed for pain.   cetirizine (ZYRTEC) 10 MG tablet Take 10 mg by mouth at bedtime.    omeprazole (PRILOSEC) 20 MG capsule Take 20 mg by mouth daily as needed.    prazosin (MINIPRESS) 5 MG capsule Take 1 capsule (5 mg total) by mouth at bedtime.   QUEtiapine (SEROQUEL) 100 MG tablet Take 1 tablet (100 mg total) by mouth every morning.   QUEtiapine (SEROQUEL) 200 MG tablet Take 1.5 tablets (300 mg total) by mouth at bedtime.   traZODone (DESYREL) 100 MG tablet TAKE 1 TABLET(100 MG) BY MOUTH AT BEDTIME       Medication Plan of Care at last office visit with MD/CNP:  Plan   1.Continue  taking  Seroquel  100 mg in the Morning and Seroquel 200 mg  at bedtime - psychosis ,  delusional thoughts. Continue with current medications Trazodone  100 mg at Bedtime, Increase  Prazosin to  5 mg at bedtime -PTSD  2- Highly recommend outpatient CD treatment Alcohol Use Disorder - Severe. Pt  opposed to CD treatment .  Continues to use alcohol when he can afford it.  3- Continue with  Psychotherapy:  4- 5-Make efforts to stay away from alcohol and drug  5- RTC- 4  weeks call in between visit with any questions     Medication Changes      prazosin HCl 5 mg Oral Bedtime     quetiapine fumarate      100 mg Oral Every morning    300 mg Oral Bedtime   What changed: medication strength

## 2021-06-17 NOTE — PROGRESS NOTES
Psychiatric  Progress Note  Date of visit:2/27/18         Discussion of Care and Treatment Recommendations:   This is a 52 y.o. male with significant history of Alcohol Use Disorder, Etoh induced gastritis , DENISE and PTSD who presents to the clinic today , noncompliance with treatment plan.  Patient is here today for  a follow up appointment         Last visit 2/27/18  Recommendation at last visit  Plan   1.-Continue with current medications Trazodone  100 mg at Bedtime, gabapentin 100  mg at bedtime, Start Prazosin 2 mg at bedtime -PTSD, Start Seroquel 25 mg  at bedtime - psychosis , delusional thoughts  2- Highly recommend Inpatient CD treatment Alcohol Use Disorder - Severe. Pt  opposed to CD treatment   3- Continue with  Psychotherapy:  4- 5-Make efforts to stay away from alcohol and drug  5- RTC-  8 -10 weeks call in between visit with any questions or concerns  Patient and I reviewed diagnosis and treatment plan and patient agrees with following recommendations:  Ongoing education given regarding diagnostic and treatment options with adequate verbalization of understanding.  .  Plan   1.-Continue with current medications Trazodone  100 mg at Bedtime, gabapentin 100  mg at bedtime, Start Prazosin 2 mg at bedtime -PTSD, Start Seroquel 25 mg  at bedtime - psychosis , delusional thoughts  2- Highly recommend Inpatient CD treatment Alcohol Use Disorder - Severe. Pt  opposed to CD treatment   3- Continue with  Psychotherapy:  4- 5-Make efforts to stay away from alcohol and drug  5- RTC-  8 -10 weeks call in between visit with any questions or concerns       Diagnoses:     PTSD  Alcohol Use Disorder , Severe   R/O Schizophrenia     Patient Active Problem List   Diagnosis     Chest pain     Alcohol withdrawal, with unspecified complication     Sinus tachycardia     Lactic acidosis     Dehydration     Microcytosis     Non-traumatic rhabdomyolysis     Nausea     Alcoholic ketoacidosis     Epigastric pain     Alcoholism,  "chronic     High anion gap metabolic acidosis     Hypomagnesemia     Alcohol intoxication, uncomplicated     GI bleed     Anemia associated with acute blood loss     Disorder due to alcohol abuse     Substance induced mood disorder     Anxiety disorder     History of posttraumatic stress disorder (PTSD)     Erosive esophagitis     Polyarthralgia     Cervical spondylosis     VIKI positive     Inflammatory arthritis     Arthritis of right ankle     Gouty arthritis of toe of left foot     Pulmonary embolism             Chief Complaint / Subjective:    Chief complaint: \"I have not been drinking because I have to be in medications\"     History of Present Illness:   Patient presented to the clinic today.  He presented very good and looking well groomed and appropriately dressed for the weather.  This is the best I have seen him in a long time.  He reports that he has stopped drinking because he developed gastritis from alcohol use and currently he is on medications.  He reports that he has also been taking his psychiatric medications.  He was clear in presented with no delusions today.  He denied hallucinations.  I did not note any paranoia today.  The only thing that he focused on was frustrations with the [place]  who per his report [place].  Choco has always maintained strongly that he does not [place].  I encouraged him to speak to his .  I had referred patient to the act team in the past and this and again  to help him with housing and are working with him.  He wants to continue the same medications he offers no complaints.  He has continued to meet with his therapist.  He comes for his appointments.  Today he is doing better may be because he is not drinking and also probably because he is taking his medications.  I encouraged him to keep up.  He denies side effects from medications.  I will have him " return in 8 weeks for follow-up appointment I encouraged him to call in between visits with any questions or concerns    Mental Status Examination:   General: Adequate hygiene, cooperative  Speech: Normal in rate and tone  Language: Intact  Thought process: Coherent  Thought content:                           Auditory hallucinations- absent                           Visual Hallucinations - Absent                           Delusions Absent                           Loose Associations:  No                          Suicidal thoughts: Absent                          Homicidal thoughts: Absent                       Affect: Neutral  Mood: Neutral   Intellectual functioning: Within normal limits  Memory: Within normal limits  Fund of knowledge: Average  Attention and concentration: Within normal limits  Gait: Steady  Psychomotor activity: Calm, no agitation  Muscles: No atrophy, no abnormal movements  InSight and judgment: Fair    Medication changes: See above   Medication adherence: compliant  Medication side effects: absent  Information about medications: Side effects, benefits and alternative treatments discussed and patient agrees with capacity to do so.    Psychotherapy: Supportive therapy day-to-day living    Education: Diet, exercise, abstinence from drugs and alcohol, patient will not drive if sedated and medications or  under influence of any substance    Lab Results:  Personally reviewed and discussed with the patient    Lab Results   Component Value Date    WBC 7.1 03/24/2018    HGB 13.2 (L) 03/24/2018    HCT 41.4 03/24/2018     03/24/2018    ALT 22 03/24/2018    AST 25 03/24/2018     03/25/2018    K 3.5 03/25/2018     03/25/2018    CREATININE 0.74 03/25/2018    BUN 5 (L) 03/25/2018    CO2 22 03/25/2018    TSH 2.17 03/24/2018    INR 1.03 03/24/2018       Vital signs:    Vitals:    04/27/18 1400   BP: (!) 140/95   Pulse: (!) 108       Allergies: Other environmental allergy         Medications:      Current Outpatient Prescriptions on File Prior to Visit   Medication Sig Dispense Refill     cetirizine (ZYRTEC) 10 MG tablet Take 10 mg by mouth daily as needed for allergies.       omeprazole (PRILOSEC) 20 MG capsule Take 20 mg by mouth daily before breakfast.       prazosin (MINIPRESS) 2 MG capsule Take 1 capsule (2 mg total) by mouth at bedtime. 30 capsule 1     QUEtiapine (SEROQUEL) 25 MG tablet Take 1 tablet (25 mg total) by mouth at bedtime. 30 tablet 1     traZODone (DESYREL) 100 MG tablet Take 1 tablet (100 mg total) by mouth at bedtime. 30 tablet 0     warfarin (COUMADIN) 5 MG tablet Take 1 tablets (5 mg) by mouth daily. Adjust dose based on INR results as directed. 30 tablet 0     No current facility-administered medications on file prior to visit.                 Review of Systems:    Otherwise reminder of review of systems is negative    Coordination of Care:   More than 25 minutes spent on this visit  with more than 50% of time spent on coordination of care including: Educating patient about diagnosis, prognosis, side effects and benefits of medications, diet, exercise.  Time also spent on entering orders and preparing documentation for the visit.Time also spent providing supportive therapy regarding above issues.    This note was created using a dictation system. All typing errors or contextual distortion is unintentional and software inherent.

## 2021-06-17 NOTE — TELEPHONE ENCOUNTER
Telephone Encounter by Mariangel Mcgarry CMA at 4/15/2020 11:47 AM     Author: Mariangel Mcgarry CMA Service: -- Author Type: Certified Medical Assistant    Filed: 4/15/2020 11:51 AM Encounter Date: 4/14/2020 Status: Signed    : Mariangel Mcgarry CMA (Certified Medical Assistant)       Writer called Claudia and relayed message from Debra Lane, Ok to take 2 tabs-  Prazosin 2 mg (4 mg) at bedtime.    Claudia will adjust Pt's medication as directed.  She sets up his medication weekly.         Debra Lane NP Adams, Tanya K, CMA    Caller: Unspecified (Yesterday,  2:27 PM)               Ok to take 4 mg for now  I will go ahead and change his orders    Previous Messages     ----- Message -----   From: Mariangel Mcagrry CMA   Sent: 4/14/2020   2:34 PM CDT   To: Debra Lane NP     ----- Message from Mariangel Mcgarry CMA sent at 4/14/2020  2:34 PM CDT -----   Please advise Nursing staff of directive.   Thank you, Mariangel

## 2021-06-18 NOTE — PROGRESS NOTES
Mental Health Visit Note    05/15/2018  Start time: 1200  Stop Time: 1255 Session # 15    Choco Claudio is a 52 y.o. male is being seen today for a follow-up psychotherapy appointment    Chief Complaint   Patient presents with      Follow Up       New symptoms or complaints:  Patient indicated home life being frustrated.     Functional Impairment:   The patient identifies the how daily stressors affect daily functioning in today's session as follows (Scale is 1-4, 1=not at all or rarely; 4=extremely so/everyday.)    Personal: 4  Family: 4  Social: 4  Work: 4    Clinical assessment of mental status:   Choco Claudio presented on time for scheduled session today.   He was oriented x3, open and cooperative, and dressed appropriately for this session and weather. His memory was poor.  His speech was  Excessive and tangential.  Language was focused current state.  Concentration and focus is Brief. Psychosis is noted. He reports his mood is depressed.  Affect is congruent with speech.  Fund of knowledge is adequate. Insight is adequate for therapy.     Suicidal/Homicidal Ideation present:   No,  Patient denies suicidal and homicidal ideations/means or plans.      Patient's impression of their current status: Patient indicated feeling depressed. Patient reported his home life is not good right now. Patient talked about having a few people come and look at the house and being unhappy. Patient indicated this making his mom unhappy and the rest of the family unhappy. Patient reported he is trying to just stay downstairs out of the way but then he feels guilty because his mom needs help. Patient indicated many times wanting to drink but not allowing himself to engage.     Therapist impression of patient's current state:   Patient appears to have fair insight into his mental health. This therapist processed with patient ways he is noticing the unhappy moods and anger bring back PTSD symptoms. This therapist  challenged patient on ways eh can work on setting healthy boundaries. This therapist processed with patient setting a certain amount of time aside to help his mom and then needing to work on self-care.      Type of psychotherapeutic technique provided:   Solution-focused and CBT    Progress toward short term goals:   Patient returned to scheduled session and medication management with Debra staying sober and getting out of the house.     Review of long term goals:Treatment Plan Updated on 02/27/2018    Diagnosis:   1. PTSD (post-traumatic stress disorder)    2. Major depressive disorder, recurrent episode, moderate    3. Severe alcohol use disorder      Plan and Follow-up:   Patient will return in one week for scheduled session. Patient will benefit from continuing to follow medication management with Debra.  Patient should work on getting out of the house 4 days a week. Patient should continue to work with [facility] on housing. Patient should continue to work on maintain sobriety. Patient would benefit from starting to get out and meet sober people. Patient should work on setting limitations with his family.     Discharge Criteria/Planning: Client has chronic symptoms and ongoing therapy for maintenance stability recommended.    Signatures:   Performed and documented by NETTA De La Paz

## 2021-06-18 NOTE — PROGRESS NOTES
Mental Health Visit Note    06/12/2018  Start time: 1200  Stop Time: 1245 Session # 18    Choco Claudio is a 52 y.o. male is being seen today for a follow-up psychotherapy appointment    Chief Complaint   Patient presents with      Follow Up       New symptoms or complaints:  None    Functional Impairment:   The patient identifies the how daily stressors affect daily functioning in today's session as follows (Scale is 1-4, 1=not at all or rarely; 4=extremely so/everyday.)    Personal: 4  Family: 4  Social: 4  Work: 4    Clinical assessment of mental status:   Choco Claudio presented on time for scheduled session today.   He was oriented x3, open and cooperative, and dressed appropriately for this session and weather. His memory was fair for short and long term.  His speech was  Excessive and tangential.  Language was focused change.  Concentration and focus is Brief. Psychosis is noted. He reports his mood is down.  Affect is congruent with speech.  Fund of knowledge is adequate. Insight is adequate for therapy.     Suicidal/Homicidal Ideation present:   No,  Patient denies suicidal and homicidal ideations/means or plans.      Patient's impression of their current status: Patient reported feeling down. Patient indicated his parents continue to treat him and his son horribly. Patient talked about getting frustrated with all the negative things they are saying. Patient reported he is trying to make them happy but finding it to be unsuccessful. Patient indicated his sister being around and she says horrible things about him as well. Patient reported no one stands up for him or has ever been proud of what he has accomplished.     Therapist impression of patient's current state:   Patient appears to have fair insight into his mental health. This therapist challenged patient on ways he feels hurt by his parents. This therapist processed with patient having a right to be upset and stand up for himself. This  therapist processed with patient the importance of self-care.     Type of psychotherapeutic technique provided:   Solution-focused and CBT    Progress toward short term goals:   Patient returned to scheduled session and medication management with Debra, staying sober, identifying his needs and getting out of the house.     Review of long term goals:Treatment Plan Updated on 06/5/2018    Diagnosis:   1. PTSD (post-traumatic stress disorder)    2. Major depressive disorder, recurrent episode, moderate    3. Severe alcohol use disorder      Plan and Follow-up:   Patient will return in one week for scheduled session. Patient will benefit from continuing to follow medication management with Debra.  Patient should work on getting out of the house 4 days a week. Patient should continue to work with [facility] on housing. Patient should continue to work on maintain sobriety. Patient would benefit from starting to get out and meet sober people. Patient should work on setting limitations with his family.     Discharge Criteria/Planning: Client has chronic symptoms and ongoing therapy for maintenance stability recommended.    Signatures:   Performed and documented by NETTA De La Paz

## 2021-06-18 NOTE — PROGRESS NOTES
Mental Health Visit Note    06/26/2018  Start time: 1206  Stop Time: 100 Session # 20    Choco Claudio is a 52 y.o. male is being seen today for a follow-up psychotherapy appointment    Chief Complaint   Patient presents with      Follow Up       New symptoms or complaints:  None    Functional Impairment:   The patient identifies the how daily stressors affect daily functioning in today's session as follows (Scale is 1-4, 1=not at all or rarely; 4=extremely so/everyday.)    Personal: 4  Family: 4  Social: 4  Work: 4    Clinical assessment of mental status:   Choco Claudio presented on time for scheduled session today.   He was oriented x3, open and cooperative, and dressed appropriately for this session and weather. His memory was fair for short and long term.  His speech was  Excessive and tangential.  Language was focused change.  Concentration and focus is Brief. Psychosis is noted. He reports his mood is upset.  Affect is congruent with speech.  Fund of knowledge is adequate. Insight is adequate for therapy.     Suicidal/Homicidal Ideation present:   No,  Patient denies suicidal and homicidal ideations/means or plans.      Patient's impression of their current status: Patient reported feeling upset. Patient indicated that he has not been able to get a hold of his GUILD worker. Patient reported his parents house sold and he will be living in a tent in July. Patient indicated his worker has not been able to find housing in the area he is requesting ([place]). Patient reported he would rather live in a tent then live in [place] or [place]. Patient indicated he is looking forward to get away from his parents due to not feeling they are a good support. Patient reported he is concerned about what he will do during the day in the tent due to not wanting to drink.     Therapist impression of patient's current state:   Patient appears to have fair insight into his mental health. This therapist assisted  patient in calling his GUILD worker to get information on the phone. This therapist processed with patient the pros and cons of living in [place] versus living in a tent. This therapist processed with patient ways to keep himself busy by attending meeting and support groups.     Type of psychotherapeutic technique provided:   Solution-focused and CBT    Progress toward short term goals:   Patient returned to scheduled session and medication management with Debra, staying sober.    Review of long term goals:Treatment Plan Updated on 06/5/2018    Diagnosis:   1. PTSD (post-traumatic stress disorder)    2. Major depressive disorder, recurrent episode, moderate (H)    3. Severe alcohol use disorder (H)      Plan and Follow-up:   Patient will return in one week for scheduled session. Patient will benefit from continuing to follow medication management with Debra. Patient would benefit from looking at the pros and cons of living in a tent versus [place].    Discharge Criteria/Planning: Client has chronic symptoms and ongoing therapy for maintenance stability recommended.    Signatures:   Performed and documented by NETTA De La Paz

## 2021-06-18 NOTE — LETTER
Letter by Debra Lane NP at      Author: Debra Lane NP Service: -- Author Type: --    Filed:  Encounter Date: 2019 Status: (Other)       Choco Claudio  General Delivery                2019    RE: Ry Claudio  : 1965    To whom it may concern,  Benitez has been my patient since 3/28/2017 at that time he was homeless and was able to be temporarily be housed by his mom during winter months until his mom moved into an assisted living in early , unable to continue housing of Benitez during winter months.  Since that time Benitez has been homeless with no permanent  housing in place.  I am in support of Benitez getting housing services to secure a permanent residence. With housing secured patient could make positive progress for medication compliance and maintaining a sober living environment.      Debra Lane [facility]  [address] [place] [address]  [phone]

## 2021-06-18 NOTE — PATIENT INSTRUCTIONS - HE
Patient Instructions by Juan Manuel Gilliam MD at 3/13/2020  3:30 PM     Author: Juan Manuel Gilliam MD Service: -- Author Type: Physician    Filed: 3/13/2020  4:02 PM Encounter Date: 3/13/2020 Status: Signed    : Juan Manuel Gilliam MD (Physician)       Patient Education     Albuterol inhalation powder  Brand Name: ProAir RespiClick  What is this medicine?  ALBUTEROL (al BYOO ter ole) is a bronchodilator. It helps open up the airways in your lungs to make it easier to breathe. This medicine is used to treat and to prevent bronchospasm.  How should I use this medicine?  This medicine is for inhalation through the mouth. Follow the directions on your prescription label. Take your medicine at regular intervals. Do not use more often than directed. Make sure that you are using your inhaler correctly. Ask you doctor or health care provider if you have any questions.  Talk to your pediatrician regarding the use of this medicine in children. While this drug may be prescribed for children as young as 4 years for selected conditions, precautions do apply.  What side effects may I notice from receiving this medicine?  Side effects that you should report to your doctor or health care professional as soon as possible:    allergic reactions like skin rash, itching or hives, swelling of the face, lips, or tongue    breathing problems    chest pain    feeling faint or lightheaded, falls    high blood pressure    irregular heartbeat    fever    muscle cramps or weakness    pain, tingling, numbness in the hands or feet    vomiting  Side effects that usually do not require medical attention (report to your doctor or health care professional if they continue or are bothersome):    changes in taste    cough    headache    nervousness or trembling    stomach upset    stuffy or runny nose    throat irritation    trouble sleeping  What may interact with this medicine?      anti-infectives like chloroquine and  "pentamidine    caffeine    cisapride    diuretics    medicines for colds    medicines for depression or for emotional or psychotic conditions    medicines for weight loss including some herbal products    methadone    some antibiotics like clarithromycin, erythromycin, levofloxacin, and linezolid    some heart medicines    steroid hormones like dexamethasone, cortisone, hydrocortisone    theophylline    thyroid hormones  What if I miss a dose?  If you miss a dose, use it as soon as you can. If it is almost time for your next dose, use only that dose. Do not use double or extra doses.  Where should I keep my medicine?  Keep out of the reach of children.  Store at room temperature between 15 and 25 degrees C (59 and 77 degrees F). Do not expose inhaler to extreme heat, cold, or humidity. Throw away the inhaler 13 months after removing it from the foil pouch for the first time, when the dose counter displays \"0\", or after the expiration date on the package, whichever comes first.  What should I tell my health care provider before I take this medicine?  They need to know if you have any of these conditions:    diabetes    heart disease or irregular heartbeat    high blood pressure    pheochromocytoma    seizures    thyroid disease    an unusual or allergic reaction to albuterol, levalbuterol, lactose, other medicines, foods, dyes, or preservatives    pregnant or trying to get pregnant    breast-feeding  What should I watch for while using this medicine?  Tell your doctor or health care professional if your symptoms do not improve. Do not use extra albuterol. If your asthma or bronchitis gets worse while you are using this medicine, call your doctor right away.  NOTE:This sheet is a summary. It may not cover all possible information. If you have questions about this medicine, talk to your doctor, pharmacist, or health care provider. Copyright  2018 [employer]                "

## 2021-06-18 NOTE — PROGRESS NOTES
Mental Health Visit Note    06/05/2018  Start time: 1200  Stop Time: 1245 Session # 17    Choco Claudio is a 52 y.o. male is being seen today for a follow-up psychotherapy appointment    Chief Complaint   Patient presents with      Follow Up       New symptoms or complaints:  None    Functional Impairment:   The patient identifies the how daily stressors affect daily functioning in today's session as follows (Scale is 1-4, 1=not at all or rarely; 4=extremely so/everyday.)    Personal: 4  Family: 4  Social: 4  Work: 4    Clinical assessment of mental status:   Choco Claudio presented on time for scheduled session.   He was oriented x3, open and cooperative, and dressed appropriately for this session and weather. His memory was fair for short and long term.  His speech was  Excessive and tangential.  Language was focused change.  Concentration and focus is Brief. Psychosis is noted. He reports his mood is frustrated.  Affect is congruent with speech.  Fund of knowledge is adequate. Insight is adequate for therapy.     Suicidal/Homicidal Ideation present:   No,  Patient denies suicidal and homicidal ideations/means or plans.      Patient's impression of their current status: Patient indicated continuing to feel frustrated with his family. Patient reported they are being mean to him and not respecting him. Patient indicated it is to the point where his dad gets mad when he makes food. Patient indicated his parents plan to move this weekend so he will not have their stress. Patient reported once the house sells he plans to move into a tent unless a new worker at [employer] can help him. Patient indicated he is continuing to look for work and feels this will help his mood.     Therapist impression of patient's current state:   Patient appears to have fair insight into his mental health. This therapist processed with patient ways to work on getting out of the house when he is feeling disrespected by his parents.  This therapist challenged patient on what he expects from his parents at this time. This therapist processed with patient working on his own goals and focusing on his needs at this time.     Type of psychotherapeutic technique provided:   Solution-focused and CBT    Progress toward short term goals:   Patient returned to scheduled session and medication management with Debra, staying sober, identifying his needs and getting out of the house.     Review of long term goals:Treatment Plan Updated on 06/5/2018    Diagnosis:   1. PTSD (post-traumatic stress disorder)    2. Major depressive disorder, recurrent episode, moderate    3. Severe alcohol use disorder      Plan and Follow-up:   Patient will return in one week for scheduled session. Patient will benefit from continuing to follow medication management with Debra.  Patient should work on getting out of the house 4 days a week. Patient should continue to work with [facility] on housing. Patient should continue to work on maintain sobriety. Patient would benefit from starting to get out and meet sober people. Patient should work on setting limitations with his family.     Discharge Criteria/Planning: Client has chronic symptoms and ongoing therapy for maintenance stability recommended.    Signatures:   Performed and documented by NETTA De La Paz

## 2021-06-18 NOTE — PROGRESS NOTES
Mental Health Visit Note    06/19/2018  Start time: 1200  Stop Time: 1245 Session # 19    Choco Claudio is a 52 y.o. male is being seen today for a follow-up psychotherapy appointment    Chief Complaint   Patient presents with      Follow Up       New symptoms or complaints:  None    Functional Impairment:   The patient identifies the how daily stressors affect daily functioning in today's session as follows (Scale is 1-4, 1=not at all or rarely; 4=extremely so/everyday.)    Personal: 4  Family: 4  Social: 4  Work: 4    Clinical assessment of mental status:   Choco Claudio presented on time for scheduled session today.   He was oriented x3, open and cooperative, and dressed appropriately for this session and weather. His memory was fair for short and long term.  His speech was  Excessive and tangential.  Language was focused change.  Concentration and focus is Brief. Psychosis is noted. He reports his mood is frustrated.  Affect is congruent with speech.  Fund of knowledge is adequate. Insight is adequate for therapy.     Suicidal/Homicidal Ideation present:   No,  Patient denies suicidal and homicidal ideations/means or plans.      Patient's impression of their current status: Patient indicated continuing to feel frustrated with his parents. Patient reported he see's the way that they act and cannot believe they treat each other that way. Patient talked about his marriage and ways he is still hurt. Patient indicated this has made him not want to trust anyone. Patient reported he trusted his wife and was very hurt by her. Patient indicated wanting to mental get [place]     Therapist impression of patient's current state:   Patient appears to have fair insight into his mental health. This therapist challenged patient on how hurt he is that his marriage ended. This therapist processed with patient ways he has been able to trust people in the past. This therapist  challenged patient on what specifically he wants to see change in his life.     Type of psychotherapeutic technique provided:   Solution-focused and CBT    Progress toward short term goals:   Patient returned to scheduled session and medication management with Debra, staying sober, identifying his needs and getting out of the house.     Review of long term goals:Treatment Plan Updated on 06/5/2018    Diagnosis:   1. PTSD (post-traumatic stress disorder)    2. Major depressive disorder, recurrent episode, moderate (H)    3. Severe alcohol use disorder (H)      Plan and Follow-up:   Patient will return in one week for scheduled session. Patient will benefit from continuing to follow medication management with Debra.  Patient should work on getting out of the house 4 days a week. Patient should continue to work with [facility] on housing. Patient should continue to work on maintain sobriety. Patient would benefit from starting to get out and meet sober people. Patient should work on setting limitations with his family.     Discharge Criteria/Planning: Client has chronic symptoms and ongoing therapy for maintenance stability recommended.    Signatures:   Performed and documented by NETTA De La Paz

## 2021-06-18 NOTE — PROGRESS NOTES
Outpatient Mental Health Treatment Plan    Name:  Choco Claudio  :  1965  MRN:  [id number]    Treatment Plan:  Updated Treatment Plan  Intake/initial treatment plan date:  2017  Benefit and risks and alternatives have been discussed: Yes  Is this treatment appropriate with minimal intrusion/restrictions: Yes  Estimated duration of treatment:  Ongoing psychotherapy at this time  Anticipated frequency of services:  Weekly  Necessity for frequency: This frequency is needed to establish therapeutic goals and for continuity of care in order to monitor progress.  Necessity for treatment: To address cognitive, behavioral, and/or emotional barriers in order to work toward goals and to improve quality of life.    Plan:       ?   ?  Posttraumatic Stress Disorder    Goal:  Reduced the signs and symptoms of PTSD and increase patient's ability to tolerate breakthrough stressors surrounding his various traumas.   Strategies: ? [x]Learn and practice relaxation techniques and other coping strategies (e.g., thought stopping, reframing, meditation)     ? [x] Increase involvement in meaningful activities     ? [x] Discuss sleep hygiene     ? [x] Explore thoughts and expectations about self and others     ? [x] Identify and monitor triggers for panic/anxiety symptoms     ? [x] Implement physical activity routine (with physician approval)     ? [x] Consider introduction of bibliotherapy and/or videos     ? [x] Continue compliance with medical treatment plan (or explore barriers)   ?Degree to which this is a problem: 4  Degree to which goal is met: 1    Date of next review: 2018    Substance use  Goal:  Increase patient awareness regarding substance use triggers.  Consider harm reduction and/or chemical dependency treatment.   Strategies: ? [x] Consider referral for chemical dependency evaluation     ? [x] Discuss barriers to participating in AA or other peer-facilitated groups         [x] Address environmental  factors which may interfere with sobriety     ? [x] Explore short-term versus long-term consequences of use  ?  Degree to which this is a problem: 4  Degree to which goal is met: 2    Date of next review: 09/16/2018    Depression  Goal:  Reduced the signs and symptoms of depression and increase patient's ability to tolerate breakthrough symptomology.   Strategies: ? [x]Learn and practice relaxation techniques and other coping strategies (e.g., thought stopping, reframing, meditation)     ? [x] Increase involvement in meaningful activities     ? [x] Discuss sleep hygiene     ? [x] Explore thoughts and expectations about self and others     ? [x] Identify and monitor triggers for panic/anxiety symptoms     ? [x] Implement physical activity routine (with physician approval)     ? [x] Consider introduction of bibliotherapy and/or videos     ? [x] Continue compliance with medical treatment plan (or explore barriers)   ?Degree to which this is a problem: 4  Degree to which goal is met: 1    Date of next review: 05/16/2018       Functional Impairment:  1=Not at all/Rarely  2=Some days  3=Most Days  4=Every Day    Personal : 4  Family : 4  Social : 4   Work/school : 3    Diagnosis:  Posttraumatic stress disorder; alcohol use disorder, severe; and major depressive disorder, recurrent, moderate.    Strengths:  supported by mother, one good friend, employed, and motivated for change.  Limitations:  isolation, ambivalence about discontinuing alcohol use, and difficulty connecting with others due to trauma symptoms  Cultural Considerations: Client identifies is half .    Persons responsible for this plan: Patient and Provider            Psychotherapist Signature           Patient Signature:                This note was created with help of Dragon dictation software.  Grammatical / typing errors are not intentional and inherent to the software.

## 2021-06-18 NOTE — PROGRESS NOTES
Mental Health Visit Note    05/22/2018  Start time: 1200  Stop Time: 1255 Session # 16    Choco Claudio is a 52 y.o. male is being seen today for a follow-up psychotherapy appointment    Chief Complaint   Patient presents with      Follow Up       New symptoms or complaints:  Patient reported feeling disrespected by his family.     Functional Impairment:   The patient identifies the how daily stressors affect daily functioning in today's session as follows (Scale is 1-4, 1=not at all or rarely; 4=extremely so/everyday.)    Personal: 4  Family: 4  Social: 4  Work: 4    Clinical assessment of mental status:   Choco Claudio presented on time..   He was oriented x3, open and cooperative, and dressed appropriately for this session and weather. His memory was fair for short and long term.  His speech was  Excessive and tangential.  Language was focused change.  Concentration and focus is Brief. Psychosis is noted. He reports his mood is frustrated.  Affect is congruent with speech.  Fund of knowledge is adequate. Insight is adequate for therapy.     Suicidal/Homicidal Ideation present:   No,  Patient denies suicidal and homicidal ideations/means or plans.      Patient's impression of their current status: Patient reported feeling frustrated. Patient indicated his family being very disrespectful to him. Patient reported his parents have made him move upstairs for reasons he does not understand. Patient indicated he worked hard to help his mom this week but is not feeling appreciated. Patient talked about a time in the past where his mom did not treat him right. Patient indicated he feels as though is parents are always noticing what his siblings do well but never him. Patient reported GUILD calling and he has another .     Therapist impression of patient's current state:   Patient appears to have fair insight into his mental health. This therapist challenged patient on ways he has always tried to  receive his parents approval. This therapist processed with patient starting to set goals for himself. This therapist challenged patient on ways he is feeling unheard from his family.     Type of psychotherapeutic technique provided:   Solution-focused and CBT    Progress toward short term goals:   Patient returned to scheduled session and medication management with Debra, staying sober, identifying his needs and getting out of the house.     Review of long term goals:Treatment Plan Updated on 02/27/2018    Diagnosis:   1. PTSD (post-traumatic stress disorder)    2. Major depressive disorder, recurrent episode, moderate    3. Severe alcohol use disorder      Plan and Follow-up:   Patient will return in one week for scheduled session. Patient will benefit from continuing to follow medication management with Debra.  Patient should work on getting out of the house 4 days a week. Patient should continue to work with [facility] on housing. Patient should continue to work on maintain sobriety. Patient would benefit from starting to get out and meet sober people. Patient should work on setting limitations with his family.     Discharge Criteria/Planning: Client has chronic symptoms and ongoing therapy for maintenance stability recommended.    Signatures:   Performed and documented by NETTA De La Paz

## 2021-06-19 NOTE — PROGRESS NOTES
Patient is here for follow up appointment.  He became homeless yesterday.  He is living in a tent.  Patient states he is medication compliant.  He said he gets EBT cash assistance and food stamps.  He sees ganesh for therapy.  He states his depression and anxiety are high.  Sleep is poor.    Correct pharmacy verified with patient and confirmed in snapshot? [x] yes []no    Charge captured ? [x] yes  [] no    Medications Phoned  to Pharmacy [] yes [x]no  Name of Pharmacist:  List Medications, including dose, quantity and instructions      Medication Prescriptions given to patient   [] yes  [x] no   List the name of the drug the prescription was written for.       Medications ordered this visit were e-scribed.  Verified by order class [x] yes  [] no  Refills on prazosin, Seroquel and trazodone    Medication changes or discontinuations were communicated to patient's pharmacy: [] yes  [x] NA    UA collected [] yes  [x] no    [place] Prescription Monitoring Program Reviewed? [] yes  [x] no    Referrals were made to:  NA    Future appointment was made: [x] yes  [] no    Dictation completed at time of chart check: [x] yes  [] no    I have checked the documentation for today s encounters and the above information has been reviewed and completed.

## 2021-06-19 NOTE — PROGRESS NOTES
Mental Health Visit Note    07/10/2018  Start time: 1200  Stop Time: 1240 Session # 21    Choco Claudio is a 52 y.o. male is being seen today for a follow-up psychotherapy appointment    Chief Complaint   Patient presents with      Follow Up       New symptoms or complaints:  Patient indicated struggling with being homeless again.     Functional Impairment:   The patient identifies the how daily stressors affect daily functioning in today's session as follows (Scale is 1-4, 1=not at all or rarely; 4=extremely so/everyday.)    Personal: 4  Family: 4  Social: 4  Work: 4    Clinical assessment of mental status:   Choco Claudio presented on time for scheduled session today.   He was oriented x3, open and cooperative, and hygiene was poor. His memory was fair for short and long term.  His speech was  Excessive and tangential.  Language was focused change.  Concentration and focus is Brief. Psychosis is noted. He reports his mood is depressed.  Affect is congruent with speech.  Fund of knowledge is adequate. Insight is adequate for therapy.     Suicidal/Homicidal Ideation present:   No,  Patient denies suicidal and homicidal ideations/means or plans.      Patient's impression of their current status: Patient reported feeling depressed. Patient indicated struggling with the fact that he will be homeless on Thursday. Patient reported when he thinks of this he wants to drink. Patient indicated knowing it would not be good for his health and that is keeping him sober at this time. Patient indicated he has started to set up an area that he will be living. Patient reported he is hopeful that he will receive disability soon and then he can get a place to live.     Therapist impression of patient's current state:   Patient appears to have fair insight into his mental health. This therapist processed with patient his emotions related to being homeless. This therapist challenged patient on ways he is working to stay  sober desptie the urges. This therapist encouraged patient to go to a metting to find support.     Type of psychotherapeutic technique provided:   Solution-focused and CBT    Progress toward short term goals:   Patient returned to scheduled session and medication management with Debra, noticing mental health symptoms and staying sober.    Review of long term goals:Treatment Plan Updated on 06/5/2018    Diagnosis:   1. PTSD (post-traumatic stress disorder)    2. Major depressive disorder, recurrent episode, moderate (H)    3. Severe alcohol use disorder (H)      Plan and Follow-up:   Patient will return in one week for scheduled session. Patient will benefit from continuing to follow medication management with Debra. Patient would benefit from attending an AA meeting. Patient should continue to work with his  on options for housing.     Discharge Criteria/Planning: Client has chronic symptoms and ongoing therapy for maintenance stability recommended.    Signatures:   Performed and documented by NETTA De La Paz

## 2021-06-19 NOTE — PROGRESS NOTES
Mental Health Visit Note    8/14/2018    Start time: 1220    Stop Time: 100   Session # 24    Session Type: Patient is presenting for an Individual session.    Choco Claudio is a 52 y.o. male is being seen today for    Chief Complaint   Patient presents with      Follow Up   .     New symptoms or complaints: None    Functional Impairment:   Personal: 4  Family: 3  Work: N/A  Social:4    Clinical assessment of mental status: Choco Claudio  presented late for scheduled session today.   He was oriented x3, open and cooperative, and hygiene and grooming was fair. His memory was fair for short and long term.  His speech and language was  Excessive and tangential. Concentration and focus is Brief. Psychosis is noted. He reports his mood is depressed.  Affect is congruent with speech.  Fund of knowledge is adequate. Insight is adequate for therapy.      Suicidal/Homicidal Ideation present: None Reported This Session    Patient's impression of their current status: Patient reported continuing to feel depressed. Patient indicated that being homeless is very traumatic for him. Patient reported he is starting to feel that the police are watching him to try and find his camp sight. Patient reported getting into a disagreement last night with his son where his son pointed out that his dad did not raise him. Patient indicated he did the best he could but being hurt by his son pointing this out. Patient reported at this time feeling he can trust no body.    Therapist impression of patients current state: Patient appears to have fair insight into his mental health. This therapist challenged patient on ways he can accept help to not be homeless. This therapist processed with patient his emotiosn related to being told he was not in his son's life.     Type of psychotherapeutic technique provided: Insight oriented, Client centered and CBT    Progress toward short term goals:Progress as expected with patient coming to  scheduled session and continuing medication management. Poor progress as evidenced by continuing to not have housing and drinking.     Review of long term goals: Long-term goals reviewed 06/05/2018    Diagnosis:   1. Major depressive disorder, recurrent episode, moderate (H)    2. PTSD (post-traumatic stress disorder)    3. Severe alcohol use disorder (H)      Plan and Follow up: Patient will return in one week for scheduled session. Patient should continue to go to his parents house to shower and get his medications. Patient would benefit from starting to find activities or a job that he can do during the day. Patient would benefit from looking at shelters.     Discharge Criteria/Planning: Patient will continue with follow-up until therapy can be discontinued without return of signs and symptoms.    Marcella Backer 8/14/2018

## 2021-06-19 NOTE — PROGRESS NOTES
Mental Health Visit Note    07/17/2018  Start time: 1200  Stop Time: 1230 Session # 22    Choco Claudio is a 52 y.o. male is being seen today for a follow-up psychotherapy appointment    Chief Complaint   Patient presents with      Follow Up       New symptoms or complaints:  Patient indicated not taking medication since Wednesday.     Functional Impairment:   The patient identifies the how daily stressors affect daily functioning in today's session as follows (Scale is 1-4, 1=not at all or rarely; 4=extremely so/everyday.)    Personal: 4  Family: 4  Social: 4  Work: 4    Clinical assessment of mental status:   Choco Claudio presented on time for scheduled session today.   He was oriented x3, open and cooperative, and hygiene and grooming was poor. His memory was fair for short and long term.  His speech and language was  Excessive and tangential. Concentration and focus is Brief. Psychosis is noted. He reports his mood is depressed.  Affect is congruent with speech.  Fund of knowledge is adequate. Insight is adequate for therapy.     Suicidal/Homicidal Ideation present:   No,  Patient denies suicidal and homicidal ideations/means or plans.      Patient's impression of their current status: Patient indicated feeling depressed. Patient reported he is really struggling with being homeless. Patient indicated that he has not taken his medications since last Wednesday due to them being at this moms. Patient reported he now is not sleeping and is concerned by ever noise he is hearing.Patient indicated he is really struggling but the [employer] taught him to never give up a fight. Patient reported he has not showered due to not going to his parents.     Therapist impression of patient's current state:   Patient appears to have poor insight into his mental health. This therapist processed with patient the importance of his medication and going to his parents to get the medication. This therapist processed with  patient other options besides living in a tent. This therapist challenged patient on ways he is starting to experience paranoia due to being in the tent and not taking his medications.     Type of psychotherapeutic technique provided:   Solution-focused and CBT    Progress toward short term goals:   Patient returned to scheduled session and medication management with Debra. Poor progress as evidenced by patient not taking medication and not taking care of hygiene.     Review of long term goals:Treatment Plan Updated on 06/5/2018    Diagnosis:   1. PTSD (post-traumatic stress disorder)    2. Major depressive disorder, recurrent episode, moderate (H)    3. Delusional disorder (H)      Plan and Follow-up:   Patient will return in one week for scheduled session. Patient should continue to work with his  on options for housing. Patient needs to take his medications as prescribed.     Discharge Criteria/Planning: Client has chronic symptoms and ongoing therapy for maintenance stability recommended.    Signatures:   Performed and documented by NETTA De La Paz

## 2021-06-19 NOTE — PROGRESS NOTES
Mental Health Visit Note    8/7/2018    Start time: 1200    Stop Time: 1255   Session # 23    Session Type: Patient is presenting for an Individual session.    Choco Claudio is a 52 y.o. male is being seen today for    Chief Complaint   Patient presents with      Follow Up   .     New symptoms or complaints: Patient reported struggling with his PTSD.     Functional Impairment:   Personal: 4  Family: 3  Work: N/A  Social:4    Clinical assessment of mental status: Choco Claudio presented on time for scheduled session today.   He was oriented x3, open and cooperative, and hygiene and grooming was fair. His memory was fair for short and long term.  His speech and language was  Excessive and tangential. Concentration and focus is Brief. Psychosis is noted. He reports his mood is depressed.  Affect is congruent with speech.  Fund of knowledge is adequate. Insight is adequate for therapy.      Suicidal/Homicidal Ideation present: None Reported This Session    Patient's impression of their current status: Patient indicated feeling extremely depressed. Patient reported being homeless is not easy for him. Patient indicated he has started drinking again to deal with his PTSD. Patient reported last night he heard a twig and then was unable to get back to sleep. Patient indicated instead he went to a park and cried. Patient reported he cannot and will not give up. Patient reported he is thinking about applying at [employer] to have somewhere to go during the day. Patient indicated things are not going well with his family so he is trying to go there as little as possible.     Therapist impression of patients current state: Patient appears to have fair insight into his mental health. This therapist processed with patient other options besides being homeless including shelter. This therapist processed with patient ways his PTSD symptoms have increased due to his environment. This therapist challenged patient no  what brings him taz and doing those activities during the day.     Type of psychotherapeutic technique provided: Insight oriented, Client centered and CBT    Progress toward short term goals:Progress as expected with patient coming to scheduled session and continuing medication management. Poor progress as evidenced by continuing to not have housing and drinking.     Review of long term goals: Long-term goals reviewed 06/05/2018    Diagnosis:   1. Major depressive disorder, recurrent episode, moderate (H)    2. PTSD (post-traumatic stress disorder)    3. Severe alcohol use disorder (H)        Plan and Follow up: Patient will return in one week for scheduled session. Patient should continue to go to his parents house to shower and get his medications. Patient would benefit from starting to find activities or a job that he can do during the day. Patient would benefit from looking at shelters.       Discharge Criteria/Planning: Patient will continue with follow-up until therapy can be discontinued without return of signs and symptoms.    Marcella Backer 8/7/2018

## 2021-06-19 NOTE — PROGRESS NOTES
Psychiatric  Progress Note  Date of visit:7/13/2018         Discussion of Care and Treatment Recommendations:   This is a 52 y.o. male with significant history of Alcohol Use Disorder, Etoh induced gastritis , DENISE and PTSD who presents to the clinic today , noncompliance with treatment plan.  Patient is here today for  a follow up appointment       Last visit 4/27/18.  Recommendation at last visit .  1.-Continue with current medications Trazodone  100 mg at Bedtime, gabapentin 100  mg at bedtime, Start Prazosin 2 mg at bedtime -PTSD, Start Seroquel 25 mg  at bedtime - psychosis , delusional thoughts  2- Highly recommend Inpatient CD treatment Alcohol Use Disorder - Severe. Pt  opposed to CD treatment   3- Continue with  Psychotherapy:  4- 5-Make efforts to stay away from alcohol and drug  5- RTC-  8 -10 weeks call in between visit with any questions or concerns  Patient and I reviewed diagnosis and treatment plan and patient agrees with following recommendations:  Ongoing education given regarding diagnostic and treatment options with adequate verbalization of understanding.    Plan   1.-Continue with current medications Trazodone  100 mg at Bedtime, gabapentin 100  mg at bedtime, Start Prazosin 2 mg at bedtime -PTSD, Start Seroquel 25 mg  at bedtime - psychosis , delusional thoughts  2- Highly recommend Inpatient CD treatment Alcohol Use Disorder - Severe. Pt  opposed to CD treatment   3- Continue with  Psychotherapy:  4- 5-Make efforts to stay away from alcohol and drug  5- RTC-10  weeks call in between visit with any questions or concerns         Diagnoses:     PTSD  Alcohol Use Disorder , Severe   Delusional disorder R/O Schizophrenia        Patient Active Problem List   Diagnosis     Chest pain     Alcohol withdrawal, with unspecified complication (H)     Sinus tachycardia     Lactic acidosis     Dehydration     Microcytosis     Non-traumatic rhabdomyolysis     Nausea     Alcoholic ketoacidosis     Epigastric  "pain     Alcoholism, chronic (H)     High anion gap metabolic acidosis     Hypomagnesemia     Alcohol intoxication, uncomplicated (H)     GI bleed     Anemia associated with acute blood loss     Disorder due to alcohol abuse (H)     Substance induced mood disorder (H)     Anxiety disorder     History of posttraumatic stress disorder (PTSD)     Erosive esophagitis     Polyarthralgia     Cervical spondylosis     VIKI positive     Inflammatory arthritis     Arthritis of right ankle     Gouty arthritis of toe of left foot     Pulmonary embolism (H)     Primary osteoarthritis involving multiple joints             Chief Complaint / Subjective:    Chief complaint: \"I am now homeless\"     History of Present Illness:   Became homeless yesterday as his parents moved in an a senior living and is not allowed to leave today as they want to have one bedroom.  Patient reports that he is sleeping in a tent in the streets now.  We have tried to assist in helping patient coordinate service with an ACT team, and [facility]  also visited patient and offered patient housing in [place] in [place] but patient refuses housing options citing they were too far.  Our efforts have been fruitless in assisting patient as he refused all recommendations and when no longer talk  with the [facility] .  Patient also continues to use alcohol on average about 3 times weekly.  He will not accept CD treatment he will not accept going to a sober house and he will not agree to wean off or to stop to using alcohol.  We discussed detrimental side effects of alcohol to his health.  He endorsed understanding but states that he will continue to drink  Patient brought with him his medications.  He states that he continues to take all his medications.  His mother picks his medications for him.  He tells me he can go to his mother's apartment and take a shower and also needs his medications once he runs out.  Today patient's  Denies all " psychiatric symptoms but states that he is unhappy that he is not able to get housing way he wants which is around [facility].  He continues to attend psychotherapy with N[facility] and keeps his appointments.  He does not want any medication changes today he denies side effects he reports compliance.    Mental Status Examination:   General: Adequate hygiene, cooperative  Speech: Normal in rate and tone  Language: Intact  Thought process: Coherent  Thought content:                           Auditory hallucinations- absent                           Visual Hallucinations - Absent                           Delusions : Present                          Loose Associations:  No                          Suicidal thoughts: Absent                          Homicidal thoughts: Absent                       Affect: Neutral  Mood: Neutral   Intellectual functioning: Within normal limits  Memory: Within normal limits  Fund of knowledge: Average  Attention and concentration: Within normal limits  Gait: Steady  Psychomotor activity: Calm, no agitation  Muscles: No atrophy, no abnormal movements  InSight and judgment: Poor    Medication changes: See Above   Medication adherence: compliant  Medication side effects: absent  Information about medications: Side effects, benefits and alternative treatments discussed and patient agrees with capacity to do so.    Psychotherapy: Supportive therapy day-to-day living    Education: Diet, exercise, abstinence from drugs and alcohol, patient will not drive if sedated and medications or  under influence of any substance    Lab Results:   Personally reviewed and discussed with the patient    Lab Results   Component Value Date    WBC 6.4 05/01/2018    HGB 12.5 (L) 05/01/2018    HCT 39.7 (L) 05/01/2018     05/01/2018    ALT 18 05/01/2018    AST 25 03/24/2018     03/25/2018    K 3.5 03/25/2018     03/25/2018    CREATININE 0.80 05/01/2018    BUN 5 (L) 03/25/2018    CO2 22 03/25/2018     TSH 2.17 03/24/2018    INR 1.03 03/24/2018       Vital signs:    Vitals:    07/13/18 1258   BP: 116/74   Pulse: 91   Temp: 97.6  F (36.4  C)   TempSrc: Oral   Weight: 215 lb (97.5 kg)     Allergies:  Allergies   Allergen Reactions     Other Environmental Allergy Other (See Comments)     Seasonal allergies  Nasal congestion          Medications:     Current Outpatient Prescriptions on File Prior to Visit   Medication Sig Dispense Refill     cetirizine (ZYRTEC) 10 MG tablet Take 10 mg by mouth daily as needed for allergies.       omeprazole (PRILOSEC) 20 MG capsule Take 20 mg by mouth daily before breakfast.       prazosin (MINIPRESS) 2 MG capsule Take 1 capsule (2 mg total) by mouth at bedtime. 30 capsule 0     QUEtiapine (SEROQUEL) 25 MG tablet Take 1 tablet (25 mg total) by mouth at bedtime. 30 tablet 0     traZODone (DESYREL) 100 MG tablet TAKE 1 TABLET(100 MG) BY MOUTH AT BEDTIME 30 tablet 0     warfarin (COUMADIN) 5 MG tablet Take 1 tablets (5 mg) by mouth daily. Adjust dose based on INR results as directed. 30 tablet 0     No current facility-administered medications on file prior to visit.               Review of Systems:    Otherwise reminder of review of systems is negative    Coordination of Care:   More than 25 minutes spent on this visit  with more than 50% of time spent on coordination of care including: Educating patient about diagnosis, prognosis, side effects and benefits of medications, diet, exercise.  Time also spent on entering orders and preparing documentation for the visit.Time also spent providing supportive therapy regarding above issues.    This note was created using a dictation system. All typing errors or contextual distortion is unintentional and software inherent.

## 2021-06-20 NOTE — PROGRESS NOTES
Mental Health Visit Note    9/11/2018    Start time: 1200    Stop Time: 1255   Session # 27    Session Type: Patient is presenting for an Individual session.    Choco Claudio is a 53 y.o. male is being seen today for    Chief Complaint   Patient presents with      Follow Up     New symptoms or complaints: Patient indicated struggling with homelessness.     Functional Impairment:   Personal: 4  Family: 3  Work: N/A  Social:4    Clinical assessment of mental status: Choco Claudio  presented on time for scheduled session today.   He was oriented x3, open and cooperative, and hygiene and grooming was poor. His memory was fair for short and long term.  His speech and language was excessive and tangential. Concentration and focus is brief. Psychosis is noted. He reports his mood is anxious.  Affect is congruent with speech.  Fund of knowledge is adequate. Insight is adequate for therapy.      Suicidal/Homicidal Ideation present: None Reported This Session    Patient's impression of their current status: Patient indicated feeling anxious. Patient reported he is struggling more and more with being homeless. Patient indicated he does not feel safe anywhere. Patient reported he seen the  trying to find where he lives and other people looking for him. Patient indicated that he lost his EBT card which scares him as well. Patient reported if he does not find it he cannot eat until he receives his new one.     Therapist impression of patients current state: Patient appears to have poor insight into his mental health. This therapist provided patient with resources for shelter and places that offer free meals. This therapist processed with patient the importance of staying sober and taking his medications. This therapist processed with patient ways that shelters can be beneficial.     Type of psychotherapeutic technique provided: Insight oriented, Client centered and CBT    Progress toward short term  goals:Progress as expected with patient coming to scheduled session. Poor progress as evidenced by continuing to not have housing.    Review of long term goals: Long term review on 09/11/2018    Diagnosis:   1. Major depressive disorder, recurrent episode, moderate (H)    2. PTSD (post-traumatic stress disorder)    3. Severe alcohol use disorder (H)      Plan and Follow up: Patient will return in one week for scheduled session. Patient should continue to go to his parents house to shower and get his medications. Patient would benefit from looking at shelters and working with his case manger on different housing options. Patient should go to different places provided by this therapist for food options.     Discharge Criteria/Planning: Client has chronic symptoms and ongoing therapy for maintenance stability recommended.    Marcella Nogueira 9/11/2018

## 2021-06-20 NOTE — LETTER
"Letter by Juan Manuel Gilliam MD at      Author: Juan Manuel Gilliam MD Service: -- Author Type: --    Filed:  Encounter Date: 5/8/2020 Status: (Other)         Matheus Gonzales MD  [address] [place] [address]                                  May 8, 2020    Patient: Choco Claudio   MR Number: [id number]   YOB: 1965   Date of Visit: 5/8/2020     Dear Dr. Christian MD:    Thank you for referring Choco Claudio to me for evaluation. Below are the relevant portions of my assessment and plan of care.    If you have questions, please do not hesitate to call me. I look forward to following Choco along with you.    Sincerely,        Juan Manuel Gillima MD          CC  No Recipients  Juan Manuel Gilliam MD  5/8/2020 11:33 AM  Sign when Signing Visit  Choco lCaudio is a 54 y.o. male who is being evaluated via a billable telephone visit.      The patient has been notified of following:     \"This telephone visit will be conducted via a call between you and your physician/provider. We have found that certain health care needs can be provided without the need for a physical exam.  This service lets us provide the care you need with a short phone conversation.  If a prescription is necessary we can send it directly to your pharmacy.  If lab work is needed we can place an order for that and you can then stop by our lab to have the test done at a later time.    Telephone visits are billed at different rates depending on your insurance coverage. During this emergency period, for some insurers they may be billed the same as an in-person visit.  Please reach out to your insurance provider with any questions.    If during the course of the call the physician/provider feels a telephone visit is not appropriate, you will not be charged for this service.\"    Patient has given verbal consent to a Telephone visit? Yes    What phone number would you like to be contacted at? [phone]    Patient would like to receive their " AVS by AVS Preference: Mail a copy.    Additional provider notes:   I last saw Ry on 3/13/2020. I recommended a VQ scan and echo, neither of which he got done.  No benefit from albuterol.   He smokes pipe. No cigarettes.   Has some shortness of breath with activities like walking and biking.   Still unable to lay flat due to shortness of breath.  No syncope.    Studies:  Nothing new to review.     54 year old man with history of alcoholism, GERD, tremor, PTSD, small PE diagnosed in 2018 no longer on anticoagulation, presents for follow up of orthopnea and dyspnea on exertion. He did not get the VQ scan or the echo done that I requested. His symptoms appear to be persistent and stable. He had no response to the albuterol inhaler. Review of prior CT showed slightly elevated left hemidiaphragm but no other concerning findings. I don't think the elevated diaphragm would explain the orthopnea and dyspnea on exertion and normally would cause restriction on PFT's, which he did not demonstrate.     Recommendations:  - Echocardiogram and VQ scan to look for PE since he is unable to tolerate CT scan - will ask our office to schedule these for him. I reassured him that he doesn't need to lay flat for these tests (he could have the head of the bed up for the echo)  - OK to stop the albuterol     Follow up in 2-3 months after above testing is complete.      Phone call duration: 15 minutes    Juan Manuel Gilliam MD (Avi)  [facility]  Pager [phone]  Clinic [phone]

## 2021-06-20 NOTE — PROGRESS NOTES
Mental Health Visit Note    9/4/2018    Start time: 1200    Stop Time: 1255   Session # 26    Session Type: Patient is presenting for an Individual session.    Choco Claudio is a 53 y.o. male is being seen today for    Chief Complaint   Patient presents with      Follow Up     New symptoms or complaints: None    Functional Impairment:   Personal: 4  Family: 3  Work: N/A  Social:4    Clinical assessment of mental status: Choco Claudio  presented on time for scheduled session today.   He was oriented x3, open and cooperative, and hygiene and grooming was poor. His memory was fair for short and long term.  His speech and language was excessive and tangential. Concentration and focus is brief. Psychosis is noted. He reports his mood is anxious.  Affect is congruent with speech.  Fund of knowledge is adequate. Insight is adequate for therapy.      Suicidal/Homicidal Ideation present: None Reported This Session    Patient's impression of their current status: Patient reported feeling anxious. Patient indicated he does not feel safe at his tent due to finding out people are looking for him. Patient reported he is unsure why these people are looking for him but not wanting to fight them. Patient indicated he does not know what to do with them. Patient reported knowing they are not people he can have a conversation with because they will try and fight. Patient indicated instead he continues to stay quiet in hopes they will not find his tent. Patient [place] ways this [place] his life.     Therapist impression of patients current state: Patient appears to have fair insight into his mental health. This therapist processed with patient different options he has that can help to get him out of the tent including shelters and working with his . This therapist processed with patient ways the trauma he experienced continue to  affect his life.     Type of psychotherapeutic technique provided: Insight oriented, Client centered and CBT    Progress toward short term goals:Progress as expected with patient coming to scheduled session. Poor progress as evidenced by continuing to not have housing and drinking.     Review of long term goals: Long-term goals reviewed 06/05/2018    Diagnosis:   1. Major depressive disorder, recurrent episode, moderate (H)    2. PTSD (post-traumatic stress disorder)    3. Severe alcohol use disorder (H)      Plan and Follow up: Patient will return in one week for scheduled session. Patient should continue to go to his parents house to shower and get his medications. Patient would benefit from starting to find activities or a job that he can do during the day. Patient would benefit from looking at shelters and working with his case manger on different housing options.     Discharge Criteria/Planning: Client has chronic symptoms and ongoing therapy for maintenance stability recommended.    Marcella Nogueira 9/4/2018

## 2021-06-20 NOTE — PROGRESS NOTES
Mental Health Visit Note    10/2/2018    Start time: 1000    Stop Time: 1053   Session # 30    Session Type: Patient is presenting for an Individual session.    Choco Claudio is a 53 y.o. male is being seen today for    Chief Complaint   Patient presents with      Follow Up     Homeless, trauma and fear     New symptoms or complaints: None    Functional Impairment:   Personal: 4  Family: 3  Work: N/A  Social:4    Clinical assessment of mental status: Choco Claudio presented on time for scheduled session today.   He was oriented x3, open and cooperative but guarded. His hygiene and grooming was good. His memory was fair for short and long term.  His speech and language was excessive and tangential. Concentration and focus is brief. Psychosis is noted. He reports his mood is depressed.  Affect is congruent with speech.  Fund of knowledge is adequate. Insight is adequate for therapy.      Suicidal/Homicidal Ideation present: None Reported This Session    Patient's impression of their current status: Patient reported feeling depressed. Patient indicated finding it harder and harder to fight everyday. Patient reported the [place] taught him to never give up so he will keep fighting. Patient indicated that he met with the assessment people and hope to hear back soon. Patient reported knowing his PTSD symptoms will decrease when he has a place. Patient indicated things are not going well with his son and deciding to take time away from him. Patient reported he has tried to be a good dad but has a lot of regrets in his life.     Therapist impression of patients current state: Patient appears to have poor insight into his mental health. This therapist processed with patient ways he can work on staying active during the day to help reduce his PTSD symptoms. This therapist challenged patient on ways he continues to stay isolated and not reach out to friends. This therapist processed with patient the benefits of  getting a job.     Type of psychotherapeutic technique provided: Insight oriented, Client centered and CBT    Progress toward short term goals:Progress as expected with patient coming to scheduled session. Poor progress as evidenced by continuing to not have housing and drinking.    Review of long term goals: Long term review on 09/11/2018    Diagnosis:   1. Major depressive disorder, recurrent episode, moderate (H)    2. PTSD (post-traumatic stress disorder)    3. Severe alcohol use disorder (H)      Plan and Follow up: Patient will return in one week for scheduled session. Patient should continue to go to his parents house to shower and get his medications. Patient would benefit from looking at shelters and working with his case manger on different housing options.     Discharge Criteria/Planning: Client has chronic symptoms and ongoing therapy for maintenance stability recommended.    Marcella Nogueira

## 2021-06-20 NOTE — PROGRESS NOTES
Mental Health Visit Note    9/18/2018    Start time: 1200    Stop Time: 1253   Session # 28    Session Type: Patient is presenting for an Individual session.    Choco Claudio is a 53 y.o. male is being seen today for    Chief Complaint   Patient presents with      Follow Up     New symptoms or complaints: None    Functional Impairment:   Personal: 4  Family: 3  Work: N/A  Social:4    Clinical assessment of mental status: Choco Claudio  presented on time for scheduled session today.   He was oriented x3, open and cooperative, and hygiene and grooming was fair. His memory was poor for short and long term.  His speech and language was excessive and tangential. Concentration and focus is brief. Psychosis is noted. He reports his mood is depressed and anxious.  Affect is congruent with speech.  Fund of knowledge is adequate. Insight is adequate for therapy.      Suicidal/Homicidal Ideation present: None Reported This Session    Patient's impression of their current status: Patient reported feeling depressed and anxious. Patient indicated being homeless is a big challenge for him. Patient reported he went for an assessment on Monday but the person was not there. Patient indicated he is waiting for his  to follow up with him on the plan. Patient reported he does not feel safe in [place] due to believing the  are trying to find him. Patient indicated he is trying to spend as little time as possible in [place].     Therapist impression of patients current state: Patient appears to have poor insight into his mental health. This therapist processed again shelter's that are available instead of staying in a shelter. This therapist processed with patient ways that his worry is taking over his life and causing a huge problem for him.     Type of psychotherapeutic technique provided: Insight oriented, Client centered and CBT    Progress toward short term goals:Progress as expected with patient  coming to scheduled session. Poor progress as evidenced by continuing to not have housing and drinking.    Review of long term goals: Long term review on 09/11/2018    Diagnosis:   1. Major depressive disorder, recurrent episode, moderate (H)    2. PTSD (post-traumatic stress disorder)    3. Severe alcohol use disorder (H)      Plan and Follow up: Patient will return in one week for scheduled session. Patient should continue to go to his parents house to shower and get his medications. Patient would benefit from looking at shelters and working with his case manger on different housing options.     Discharge Criteria/Planning: Client has chronic symptoms and ongoing therapy for maintenance stability recommended.    Marcella Nogueira 9/18/2018

## 2021-06-20 NOTE — PROGRESS NOTES
This therapist left a VM asking patient to call the clinic back after he no showed for his scheduled session. Patient is scheduled to meet with this therapist again on 10/16. This is patient's first no show.

## 2021-06-20 NOTE — PROGRESS NOTES
Mental Health Visit Note    8/28/2018    Start time: 1200    Stop Time: 1255   Session # 25    Session Type: Patient is presenting for an Individual session.    Choco Claudio is a 53 y.o. male is being seen today for    Chief Complaint   Patient presents with      Follow Up     New symptoms or complaints: None    Functional Impairment:   Personal: 4  Family: 3  Work: N/A  Social:4    Clinical assessment of mental status: Choco Claudio  presented on time for scheduled session today.   He was oriented x3, open and cooperative, and hygiene and grooming was poor. His memory was fair for short and long term.  His speech and language was excessive and tangential. Concentration and focus is Brief. Psychosis is noted. He reports his mood is depressed.  Affect is congruent with speech.  Fund of knowledge is adequate. Insight is adequate for therapy.      Suicidal/Homicidal Ideation present: None Reported This Session    Patient's impression of their current status: Patient indicated feeling depressed. Patient reported he does not know how he could feel any other way giving what is happening in his life. Patient indicated the police are continuing to try and find his tent. Patient reported he is now leaving his campsite early in the morning and not returning until dark. Patient indicated he spends Friday and Saturday night at his parents which is when he showers. Patient reported he continues to worry all day long that someone will find his campsite. Patient reported there are times when he is not taking the medication as prescribed by forgetting to take it.  Patient indicated he believes having a job may help to reduce his racing thoughts.     Therapist impression of patients current state: Patient appears to have fair insight into his mental health. This therapist processed with patient the steps to take in order to apply for a job. This therapist processed with patient his options for shelter and ways this  can be beneficial to him. This therapist educated patient againon the importance of his medications.     Type of psychotherapeutic technique provided: Insight oriented, Client centered and CBT    Progress toward short term goals:Progress as expected with patient coming to scheduled session.. Poor progress as evidenced by continuing to not have housing, not taking medication as prescribed and drinking.     Review of long term goals: Long-term goals reviewed 06/05/2018    Diagnosis:   1. Major depressive disorder, recurrent episode, moderate (H)    2. PTSD (post-traumatic stress disorder)    3. Severe alcohol use disorder (H)      Plan and Follow up: Patient will return in one week for scheduled session. Patient should continue to go to his parents house to shower and get his medications. Patient would benefit from starting to find activities or a job that he can do during the day. Patient would benefit from looking at shelters.     Discharge Criteria/Planning: Client has chronic symptoms and ongoing therapy for maintenance stability recommended.    Marcella Nogueira 8/28/2018

## 2021-06-20 NOTE — PROGRESS NOTES
Psychiatric  Progress Note  Date of visit:9/14/2018         Discussion of Care and Treatment Recommendations:   This is a 53 y.o. male with significant history of Alcohol Use Disorder, Etoh induced gastritis , DENISE and PTSD who presents to the clinic today , noncompliance with treatment plan.  Patient is here today for  a follow up appointment       Last visit 7/13/18 .  Recommendation at last visit   1.-Continue with current medications Trazodone  100 mg at Bedtime, gabapentin 100  mg at bedtime, Start Prazosin 2 mg at bedtime -PTSD, Start Seroquel 25 mg  at bedtime - psychosis , delusional thoughts  2- Highly recommend Inpatient CD treatment Alcohol Use Disorder - Severe. Pt  opposed to CD treatment   3- Continue with  Psychotherapy:  4- 5-Make efforts to stay away from alcohol and drug  5- RTC-10  weeks call in between visit with any questions or concerns  Patient and I reviewed diagnosis and treatment plan and patient agrees with following recommendations:  Ongoing education given regarding diagnostic and treatment options with adequate verbalization of understanding.  Plan   1.-Continue with current medications Trazodone  100 mg at Bedtime, gabapentin 100  mg at bedtime, Start Prazosin 2 mg at bedtime -PTSD, Start Seroquel 25 mg  at bedtime - psychosis , delusional thoughts  2- Highly recommend Inpatient CD treatment Alcohol Use Disorder - Severe. Pt  opposed to CD treatment   3- Continue with  Psychotherapy:  4- 5-Make efforts to stay away from alcohol and drug  5- RTC-10  weeks call in between visit with any questions or concerns       Diagnoses:     PTSD  Alcohol Use Disorder , Severe   Delusional disorder R/O Schizophrenia     Patient Active Problem List   Diagnosis     Chest pain     Alcohol withdrawal, with unspecified complication (H)     Sinus tachycardia     Lactic acidosis     Dehydration     Microcytosis     Non-traumatic rhabdomyolysis     Nausea     Alcoholic ketoacidosis     Epigastric pain      "Alcoholism, chronic (H)     High anion gap metabolic acidosis     Hypomagnesemia     Alcohol intoxication, uncomplicated (H)     GI bleed     Anemia associated with acute blood loss     Disorder due to alcohol abuse (H)     Substance induced mood disorder (H)     Anxiety disorder     History of posttraumatic stress disorder (PTSD)     Erosive esophagitis     Polyarthralgia     Cervical spondylosis     VIKI positive     Inflammatory arthritis     Arthritis of right ankle     Gouty arthritis of toe of left foot     Pulmonary embolism (H)     Primary osteoarthritis involving multiple joints     Supratherapeutic INR     Hypokalemia     Epistaxis             Chief Complaint / Subjective:    Chief complaint: \" I am ok \"     History of Present Illness:  Patient is currently homeless.  Per patient's statement : \" I am drinking everyday and it is hard to find sleep in my tent- pt is homeless and has a tent and sleeps outside. He in he past has refused to cooperate with [employer]  to assist in housing and has turned down housing from them . Now he is willing to meet with a [employer]  . I encouraged him to cooperate and not be too picky on housing allocation . [place] and has rejected an offer for housing with [employer] in the [facility] before . We talked about colder temperatures and winter and reminded hi that it is safer to accept housing and that it is unsafe and more dangerous to be sleeping outside. He remains uninterested to quit alcohol/   He is taking his medications. He goes to his mother senior living apartment on weekends to take showers and have his med's set up.  Resources for available shelters also provided.  He reports that the police are following him and reported to me that he does not want to go outside because he thinks that over the looking for him and he does not want them to know where he has pitched his stent.  He would never disclose to anyone " "including myself with his dentist pitched because his paranoid that people are out there to get him.   He reports compliance with med's and denies side effects . DISCUSS score = Zero today.  He is adamant that he does not want to make any changes to his current medications.  He meets with his therapist on  weekly basis      He denies suicidal homicidal ideations.  He denies depression.  He does some anxiety related to I think the police are trying to find out where I have pitched my intent\" \"he tells me he feels safe does not appear to be in any apparent distress or in any imminent danger to self or others and verbally contracts for safety.    I will have him return in 8 weeks, for a follow up appointment and yanelis in between visit  with any  questions or concerns     Mental Status Examination:   General: Poor hygiene, cooperative  Speech: Excessive, tangential  Language: Intact  Thought process: Coherent  Thought content:                           Auditory hallucinations-patient denies that he is noted to be talking  to self as if responding to internal stimuli                          Visual Hallucinations - Denies                          Delusions : Presents even though pt denies                           Loose Associations:  No                          Suicidal thoughts: Denies                           Homicidal thoughts: Denies                        Affect: Neutral  Mood: Neutral   Intellectual functioning: Within normal limits  Memory: Within normal limits  Fund of knowledge: Average  Attention and concentration: Within normal limits  Gait: Steady  Psychomotor activity: Calm, no agitation  Muscles: No atrophy, no abnormal movements  InSight and judgment: Fair    Medication changes: See above   Medication adherence: compliant  Medication side effects: absent  Information about medications: Side effects, benefits and alternative treatments discussed and patient agrees with capacity to do so.    Psychotherapy: " Supportive therapy day-to-day living    Education: Diet, exercise, abstinence from drugs and alcohol, patient will not drive if sedated and medications or  under influence of any substance    Lab Results:   Personally reviewed and discussed with the patient    Lab Results   Component Value Date    WBC 7.8 07/23/2018    HGB 11.1 (L) 07/24/2018    HCT 38.3 (L) 07/23/2018     07/23/2018    ALT 15 07/23/2018    AST 19 07/23/2018     07/23/2018    K 3.4 (L) 07/24/2018     07/23/2018    CREATININE 1.00 07/23/2018    BUN 5 (L) 07/23/2018    CO2 27 07/23/2018    TSH 2.17 03/24/2018    INR 2.77 (H) 07/24/2018       Vital signs:  There were no vitals taken for this visit.    Allergies: Other environmental allergy  Allergies   Allergen Reactions     Other Environmental Allergy Other (See Comments)     Seasonal allergies  Nasal congestion          Medications:       Current Outpatient Prescriptions on File Prior to Visit   Medication Sig Dispense Refill     acetaminophen (TYLENOL) 500 MG tablet Take 1,000 mg by mouth every 6 (six) hours as needed for pain.       cetirizine (ZYRTEC) 10 MG tablet Take 10 mg by mouth at bedtime.        omeprazole (PRILOSEC) 20 MG capsule Take 20 mg by mouth daily as needed.        prazosin (MINIPRESS) 2 MG capsule Take 1 capsule (2 mg total) by mouth at bedtime. 30 capsule 2     QUEtiapine (SEROQUEL) 25 MG tablet Take 1 tablet (25 mg total) by mouth at bedtime. 30 tablet 2     traZODone (DESYREL) 100 MG tablet TAKE 1 TABLET(100 MG) BY MOUTH AT BEDTIME 30 tablet 2     warfarin (COUMADIN) 5 MG tablet Take 1 tablet (5 mg total) by mouth daily. Take 1 tablets (5 mg) by mouth daily. Adjust dose based on INR results as directed. 30 tablet 0     No current facility-administered medications on file prior to visit.                 Review of Systems:      ROS:       10 point ROS was negative except for the items listed in HPI.    Coordination of Care:   More than 25 minutes spent on this  visit  with more than 50% of time spent on coordination of care including: Educating patient about diagnosis, prognosis, side effects and benefits of medications, diet, exercise.  Time also spent on entering orders and preparing documentation for the visit.Time also spent providing supportive therapy regarding above issues.    This note was created using a dictation system. All typing errors or contextual distortion is unintentional and software inherent.

## 2021-06-20 NOTE — PROGRESS NOTES
Patient is here for follow up appointment.  He is still living in a tent during the week and his parents on weekends.  His mother sets up his medications.  He does have paranoia.  He denies hallucinations.  Ming will be meeting with him Monday at his parents house. He states his depression is managed, but does have marcell anxiety when living in his tent.  He sees a therapist weekly.      Correct pharmacy verified with patient and confirmed in snapshot? [x] yes []no    Charge captured ? [x] yes  [] no    Medications Phoned  to Pharmacy [] yes [x]no  Name of Pharmacist:  List Medications, including dose, quantity and instructions      Medication Prescriptions given to patient   [] yes  [x] no   List the name of the drug the prescription was written for.       Medications ordered this visit were e-scribed.  Verified by order class [x] yes  [] no refills    Medication changes or discontinuations were communicated to patient's pharmacy: [] yes  [x] NA    UA collected [] yes  [x] no    [place] Prescription Monitoring Program Reviewed? [] yes  [x] no    Referrals were made to: NA     Future appointment was made: [x] yes  [] no    Dictation completed at time of chart check: [x] yes  [] no    I have checked the documentation for today s encounters and the above information has been reviewed and completed.

## 2021-06-20 NOTE — PROGRESS NOTES
Mental Health Visit Note    09/25/2018    Start time: 1200    Stop Time: 1253   Session # 29    Session Type: Patient is presenting for an Individual session.    Choco Claudio is a 53 y.o. male is being seen today for    Chief Complaint   Patient presents with      Follow Up     Struggles of homelessness and trauma     New symptoms or complaints: None    Functional Impairment:   Personal: 4  Family: 3  Work: N/A  Social:4    Clinical assessment of mental status: Choco Claudio presented on time for scheduled session today.   He was oriented x3, open and cooperative but guarded. His hygiene and grooming was poor. His memory was poor for short and long term.  His speech and language was excessive and tangential. Concentration and focus is brief. Psychosis is noted. He reports his mood is depressed and anxious.  Affect is congruent with speech.  Fund of knowledge is adequate. Insight is adequate for therapy.      Suicidal/Homicidal Ideation present: None Reported This Session    Patient's impression of their current status: Patient indicated continuing to feel depressed and anxious. Patient reported he feels depressed over the fact that he is homeless. Patient indicated he is working with her [employer] worker but has not idea when he will get a place. Patient reported he is noticing his depression getting worse and worse. Patient indicated the [employer] taught him to never give up so that keeps him going even in the tough times. Patient reported his anxiety continues to be high. Patient indicated he continues to believe people are looking for his tent. Patient reported he is extremely worried once the leaves fall that people will find his tent and he will have no where to go.     Therapist impression of patients current state: Patient appears to have poor insight into his mental health. This therapist challenged patient on ways he is struggling more and more daily with living in the tent. This therapist processed  with patient the options of shelter as a place to go to feel safe. This therapist challenged patient on ways his mental health is going to continue to get worse if he does not decide to go to a shelter.     Type of psychotherapeutic technique provided: Insight oriented, Client centered and CBT    Progress toward short term goals:Progress as expected with patient coming to scheduled session. Poor progress as evidenced by continuing to not have housing and drinking.    Review of long term goals: Long term review on 09/11/2018    Diagnosis:   1. Major depressive disorder, recurrent episode, moderate (H)    2. PTSD (post-traumatic stress disorder)    3. Severe alcohol use disorder (H)      Plan and Follow up: Patient will return in one week for scheduled session. Patient should continue to go to his parents house to shower and get his medications. Patient would benefit from looking at shelters and working with his case manger on different housing options.     Discharge Criteria/Planning: Client has chronic symptoms and ongoing therapy for maintenance stability recommended.    Marcella Nogueira

## 2021-06-20 NOTE — LETTER
Letter by Juan Manuel Gilliam MD at      Author: Juan Manuel Gilliam MD Service: -- Author Type: --    Filed:  Encounter Date: 2/26/2020 Status: (Other)         Choco Claudio  [address]  [place] [address]    February 26, 2020    Dear  Osmanelinor,    Welcome to [facility]! Your appointment information is below.   Please bring the following to your appointment:    Insurance Card, so we may scan it for our records    Drivers license or valid ID, so we may scan it for our records    Co-pay (as applicable per your insurance plan)    A current list of your medications including over the counter products such as vitamins and supplements    Your medical records including copies of X-Ray films if you are transferring your care from another clinic.  If you do not have your records, please fill out the release of information form and we will request those records.     Provider: Juan Manuel Gilliam MD  Appointment Date: March 13, 2020  Arrival Time: 3:15PM  Appointment Time: 3:30PM    Location: [facility]         [address]        [place], [address]    **Please allow adequate time for your commute and parking. If you are more than 10 minutes late, you may be asked to reschedule.     If you need to cancel or reschedule your appointment, please notify us at least 24 hours prior to your appointment time so we are able to make this time available for another patient.    Thank you for choosing the [facility] for your health care needs. If you have any questions, please do not hesitate to contact us at any time at   [phone]. We look forward to caring for you.     Sincerely,     [facility] staff

## 2021-06-20 NOTE — LETTER
Letter by Juan Manuel Gilliam MD at      Author: Juan Manuel Gilliam MD Service: -- Author Type: --    Filed:  Encounter Date: 3/13/2020 Status: (Other)         Matheus Gonzales MD  [address] [facility] [address]  [place] [address]                                  March 13, 2020    Patient: Choco Claudio   MR Number: [id number]   YOB: 1965   Date of Visit: 3/13/2020     Dear Dr. Christian MD:    Thank you for referring Choco Claudio to me for evaluation. Below are the relevant portions of my assessment and plan of care.    If you have questions, please do not hesitate to call me. I look forward to following Choco along with you.    Sincerely,        Juan Manuel Gilliam MD          CC  No Recipients  Juan Manuel Gilliam MD  3/13/2020  4:27 PM  Sign when Signing Visit  Pulmonary Clinic Outpatient Consultation    Assessment and Plan:   54 year old man with history of alcoholism, GERD, tremor, PTSD, small PE diagnosed in 2018 no longer on anticoagulation, presents for orthopnea and dyspnea on exertion. Exam is unremarkable today. PFTs showed marked reductions in FEV1 and FVC which may have been effort related; the degree of reduction seems out of proportion to the other PFT parameters. There was significant bronchodilator response. There was no restriction but significant air trapping and possibly hyperinflation on body pleth. DLco was moderately reduced. Differential for his symptoms and PFT abnormalities include reactive airway disease vs. Asthma, recurrent PE, and CHF (less likely with normal BNP). It was difficult to get a consistent history from him today.    Recommendations:  - VQ scan to look for PE since he is unable to tolerate CT scan  - trial of albuterol 2 puffs before exertion to see if this helps with his breathing, demonstrated technique in clinic today  - echo to evaluate for heart disease given his orthopnea  - he says he is UTD with flu vaccine    Follow up in 4-6 weeks for reassessment.    Juan Manuel (Ryan) Mane  "MD CORNEJO [facility]  Pulmonary & Critical Care  Pager [phone]  Clinic [phone]      CCx: dyspnea    HPI: 54 year old man with history of alcoholism, GERD, tremor, PTSD, small PE diagnosed in 2018 no longer on anticoagulation, presents for orthopnea and dyspnea on exertion. He's had several ER visits over the last month for these issues without a diagnosis. He notes that shortness of breath occurred suddenly 2 months ago while he was walking to the gas station from his house. He has to stop to catch his breath while climbing stairs or hills.  He has a \"nervous\" cough but it's not productive.   He also notes orthopnea, can't lay flat for more than a few seconds (he demonstrated this for me in the exam room today, but it was unclear if he had shortness of breath or anxiety while laying flat).   Denies chest pain, peripheral edema, palpitations.  He appears disheveled and malodorous today.  He's accompanied by his mother Claudia who does a lot of talking on his behalf.  He says he was fine prior to 2 months ago when these attacks of dyspnea on exertion began.     ROS:  A 12-system review was obtained and was negative with the exception of the symptoms endorsed in the history of present illness.    PMH:  Past Medical History:   Diagnosis Date   ? Acid reflux    ? Alcohol intoxication, uncomplicated (H)    ? Alcohol withdrawal seizure (H)    ? Alcoholic ketoacidosis 12/4/2016   ? Alcoholism (H)    ? Anxiety disorder    ? Arthritis    ? Back pain    ? Essential tremor    ? GI bleed 1/14/2017   ? Gout    ? Learning disability    ? Paranoid schizophrenia (H)    ? PTSD (post-traumatic stress disorder)    ? Pulmonary embolism (H)        PSH:  Past Surgical History:   Procedure Laterality Date   ? ESOPHAGOGASTRODUODENOSCOPY N/A 1/15/2017    Procedure: ESOPHAGOGASTRODUODENOSCOPY (EGD);  Surgeon: Hans Gleason MD;  Location: [facility];  Service:    ? HAND SURGERY Right    ? HERNIA REPAIR      Age " 7       Allergies:  No Known Allergies    Family HX:  Family History   Problem Relation Age of Onset   ? Arthritis Mother    ? Tremor Mother    ? Diabetes Mother    ? Tremor Brother    ? Thyroid disease Brother        Social Hx:  Social History     Socioeconomic History   ? Marital status:      Spouse name: Not on file   ? Number of children: Not on file   ? Years of education: Not on file   ? Highest education level: Not on file   Occupational History   ? Not on file   Social Needs   ? Financial resource strain: Not on file   ? Food insecurity     Worry: Not on file     Inability: Not on file   ? Transportation needs     Medical: Not on file     Non-medical: Not on file   Tobacco Use   ? Smoking status: Current Some Day Smoker     Types: Pipe   ? Smokeless tobacco: Never Used   ? Tobacco comment: just when at pow wows and use of pipe   Substance and Sexual Activity   ? Alcohol use: Yes     Alcohol/week: 8.0 standard drinks     Types: 8 Cans of beer per week     Comment: when has money   ? Drug use: No     Comment: remote history of marijuana use   ? Sexual activity: Not Currently   Lifestyle   ? Physical activity     Days per week: Not on file     Minutes per session: Not on file   ? Stress: Not on file   Relationships   ? Social connections     Talks on phone: Not on file     Gets together: Not on file     Attends Zoroastrian service: Not on file     Active member of club or organization: Not on file     Attends meetings of clubs or organizations: Not on file     Relationship status: Not on file   ? Intimate partner violence     Fear of current or ex partner: Not on file     Emotionally abused: Not on file     Physically abused: Not on file     Forced sexual activity: Not on file   Other Topics Concern   ? 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 He has a history of homelessness but currently lives with his mother.       Current Meds:  Current  Outpatient Medications   Medication Sig Dispense Refill   ? cetirizine (ZYRTEC) 10 MG tablet Take 10 mg by mouth at bedtime.      ? omeprazole (PRILOSEC) 20 MG capsule Take 20 mg by mouth daily as needed.      ? prazosin (MINIPRESS) 2 MG capsule Take 1 capsule (2 mg total) by mouth at bedtime. 90 capsule 0   ? QUEtiapine (SEROQUEL) 200 MG tablet Take 1 tablet (200 mg total) by mouth at bedtime. 90 tablet 0   ? ranitidine (ZANTAC) 300 MG tablet Take 300 mg by mouth daily.  1   ? traZODone (DESYREL) 100 MG tablet TAKE 1 TABLET(100 MG) BY MOUTH AT BEDTIME 90 tablet 0   ? acetaminophen (TYLENOL) 500 MG tablet Take 1,000 mg by mouth every 6 (six) hours as needed for pain.     ? albuterol (PROAIR HFA;PROVENTIL HFA;VENTOLIN HFA) 90 mcg/actuation inhaler Inhale 2 puffs every 4 (four) hours as needed (cough). 1 Inhaler 0     No current facility-administered medications for this visit.        Physical Exam:  /60   Pulse 87   Temp 98.6  F (37  C)   Resp 20   Ht 6' (1.829 m)   Wt 212 lb (96.2 kg)   SpO2 94%   BMI 28.75 kg/m    Gen: awake, alert, oriented, no distress  HEENT: nasal turbinates are unremarkable, no oropharyngeal lesions, no cervical or supraclavicular lymphadenopathy  CV: RRR, no M/G/R  Resp: diminished air entry bilaterally. Appears effort related. 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 No wheezing or rhonchi.   Abd: soft, nontender, no palpable organomegaly  Skin: no apparent rashes  Ext: no cyanosis, clubbing or edema  Neuro: alert, nonfocal    Labs:  Reviewed  Chem panel wnl  BNP normal  Troponin negative  hgb 15.2    Imaging studies:  EXAM: CTA CHEST PE RUN  LOCATION: [facility]  DATE/TIME: 4/15/2019 3:49 PM     INDICATION: Shortness of breath  COMPARISON: 08/04/2018  TECHNIQUE: Helical acquisition through the chest was performed during the arterial phase of contrast enhancement using IV contrast. 2D and 3D reconstructions were performed by the CT technologist. Dose reduction  techniques were used.   IV CONTRAST: Iohexol (Omni) 100 mL     FINDINGS:  ANGIOGRAM CHEST: Pulmonary arteries are normal caliber and negative for pulmonary emboli. Normal caliber thoracic aorta with no dissection or aneurysm.     LUNGS AND PLEURA: The left hemidiaphragm is elevated with adjacent atelectasis. Lungs are otherwise clear. No pleural effusion.     MEDIASTINUM: No lymphadenopathy. No coronary artery calcification.     LIMITED UPPER ABDOMEN: Negative.     MUSCULOSKELETAL: Negative.     IMPRESSION:   CONCLUSION:  1.  No evidence of pulmonary embolus. No change from previous.    EXAM: XR CHEST 2 VIEWS  LOCATION: [facility]  DATE/TIME: 2/24/2020 4:29 PM     INDICATION: shortness of breath  COMPARISON: None.     IMPRESSION:   Mild bibasilar atelectasis similar to previous. Lungs otherwise clear. Normal heart size and pulmonary vascularity. Chronic fracture deformity of the mid right clavicle.    Echo March 2018    Left Ventricle: Normal left ventricular size and systolic function.The calculated left ventricular ejection fraction is 60%. This represents a normal ejection fraction. The left ventricular wall thickness is normal. E/e' < 8, suggesting normal LV filling pressure.    The left ventricular wall motion is normal.    Right Ventricle: Right ventricle not well visualized. Right ventricle not well visualized but suspect normal right ventricular systolic function. Normal TAPSE also suggests that right ventricular systolic function is within normal limits. TAPSE is normal, which is consistent with normal right ventricular systolic function.    PFT's   3/12/2020  FEV1/FVC is 75% and is normal.  FEV1 is 0.88 L or 21% predicted and is reduced.  FVC is 1.18 L or 22% predicted and is reduced.  There was improvement in spirometry after a single inhaled dose of bronchodilator.  TLC is 9.50 L or 126% predicted and is increased.  RV is 8.34 L or 357% predicted and is increased.  DLCO is 20.98 mL/min/mmHg or  68% predicted and is reduced when it   is corrected for hemoglobin.  The flow volume loop is normal No.     Impression:  Full Pulmonary Function Test is abnormal.   Spirometry is consistent with very severe obstructive ventilatory defect.  Spirometry is consistent with reversibility.  There is hyperinflation.  There is air-trapping.  Diffusion capacity when corrected for hemoglobin is mildly reduced.     Flow volume loops are quite erratic and appear to be non-forced maneuvers.  There is comment that the test meets ATS standards but based on flow-volume loops these results are questionable and should be interpreted with caution.  Clinical correlation is required.

## 2021-06-20 NOTE — PROGRESS NOTES
Outpatient Mental Health Treatment Plan    Name:  Choco Claudio  :  1965  MRN:  [id number]    Treatment Plan:  Updated Treatment Plan  Intake/initial treatment plan date:  2017  Benefit and risks and alternatives have been discussed: Yes  Is this treatment appropriate with minimal intrusion/restrictions: Yes  Estimated duration of treatment:  Ongoing psychotherapy at this time  Anticipated frequency of services:  Weekly  Necessity for frequency: This frequency is needed to establish therapeutic goals and for continuity of care in order to monitor progress.  Necessity for treatment: To address cognitive, behavioral, and/or emotional barriers in order to work toward goals and to improve quality of life.    Plan:       ?   ?  Posttraumatic Stress Disorder    Goal:  Reduced the signs and symptoms of PTSD and increase patient's ability to tolerate breakthrough stressors surrounding his various traumas.   Strategies: ? [x]Learn and practice relaxation techniques and other coping strategies (e.g., thought stopping, reframing, meditation)     ? [x] Increase involvement in meaningful activities     ? [x] Discuss sleep hygiene     ? [x] Explore thoughts and expectations about self and others     ? [x] Identify and monitor triggers for panic/anxiety symptoms     ? [x] Implement physical activity routine (with physician approval)     ? [x] Consider introduction of bibliotherapy and/or videos     ? [x] Continue compliance with medical treatment plan (or explore barriers)   ?Degree to which this is a problem: 4  Degree to which goal is met: 1    Date of next review: 2018    Substance use  Goal:  Increase patient awareness regarding substance use triggers.  Consider harm reduction and/or chemical dependency treatment.   Strategies: ? [x] Consider referral for chemical dependency evaluation     ? [x] Discuss barriers to participating in AA or other peer-facilitated groups         [x] Address environmental  factors which may interfere with sobriety     ? [x] Explore short-term versus long-term consequences of use    ?  Degree to which this is a problem: 4  Degree to which goal is met: 1    Date of next review: 12/11/2018    Depression  Goal:  Reduced the signs and symptoms of depression and increase patient's ability to tolerate breakthrough symptomology.   Strategies: ? [x]Learn and practice relaxation techniques and other coping strategies (e.g., thought stopping, reframing, meditation)     ? [x] Increase involvement in meaningful activities     ? [x] Discuss sleep hygiene     ? [x] Explore thoughts and expectations about self and others     ? [x] Identify and monitor triggers for panic/anxiety symptoms     ? [x] Implement physical activity routine (with physician approval)     ? [x] Consider introduction of bibliotherapy and/or videos     ? [x] Continue compliance with medical treatment plan (or explore barriers)   ?Degree to which this is a problem: 4  Degree to which goal is met: 1    Date of next review: 12/11/2018       Functional Impairment:  1=Not at all/Rarely  2=Some days  3=Most Days  4=Every Day    Personal : 4  Family : 4  Social : 4   Work/school : N/A at this time    Diagnosis:  Posttraumatic stress disorder; alcohol use disorder, severe; and major depressive disorder, recurrent, moderate.    Strengths:  supported by mother, one good friend, employed, and motivated for change.  Limitations:  isolation, ambivalence about discontinuing alcohol use, and difficulty connecting with others due to trauma symptoms  Cultural Considerations: Client identifies is half .    Persons responsible for this plan: Patient and Provider            Psychotherapist Signature           Patient Signature:                This note was created with help of Dragon dictation software.  Grammatical / typing errors are not intentional and inherent to the software.

## 2021-06-21 NOTE — PROGRESS NOTES
Mental Health Visit Note    10/30/2018    Start time: 1200   Stop Time: 1245  Session # 33    Session Type: Patient is presenting for an Individual session.    Choco Claudio is a 53 y.o. male is being seen today for    Chief Complaint   Patient presents with      Follow Up     Depression      New symptoms or complaints: None    Functional Impairment:   Personal: 4  Family: 3  Work: N/A  Social:4    Clinical assessment of mental status: Choco Claudio presented on time for scheduled session today.  He was oriented x3, open and cooperative but exhibited paranoia and guarded during most of the session. His hygiene and grooming was fair. His memory was fair for short and long term.  His speech and language was soft and monotone. Concentration and focus is brief. Psychosis is noted. He reports his mood is depressed.  Affect is congruent with speech.  Fund of knowledge is adequate. Insight is adequate for therapy. Reviewed and made changes     Suicidal/Homicidal Ideation present: None Reported This Session    Patient's impression of their current status: Patient indicated feeling depressed. Patient reported the depression started to get bad yesterday. Patient indicated this is after his parents told him he is allowed to stay Friday, Saturday and Sunday night but not come during the week anymore. Patient reported feeling hurt by this decision by his parents. Patient indicated feeling that they would do anything for his son but not for him. Patient reported his parents knowing that he needs them but not feeling that they care. Patient indicated continuing to work with his worker for housing.     Therapist impression of patients current state: Patient appears to have poor insight into his mental health. This therapist challenged patient on how he feels about his parents decision. This therapist processed with patient his goals and ways to work towards those goals at this time. This therapist processed with  patient the importance of taking his medication daily now that he will not be seeing his mom to give it to him.     Type of psychotherapeutic technique provided: Insight oriented, Client centered and CBT    Progress toward short term goals:Progress as expected with patient coming to scheduled session. Poor progress as evidenced by continuing to not have housing, and drinking.    Review of long term goals: Long term review on 09/11/2018    Diagnosis:   1. PTSD (post-traumatic stress disorder)    2. Delusional disorder (H)    3. Severe alcohol use disorder (H)      Plan and Follow up: Patient will return in one week for scheduled session. Patient should continue to go to his parents house to shower and get his medications dasily. Patient would benefit from looking at shelters and working with his case manger on different housing options. Patient should work on stopping drinking.     Discharge Criteria/Planning: Client has chronic symptoms and ongoing therapy for maintenance stability recommended.    Marcella Nogueira

## 2021-06-21 NOTE — PROGRESS NOTES
Mental Health Visit Note    10/23/2018    Start time: 1215    Stop Time: 100  Session # 32    Session Type: Patient is presenting for an Individual session.    Choco Claudio is a 53 y.o. male is being seen today for    Chief Complaint   Patient presents with      Follow Up     Homeless, worry and stress     New symptoms or complaints: None    Functional Impairment:   Personal: 4  Family: 3  Work: N/A  Social:4    Clinical assessment of mental status: Choco Claudio presented on time for scheduled session today.  He was oriented x3, open and cooperative but exhibited paranoia and guarded during most of the session. His hygiene and grooming was good. His memory was fair for short and long term.  His speech and language was excessive and tangential. Concentration and focus is brief. Psychosis is noted. He reports his mood is anxious.  Affect is congruent with speech.  Fund of knowledge is adequate. Insight is adequate for therapy.      Suicidal/Homicidal Ideation present: None Reported This Session    Patient's impression of their current status: Patient reported feeling anxious. Patient indicated it continues to be hard for him to be homeless. Patient reported when he missed his appointment two weeks ago he does not recall the timeframe. Patient indicated to this day he is unsure what happened. Patient reported he believes he may have gone to his other campsite but cannot specifically recall. Patient indicated being homeless is really messing with his mental health. Patient reported he is on edge at all times and concerned that people are looking for him.      Therapist impression of patients current state: Patient appears to have poor insight into his mental health. This therapist processed with patient continuing to work with his  to get housing. This therapist processed with patient ways drinking can affect people timeframe and state of mind.     Type of psychotherapeutic technique provided:  Insight oriented, Client centered and CBT    Progress toward short term goals:Progress as expected with patient coming to scheduled session. Poor progress as evidenced by continuing to not have housing, and drinking.    Review of long term goals: Long term review on 09/11/2018    Diagnosis:   1. PTSD (post-traumatic stress disorder)    2. Delusional disorder (H)    3. Severe alcohol use disorder (H)      Plan and Follow up: Patient will return in one week for scheduled session. Patient should continue to go to his parents house to shower and get his medications dasily. Patient would benefit from looking at shelters and working with his case manger on different housing options. Patient should work on stopping drinking.     Discharge Criteria/Planning: Client has chronic symptoms and ongoing therapy for maintenance stability recommended.    Marcella Nogueira

## 2021-06-21 NOTE — PROGRESS NOTES
Mental Health Visit Note    11/20/2018    Start time: 1200   Stop Time: 1245  Session # 36    Session Type: Patient is presenting for an Individual session.    Choco Claudio is a 53 y.o. male is being seen today for    Chief Complaint   Patient presents with      Follow Up     Homeless     New symptoms or complaints: None    Functional Impairment:   Personal: 4  Family: 3  Work: N/A  Social:4    Clinical assessment of mental status: Choco Claudio presented on time for scheduled session today.  He was oriented x3, open and cooperative and appropriate for therapy. His hygiene and grooming was poor due to being homeless and having no where to shower. His memory was poor for short and long term.  His speech and language was soft and monotone. Concentration and focus is brief. Psychosis is noted. He reports his mood is depressed  Affect is congruent with speech.  Fund of knowledge is adequate. Insight is adequate for therapy. Reviewed mental status and made changes as needed.   Suicidal/Homicidal Ideation present: None Reported This Session    Patient's impression of their current status: Patient reported feeling depressed. Patient indicated he does not feel his depression will get better until he has a place to live. Patient reported he met with Debra last week and his [employer] worker came with him. Patient indicated new information was provided to the [employer] worker which makes patient believe he will get housing sooner. Patient reported he spends his time riding the bus until it gets dark. Patient indicated at this point he is just trying to survive. Patient reported he is going to spent 5 nights at his parents which will help greatly.     Therapist impression of patients current state: Patient appears to have poor insight into his mental health. This therapist processed with patient his options at this time. This therapist challenged patient on the risks of continuing to stay in a tent. This therapist  processed with patient coping skills to help with his depressed mood.     Type of psychotherapeutic technique provided: Insight oriented, Client centered and CBT    Progress toward short term goals:Progress as expected with patient coming to scheduled session. Poor progress as evidenced by continuing to not have housing, and drinking.    Review of long term goals: Long term review on 09/11/2018    Diagnosis:   1. PTSD (post-traumatic stress disorder)    2. Delusional disorder (H)    3. Severe alcohol use disorder (H)      Plan and Follow up: Patient will return in one week for scheduled session. Patient should continue to go to his parents house to shower. Patient should continue to take his medication daily as prescribed. Patient would benefit from looking at shelters and working with his case manger on different housing options. Patient should work on stopping drinking.     Discharge Criteria/Planning: Client has chronic symptoms and ongoing therapy for maintenance stability recommended.    Marcella Nogueira

## 2021-06-21 NOTE — PROGRESS NOTES
Patient is here for follow up appointment.  He has his [employer] worker with him.  He denies hallucinations.  Depression and anxiety are dependant of is he is at his parents house where he can let his guard down.  He last drank beer last night.  He states he has not noticed any changes with the prazosin.  His mother sets up his medications.  He sees a therapist weekly.    Correct pharmacy verified with patient and confirmed in snapshot? [x] yes []no    Charge captured ? [x] yes  [] no    Medications Phoned  to Pharmacy [] yes [x]no  Name of Pharmacist:  List Medications, including dose, quantity and instructions      Medication Prescriptions given to patient   [] yes  [x] no   List the name of the drug the prescription was written for.       Medications ordered this visit were e-scribed.  Verified by order class [x] yes  [] no  Refills    Medication changes or discontinuations were communicated to patient's pharmacy: [] yes  [x] NA    UA collected [] yes  [x] no    [place] Prescription Monitoring Program Reviewed? [] yes  [x] no    Referrals were made to:  [facility]    Future appointment was made: [x] yes  [] no    Dictation completed at time of chart check: [x] yes  [] no    I have checked the documentation for today s encounters and the above information has been reviewed and completed.

## 2021-06-21 NOTE — PROGRESS NOTES
Mental Health Visit Note    11/13/2018    Start time: 1200   Stop Time: 1245  Session # 35    Session Type: Patient is presenting for an Individual session.    Choco Claudio is a 53 y.o. male is being seen today for    Chief Complaint   Patient presents with     MH Follow Up     Concern for housing     New symptoms or complaints: None    Functional Impairment:   Personal: 4  Family: 3  Work: N/A  Social:4    Clinical assessment of mental status: Choco Claudio presented on time for scheduled session today.  He was oriented x3, open and cooperative and appropriate for therapy. His hygiene and grooming was poor due to being homeless and having no where to shower. His memory was poor for short and long term.  His speech and language was soft and monotone. Concentration and focus is brief. Psychosis is noted. He reports his mood is anxious.  Affect is congruent with speech.  Fund of knowledge is adequate. Insight is adequate for therapy. Mental status reviewed and changes made as needed.      Suicidal/Homicidal Ideation present: None Reported This Session    Patient's impression of their current status: Patient indicated feeling anxious. Patient reported it being a rough night due to the cold. Patient indicated despite it being cold he feels better in the tent then in a homeless shelter. Patient reported he feels that people are not trying to find him housing. Patient indicated if they cared he would have somewhere to live. Patient reported he has not noticed footsteps towards his tent so he believes people are not looking for his at this time. Patient indicated he does continue to be extremely anxious in [place] due to feeling the  are looking for him.     Therapist impression of patients current state: Patient appears to have poor insight into his mental health. This therapist challenged patient on ways his  and this therapist and provided resources for patient but patient did not want to  go to the specific places. This therapist processed with patient the pros and cons of continuing to stay in his tent versus going to a homeless shelter. This therapist processed with patient reasons the  would be looking for him and ways that he is following the law.    Type of psychotherapeutic technique provided: Insight oriented, Client centered and CBT    Progress toward short term goals:Progress as expected with patient coming to scheduled session. Poor progress as evidenced by continuing to not have housing, and drinking.    Review of long term goals: Long term review on 09/11/2018    Diagnosis:   1. PTSD (post-traumatic stress disorder)    2. Delusional disorder (H)    3. Severe alcohol use disorder (H)      Plan and Follow up: Patient will return in one week for scheduled session. Patient should continue to go to his parents house to shower. Patient should continue to take his medication daily as prescribed. Patient would benefit from looking at shelters and working with his case manger on different housing options. Patient should work on stopping drinking.     Discharge Criteria/Planning: Client has chronic symptoms and ongoing therapy for maintenance stability recommended.    Marcella Nogueira

## 2021-06-21 NOTE — PROGRESS NOTES
Mental Health Visit Note    10/16/2018    Start time: 1200    Stop Time: 1240  Session # 31    Session Type: Patient is presenting for an Individual session.    Choco Claudio is a 53 y.o. male is being seen today for    Chief Complaint   Patient presents with      Follow Up     Paranoia and homeless     New symptoms or complaints: None    Functional Impairment:   Personal: 4  Family: 3  Work: N/A  Social:4    Clinical assessment of mental status: Choco Claudio presented on time for scheduled session today.  He was oriented x3, open and cooperative but exhibited paranoia and  guarded. His hygiene and grooming was fair. His memory was fair for short and long term.  His speech and language was excessive and tangential. Concentration and focus is brief. Psychosis is noted. He reports his mood is anxious.  Affect is congruent with speech.  Fund of knowledge is adequate. Insight is adequate for therapy.      Suicidal/Homicidal Ideation present: None Reported This Session    Patient's impression of their current status: Patient indicated feeling anxious. Patient reported going to the ER Friday morning due to his anxiety ad paranoia. Patient indicated he does not feel that he can trust anyone. Patient reported being homeless is getting harder each day. Patient indicated he cannot go to the shelter because they are not safe for him. Patient reported just needing a place to sleep at night that is safe. Patient indicated at this time feeling safer in his tent. Patient reported he did not take his medications for 4 days last week but is now back on track.     Therapist impression of patients current state: Patient appears to have poor insight into his mental health. This therapist processed with patient the importance of taking his medication as prescribed. This therapist challenged patient on ways he can ask for help but is struggling to at this time. This therapist processed with patient the idea of getting work  as a way to have some income. This therapist challenged patient on ways his paranoia is getting worse due to homelessness.     Type of psychotherapeutic technique provided: Insight oriented, Client centered and CBT    Progress toward short term goals:Progress as expected with patient coming to scheduled session. Poor progress as evidenced by continuing to not have housing, not taking medications and drinking.    Review of long term goals: Long term review on 09/11/2018    Diagnosis:   1. PTSD (post-traumatic stress disorder)    2. Delusional disorder (H)    3. Severe alcohol use disorder (H)      Plan and Follow up: Patient will return in one week for scheduled session. Patient should continue to go to his parents house to shower and get his medications dasily. Patient would benefit from looking at shelters and working with his case manger on different housing options. Patient should work on stopping drinking.     Discharge Criteria/Planning: Client has chronic symptoms and ongoing therapy for maintenance stability recommended.    Marcella Nogueira

## 2021-06-21 NOTE — PROGRESS NOTES
Mental Health Visit Note    11/6/2018    Start time: 1200   Stop Time: 1245  Session # 34    Session Type: Patient is presenting for an Individual session.    Choco Claudio is a 53 y.o. male is being seen today for    Chief Complaint   Patient presents with      Follow Up     Anxiety     New symptoms or complaints: None    Functional Impairment:   Personal: 4  Family: 3  Work: N/A  Social:4    Clinical assessment of mental status: Choco Claudio presented on time for scheduled session today.  He was oriented x3, open and cooperative but exhibited paranoia and guarded during most of the session. His hygiene and grooming waspoor. His memory was fair for short and long term.  His speech and language was soft and monotone. Concentration and focus is brief. Psychosis is noted. He reports his mood is anxious.  Affect is congruent with speech.  Fund of knowledge is adequate. Insight is adequate for therapy. Reviewed mental status and made appropriate changes.      Suicidal/Homicidal Ideation present: None Reported This Session    Patient's impression of their current status: Patient reported feeling anxious. Patient indicated it is hard being homeless. Patient reported he is always on edge and worried. Patient indicated he still has fears that people are out to get him. Patient reported he has been spending more time at his camp just being quiet. Patient indicated he also spends a lot of time riding the bus. Patient reported now that he is not allowed at his parents during the week he has to find other ways to get time to go by.     Therapist impression of patients current state: Patient appears to have poor insight into his mental health. This therapist processed with patient ways his anxiety affects many aspects of his life. This therapist challenged patient no awys he can continue to work on advocating for himself. This therapist processed with patient ways drinking can cause anxiety.     Type of  psychotherapeutic technique provided: Insight oriented, Client centered and CBT    Progress toward short term goals:Progress as expected with patient coming to scheduled session. Poor progress as evidenced by continuing to not have housing, and drinking.    Review of long term goals: Long term review on 09/11/2018    Diagnosis:   1. PTSD (post-traumatic stress disorder)    2. Delusional disorder (H)    3. Severe alcohol use disorder (H)      Plan and Follow up: Patient will return in one week for scheduled session. Patient should continue to go to his parents house to shower and get his medications dasily. Patient would benefit from looking at shelters and working with his case manger on different housing options. Patient should work on stopping drinking.     Discharge Criteria/Planning: Client has chronic symptoms and ongoing therapy for maintenance stability recommended.    Marcella Nogueira

## 2021-06-21 NOTE — PROGRESS NOTES
Psychiatric  Progress Note  Date of visit:11/15/2018         Discussion of Care and Treatment Recommendations:   This is a 53 y.o. male with significant history of Alcohol Use Disorder, Etoh induced gastritis , DENISE and PTSD who presents to the clinic today , noncompliance with treatment plan.  Patient is here today for  a follow up appointment       Last visit 9/14/18  Recommendation at last visit   1.-Continue with current medications Trazodone  100 mg at Bedtime, gabapentin 100  mg at bedtime, Start Prazosin 2 mg at bedtime -PTSD, Start Seroquel 25 mg  at bedtime - psychosis , delusional thoughts  2- Highly recommend Inpatient CD treatment Alcohol Use Disorder - Severe. Pt  opposed to CD treatment   3- Continue with  Psychotherapy:  4- 5-Make efforts to stay away from alcohol and drug  5- RTC-10  weeks call in between visit with any questions or concerns      Patient and I reviewed diagnosis and treatment plan and patient agrees with following recommendations:  Ongoing education given regarding diagnostic and treatment options with adequate verbalization of understanding.  Plan    1.-Continue with current medications Trazodone  100 mg at Bedtime, gabapentin 100  mg at bedtime, Start Prazosin 2 mg at bedtime -PTSD, Start Seroquel 25 mg  at bedtime - psychosis , delusional thoughts  2- Highly recommend Inpatient CD treatment Alcohol Use Disorder - Severe. Pt  opposed to CD treatment   3- Continue with  Psychotherapy:  4- 5-Make efforts to stay away from alcohol and drug  5- RTC-10  weeks call in between visit with any questions or concerns         Diagnoses:     PTSD  Alcohol Use Disorder , Severe   Delusional disorder R/O Schizophrenia     Patient Active Problem List   Diagnosis     Chest pain     Alcohol withdrawal, with unspecified complication (H)     Sinus tachycardia     Lactic acidosis     Dehydration     Microcytosis     Non-traumatic rhabdomyolysis     Nausea     Alcoholic ketoacidosis     Epigastric pain  "    Alcoholism, chronic (H)     High anion gap metabolic acidosis     Hypomagnesemia     Alcohol intoxication, uncomplicated (H)     GI bleed     Anemia associated with acute blood loss     Disorder due to alcohol abuse (H)     Substance induced mood disorder (H)     Anxiety disorder     History of posttraumatic stress disorder (PTSD)     Erosive esophagitis     Polyarthralgia     Cervical spondylosis     VIKI positive     Inflammatory arthritis     Arthritis of right ankle     Gouty arthritis of toe of left foot     Pulmonary embolism (H)     Primary osteoarthritis involving multiple joints     Supratherapeutic INR     Hypokalemia     Epistaxis             Chief Complaint / Subjective:    Chief complaint: \" I am doing ok but my anxiety is up sleeping in the tent in this cold weather \"     History of Present Illness:   Pt seen together with Ming    Per patient statement : He is still slipping out of the tent in the cold outside.  His anxiety is high since he gets paranoid slipping out of the tent.  His mother continues to set up his pills and reports continued compliance in the past few months.  He however continues to use alcohol with his last drink being last night.  He has been cooperative working with a [facility]  to find housing options for him.  Today during our meeting we discussed with her Ming  patient's homelessness and mental health issues.  Social alcohol was unaware patient has been homeless for many years and only used to go to his mother's house in the winter months.  I relayed this information to the  and she is hopeful that patient may be able to get find housing sooner than she had anticipated with this new information.  I did encourage and recommend that patient cooperates with a  and accepts the housing options that will be available as being outside in the tent is more dangerous especially for his health and the risk of developing " frostbite and the danger of sleeping outside in the cold winter months.  Patient is agreeable to this and he has been more cooperative with social workers this time mainly due to his continued compliance with medication compared to last year when he was not taking his medications.  I applauded patient for continued compliance with medications and cooperation with the .  Patient would like to continue the same medications despite his increased anxiety he tells me that once he finds a solution to his housing then the anxiety will dwindle down but also tells me that when he takes his medication he finds himself more calm.  He denies suicidal homicidal ideations tells me he feels safe and verbally contracts for safety.  I did note that he presented to the ED at [facility] on 10/12/2018 with complaints of anxiety.  He was seen and discharged the same day.  No medication changes were made.  He has continued to follow-up with this therapist and also has been consistent with his follow-up appointments.  I still highly recommend that he completely abstains from alcohol and that he gets help for his alcohol use disorder but he continues to be resistant.  I did again remind him of the detrimental side effects to his health with continued alcohol consumption and he endorsed understanding.  For now he will continue working with his guilt  in finding housing.  Continue psychotherapy.  Continue with current medications and return to the clinic in 12 weeks for follow-up appointment I encouraged him to call in between visits with any questions or concerns.    Mental Status Examination:   General: Adequate hygiene, cooperative  Speech: Normal in rate and tone  Language: Intact  Thought process: Coherent  Thought content:                           Auditory hallucinations- absent                           Visual Hallucinations - Absent                           Delusions Absent                           Loose  Associations:  No                          Suicidal thoughts: Absent                          Homicidal thoughts: Absent                       Affect: Neutral  Mood: Neutral   Intellectual functioning: Within normal limits  Memory: Within normal limits  Fund of knowledge: Average  Attention and concentration: Within normal limits  Gait: Steady  Psychomotor activity: Calm, no agitation  Muscles: No atrophy, no abnormal movements  InSight and judgment: Fair     Decision Making Capacity   Patient has the capacity to make independent decisions regarding medical and psychiatric care.      Medication changes: See Above   Medication adherence: compliant  Medication side effects: absent  Information about medications: Side effects, benefits and alternative treatments discussed and patient agrees with capacity to do so.    Psychotherapy: Supportive therapy day-to-day living    Education: Diet, exercise, abstinence from drugs and alcohol, patient will not drive if sedated and medications or  under influence of any substance    Lab Results:  Personally reviewed and discussed with the patient    Lab Results   Component Value Date    WBC 7.8 07/23/2018    HGB 11.1 (L) 07/24/2018    HCT 38.3 (L) 07/23/2018     07/23/2018    ALT 15 07/23/2018    AST 19 07/23/2018     07/23/2018    K 3.4 (L) 07/24/2018     07/23/2018    CREATININE 1.00 07/23/2018    BUN 5 (L) 07/23/2018    CO2 27 07/23/2018    TSH 2.17 03/24/2018    INR 2.77 (H) 07/24/2018       Vital signs:  There were no vitals taken for this visit.  .vital  Allergies: Other environmental allergy       Medications:       Current Outpatient Medications on File Prior to Visit   Medication Sig Dispense Refill     acetaminophen (TYLENOL) 500 MG tablet Take 1,000 mg by mouth every 6 (six) hours as needed for pain.       cetirizine (ZYRTEC) 10 MG tablet Take 10 mg by mouth at bedtime.        omeprazole (PRILOSEC) 20 MG capsule Take 20 mg by mouth daily as needed.         prazosin (MINIPRESS) 2 MG capsule Take 1 capsule (2 mg total) by mouth at bedtime. 30 capsule 2     QUEtiapine (SEROQUEL) 25 MG tablet Take 1 tablet (25 mg total) by mouth at bedtime. 30 tablet 2     traZODone (DESYREL) 100 MG tablet TAKE 1 TABLET(100 MG) BY MOUTH AT BEDTIME 30 tablet 2     warfarin (COUMADIN) 5 MG tablet Take 1 tablet (5 mg total) by mouth daily. Take 1 tablets (5 mg) by mouth daily. Adjust dose based on INR results as directed. 30 tablet 0     No current facility-administered medications on file prior to visit.                 Review of Systems:      ROS:       10 point ROS was negative except for the items listed in HPI.        Coordination of Care:   More than 25 minutes spent on this visit  with more than 50% of time spent on coordination of care including: Educating patient about diagnosis, prognosis, side effects and benefits of medications, diet, exercise.  Time also spent on entering orders and preparing documentation for the visit.Time also spent providing supportive therapy regarding above issues.    This note was created using a dictation system. All typing errors or contextual distortion is unintentional and software inherent.

## 2021-06-22 NOTE — PROGRESS NOTES
Mental Health Visit Note    12/4/2018    Start time: 1200   Stop Time: 1225  Session # 37    Session Type: Patient is presenting for an Individual session.    Choco Claudio is a 53 y.o. male is being seen today for    Chief Complaint   Patient presents with      Follow Up     Physical pain and homelessness     New symptoms or complaints: None    Functional Impairment:   Personal: 4  Family: 3  Work: N/A  Social:4    Clinical assessment of mental status: Choco Claudio presented on time for scheduled session today.  He was oriented x3, open and cooperative and appropriate for therapy. His hygiene and grooming was poor due to being homeless and continuing having no where to shower except on weekends. His memory was poor for short and long term.  His speech and language was soft and monotone. Concentration and focus is brief. Psychosis is noted. He reports his mood is sad. Affect is congruent with speech.  Fund of knowledge is adequate. Insight is adequate for therapy. Mental status reviewed and all changed made as needed.      Suicidal/Homicidal Ideation present: None Reported This Session    Patient's impression of their current status: Patient indicated feeling sad. Patient reported going back to the tent last night and being cold all night. Patient indicated he does not sleep well due to his worry that people are looking for him. Patient reported he does feel that his worry has become a paranoia. Patient indicated he cannot let go of the fact that people may not be looking for him. Patient reported he has been sick lately and still not feeling better. Patient indicated due to this wanting to end the session.     Therapist impression of patients current state: Patient appears to have poor insight into his mental health. This therapist processed with patient the other options that are aviable to him that would give him somewhere to stay warm overnight. This therapist processed with patient his feelings  of paranoia and started to work on identifying these feelings.    Type of psychotherapeutic technique provided: Insight oriented, Client centered and CBT    Progress toward short term goals:Progress as expected with patient coming to scheduled session. Poor progress as evidenced by continuing to not have housing, and drinking.    Review of long term goals: Long term review on 09/11/2018    Diagnosis:   1. PTSD (post-traumatic stress disorder)    2. Delusional disorder (H)    3. Severe alcohol use disorder (H)      Plan and Follow up: Patient will return in one week for scheduled session. Patient should continue to go to his parents house to shower. Patient should continue to take his medication daily as prescribed. Patient would benefit from looking at shelters and working with his case manger on different housing options. Patient should work on stopping drinking. Patient would benefit from continuing to identify situations where he feels he may be struggling with paranoia.     Discharge Criteria/Planning: Client has chronic symptoms and ongoing therapy for maintenance stability recommended.    Marcella Nogueira

## 2021-06-22 NOTE — PROGRESS NOTES
Outpatient Mental Health Treatment Plan    Name:  Choco Claudio  :  1965  MRN:  [id number]    Treatment Plan:  Updated Treatment Plan  Intake/initial treatment plan date:  2017  Benefit and risks and alternatives have been discussed: Yes  Is this treatment appropriate with minimal intrusion/restrictions: Yes  Estimated duration of treatment:  Ongoing psychotherapy at this time  Anticipated frequency of services:  Weekly  Necessity for frequency: This frequency is needed to establish therapeutic goals and for continuity of care in order to monitor progress.  Necessity for treatment: To address cognitive, behavioral, and/or emotional barriers in order to work toward goals and to improve quality of life.    Plan:       ?   ?  Posttraumatic Stress Disorder    Goal:  Reduced the signs and symptoms of PTSD and increase patient's ability to tolerate breakthrough stressors surrounding his various traumas.   Strategies: ? [x]Learn and practice relaxation techniques and other coping strategies (e.g., thought stopping, reframing, meditation)     ? [x] Increase involvement in meaningful activities     ? [x] Discuss sleep hygiene     ? [x] Explore thoughts and expectations about self and others     ? [x] Identify and monitor triggers for panic/anxiety symptoms     ? [x] Implement physical activity routine (with physician approval)     ? [x] Consider introduction of bibliotherapy and/or videos     ? [x] Continue compliance with medical treatment plan (or explore barriers)   ?Degree to which this is a problem: 4  Degree to which goal is met: 1    Date of next review: 2019    Substance use  Goal:  Increase patient awareness regarding substance use triggers.  Consider harm reduction and/or chemical dependency treatment.   Strategies: ? [x] Consider referral for chemical dependency evaluation     ? [x] Discuss barriers to participating in AA or other peer-facilitated groups         [x] Address environmental  factors which may interfere with sobriety     ? [x] Explore short-term versus long-term consequences of use    ?  Degree to which this is a problem: 4  Degree to which goal is met: 1    Date of next review: 04/08/2019    Depression  Goal:  Reduced the signs and symptoms of depression and increase patient's ability to tolerate breakthrough symptomology.   Strategies: ? [x]Learn and practice relaxation techniques and other coping strategies (e.g., thought stopping, reframing, meditation)     ? [x] Increase involvement in meaningful activities     ? [x] Discuss sleep hygiene     ? [x] Explore thoughts and expectations about self and others     ? [x] Identify and monitor triggers for panic/anxiety symptoms     ? [x] Implement physical activity routine (with physician approval)     ? [x] Consider introduction of bibliotherapy and/or videos     ? [x] Continue compliance with medical treatment plan (or explore barriers)   ?Degree to which this is a problem: 4  Degree to which goal is met: 1    Date of next review: 04/08/2019    Functional Impairment:  1=Not at all/Rarely  2=Some days  3=Most Days  4=Every Day    Personal : 4  Family : 4  Social : 4   Work/school : N/A at this time    Diagnosis:  Posttraumatic stress disorder; alcohol use disorder, severe; and major depressive disorder, recurrent, moderate.    Strengths:  supported by mother, one good friend, employed, and motivated for change.  Limitations:  isolation, ambivalence about discontinuing alcohol use, and difficulty connecting with others due to trauma symptoms  Cultural Considerations: Client identifies is half .    Persons responsible for this plan: Patient and Provider            Psychotherapist Signature           Patient Signature:                This note was created with help of Dragon dictation software.  Grammatical / typing errors are not intentional and inherent to the software.
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
## 2021-06-22 NOTE — PROGRESS NOTES
Mental Health Visit Note    01/8/2019    Start time: 1200  Stop Time: 1255  Session # 1    Session Type: Patient is presenting for an Individual session.    Choco Claudio is a 53 y.o. male is being seen today for    Chief Complaint   Patient presents with     MH Follow Up     Anxiety and scared     New symptoms or complaints: None    Functional Impairment:   Personal: 4  Family: 3  Work: N/A  Social:4    Clinical assessment of mental status: Choco Claudio presented on time for scheduled session today.  He was oriented x3, open and cooperative and appropriate for therapy. His hygiene and grooming continues to be poor due to being homeless and continuing to have nowhere to shower.. His memory was poor for short and long term.  His speech and language was elevated throughout session. Concentration and focus is brief. Psychosis is noted. He reports his mood is anxious. Affect is congruent with speech.  Fund of knowledge is adequate. Insight is adequate for therapy. Reviewed and changes made as needed.     Suicidal/Homicidal Ideation present: None Reported This Session    Patient's impression of their current status: Patient indicated feeling anxious. Patient reported he went to [facility] ER last night due to his mental health. Patient indicated he was in his tent but at the moment he did not know where he was at. Patient reported then deciding he needed to get help. Patient indicated they gave him medications and discharged him. Patient denied any suicidal or homicidal ideations at this time. Patient indicated he just wants to feel normal. Patient reported if he was normal his PTSD would be gone. Patient indicated he did not take his medications for a few days due to not wanting to spend his life on medications.     Therapist impression of patients current state: Patient appears to have poor insight into his mental health. This therapist processed with patient the importance of talking with his provider  before discontinuing medications. This therapist encouraged patient to talk with his provider about his mental health symptoms including the racing thoughts. This therapist challenged patient as to what normal would look like in his life. This therapist processed with patient needing to start looking at goals moving forward and not trying to get back to the normal he once had in his life but a new healthy normal for him.     Type of psychotherapeutic technique provided: Insight oriented, Client centered and CBT    Progress toward short term goals:Progress as expected with patient coming to scheduled session. Poor progress as evidenced by continuing to not have housing, not taking medications and drinking.    Review of long term goals: Long term review on 01/08/2019    Diagnosis:   1. PTSD (post-traumatic stress disorder)    2. Delusional disorder (H)    3. Severe alcohol use disorder (H)      Plan and Follow up: Patient will return in one week for scheduled session. Patient should continue to go to his parents house to shower. Patient should continue to take his medication daily as prescribed. Patient would benefit from looking at shelters and working with his case manger on different housing options. Patient should work on stopping drinking. Patient would benefit from continuing to identify situations where he feels he may be struggling with paranoia. Patient should start to identify what he wants to achieve moving forward in life.     Discharge Criteria/Planning: Client has chronic symptoms and ongoing therapy for maintenance stability recommended.    Marcella Nogueira

## 2021-06-23 NOTE — PROGRESS NOTES
Psychiatric  Progress Note  Date of visit:2/7/2019           Discussion of Care and Treatment Recommendations:   This is a 53 y.o. male with significant history of Alcohol Use Disorder, Etoh induced gastritis , DENISE and PTSD who presents to the clinic today , noncompliance with treatment plan.  Patient is here today for  a follow up appointment       Last visit  11/15/18  Recommendation at last visit .  1.-Continue with current medications Trazodone  100 mg at Bedtime, gabapentin 100  mg at bedtime, Start Prazosin 2 mg at bedtime -PTSD, Start Seroquel 25 mg  at bedtime - psychosis , delusional thoughts  2- Highly recommend Inpatient CD treatment Alcohol Use Disorder - Severe. Pt  opposed to CD treatment   3- Continue with  Psychotherapy:  4- 5-Make efforts to stay away from alcohol and drug  5- RTC-10  weeks call in between visit with any questions or concerns     Patient and I reviewed diagnosis and treatment plan and patient agrees with following recommendations:  Ongoing education given regarding diagnostic and treatment options with adequate verbalization of understanding.    Plan   1.-Continue with current medications Trazodone  100 mg at Bedtime, gabapentin 100  mg at bedtime, Start Prazosin 2 mg at bedtime -PTSD, Start Seroquel 25 mg  at bedtime - psychosis , delusional thoughts  2- Highly recommend Inpatient CD treatment Alcohol Use Disorder - Severe. Pt  opposed to CD treatment   3- Continue with  Psychotherapy:  4- 5-Make efforts to stay away from alcohol and drug  5- RTC-10  weeks call in between visit with any questions or concerns            Diagnoses:   PTSD  Alcohol Use Disorder , Severe   Delusional disorder R/O Schizophrenia       Patient Active Problem List   Diagnosis     Chest pain     Alcohol withdrawal, with unspecified complication (H)     Sinus tachycardia     Lactic acidosis     Dehydration     Microcytosis     Non-traumatic rhabdomyolysis     Nausea     Alcoholic ketoacidosis     Epigastric  "pain     Alcoholism, chronic (H)     High anion gap metabolic acidosis     Hypomagnesemia     Alcohol intoxication, uncomplicated (H)     GI bleed     Anemia associated with acute blood loss     Disorder due to alcohol abuse (H)     Substance induced mood disorder (H)     Anxiety disorder     History of posttraumatic stress disorder (PTSD)     Erosive esophagitis     Polyarthralgia     Cervical spondylosis     VIKI positive     Inflammatory arthritis     Arthritis of right ankle     Gouty arthritis of toe of left foot     Pulmonary embolism (H)     Primary osteoarthritis involving multiple joints     Supratherapeutic INR     Hypokalemia     Epistaxis             Chief Complaint / Subjective:    Chief complaint: \" I am doing well \"     History of Present Illness:  Per patient's statement : He is doing well.  He has continued to take his medications and denies side effects.  He is homeless but currently his brother has agreed to hosting during this cold months.  He has a  working hard to find him housing and is almost in the final stages.  He is cooperating with a .  He continues to attend psychotherapy sessions.  He is still drinking alcohol and resistant to stop but willing to decrease intake which I applauded him for this is been an ongoing struggle for him.  He is in a hurry to catch his bus in the cold winter weather therefore I will refill his medications and have him return in 4 weeks for follow-up appointment.  I encouraged him to call in between visits with any questions or concerns.    Mental Status Examination:   General: Adequate hygiene, cooperative, dressed appropriately for the weather  Speech: Normal in rate and tone  Language: Intact  Thought process: Coherent  Thought content:                     Auditory hallucinations-denies                      Visual Hallucinations -denied                         Delusions denies                          Loose Associations:  No            "               Suicidal thoughts: Denies                          Homicidal thoughts: Denies                       Affect: Neutral  Mood: Neutral   Intellectual functioning: Within normal limits  Memory: Within normal limits  Fund of knowledge: Average  Attention and concentration: Within normal limits  Gait: Steady  Psychomotor activity: Calm, no agitation  Muscles: No atrophy, no abnormal movements  InSight and judgment: Fair      Medication changes: See Above   Medication adherence: compliant  Medication side effects: absent  Information about medications: Side effects, benefits and alternative treatments discussed and patient agrees with capacity to do so.    Psychotherapy: Supportive therapy day-to-day living    Education: Diet, exercise, abstinence from drugs and alcohol, patient will not drive if sedated and medications or  under influence of any substance    Lab Results:   Personally reviewed and discussed with the patient    Lab Results   Component Value Date    WBC 7.8 07/23/2018    HGB 11.1 (L) 07/24/2018    HCT 38.3 (L) 07/23/2018     07/23/2018    ALT 15 07/23/2018    AST 19 07/23/2018     07/23/2018    K 3.4 (L) 07/24/2018     07/23/2018    CREATININE 1.00 07/23/2018    BUN 5 (L) 07/23/2018    CO2 27 07/23/2018    TSH 2.17 03/24/2018    INR 2.77 (H) 07/24/2018       Vital signs:  Vitals:    02/07/19 1033   BP: 176/89   Pulse: (!) 112   Temp: 97.8  F (36.6  C)   TempSrc: Oral   Weight: 200 lb (90.7 kg)     Allergies: Other environmental allergy  Allergies   Allergen Reactions     Other Environmental Allergy Other (See Comments)     Seasonal allergies  Nasal congestion          Medications:     Current Outpatient Medications on File Prior to Visit   Medication Sig Dispense Refill     acetaminophen (TYLENOL) 500 MG tablet Take 1,000 mg by mouth every 6 (six) hours as needed for pain.       albuterol (PROAIR HFA;PROVENTIL HFA;VENTOLIN HFA) 90 mcg/actuation inhaler Inhale 2 puffs every 4  (four) hours as needed (cough). 1 Inhaler 0     cetirizine (ZYRTEC) 10 MG tablet Take 10 mg by mouth at bedtime.        omeprazole (PRILOSEC) 20 MG capsule Take 20 mg by mouth daily as needed.        prazosin (MINIPRESS) 2 MG capsule Take 1 capsule (2 mg total) by mouth at bedtime. 30 capsule 2     QUEtiapine (SEROQUEL) 25 MG tablet Take 1 tablet (25 mg total) by mouth at bedtime. 30 tablet 2     traZODone (DESYREL) 100 MG tablet TAKE 1 TABLET(100 MG) BY MOUTH AT BEDTIME. 30 tablet 2     warfarin (COUMADIN) 5 MG tablet Take 1 tablet (5 mg total) by mouth daily. Take 1 tablets (5 mg) by mouth daily. Adjust dose based on INR results as directed. 30 tablet 0     No current facility-administered medications on file prior to visit.               Review of Systems:      ROS:       10 point ROS was negative except for the items listed in HPI.        Coordination of Care:   More than 25 minutes spent on this visit  with more than 50% of time spent on coordination of care including: Educating patient about diagnosis, prognosis, side effects and benefits of medications, diet, exercise.  Time also spent on entering orders and preparing documentation for the visit.Time also spent providing supportive therapy regarding above issues.    This note was created using a dictation system. All typing errors or contextual distortion is unintentional and software inherent.

## 2021-06-23 NOTE — TELEPHONE ENCOUNTER
Incoming call from  @ [employer]. She has most likely secured patient a housing voucher from [employer] but is requesting written letters from both Debra and Marcella stating the patient has indeed been homeless for much of the past few years. She is requesting the letters be faxed to her when done at [phone]. Her cell is [phone] with any questions.
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
## 2021-06-23 NOTE — PATIENT INSTRUCTIONS - HE
Continue medications as prescribed  No Alcohol or drug use  No driving if sedated  Call the clinic with any questions or concerns   Mental Health Urgent Care Crisis Line  is available 24 hrs a day  [facility] Suicide HotLine [phone] or go to nearest Emergency room  Follow-up as directed.

## 2021-06-23 NOTE — PROGRESS NOTES
This therapist attempted to return patient's phone call. A VM was left indicating that he could attempt to call this therapist again.

## 2021-06-23 NOTE — PROGRESS NOTES
Patient here for follow up appointment.  [place]  He is living between his parents house and son.  He is very anxious to leave due to not wanting to miss the bus.  He said he last drank a couple days ago.  [employer] worker has not contacted him yet. He sees Marcella weekly for therapy.  His mother sets up his medications.    Correct pharmacy verified with patient and confirmed in snapshot? [x] yes []no    Charge captured ? [x] yes  [] no    Medications Phoned  to Pharmacy [] yes [x]no  Name of Pharmacist:  List Medications, including dose, quantity and instructions      Medication Prescriptions given to patient   [] yes  [x] no   List the name of the drug the prescription was written for.       Medications ordered this visit were e-scribed.  Verified by order class [x] yes  [] no  Refills    Medication changes or discontinuations were communicated to patient's pharmacy: [] yes  [] NA    UA collected [] yes  [x] no    [place] Prescription Monitoring Program Reviewed? [] yes  [x] no    Referrals were made to:  NA    Future appointment was made: [x] yes  [] no    Dictation completed at time of chart check: [x] yes  [] no    I have checked the documentation for today s encounters and the above information has been reviewed and completed.

## 2021-06-23 NOTE — PROGRESS NOTES
Mental Health Visit Note    01/15/2019    Start time: 1200  Stop Time: 1240 Session # 2    Session Type: Patient is presenting for an Individual session.    Choco Claudio is a 53 y.o. male is being seen today for    Chief Complaint   Patient presents with      Follow Up     Trauma and homelessness     New symptoms or complaints: None    Functional Impairment:   Personal: 4  Family: 3  Work: N/A  Social:4    Clinical assessment of mental status: Choco Claudio presented on time for scheduled session today.  He was oriented x3, open and cooperative and appropriate for therapy. His hygiene and grooming continues to be poor due to being homeless and continuing to have nowhere to shower except on weekends. His memory was poor for short and long term.  His speech and language was soft throughout session. Concentration and focus is brief. Psychosis is noted. He reports his mood is depressed and anxious. Affect is congruent with speech.  Fund of knowledge is adequate. Insight is adequate for therapy. Mental status reviewed and changes were made as needed.     Suicidal/Homicidal Ideation present: None Reported This Session    Patient's impression of their current status: Patient reported feeling depressed and anxious. Patient indicated he wants to feel normal again. Patient reported he wish's [place] Patient indicated he is having a lot of anxiety over the fact that he has not received his bus card. Patient reported last time it came right away. Patient indicated he continues to worry all the time that people are trying to find his camp sight. Patient reported he has not been taking his medications due to forgetting. Patient indicated he wants his brain to work and feel normal.     Therapist impression of patients current state: Patient appears to have poor insight into his mental health. This therapist processed with patient again the importance of taking his  medications daily. This therapist suggested patient set a timer on his phone that will remind him to take his medications. This therapist processed with patient needing to identify a new normal due to not being able to go back. This therapist processed with patient the importance of continuing to connect with his worker related to housing. This therapist assisted patient in calling his insurance to get information the his bus card. The lady informed us that she would call patient back with information.     Type of psychotherapeutic technique provided: Insight oriented, Client centered and CBT    Progress toward short term goals:Progress as expected with patient coming to scheduled session. Poor progress as evidenced by continuing to not have housing, not taking medications and drinking.    Review of long term goals: Long term review on 01/08/2019    Diagnosis:   1. PTSD (post-traumatic stress disorder)    2. Delusional disorder (H)    3. Severe alcohol use disorder (H)      Plan and Follow up: Patient will return in one week for scheduled session. Patient should continue to go to his parents house to shower. Patient should continue to take his medication daily as prescribed. Patient would benefit from looking at shelters and working with his case manger on different housing options. Patient should work on stopping drinking. Patient would benefit from continuing to identify situations where he feels he may be struggling with paranoia. Patient should start to identify what he wants to achieve moving forward in life.     Discharge Criteria/Planning: Client has chronic symptoms and ongoing therapy for maintenance stability recommended.    Marcella Nogueira

## 2021-06-23 NOTE — PROGRESS NOTES
Mental Health Visit Note    01/22/2019    Start time: 1150  Stop Time: 1240 Session # 3    Session Type: Patient is presenting for an Individual session.    Choco Claudio is a 53 y.o. male is being seen today for    Chief Complaint   Patient presents with      Follow Up     Worry     New symptoms or complaints: None    Functional Impairment:   Personal: 4  Family: 3  Work: N/A  Social:4    Clinical assessment of mental status: Choco Claudio presented on time for scheduled session today.  He was oriented x3, open and cooperative and appropriate for therapy. His hygiene and grooming continues to be poor due to being homeless and continuing to have nowhere to shower except on weekends. His memory was poor for short and long term.  His speech and language was soft throughout session. Concentration and focus is brief. Psychosis is noted. He reports his mood is worried. Affect is congruent with speech.  Fund of knowledge is adequate. Insight is adequate for therapy. Reviewed and changes made as needed.     Suicidal/Homicidal Ideation present: None Reported This Session    Patient's impression of their current status: Patient indicated feeling worried. Patient reported his son is in the hospital out of state. Patient indicated being very concerned for patient's physical health, mental health and alcohol use. Patient reported he has not been able to talk to his son which adds to his worry. Patient indicated he can relate to his son's struggles but does not know how to be a support. Patient reported he has been staying at his son's apartment which seems to have helped a little with his PTSD symptoms. Patient indicated planning to go back to his tent text week.     Therapist impression of patients current state: Patient appears to have poor insight into his mental health. This therapist processed with patient his worry for his son. This therapist processed with patient ways he can be a support to his son and  ways he could try and get in contact with his son. This therapist challenged patient on ways taking care of himself including stopping drinking can show his son ways to do self-care.     Type of psychotherapeutic technique provided: Insight oriented, Client centered and CBT    Progress toward short term goals:Progress as expected with patient coming to scheduled session. Poor progress as evidenced by continuing to not have housing, not taking medications and drinking.    Review of long term goals: Long term review on 01/08/2019    Diagnosis:   1. PTSD (post-traumatic stress disorder)    2. Delusional disorder (H)    3. Severe alcohol use disorder (H)      Plan and Follow up: Patient will return in one week for scheduled session. Patient should continue to go to his parents house to shower. Patient should continue to take his medication daily as prescribed. Patient would benefit from looking at shelters and working with his case manger on different housing options. Patient should work on stopping drinking. Patient would benefit from continuing to identify situations where he feels he may be struggling with paranoia. Patient should start to identify what he wants to achieve moving forward in life.     Discharge Criteria/Planning: Client has chronic symptoms and ongoing therapy for maintenance stability recommended.    Marcella Nogueira

## 2021-06-23 NOTE — TELEPHONE ENCOUNTER
Provider provided information for letter and writer typed out letter and provider reviewed and signed.   Writer then fax[facility] of [facility] getting housing services going.   Faxed letter to Deborah at [phone] confirmation     Letter can be found in Letter's tab for review.

## 2021-06-23 NOTE — PROGRESS NOTES
Mental Health Visit Note    02/05/2019    Start time: 1205 Stop Time: 1248 Session # 5    Session Type: Patient is presenting for an Individual session.    Choco Claudio is a 53 y.o. male is being seen today for    Chief Complaint   Patient presents with      Follow Up     Frustration     New symptoms or complaints: None    Functional Impairment:   Personal: 4  Family: 3  Work: N/A  Social:4    Clinical assessment of mental status: Choco Claudio presented on time for scheduled session today.  He was oriented x3, open and cooperative and appropriate for therapy. His hygiene and grooming was fair. His memory was fair for short and long term.  His speech and language was soft and concise throughout the session. Concentration and focus is brief. Psychosis is noted. He reports his mood is frustrated. Affect is congruent with speech.  Fund of knowledge is adequate. Insight is adequate for therapy. Reviewed and changes made as needed.     Suicidal/Homicidal Ideation present: None Reported This Session    Patient's impression of their current status: Patient indicated feeling frustrated. Patient reported that this weather makes it so that he cannot go to the tent. Patient indicated his son and parents have been allowing him to stay at their places. Patient reported he really needs a place of his own. Patient indicated that he knows he will always have PTSD but feels it will be a little better when he has his own place. Patient reported he feels like is mind is everywhere due to not having one place that he lives in. Patient reported he continues to struggle to take his medications specifically when he is drinking.     Therapist impression of patients current state: Patient appears to have poor insight into his mental health. This therapist processed with patient continuing to work with his  on his housing. This therapist challenged patient on awys drinking is having many negative effects on him  including his health and not taking his medications.     Type of psychotherapeutic technique provided: Insight oriented, Client centered and CBT    Progress toward short term goals:Progress as expected with patient coming to scheduled session. Poor progress as evidenced by continuing to not have housing, not taking medications and drinking.    Review of long term goals: Long term review on 01/08/2019    Diagnosis:   1. PTSD (post-traumatic stress disorder)    2. Delusional disorder (H)    3. Severe alcohol use disorder (H)      Plan and Follow up: Patient will return in one week for scheduled session. Patient should continue to go to his parents or son's house to shower. Patient should take his medication daily as prescribed. Patient should work on stopping drinking. Patient would benefit from continuing to identify situations where he feels he may be struggling with paranoia.     Discharge Criteria/Planning: Client has chronic symptoms and ongoing therapy for maintenance stability recommended.    Marcella Nogueira

## 2021-06-24 NOTE — PROGRESS NOTES
Mental Health Visit Note    02/19/2019    Start time: 1205 Stop Time: 1249 Session # 7    Session Type: Patient is presenting for an Individual session.    Choco Claudio is a 53 y.o. male is being seen today for    Chief Complaint   Patient presents with      Follow Up     Scared     New symptoms or complaints: None    Functional Impairment:   Personal: 4  Family: 3  Work: N/A  Social:4    Clinical assessment of mental status: Choco Claudio presented on time for scheduled session today.  He was oriented x3, open and cooperative and appropriate for therapy. His hygiene and grooming was poor. His memory was fair for short and long term.  His speech and language was soft and concise throughout the session. Concentration and focus is brief. Psychosis is reported and noted. He reports his mood is scared.  Affect is congruent with speech.  Fund of knowledge is adequate. Insight is adequate for therapy. Reviewed and changes made as needed.     Suicidal/Homicidal Ideation present: None Reported This Session    Patient's impression of their current status: Patient indicated feeling scared. Patient reported a person who standing too close to him on the bus and now his hypervigilance is up. Patient indicated he is scared how he will react and then that the police will get involved. Patient reported he continues to struggle with understanding his PTSD. Patient indicated he wants it to go away. 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 Patient indicated it got to a point where it no longer brought him taz. Patient reported having a hard time remembering when he felt taz since the band.     Therapist impression of patients current state: Patient appears to have poor insight into his mental health. This therapist processed with patient ways he can communicate to people that they are too close to him. This therapist processed with patient ways to work on controlling his anger. This  therapist challenged patient on times in his life when he has felt taz and needing to be able to identify these times in his life.     Type of psychotherapeutic technique provided: Insight oriented, Client centered and CBT    Progress toward short term goals:Progress as expected with patient coming to scheduled session. Poor progress as evidenced by continuing to not have housing, not taking medications and drinking.    Review of long term goals: Long term review on 01/08/2019    Diagnosis:   1. PTSD (post-traumatic stress disorder)    2. Delusional disorder (H)    3. Severe alcohol use disorder (H)      Plan and Follow up: Patient will return in one week for scheduled session.  Patient should take his medication daily as prescribed. Patient should work on stopping drinking. Patient would benefit from continuing to identify situations where he feels he may be struggling with paranoia and what is leading to the paranoia thoughts.     Discharge Criteria/Planning: Client has chronic symptoms and ongoing therapy for maintenance stability recommended.    Marcella Nogueira

## 2021-06-24 NOTE — PROGRESS NOTES
Mental Health Visit Note    3/12/2019    Start time: 1203 Stop Time: 1259 Session # 9    Session Type: Patient is presenting for an Individual session.    Choco Claudio is a 53 y.o. male is being seen today for    Chief Complaint   Patient presents with      Follow Up     Stress and anxiety     New symptoms or complaints: None    Functional Impairment:   Personal: 4  Family: 3  Work: N/A  Social:4    Clinical assessment of mental status: Choco Claudio presented on time for scheduled session today.  He was oriented x3, open and cooperative and appropriate for therapy. His hygiene and grooming was poor. His memory was fair for short and long term.  His speech and language was soft and concise throughout the session. Concentration and focus is brief. Psychosis is reported and noted. He reports his mood is stressed and anxious.  Affect is congruent with speech.  Fund of knowledge is adequate. Insight is adequate for therapy. Changes made as needed for this session.     Suicidal/Homicidal Ideation present: None Reported This Session    Patient's impression of their current status: Patient indicated feeling both stressed and anxious. Patient reported that his parents told him it is warmer enough for him to go back to the tent. Patient indicated he went to find it and was unable to due to the snow. Patient reported since Friday he has been riding the train to sleep at night. Patient indicated he feels very unsafe on the train. Patient indicated a lot happens that triggers his PTSD symptoms. Patient reported due to this he has not been sleeping. Patient indicated he can go to his parents tonight and then back to the tent. Patient reported he is meeting with someone for [address] and is hoping to find a place very soon. Patient indicated he does not feel he can let his guard down at all.     Therapist impression of patients current state: Patient appears to have poor insight into his mental health. This  therapist processed with patient the option to stay at his son's house at times. This therapist processed with patient ways h can communicate to his parents the hardship he is experiencing due to having no where to sleep at night. This therapist processed with patient ways that homeless shelter may be safer then the train.     Type of psychotherapeutic technique provided: Insight oriented, Client centered and CBT    Progress toward short term goals:Progress as expected with patient coming to scheduled session. Poor progress as evidenced by continuing to not taking medications and drinking.    Review of long term goals: Long term review on 01/08/2019    Diagnosis:   1. Delusional disorder (H)    2. PTSD (post-traumatic stress disorder)    3. Severe alcohol use disorder (H)      Plan and Follow up: Patient will return in one week for scheduled session.  Patient should take his medication daily as prescribed. Patient should work on stopping drinking. Patient would benefit from continuing to identify situations where he feels he may be struggling with paranoia and what is leading to the paranoia thoughts. Patient should continue to look for housing.     Discharge Criteria/Planning: Client has chronic symptoms and ongoing therapy for maintenance stability recommended.    Marcella Nogueira

## 2021-06-24 NOTE — PROGRESS NOTES
Mental Health Visit Note    3/5/2019    Start time: 1202 Stop Time: 1232 Session # 8    Session Type: Patient is presenting for an Individual session.    Choco Claudio is a 53 y.o. male is being seen today for    Chief Complaint   Patient presents with      Follow Up     Anxiety     New symptoms or complaints: None    Functional Impairment:   Personal: 4  Family: 3  Work: N/A  Social:4    Clinical assessment of mental status: Choco Claudio presented on time for scheduled session today.  He was oriented x3, open and cooperative and appropriate for therapy. His hygiene and grooming was poor. His memory was fair for short and long term.  His speech and language was soft and concise throughout the session. Concentration and focus is brief. Psychosis is reported and noted. He reports his mood is anxious.  Affect is congruent with speech.  Fund of knowledge is adequate. Insight is adequate for therapy. Reviewed and changes made as needed for this session.     Suicidal/Homicidal Ideation present: None Reported This Session    Patient's impression of their current status: Patient reported feeling anxious. Patient indicated he was in the ER with his son last week and there were 3 security guards looking at him. Patient reported this spiraled him to feel anxious since that time. Patient indicated due to his he is not sleeping either. Patient reported he is staying at his parents house which is nice due to the cold weather. Patient indicated he does still struggle with anxiety due to there being many people that walk by that he can hear.    Therapist impression of patients current state: Patient appears to have poor insight into his mental health. This therapist challenged patient on was he is not using his skills to help his anxiety. This therapist went over deep breathing and grounding again with patient. This therapist processed with patient that he is safe and there is no reason for security to question him.  This therapist processed with patient ways he can try to feel safe in his parents home.     Type of psychotherapeutic technique provided: Insight oriented, Client centered and CBT    Progress toward short term goals:Progress as expected with patient coming to scheduled session. Poor progress as evidenced by continuing to not taking medications and drinking.    Review of long term goals: Long term review on 01/08/2019    Diagnosis:   1. PTSD (post-traumatic stress disorder)    2. Delusional disorder (H)    3. Severe alcohol use disorder (H)      Plan and Follow up: Patient will return in one week for scheduled session.  Patient should take his medication daily as prescribed. Patient should work on stopping drinking. Patient would benefit from continuing to identify situations where he feels he may be struggling with paranoia and what is leading to the paranoia thoughts.     Discharge Criteria/Planning: Client has chronic symptoms and ongoing therapy for maintenance stability recommended.    Marcella Nogueira

## 2021-06-24 NOTE — PROGRESS NOTES
Psychiatric  Progress Note  Date of visit:3/8/2019         Discussion of Care and Treatment Recommendations:   This is a 53 y.o. male with  significant history of Alcohol Use Disorder, Etoh induced gastritis , DENISE and PTSD who presents to the clinic today , noncompliance with treatment plan.  Patient is here today for  a follow up appointment       Last visit 2/7/19.  Recommendation at last visit .  1.-Continue with current medications Trazodone  100 mg at Bedtime, gabapentin 100  mg at bedtime, Start Prazosin 2 mg at bedtime -PTSD, Start Seroquel 25 mg  at bedtime - psychosis , delusional thoughts  2- Highly recommend Inpatient CD treatment Alcohol Use Disorder - Severe. Pt  opposed to CD treatment   3- Continue with  Psychotherapy:  4- 5-Make efforts to stay away from alcohol and drug  5- RTC-10  weeks call in between visit with any questions or concerns    Patient and I reviewed diagnosis and treatment plan and patient agrees with following recommendations:  Ongoing education given regarding diagnostic and treatment options with adequate verbalization of understanding.  Plan   1.-Continue with current medications Trazodone  100 mg at Bedtime, gabapentin 100  mg at bedtime, Start Prazosin 2 mg at bedtime -PTSD, Start Seroquel 25 mg  at bedtime - psychosis , delusional thoughts  2- Highly recommend Inpatient CD treatment Alcohol Use Disorder - Severe. Pt  opposed to CD treatment   3- Continue with  Psychotherapy:  4- 5-Make efforts to stay away from alcohol and drug  5- RTC-10  weeks call in between visit with any questions or concerns         Diagnoses:     PTSD  Alcohol Use Disorder , Severe   Delusional disorder R/O Schizophrenia        Patient Active Problem List   Diagnosis     Chest pain     Alcohol withdrawal, with unspecified complication (H)     Sinus tachycardia     Lactic acidosis     Dehydration     Microcytosis     Non-traumatic rhabdomyolysis     Nausea     Alcoholic ketoacidosis     Epigastric pain  "    Alcoholism, chronic (H)     High anion gap metabolic acidosis     Hypomagnesemia     Alcohol intoxication, uncomplicated (H)     GI bleed     Anemia associated with acute blood loss     Disorder due to alcohol abuse (H)     Substance induced mood disorder (H)     Anxiety disorder     History of posttraumatic stress disorder (PTSD)     Erosive esophagitis     Polyarthralgia     Cervical spondylosis     VIKI positive     Inflammatory arthritis     Arthritis of right ankle     Gouty arthritis of toe of left foot     Pulmonary embolism (H)     Primary osteoarthritis involving multiple joints     Supratherapeutic INR     Hypokalemia     Epistaxis             Chief Complaint / Subjective:    Chief complaint: \" I am in a hurry to catch the bus \"   History of Present Illness:   Per patient statement : He is in a hurry today because he was doing well.  He says is very anxious because he has to go back to sleeping outside in his tent.  Patient continues to be homeless.  His parents who currently lives in a senior living apartment and her restriction on the number \"having to help her take anymore in the winter was really brought 12 but now he states that they have kicked him out .  Since I started working with this patient we have worked with his case management team and attempt to get him housing but he has turned down housing stating he does not want to be in those areas.  He is currently working with the  who is trying to find him housing.  Spent most of the appointment time counseling patient and warning him it is still very dangerously cold outside and he could get frostbite of fluids today outside.  It is also predicted that the will be a small stone this weekend which is also dangerous for him to sleeping outside.  Patient states that when he plans to ride the bus all day and at night he plans to ride between all night.  He states that the people at the bus know him and do not bother him.  I did offer him " resources for available shelters but he is adamant he does not want to go to the shelters.  He continues to drink alcohol whenever he has money and is not willing to stop.  He is still following up with psychotherapy and shows up all his appointments.  He also shows up for follow-up appointments.  He reports that he has been taking his current medications and denies side effects.  He continues to endorse sporadic auditory hallucinations mainly describing them as noise non commanding nature and harmful.  He reports that he has learned to ignore them .  He denies visual hallucinations.  He denies suicidal or homicidal ideations.  He denies delusions or paranoia although I do sense paranoia due to his fear and believe that if he goes to the shelter he will get harmed or killed.  He wants to continue the same medications.  I will have him return to the clinic in 4 weeks for follow-up appointment encouraged him to call in between visits with any questions or concerns .  Community resources for shelters in nearby areas have been provided.  Mental Status Examination:   General: Adequate hygiene, cooperative  Speech: Normal in rate and tone  Language: Intact  Thought process: Coherent  Thought content:                           Auditory hallucinations- present                          Visual Hallucinations - Denies                         Delusions Absent                           Loose Associations:  No                          Suicidal thoughts: Denies                          Homicidal thoughts:Denies                        Affect: Neutral  Mood: Neutral   Intellectual functioning: Within normal limits  Memory: Within normal limits  Fund of knowledge: Average  Attention and concentration: Within normal limits  Gait: Steady  Psychomotor activity: Calm, no agitation  Muscles: No atrophy, no abnormal movements  InSight and judgment: Fair      Medication changes: See Above   Medication adherence: compliant  Medication side  effects: absent  Information about medications: Side effects, benefits and alternative treatments discussed and patient agrees with capacity to do so.    Psychotherapy: Supportive therapy day-to-day living    Education: Diet, exercise, abstinence from drugs and alcohol, patient will not drive if sedated and medications or  under influence of any substance    Lab Results:   Personally reviewed and discussed with the patient    Lab Results   Component Value Date    WBC 7.8 07/23/2018    HGB 11.1 (L) 07/24/2018    HCT 38.3 (L) 07/23/2018     07/23/2018    ALT 15 07/23/2018    AST 19 07/23/2018     07/23/2018    K 3.4 (L) 07/24/2018     07/23/2018    CREATININE 1.00 07/23/2018    BUN 5 (L) 07/23/2018    CO2 27 07/23/2018    TSH 2.17 03/24/2018    INR 2.77 (H) 07/24/2018       Vital signs:    Vitals:    03/08/19 0930   BP: (!) 161/91   Pulse: (!) 105   Temp: 97.5  F (36.4  C)   TempSrc: Oral   Weight: 195 lb (88.5 kg)     Allergies: Other environmental allergy         Medications:     Current Outpatient Medications on File Prior to Visit   Medication Sig Dispense Refill     acetaminophen (TYLENOL) 500 MG tablet Take 1,000 mg by mouth every 6 (six) hours as needed for pain.       albuterol (PROAIR HFA;PROVENTIL HFA;VENTOLIN HFA) 90 mcg/actuation inhaler Inhale 2 puffs every 4 (four) hours as needed (cough). 1 Inhaler 0     cetirizine (ZYRTEC) 10 MG tablet Take 10 mg by mouth at bedtime.        omeprazole (PRILOSEC) 20 MG capsule Take 20 mg by mouth daily as needed.        prazosin (MINIPRESS) 2 MG capsule Take 1 capsule (2 mg total) by mouth at bedtime. 30 capsule 2     QUEtiapine (SEROQUEL) 25 MG tablet Take 1 tablet (25 mg total) by mouth at bedtime. 30 tablet 2     traZODone (DESYREL) 100 MG tablet TAKE 1 TABLET(100 MG) BY MOUTH AT BEDTIME 30 tablet 2     warfarin (COUMADIN) 5 MG tablet Take 1 tablet (5 mg total) by mouth daily. Take 1 tablets (5 mg) by mouth daily. Adjust dose based on INR results  as directed. 30 tablet 0     No current facility-administered medications on file prior to visit.               Review of Systems:      ROS:       10 point ROS was negative except for the items listed in HPI.        Coordination of Care:   More than 25 minutes spent on this visit  with more than 50% of time spent on coordination of care including: Educating patient about diagnosis, prognosis, side effects and benefits of medications, diet, exercise.  Time also spent on entering orders and preparing documentation for the visit.Time also spent providing supportive therapy regarding above issues.    This note was created using a dictation system. All typing errors or contextual distortion is unintentional and software inherent.

## 2021-06-24 NOTE — PATIENT INSTRUCTIONS - HE
Continue medications as prescribed  Have your pharmacy contact us for a refill if you are running low on medications (We may ask you to come into clinic to get a refill from the nurse  No Alcohol or drug use  No driving if sedated  Call the clinic with any questions or concerns   Reach out for help if you feel like hurting yourself or others ([facility] [phone]: [address] [facility], [address] or [facility] Suicide Hotline [phone] , call [phone] or go to nearest Emergency room    Follow up as directed, for your appointments, per your After Visit Summary Form.

## 2021-06-24 NOTE — PROGRESS NOTES
Patient is here for follow up appointment.  He states his depression, anxiety and sleep are poor due to his living situation.  He is going back to his tent tonight.  He got his bus pass back so happy about that.  He denies SI.    Correct pharmacy verified with patient and confirmed in snapshot? [x] yes []no    Charge captured ? [x] yes  [] no    Medications Phoned  to Pharmacy [] yes [x]no  Name of Pharmacist:  List Medications, including dose, quantity and instructions      Medication Prescriptions given to patient   [] yes  [x] no   List the name of the drug the prescription was written for.       Medications ordered this visit were e-scribed.  Verified by order class [x] yes  [] no  Prazosin, Seroquel and trazodone refills    Medication changes or discontinuations were communicated to patient's pharmacy: [] yes  [x] NA    UA collected [] yes  [x] no    [place] Prescription Monitoring Program Reviewed? [] yes  [x] no    Referrals were made to:NA     Future appointment was made: [x] yes  [] no    Dictation completed at time of chart check: [x] yes  [] no    I have checked the documentation for today s encounters and the above information has been reviewed and completed.

## 2021-06-24 NOTE — PROGRESS NOTES
Mental Health Visit Note    02/12/2019    Start time: 1202 Stop Time: 1248 Session # 6    Session Type: Patient is presenting for an Individual session.    Choco Claudio is a 53 y.o. male is being seen today for    Chief Complaint   Patient presents with      Follow Up     Paranoia      New symptoms or complaints: None    Functional Impairment:   Personal: 4  Family: 3  Work: N/A  Social:4    Clinical assessment of mental status: Choco Claudio presented on time for scheduled session today.  He was oriented x3, open and cooperative and appropriate for therapy. His hygiene and grooming was poor. His memory was fair for short and long term.  His speech and language was soft and concise throughout the session. Concentration and focus is brief. Psychosis is reported and noted. He reports his mood is suspicious.  Affect is congruent with speech.  Fund of knowledge is adequate. Insight is adequate for therapy. Mental status reviewed and changes made as needed.     Suicidal/Homicidal Ideation present: None Reported This Session    Patient's impression of their current status: Patient reported feeling suspicious. Patient indicated he cannot relax due to feeling that people are trying to find him. Patient reported he knows he has not broke any laws but continues to be on edge all the time. Patient indicated even in his parents complex he is on guard. Patient reported when he hears a car drive by he is instantly on alert. Patient indicated he continues to not take his medications due to drinking. Patient reported he also does not like that the medication makes him sleep heavy and he is worried he will miss something if he is sleeping. Patient indicated he has been staying at his parents house since seeing this therapist last due to the snow and cold. Patient reported his dad said he does not want him going back to the tent until it warms up.    Therapist impression of patients current state: Patient appears to  have poor insight into his mental health. This therapist challenged patient on ways his fear of people coming after him has been worse since stopping his medications. This therapist encouraged patient to talk with Debra Lane about his concerns with his medications. This therapist processed with patient other ways that alcohol is having a negative affect on his life. This therapist challenged patient on reason's why people would be trying to find him and that he is not breaking the law.     Type of psychotherapeutic technique provided: Insight oriented, Client centered and CBT    Progress toward short term goals:Progress as expected with patient coming to scheduled session. Poor progress as evidenced by continuing to not have housing, not taking medications and drinking.    Review of long term goals: Long term review on 01/08/2019    Diagnosis:   1. PTSD (post-traumatic stress disorder)    2. Delusional disorder (H)    3. Severe alcohol use disorder (H)      Plan and Follow up: Patient will return in one week for scheduled session.  Patient should take his medication daily as prescribed. Patient should work on stopping drinking. Patient would benefit from continuing to identify situations where he feels he may be struggling with paranoia and what is leading to the paranoia thoughts.     Discharge Criteria/Planning: Client has chronic symptoms and ongoing therapy for maintenance stability recommended.    Marcella Nogueira

## 2021-06-25 NOTE — PROGRESS NOTES
Mental Health Visit Note    3/19/2019    Start time: 1205 Stop Time: 1230 Session # 10    Session Type: Patient is presenting for an Individual session.    Choco Claudio is a 53 y.o. male is being seen today for    Chief Complaint   Patient presents with      Follow Up     Anxiety     New symptoms or complaints: None    Functional Impairment:   Personal: 4  Family: 3  Work: N/A  Social:4    Clinical assessment of mental status: Choco Claudio presented on time for scheduled session today.  He was oriented x3, open and cooperative and appropriate for therapy. His hygiene and grooming was poor. His memory was fair for short and long term.  His speech and language was soft and concise throughout the session. Concentration and focus is brief. Psychosis is reported and noted. He reports his mood is anxious and on guard.  Affect is congruent with speech.  Fund of knowledge is adequate. Insight is adequate for therapy. Reviewed and changes made as needed.     Suicidal/Homicidal Ideation present: None Reported This Session    Patient's impression of their current status: Patient reported feeling extremely anxious. Patient indicated last night was not a good night. Patient reported he showed up to his parents drunk last night. Patient indicated he does not wish to share what happened. Patient reported he does not feel at this time that he can go back there. Patient indicated he will go tonight to try and find his tent. Patient reported he did get a housing voucher and is working with his  to find a place to live. Patient indicated he continues to struggle daily with his PTSD due to there being a lot of unsafe people no the buses and trains.     Therapist impression of patients current state: Patient appears to have poor insight into his mental health. This therapist processed with patient ways that drinking continues to have negative impact on his life. This therapist talked with patient about CD  treatments and patient declined. This therapist processed with patient continuing to stay in touch with his  for housing.     Type of psychotherapeutic technique provided: Insight oriented, Client centered and CBT    Progress toward short term goals:Progress as expected with patient coming to scheduled session. Poor progress as evidenced by continuing to not taking medications and drinking.    Review of long term goals: Long term review on 01/08/2019    Diagnosis:   1. Delusional disorder (H)    2. PTSD (post-traumatic stress disorder)    3. Severe alcohol use disorder (H)      Plan and Follow up: Patient will return in one week for scheduled session.  Patient should take his medication daily as prescribed. Patient should work on stopping drinking. Patient would benefit from continuing to identify situations where he feels he may be struggling with paranoia and what is leading to the paranoia thoughts. Patient should continue to look for housing.     Discharge Criteria/Planning: Client has chronic symptoms and ongoing therapy for maintenance stability recommended.    Marcella Nogueira

## 2021-06-29 NOTE — PROGRESS NOTES
Progress Notes by Mariangel Mcgarry CMA at 9/10/2020 10:00 AM     Author: Mariangel Mcgarry CMA Service: -- Author Type: Certified Medical Assistant    Filed: 9/10/2020 10:47 AM Encounter Date: 9/10/2020 Status: Signed    : Mariangel Mcgarry CMA (Certified Medical Assistant)       This video/telephone visit will be conducted via a call between you and your physician/provider. We have found that certain health care needs can be provided without the need for an in-person physical exam. This service lets us provide the care you need with a video /telephone conversation. If a prescription is necessary we can send it directly to your pharmacy. If lab work is needed we can place an order for that and you can then stop by our lab to have the test done at a later time.   Just as we bill insurance for in-person visits, we also bill insurance for video/telephone visits. If you have questions about your insurance coverage, we recommend that you speak with your insurance company.   Patient has given verbal consent for video/Telephone visit? Yes   Patient would like telephone visit please call: [phone]  Mariangel GORDILLO CMA    AVS: Mail   Anxiety has increased and needing to force himself to leave in order to get things done.  Mom is out of town currently but returning tomorrow per Pt.   Patient verified allergies, medications and pharmacy via phone. Patient states he is ready for visit.  [place]  was reviewed prior to seeing patient today. See below for embedded report.

## 2021-07-03 NOTE — ADDENDUM NOTE
Addendum Note by Ena Solares LPN at 5/29/2020 10:30 AM     Author: Ena Solares LPN Service: -- Author Type: Licensed Nurse    Filed: 5/29/2020 12:10 PM Encounter Date: 5/29/2020 Status: Signed    : Ena Solares LPN (Licensed Nurse)    Addended by: ENA SOLARES on: 5/29/2020 12:10 PM        Modules accepted: Orders

## 2021-07-03 NOTE — ADDENDUM NOTE
Addendum Note by Debra Mcpherson NP at 11/7/2017  1:08 PM     Author: Debra Mcpherson NP Service: -- Author Type: Nurse Practitioner    Filed: 11/7/2017  1:08 PM Encounter Date: 11/7/2017 Status: Signed    : Debra Mcpherson NP (Nurse Practitioner)    Addended by: DEBRA MCPHERSON on: 11/7/2017 01:08 PM        Modules accepted: Orders

## 2021-07-03 NOTE — ADDENDUM NOTE
Addendum Note by Elma Peterson RN at 2/23/2018 10:02 AM     Author: Elma Peterson RN Service: -- Author Type: Registered Nurse    Filed: 2/23/2018 10:02 AM Encounter Date: 2/22/2018 Status: Signed    : Elma Peterson RN (Registered Nurse)    Addended by: ELMA PETERSON on: 2/23/2018 10:02 AM        Modules accepted: Orders

## 2021-07-03 NOTE — ADDENDUM NOTE
Addendum Note by Tawny Dickinson RN at 11/7/2017 12:55 PM     Author: Tawny Dickinson RN Service: -- Author Type: Registered Nurse    Filed: 11/7/2017 12:55 PM Encounter Date: 11/7/2017 Status: Signed    : Tawny Dickinson RN (Registered Nurse)    Addended by: TAWNY DICKINSON on: 11/7/2017 12:55 PM        Modules accepted: Orders

## 2021-07-03 NOTE — ADDENDUM NOTE
Addendum Note by Debra Mcpherson NP at 5/29/2020 10:30 AM     Author: Debra Mcpherson NP Service: -- Author Type: Nurse Practitioner    Filed: 5/29/2020  2:33 PM Encounter Date: 5/29/2020 Status: Signed    : Debra Mcpherson NP (Nurse Practitioner)    Addended by: DEBRA MCPHERSON on: 5/29/2020 02:33 PM        Modules accepted: Orders

## 2023-09-12 NOTE — PROGRESS NOTES
Pt states he is physically and emotionally tired. Pt states he has not slept since last Thursday. Pt states he has a lot of anxiety and does not trust people easily.  Pt is not aware of his current medications and states his mother knows his medication. RASHEED was obtained for pt's mother. Will contact pt's mother later regarding medication.    Correct pharmacy verified with patient and confirmed in snapshot? [x] yes []no    Medications Phoned  to Pharmacy [] yes [x]no  Name of Pharmacist:  List Medications, including dose, quantity and instructions      Medication Prescriptions given to patient   [] yes  [x] no   List the name of the drug the prescription was written for.      Medications ordered this visit were e-scribed.  Verified by order class [x] yes  [] no  Trazodone, prazosin    Medication changes or discontinuations were communicated to patient's pharmacy:  [] yes  [x] no  Pharmacist Spoke With:     UA collected [] yes  [x] no    [place] Prescription Monitoring Program Reviewed? [] yes  [x] no    Referrals/Labs were made to: None    Completed Charge Capture?  [x] yes  [] no    Future appointment was made: [x] yes  [] no  5/16/17  Dictation completed at time of chart check: [] yes  [x] no    I have checked the documentation for today s encounters and the above information has been reviewed and completed.       5-Fu Counseling: 5-Fluorouracil Counseling:  I discussed with the patient the risks of 5-fluorouracil including but not limited to erythema, scaling, itching, weeping, crusting, and pain.

## 2024-11-17 NOTE — PROGRESS NOTES
Coming in with generalized weakness/poor oral intake/lightheadedness/chills  Patient also noted to be hypotensive in the ER with systolic blood pressure readings in the 90s which is now improved  Patient receiving empiric IV fluids/IV antibiotics  Hold home antihypertensive agents.  PT OT caden requested   Mental Health Visit Note    3/3/2020   Start time: 1101 Stop Time: 1149 Session # 1    Session Type: Patient is presenting for an Individual session.    Choco Claudio is a 54 y.o. male is being seen today for    Chief Complaint   Patient presents with      Follow Up     Anxiety     New symptoms or complaints: Patient indicated struggling with loneliness.     Present For Session: Patient and therapist    Functional Impairment:   Personal: 4  Family: 3  Work: N/A  Social:4    Clinical assessment of mental status: Choco Claudio presented on time for scheduled session today.  He was struggling with memory, open and cooperative and appropriate for therapy. His hygiene and grooming was fair. His memory was poor for short and long term.  His speech and language was soft and concise throughout the session. Concentration and focus is brief. Psychosis is nor reported at today's session. He reports his mood is sad.  Affect is congruent with speech.  Fund of knowledge is adequate. Insight is adequate for therapy. Reviewed and changes made as needed.     Suicidal/Homicidal Ideation present: None reported this session    Patient's impression of their current status: Patient indicated feeling lonely. Patient reported he feels lonely at his apartment and does not want to go back once he leave the apartment. Patient indicated it is also hard for him to leave his apartment due to his anxiety. Patient reported he does not feel safe in most area's. Patient indicated he spends a lot of time checking his surroundings. Patient reported he has one good friend that he spends time with but they have not been spending as much time together either. Patient indicated despite feeling lonely he does not want to meet new people due to not trusting anyone.      Therapist impression of patients current state: Patient continues to have poor insight into his mental health. This therapist processed with patient ways to work on feeling  safe in certain area's using skills taught in session. This therapist challenged patient on ways he feels better when he does leave his apartment. This therapist processed with patient the importance of having a routine that includes getting out of the apartment, being around a support network and self-care.     Type of psychotherapeutic technique provided: Insight oriented, Client centered and CBT    Progress toward short term goals:Progress as expected with patient coming to scheduled session.    Review of long term goals: Will be reviewed at next session    Diagnosis:   1. PTSD (post-traumatic stress disorder)    2. Delusional disorder (H)    3. Severe alcohol use disorder (H)      Plan and Follow up: Patient will return in one week for scheduled session. Patient should continue to work on getting into a healthy routine that involves being around people and leaving his apartment. Patient would benefit from continuing to work on identifying his fears related to trusting people.     Discharge Criteria/Planning: Client has chronic symptoms and ongoing therapy for maintenance stability recommended.    Marcella Nogueira

## 2024-12-04 NOTE — PATIENT INSTRUCTIONS - HE
Continue medications as prescribed  Have your pharmacy contact us for a refill if you are running low on medications (We may ask you to come into clinic to get a refill from the nurse  No Alcohol or drug use  No driving if sedated  Call the clinic with any questions or concerns   Reach out for help if you feel like hurting yourself or others ([facility] [phone]: [address] [facility], [address] or [facility] Suicide Hotline [phone] , call 911 or go to nearest Emergency room    Follow up as directed, for your appointments, per your After Visit Summary Form.               N/A